# Patient Record
Sex: FEMALE | Race: BLACK OR AFRICAN AMERICAN | NOT HISPANIC OR LATINO | Employment: OTHER | URBAN - METROPOLITAN AREA
[De-identification: names, ages, dates, MRNs, and addresses within clinical notes are randomized per-mention and may not be internally consistent; named-entity substitution may affect disease eponyms.]

---

## 2017-11-08 ENCOUNTER — HOSPITAL ENCOUNTER (EMERGENCY)
Facility: HOSPITAL | Age: 77
Discharge: HOME | End: 2017-11-08
Attending: EMERGENCY MEDICINE
Payer: MEDICARE

## 2017-11-08 VITALS
DIASTOLIC BLOOD PRESSURE: 74 MMHG | BODY MASS INDEX: 30.22 KG/M2 | WEIGHT: 177 LBS | RESPIRATION RATE: 16 BRPM | OXYGEN SATURATION: 100 % | TEMPERATURE: 97 F | HEIGHT: 64 IN | HEART RATE: 63 BPM | SYSTOLIC BLOOD PRESSURE: 140 MMHG

## 2017-11-08 DIAGNOSIS — M10.9 ACUTE GOUT OF RIGHT FOOT, UNSPECIFIED CAUSE: ICD-10-CM

## 2017-11-08 DIAGNOSIS — M79.671 FOOT PAIN, RIGHT: ICD-10-CM

## 2017-11-08 DIAGNOSIS — L03.90 CELLULITIS, UNSPECIFIED CELLULITIS SITE: Primary | ICD-10-CM

## 2017-11-08 LAB
ANION GAP SERPL CALC-SCNC: 11 MMOL/L
BASOPHILS # BLD AUTO: 0.04 K/UL
BASOPHILS NFR BLD: 0.6 %
BUN SERPL-MCNC: 16 MG/DL
CALCIUM SERPL-MCNC: 9.1 MG/DL
CHLORIDE SERPL-SCNC: 104 MMOL/L
CO2 SERPL-SCNC: 23 MMOL/L
CREAT SERPL-MCNC: 0.9 MG/DL
CRP SERPL-MCNC: 59.1 MG/L
DIFFERENTIAL METHOD: ABNORMAL
EOSINOPHIL # BLD AUTO: 0.4 K/UL
EOSINOPHIL NFR BLD: 6.1 %
ERYTHROCYTE [DISTWIDTH] IN BLOOD BY AUTOMATED COUNT: 14.9 %
EST. GFR  (AFRICAN AMERICAN): >60 ML/MIN/1.73 M^2
EST. GFR  (NON AFRICAN AMERICAN): >60 ML/MIN/1.73 M^2
GLUCOSE SERPL-MCNC: 97 MG/DL
HCT VFR BLD AUTO: 31 %
HGB BLD-MCNC: 10 G/DL
LYMPHOCYTES # BLD AUTO: 0.9 K/UL
LYMPHOCYTES NFR BLD: 12.6 %
MCH RBC QN AUTO: 29.2 PG
MCHC RBC AUTO-ENTMCNC: 32.3 G/DL
MCV RBC AUTO: 90 FL
MONOCYTES # BLD AUTO: 0.8 K/UL
MONOCYTES NFR BLD: 11.7 %
NEUTROPHILS # BLD AUTO: 4.9 K/UL
NEUTROPHILS NFR BLD: 68.4 %
PLATELET # BLD AUTO: 88 K/UL
PMV BLD AUTO: 11.1 FL
POTASSIUM SERPL-SCNC: 3.7 MMOL/L
RBC # BLD AUTO: 3.43 M/UL
SODIUM SERPL-SCNC: 138 MMOL/L
URATE SERPL-MCNC: 6.9 MG/DL
WBC # BLD AUTO: 7.17 K/UL

## 2017-11-08 PROCEDURE — 84550 ASSAY OF BLOOD/URIC ACID: CPT

## 2017-11-08 PROCEDURE — 86140 C-REACTIVE PROTEIN: CPT

## 2017-11-08 PROCEDURE — 25000003 PHARM REV CODE 250: Performed by: EMERGENCY MEDICINE

## 2017-11-08 PROCEDURE — 99284 EMERGENCY DEPT VISIT MOD MDM: CPT

## 2017-11-08 PROCEDURE — 85025 COMPLETE CBC W/AUTO DIFF WBC: CPT

## 2017-11-08 PROCEDURE — 80048 BASIC METABOLIC PNL TOTAL CA: CPT

## 2017-11-08 RX ORDER — QUINAPRIL HYDROCHLORIDE AND HYDROCHLOROTHIAZIDE 20; 12.5 MG/1; MG/1
1 TABLET, FILM COATED ORAL DAILY
Status: ON HOLD | COMMUNITY
End: 2018-10-12 | Stop reason: HOSPADM

## 2017-11-08 RX ORDER — NAPROXEN 500 MG/1
500 TABLET ORAL 2 TIMES DAILY WITH MEALS
COMMUNITY
End: 2018-03-30

## 2017-11-08 RX ORDER — HYDROCODONE BITARTRATE AND ACETAMINOPHEN 5; 325 MG/1; MG/1
1 TABLET ORAL ONCE
Status: COMPLETED | OUTPATIENT
Start: 2017-11-08 | End: 2017-11-08

## 2017-11-08 RX ORDER — CLINDAMYCIN HYDROCHLORIDE 150 MG/1
300 CAPSULE ORAL 4 TIMES DAILY
Qty: 56 CAPSULE | Refills: 0 | Status: SHIPPED | OUTPATIENT
Start: 2017-11-08 | End: 2017-11-15

## 2017-11-08 RX ORDER — DEXTROSE 4 G
TABLET,CHEWABLE ORAL
COMMUNITY
Start: 2016-01-06

## 2017-11-08 RX ORDER — METFORMIN HYDROCHLORIDE 500 MG/1
500 TABLET ORAL
COMMUNITY
End: 2018-09-30

## 2017-11-08 RX ORDER — ATORVASTATIN CALCIUM 20 MG/1
20 TABLET, FILM COATED ORAL NIGHTLY
COMMUNITY

## 2017-11-08 RX ORDER — LANCETS
EACH MISCELLANEOUS
COMMUNITY
Start: 2017-08-14

## 2017-11-08 RX ORDER — HYDROCODONE BITARTRATE AND ACETAMINOPHEN 5; 325 MG/1; MG/1
1 TABLET ORAL EVERY 4 HOURS PRN
Qty: 10 TABLET | Refills: 0 | Status: SHIPPED | OUTPATIENT
Start: 2017-11-08 | End: 2018-03-30

## 2017-11-08 RX ADMIN — HYDROCODONE BITARTRATE AND ACETAMINOPHEN 1 TABLET: 5; 325 TABLET ORAL at 11:11

## 2017-11-08 NOTE — ED PROVIDER NOTES
Encounter Date: 11/8/2017       History     Chief Complaint   Patient presents with    Foot Pain     redness/swelling to left foot. denies injry.      The history is provided by the patient.   Foot Pain   This is a chronic problem. The current episode started more than 1 week ago. The problem occurs daily. The problem has been gradually worsening. Pertinent negatives include no chest pain, no abdominal pain, no headaches and no shortness of breath. The symptoms are aggravated by walking. The symptoms are relieved by rest. She has tried nothing for the symptoms. The treatment provided no relief.   Patient reports chronic swelling of medial aspect of 1st MCP bilaterally. Pt states the pain has increased to right great toe over last several days and reports increased redness and tenderness. Pt is currently on Thiazide diuretic.    Review of patient's allergies indicates:  No Known Allergies  Past Medical History:   Diagnosis Date    Diabetes mellitus     Hypercholesteremia     Hypertension      Past Surgical History:   Procedure Laterality Date    ABDOMINAL SURGERY       History reviewed. No pertinent family history.  Social History   Substance Use Topics    Smoking status: Never Smoker    Smokeless tobacco: Never Used    Alcohol use Yes      Comment: socially     Review of Systems   Respiratory: Negative for shortness of breath.    Cardiovascular: Negative for chest pain.   Gastrointestinal: Negative for abdominal pain.   Musculoskeletal:        Right foot pain   Neurological: Negative for headaches.   All other systems reviewed and are negative.      Physical Exam     Initial Vitals [11/08/17 1022]   BP Pulse Resp Temp SpO2   (!) 150/70 74 18 98.3 °F (36.8 °C) 97 %      MAP       96.67         Physical Exam    Nursing note and vitals reviewed.  Constitutional: She appears well-developed and well-nourished. No distress.   HENT:   Head: Normocephalic and atraumatic.   Mouth/Throat: Oropharynx is clear and  moist.   Eyes: Conjunctivae and EOM are normal. Pupils are equal, round, and reactive to light.   Neck: Normal range of motion. Neck supple.   Cardiovascular: Normal rate, regular rhythm and normal heart sounds.   Pulmonary/Chest: Breath sounds normal. No respiratory distress.   Abdominal: Soft. Bowel sounds are normal.   Musculoskeletal: Normal range of motion. She exhibits tenderness.        Right foot: There is tenderness and swelling. There is normal range of motion, no crepitus, no deformity and no laceration.        Left foot: There is swelling. There is normal range of motion, no tenderness, normal capillary refill, no crepitus, no deformity and no laceration.   Neurological: She is alert and oriented to person, place, and time. She has normal strength.   Skin: Skin is warm and dry.   Psychiatric: She has a normal mood and affect. Thought content normal.         ED Course   Procedures  Labs Reviewed   CBC W/ AUTO DIFFERENTIAL   BASIC METABOLIC PANEL   URIC ACID   C-REACTIVE PROTEIN     Results for orders placed or performed during the hospital encounter of 11/08/17   CBC auto differential   Result Value Ref Range    WBC 7.17 3.90 - 12.70 K/uL    RBC 3.43 (L) 4.00 - 5.40 M/uL    Hemoglobin 10.0 (L) 12.0 - 16.0 g/dL    Hematocrit 31.0 (L) 37.0 - 48.5 %    MCV 90 82 - 98 fL    MCH 29.2 27.0 - 31.0 pg    MCHC 32.3 32.0 - 36.0 g/dL    RDW 14.9 (H) 11.5 - 14.5 %    Platelets 88 (L) 150 - 350 K/uL    MPV 11.1 9.2 - 12.9 fL    Gran # 4.9 1.8 - 7.7 K/uL    Lymph # 0.9 (L) 1.0 - 4.8 K/uL    Mono # 0.8 0.3 - 1.0 K/uL    Eos # 0.4 0.0 - 0.5 K/uL    Baso # 0.04 0.00 - 0.20 K/uL    Gran% 68.4 38.0 - 73.0 %    Lymph% 12.6 (L) 18.0 - 48.0 %    Mono% 11.7 4.0 - 15.0 %    Eosinophil% 6.1 0.0 - 8.0 %    Basophil% 0.6 0.0 - 1.9 %    Differential Method Automated    Basic metabolic panel   Result Value Ref Range    Sodium 138 136 - 145 mmol/L    Potassium 3.7 3.5 - 5.1 mmol/L    Chloride 104 95 - 110 mmol/L    CO2 23 23 - 29  mmol/L    Glucose 97 70 - 110 mg/dL    BUN, Bld 16 8 - 23 mg/dL    Creatinine 0.9 0.5 - 1.4 mg/dL    Calcium 9.1 8.7 - 10.5 mg/dL    Anion Gap 11 8 - 16 mmol/L    eGFR if African American >60.0 >60 mL/min/1.73 m^2    eGFR if non African American >60.0 >60 mL/min/1.73 m^2   Uric acid   Result Value Ref Range    Uric Acid 6.9 (H) 2.4 - 5.7 mg/dL   C-reactive protein   Result Value Ref Range    CRP 59.1 (H) 0.0 - 8.2 mg/L     Imaging Results          X-Ray Foot Complete Right (Final result)  Result time 11/08/17 11:58:17    Final result by SHERRI Lopez Sr., MD (11/08/17 11:58:17)                 Impression:      1. There is mild hallux valgus deformity. There is mild prominence of the soft tissue thickness medial to the first metatarsophalangeal joint. This is characteristic of a bunion.  2. There is mild prominence of the soft tissue thickness in the dorsum of the right foot. There is moderate prominence of the soft tissue thickness lateral to the right ankle.  This is characteristic of a soft tissue contusion or cellulitis.  3. There is a small spur at the site of attachment of the plantar fascia to the calcaneus. There is a small spur at the site of attachment of the Achilles tendon to the calcaneus.   4. There is a 1 mm oval-shaped calcification projected medial to the distal aspect of the first metatarsal. This is characteristic of prior trauma.      Electronically signed by: SHERRI LOPEZ MD  Date:     11/08/17  Time:    11:58              Narrative:    3 view x-ray of the right foot    Clinical History:     Pain in right foot    Findings:     There is no fracture. There is no dislocation. There is mild hallux valgus deformity. There is a 1 mm oval-shaped calcification projected medial to the distal aspect of the first metatarsal. There is mild prominence of the soft tissue thickness medial to the first metatarsophalangeal joint. There is a small spur at the site of attachment of the plantar fascia to the  calcaneus. There is a small spur at the site of attachment of the Achilles tendon to the calcaneus. There is mild prominence of the soft tissue thickness in the dorsum of the right foot. There is moderate prominence of the soft tissue thickness lateral to the right ankle.                            12:30 PM - Counseling: Spoke with the patient and discussed todays findings, in addition to providing specific details for the plan of care and counseling regarding the diagnosis and prognosis. Questions are answered at this time. I discussed results with patient and need to follow up with PCP, regarding evaluation for Gout and presumed tx for cellulitis in Diabetic pt with findings c/w Gout on a Thiazide diuretic.                               ED Course      Clinical Impression:   The primary encounter diagnosis was Cellulitis, unspecified cellulitis site. Diagnoses of Foot pain, right and Acute gout of right foot, unspecified cause were also pertinent to this visit.    Disposition:   Disposition: Discharged  Condition: Stable                        Harpreet Mcnamara MD  11/08/17 6738

## 2018-03-30 ENCOUNTER — HOSPITAL ENCOUNTER (EMERGENCY)
Facility: HOSPITAL | Age: 78
Discharge: HOME | End: 2018-03-30
Attending: EMERGENCY MEDICINE
Payer: MEDICARE

## 2018-03-30 VITALS
SYSTOLIC BLOOD PRESSURE: 150 MMHG | BODY MASS INDEX: 28.67 KG/M2 | HEART RATE: 72 BPM | TEMPERATURE: 98 F | DIASTOLIC BLOOD PRESSURE: 67 MMHG | WEIGHT: 167 LBS | RESPIRATION RATE: 15 BRPM | OXYGEN SATURATION: 100 %

## 2018-03-30 DIAGNOSIS — R10.9 ABDOMINAL PAIN: Primary | ICD-10-CM

## 2018-03-30 DIAGNOSIS — K52.9 GASTROENTERITIS, INFECTIOUS, PRESUMED: ICD-10-CM

## 2018-03-30 LAB
ALBUMIN SERPL BCP-MCNC: 4.1 G/DL
ALP SERPL-CCNC: 80 U/L
ALT SERPL W/O P-5'-P-CCNC: 13 U/L
ANION GAP SERPL CALC-SCNC: 12 MMOL/L
AST SERPL-CCNC: 20 U/L
BASOPHILS # BLD AUTO: 0.02 K/UL
BASOPHILS NFR BLD: 0.3 %
BILIRUB SERPL-MCNC: 0.5 MG/DL
BILIRUB UR QL STRIP: NEGATIVE
BUN SERPL-MCNC: 18 MG/DL
CALCIUM SERPL-MCNC: 9.3 MG/DL
CHLORIDE SERPL-SCNC: 102 MMOL/L
CK MB SERPL-MCNC: 3.8 NG/ML
CK MB SERPL-RTO: 1.2 %
CK SERPL-CCNC: 313 U/L
CK SERPL-CCNC: 313 U/L
CLARITY UR REFRACT.AUTO: CLEAR
CO2 SERPL-SCNC: 22 MMOL/L
COLOR UR AUTO: YELLOW
CREAT SERPL-MCNC: 1 MG/DL
DIFFERENTIAL METHOD: ABNORMAL
EOSINOPHIL # BLD AUTO: 0.3 K/UL
EOSINOPHIL NFR BLD: 3.9 %
ERYTHROCYTE [DISTWIDTH] IN BLOOD BY AUTOMATED COUNT: 15.4 %
EST. GFR  (AFRICAN AMERICAN): >60 ML/MIN/1.73 M^2
EST. GFR  (NON AFRICAN AMERICAN): 54.9 ML/MIN/1.73 M^2
GLUCOSE SERPL-MCNC: 126 MG/DL
GLUCOSE UR QL STRIP: NEGATIVE
HCT VFR BLD AUTO: 30.9 %
HGB BLD-MCNC: 9.8 G/DL
HGB UR QL STRIP: NEGATIVE
KETONES UR QL STRIP: NEGATIVE
LEUKOCYTE ESTERASE UR QL STRIP: NEGATIVE
LIPASE SERPL-CCNC: 47 U/L
LYMPHOCYTES # BLD AUTO: 0.9 K/UL
LYMPHOCYTES NFR BLD: 10.8 %
MCH RBC QN AUTO: 27 PG
MCHC RBC AUTO-ENTMCNC: 31.7 G/DL
MCV RBC AUTO: 85 FL
MONOCYTES # BLD AUTO: 0.5 K/UL
MONOCYTES NFR BLD: 6.8 %
NEUTROPHILS # BLD AUTO: 6.2 K/UL
NEUTROPHILS NFR BLD: 77.8 %
NITRITE UR QL STRIP: NEGATIVE
PH UR STRIP: 6 [PH] (ref 5–8)
PLATELET # BLD AUTO: 210 K/UL
PMV BLD AUTO: 11 FL
POTASSIUM SERPL-SCNC: 3.3 MMOL/L
PROT SERPL-MCNC: 7.5 G/DL
PROT UR QL STRIP: NEGATIVE
RBC # BLD AUTO: 3.63 M/UL
SODIUM SERPL-SCNC: 136 MMOL/L
SP GR UR STRIP: 1.02 (ref 1–1.03)
TROPONIN I SERPL DL<=0.01 NG/ML-MCNC: <0.006 NG/ML
URN SPEC COLLECT METH UR: NORMAL
UROBILINOGEN UR STRIP-ACNC: NEGATIVE EU/DL
WBC # BLD AUTO: 7.93 K/UL

## 2018-03-30 PROCEDURE — 80053 COMPREHEN METABOLIC PANEL: CPT

## 2018-03-30 PROCEDURE — 99900035 HC TECH TIME PER 15 MIN (STAT)

## 2018-03-30 PROCEDURE — 99285 EMERGENCY DEPT VISIT HI MDM: CPT | Mod: 25

## 2018-03-30 PROCEDURE — 85025 COMPLETE CBC W/AUTO DIFF WBC: CPT

## 2018-03-30 PROCEDURE — 93010 ELECTROCARDIOGRAM REPORT: CPT | Mod: ,,, | Performed by: NUCLEAR MEDICINE

## 2018-03-30 PROCEDURE — 63600175 PHARM REV CODE 636 W HCPCS: Performed by: EMERGENCY MEDICINE

## 2018-03-30 PROCEDURE — 96365 THER/PROPH/DIAG IV INF INIT: CPT

## 2018-03-30 PROCEDURE — 82553 CREATINE MB FRACTION: CPT

## 2018-03-30 PROCEDURE — 83690 ASSAY OF LIPASE: CPT

## 2018-03-30 PROCEDURE — 81003 URINALYSIS AUTO W/O SCOPE: CPT

## 2018-03-30 PROCEDURE — 25000003 PHARM REV CODE 250: Performed by: EMERGENCY MEDICINE

## 2018-03-30 PROCEDURE — 93005 ELECTROCARDIOGRAM TRACING: CPT

## 2018-03-30 PROCEDURE — 96375 TX/PRO/DX INJ NEW DRUG ADDON: CPT

## 2018-03-30 PROCEDURE — 84484 ASSAY OF TROPONIN QUANT: CPT

## 2018-03-30 RX ORDER — PANTOPRAZOLE SODIUM 20 MG/1
20 TABLET, DELAYED RELEASE ORAL DAILY
Qty: 30 TABLET | Refills: 0 | Status: SHIPPED | OUTPATIENT
Start: 2018-03-30 | End: 2018-09-30

## 2018-03-30 RX ORDER — HYDROMORPHONE HYDROCHLORIDE 1 MG/ML
0.5 INJECTION, SOLUTION INTRAMUSCULAR; INTRAVENOUS; SUBCUTANEOUS
Status: COMPLETED | OUTPATIENT
Start: 2018-03-30 | End: 2018-03-30

## 2018-03-30 RX ORDER — ONDANSETRON 2 MG/ML
4 INJECTION INTRAMUSCULAR; INTRAVENOUS
Status: COMPLETED | OUTPATIENT
Start: 2018-03-30 | End: 2018-03-30

## 2018-03-30 RX ORDER — ONDANSETRON 4 MG/1
4 TABLET, ORALLY DISINTEGRATING ORAL EVERY 12 HOURS PRN
Qty: 6 TABLET | Refills: 1 | Status: SHIPPED | OUTPATIENT
Start: 2018-03-30 | End: 2018-09-30

## 2018-03-30 RX ORDER — PANTOPRAZOLE SODIUM 40 MG/1
40 TABLET, DELAYED RELEASE ORAL
Status: COMPLETED | OUTPATIENT
Start: 2018-03-30 | End: 2018-03-30

## 2018-03-30 RX ORDER — COLCHICINE 0.6 MG/1
0.6 TABLET ORAL DAILY
COMMUNITY

## 2018-03-30 RX ADMIN — HYDROMORPHONE HYDROCHLORIDE 0.5 MG: 1 INJECTION, SOLUTION INTRAMUSCULAR; INTRAVENOUS; SUBCUTANEOUS at 09:03

## 2018-03-30 RX ADMIN — ONDANSETRON 4 MG: 2 INJECTION INTRAMUSCULAR; INTRAVENOUS at 08:03

## 2018-03-30 RX ADMIN — PANTOPRAZOLE SODIUM 40 MG: 40 TABLET, DELAYED RELEASE ORAL at 10:03

## 2018-03-30 RX ADMIN — PROMETHAZINE HYDROCHLORIDE 6.25 MG: 25 INJECTION INTRAMUSCULAR; INTRAVENOUS at 08:03

## 2018-03-31 NOTE — ED NOTES
Pt is aaoX4, resp e/u, skin warm and dry, nad. Pt has been updated on test results and poc, and she has verbalized understanding. Pt denies having any further questions or concerns at this time. Pt will be discharged per md order.

## 2018-03-31 NOTE — ED PROVIDER NOTES
"Encounter Date: 3/30/2018       History     Chief Complaint   Patient presents with    Abdominal Pain     Pt states "stomach pains that started today". LBM today.     Abdominal pain, nausea - here with daughter-in-law; onset midday today; describes generalized anterior and mid abdominal discomfort with a mild increase in stool frequency but no actual diarrhea.  She has had some nausea but no vomiting.  No fever, chills, chest pain, shortness of breath, urinary symptoms, back pain, or other complaints.  No known ill contacts.  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  She does remember feeling some mild sweats earlier today.  No prior such episodes.  No other complaints.      The history is provided by the patient and a relative. No  was used.     Review of patient's allergies indicates:  No Known Allergies  Past Medical History:   Diagnosis Date    Diabetes mellitus     Gout     Hypercholesteremia     Hypertension      Past Surgical History:   Procedure Laterality Date    ABDOMINAL SURGERY       History reviewed. No pertinent family history.  Social History   Substance Use Topics    Smoking status: Never Smoker    Smokeless tobacco: Never Used    Alcohol use Yes      Comment: socially     Review of Systems   Constitutional: Negative for activity change, fatigue and fever.   HENT: Negative for congestion, ear pain, facial swelling, nosebleeds, sinus pressure and sore throat.    Eyes: Negative for pain, discharge, redness and visual disturbance.   Respiratory: Negative for cough, choking, chest tightness, shortness of breath and wheezing.    Cardiovascular: Negative for chest pain, palpitations and leg swelling.   Gastrointestinal: Positive for abdominal pain and nausea. Negative for abdominal distention and vomiting.   Endocrine: Negative for heat intolerance, polydipsia and polyuria.   Genitourinary: Negative for difficulty " urinating, dysuria, flank pain, hematuria and urgency.   Musculoskeletal: Negative for back pain, gait problem, joint swelling and myalgias.   Skin: Negative for color change and rash.   Allergic/Immunologic: Negative for environmental allergies and food allergies.   Neurological: Negative for dizziness, weakness, numbness and headaches.   Hematological: Negative for adenopathy. Does not bruise/bleed easily.   Psychiatric/Behavioral: Negative for agitation and behavioral problems. The patient is not nervous/anxious.    All other systems reviewed and are negative.      Physical Exam     Initial Vitals [03/30/18 1921]   BP Pulse Resp Temp SpO2   (!) 170/79 68 20 97.5 °F (36.4 °C) 100 %      MAP       109.33         Physical Exam    Nursing note and vitals reviewed.  Constitutional: She appears well-developed and well-nourished. She is not diaphoretic. No distress.   Slightly uncomfortable   HENT:   Head: Normocephalic and atraumatic.   Mouth/Throat: No oropharyngeal exudate.   Eyes: Conjunctivae and EOM are normal. Pupils are equal, round, and reactive to light. Right eye exhibits no discharge. Left eye exhibits no discharge. No scleral icterus.   Neck: Normal range of motion. Neck supple. No thyromegaly present. No tracheal deviation present. No JVD present.   Cardiovascular: Normal rate, regular rhythm, normal heart sounds and intact distal pulses. Exam reveals no gallop and no friction rub.    No murmur heard.  Pulmonary/Chest: Breath sounds normal. No stridor. No respiratory distress. She has no wheezes. She has no rhonchi. She has no rales. She exhibits no tenderness.   Abdominal: Soft. Bowel sounds are normal. She exhibits no distension and no mass. There is no tenderness. There is no rebound and no guarding.   Indicates diffuse anterior/ upper/ mid-abdominal region but relatively poorly localizing, no rebound/ guarding; no distension; normal BS   Musculoskeletal: Normal range of motion. She exhibits no edema or  tenderness.   Neurological: She is alert and oriented to person, place, and time. She has normal strength.   Skin: Skin is warm and dry. No rash and no abscess noted. No erythema.   Psychiatric: She has a normal mood and affect. Her behavior is normal. Judgment and thought content normal.         ED Course   Procedures  Labs Reviewed   CBC W/ AUTO DIFFERENTIAL - Abnormal; Notable for the following:        Result Value    RBC 3.63 (*)     Hemoglobin 9.8 (*)     Hematocrit 30.9 (*)     MCHC 31.7 (*)     RDW 15.4 (*)     Lymph # 0.9 (*)     Gran% 77.8 (*)     Lymph% 10.8 (*)     All other components within normal limits   COMPREHENSIVE METABOLIC PANEL - Abnormal; Notable for the following:     Potassium 3.3 (*)     CO2 22 (*)     Glucose 126 (*)     eGFR if non  54.9 (*)     All other components within normal limits   CK - Abnormal; Notable for the following:      (*)     All other components within normal limits   CK-MB - Abnormal; Notable for the following:      (*)     All other components within normal limits   LIPASE   URINALYSIS   TROPONIN I     EKG Readings: (Independently Interpreted)   Rhythm: Normal Sinus Rhythm. Heart Rate: 65. Ectopy: No Ectopy. Conduction: Normal. ST Segments: Normal ST Segments. T Waves: Normal. Clinical Impression: Normal Sinus Rhythm     Imaging Results          US Abdomen Limited (Final result)  Result time 03/30/18 21:53:47    Final result by Driss Aquino MD (03/30/18 21:53:47)                 Impression:     Negative gallbladder ultrasound.      Electronically signed by: DRISS AQUINO MD  Date:     03/30/18  Time:    21:53              Narrative:    Gallbladder ultrasound. 03/30/18 21:25:00    Clinical history: Gallstones/ abdominal pain ,  Abdominal pain.    The liver is unremarkable. The gallbladder is empty. The common bile duct is normal at 0.22 cm cm. The pancreas is normal. The right kidney is normal at 10 point cm.  The aorta, vena cava,and  portal vein as visualized appear free of significant abnormality.                             X-Ray Chest PA And Lateral (Final result)  Result time 03/30/18 20:06:12    Final result by Driss Aquino MD (03/30/18 20:06:12)                 Impression:         Negative two-view chest x-ray.      Electronically signed by: DRISS AQUINO MD  Date:     03/30/18  Time:    20:06              Narrative:    Two-view chest x-ray.03/30/18 20:01:35    Clinical indication: Unspecified abdominal pain.    Heart size is normal. The lung fields are clear. No acute pulmonary infiltrate.                             X-Ray Abdomen Flat And Erect (Final result)  Result time 03/30/18 20:05:22    Final result by Driss Aquino MD (03/30/18 20:05:22)                 Impression:     The bowel gas pattern is unremarkable.  Gallstones are present in the right upper quadrant.      Electronically signed by: DRISS AQUINO MD  Date:     03/30/18  Time:    20:05              Narrative:    Two-view abdominal x-ray, 03/30/18 19:42:40    History:    Abdominal Pain     Two views of the abdomen. The bowel gas pattern is unremarkable. No obstruction, ileus or free air.    Bony structures are intact with chronic spurring and degenerative change of the spine.  Rounded calcifications in the right upper quadrant have the appearance of gallstones measuring up to 1.5 cm.                            8:48 PM Nausea improved, abdominal pain slightly improved, asking for pain medicine.  She was not previously aware of gallstones.  Await ultrasound.  Exam is stable.    10:06 PM Discussed with radiologist.  No gallstones seen on ultrasound.  On review of abdominal plain films, the calcifications are now felt to be soft tissue benign calcifications and not gallstones.                            Clinical Impression:     1. Abdominal pain    2. Gastroenteritis, infectious, presumed          Disposition:   Disposition: Discharged  Condition:  Stable                        Kade Ortega MD  03/30/18 7843

## 2018-03-31 NOTE — DISCHARGE INSTRUCTIONS
__________________    Exam and tests are fine.    See your MD for a recheck Monday or Tuesday.    Return to the ER as needed/ if worse.    _________________

## 2018-04-16 ENCOUNTER — TELEPHONE (OUTPATIENT)
Dept: RADIOLOGY | Facility: HOSPITAL | Age: 78
End: 2018-04-16

## 2018-04-17 ENCOUNTER — HOSPITAL ENCOUNTER (OUTPATIENT)
Dept: RADIOLOGY | Facility: HOSPITAL | Age: 78
Discharge: HOME | End: 2018-04-17
Attending: INTERNAL MEDICINE
Payer: MEDICARE

## 2018-04-17 DIAGNOSIS — R10.84 GENERALIZED ABDOMINAL PAIN: ICD-10-CM

## 2018-04-17 DIAGNOSIS — D72.825 BANDEMIA: ICD-10-CM

## 2018-04-17 PROCEDURE — 74176 CT ABD & PELVIS W/O CONTRAST: CPT | Mod: TC,PO

## 2018-04-17 PROCEDURE — 25500020 PHARM REV CODE 255: Mod: PO

## 2018-04-17 PROCEDURE — 74176 CT ABD & PELVIS W/O CONTRAST: CPT | Mod: 26,,, | Performed by: RADIOLOGY

## 2018-04-17 RX ADMIN — IOHEXOL 30 ML: 350 INJECTION, SOLUTION INTRAVENOUS at 09:04

## 2018-08-10 ENCOUNTER — TELEPHONE (OUTPATIENT)
Dept: RADIOLOGY | Facility: HOSPITAL | Age: 78
End: 2018-08-10

## 2018-08-13 ENCOUNTER — HOSPITAL ENCOUNTER (OUTPATIENT)
Dept: RADIOLOGY | Facility: HOSPITAL | Age: 78
Discharge: HOME | End: 2018-08-13
Attending: PHYSICIAN ASSISTANT
Payer: MEDICARE

## 2018-08-13 DIAGNOSIS — R93.2 ABNORMAL FINDINGS ON DIAGNOSTIC IMAGING OF LIVER: ICD-10-CM

## 2018-08-13 DIAGNOSIS — D50.9 IRON DEFICIENCY ANEMIA: ICD-10-CM

## 2018-08-13 PROCEDURE — 76705 ECHO EXAM OF ABDOMEN: CPT | Mod: 26,,, | Performed by: RADIOLOGY

## 2018-08-13 PROCEDURE — 76705 ECHO EXAM OF ABDOMEN: CPT | Mod: TC,PO

## 2018-09-30 ENCOUNTER — HOSPITAL ENCOUNTER (EMERGENCY)
Facility: HOSPITAL | Age: 78
Discharge: HOME | End: 2018-09-30
Attending: EMERGENCY MEDICINE
Payer: MEDICARE

## 2018-09-30 VITALS
RESPIRATION RATE: 18 BRPM | HEART RATE: 84 BPM | TEMPERATURE: 98 F | DIASTOLIC BLOOD PRESSURE: 84 MMHG | BODY MASS INDEX: 24.22 KG/M2 | WEIGHT: 141.13 LBS | SYSTOLIC BLOOD PRESSURE: 135 MMHG | OXYGEN SATURATION: 97 %

## 2018-09-30 DIAGNOSIS — A08.4 VIRAL GASTROENTERITIS: Primary | ICD-10-CM

## 2018-09-30 LAB
ALBUMIN SERPL BCP-MCNC: 4.2 G/DL
ALP SERPL-CCNC: 92 U/L
ALT SERPL W/O P-5'-P-CCNC: 12 U/L
ANION GAP SERPL CALC-SCNC: 15 MMOL/L
AST SERPL-CCNC: 18 U/L
BASOPHILS # BLD AUTO: 0.01 K/UL
BASOPHILS NFR BLD: 0.1 %
BILIRUB SERPL-MCNC: 0.8 MG/DL
BILIRUB UR QL STRIP: ABNORMAL
BUN SERPL-MCNC: 15 MG/DL
CALCIUM SERPL-MCNC: 10.3 MG/DL
CHLORIDE SERPL-SCNC: 98 MMOL/L
CLARITY UR REFRACT.AUTO: CLEAR
CO2 SERPL-SCNC: 23 MMOL/L
COLOR UR AUTO: YELLOW
CREAT SERPL-MCNC: 1.1 MG/DL
DIFFERENTIAL METHOD: ABNORMAL
EOSINOPHIL # BLD AUTO: 0 K/UL
EOSINOPHIL NFR BLD: 0.1 %
ERYTHROCYTE [DISTWIDTH] IN BLOOD BY AUTOMATED COUNT: 17.9 %
EST. GFR  (AFRICAN AMERICAN): 56.4 ML/MIN/1.73 M^2
EST. GFR  (NON AFRICAN AMERICAN): 48.9 ML/MIN/1.73 M^2
GLUCOSE SERPL-MCNC: 158 MG/DL
GLUCOSE UR QL STRIP: NEGATIVE
HCT VFR BLD AUTO: 37.8 %
HGB BLD-MCNC: 12.7 G/DL
HGB UR QL STRIP: NEGATIVE
KETONES UR QL STRIP: NEGATIVE
LEUKOCYTE ESTERASE UR QL STRIP: NEGATIVE
LIPASE SERPL-CCNC: 32 U/L
LYMPHOCYTES # BLD AUTO: 0.5 K/UL
LYMPHOCYTES NFR BLD: 3.9 %
MCH RBC QN AUTO: 28.5 PG
MCHC RBC AUTO-ENTMCNC: 33.6 G/DL
MCV RBC AUTO: 85 FL
MONOCYTES # BLD AUTO: 0.3 K/UL
MONOCYTES NFR BLD: 1.8 %
NEUTROPHILS # BLD AUTO: 12.8 K/UL
NEUTROPHILS NFR BLD: 93.8 %
NITRITE UR QL STRIP: NEGATIVE
PH UR STRIP: 5 [PH] (ref 5–8)
PLATELET # BLD AUTO: 322 K/UL
PMV BLD AUTO: 10.5 FL
POCT GLUCOSE: 151 MG/DL (ref 70–110)
POTASSIUM SERPL-SCNC: 3.4 MMOL/L
PROT SERPL-MCNC: 8 G/DL
PROT UR QL STRIP: NEGATIVE
RBC # BLD AUTO: 4.46 M/UL
SODIUM SERPL-SCNC: 136 MMOL/L
SP GR UR STRIP: >=1.03 (ref 1–1.03)
URN SPEC COLLECT METH UR: ABNORMAL
UROBILINOGEN UR STRIP-ACNC: NEGATIVE EU/DL
WBC # BLD AUTO: 13.62 K/UL

## 2018-09-30 PROCEDURE — 63600175 PHARM REV CODE 636 W HCPCS: Performed by: EMERGENCY MEDICINE

## 2018-09-30 PROCEDURE — 81003 URINALYSIS AUTO W/O SCOPE: CPT

## 2018-09-30 PROCEDURE — 80053 COMPREHEN METABOLIC PANEL: CPT

## 2018-09-30 PROCEDURE — 99284 EMERGENCY DEPT VISIT MOD MDM: CPT | Mod: 25

## 2018-09-30 PROCEDURE — 96374 THER/PROPH/DIAG INJ IV PUSH: CPT

## 2018-09-30 PROCEDURE — 83690 ASSAY OF LIPASE: CPT

## 2018-09-30 PROCEDURE — 85025 COMPLETE CBC W/AUTO DIFF WBC: CPT

## 2018-09-30 PROCEDURE — 25000003 PHARM REV CODE 250: Performed by: EMERGENCY MEDICINE

## 2018-09-30 PROCEDURE — 82962 GLUCOSE BLOOD TEST: CPT

## 2018-09-30 RX ORDER — DIPHENOXYLATE HYDROCHLORIDE AND ATROPINE SULFATE 2.5; .025 MG/1; MG/1
1-2 TABLET ORAL 4 TIMES DAILY PRN
Qty: 8 TABLET | Refills: 1 | OUTPATIENT
Start: 2018-09-30 | End: 2018-10-12 | Stop reason: HOSPADM

## 2018-09-30 RX ORDER — FERROUS SULFATE 325(65) MG
325 TABLET, DELAYED RELEASE (ENTERIC COATED) ORAL
Status: ON HOLD | COMMUNITY
End: 2018-10-12 | Stop reason: HOSPADM

## 2018-09-30 RX ORDER — ONDANSETRON 2 MG/ML
4 INJECTION INTRAMUSCULAR; INTRAVENOUS
Status: COMPLETED | OUTPATIENT
Start: 2018-09-30 | End: 2018-09-30

## 2018-09-30 RX ORDER — OMEPRAZOLE 40 MG/1
40 CAPSULE, DELAYED RELEASE ORAL DAILY
COMMUNITY

## 2018-09-30 RX ORDER — DIPHENOXYLATE HYDROCHLORIDE AND ATROPINE SULFATE 2.5; .025 MG/1; MG/1
2 TABLET ORAL
Status: COMPLETED | OUTPATIENT
Start: 2018-09-30 | End: 2018-09-30

## 2018-09-30 RX ORDER — ONDANSETRON 4 MG/1
4 TABLET, ORALLY DISINTEGRATING ORAL EVERY 6 HOURS PRN
Qty: 6 TABLET | Refills: 1 | Status: SHIPPED | OUTPATIENT
Start: 2018-09-30 | End: 2018-10-16

## 2018-09-30 RX ORDER — PROMETHAZINE HYDROCHLORIDE 25 MG/1
25 TABLET ORAL
Status: COMPLETED | OUTPATIENT
Start: 2018-09-30 | End: 2018-09-30

## 2018-09-30 RX ORDER — DICYCLOMINE HYDROCHLORIDE 10 MG/1
10 CAPSULE ORAL 3 TIMES DAILY PRN
COMMUNITY

## 2018-09-30 RX ADMIN — ONDANSETRON 4 MG: 2 INJECTION, SOLUTION INTRAMUSCULAR; INTRAVENOUS at 02:09

## 2018-09-30 RX ADMIN — PROMETHAZINE HYDROCHLORIDE 25 MG: 25 TABLET ORAL at 03:09

## 2018-09-30 RX ADMIN — SODIUM CHLORIDE 1000 ML: 0.9 INJECTION, SOLUTION INTRAVENOUS at 02:09

## 2018-09-30 RX ADMIN — DIPHENOXYLATE HYDROCHLORIDE AND ATROPINE SULFATE 2 TABLET: 2.5; .025 TABLET ORAL at 02:09

## 2018-09-30 NOTE — ED PROVIDER NOTES
"Encounter Date: 9/30/2018       History     Chief Complaint   Patient presents with    Diarrhea     Pt states "diarrhea and throwing up".     Nausea, vomiting, and diarrhea on and off since yesterday, no known ill contacts.  Blood sugar reported under good control.  No fever or urinary symptoms.  No chest pain or dyspnea.  No sign of bleeding.  No back pain or abdominal pain. No other complaints.      The history is provided by the patient and a relative. No  was used.     Review of patient's allergies indicates:  No Known Allergies  Past Medical History:   Diagnosis Date    Diabetes mellitus     Gout     Hypercholesteremia     Hypertension      Past Surgical History:   Procedure Laterality Date    ABDOMINAL SURGERY       History reviewed. No pertinent family history.  Social History     Tobacco Use    Smoking status: Never Smoker    Smokeless tobacco: Never Used   Substance Use Topics    Alcohol use: Yes     Comment: socially    Drug use: No     Review of Systems   Constitutional: Negative for activity change, fatigue and fever.   HENT: Negative for congestion, ear pain, facial swelling, nosebleeds, sinus pressure and sore throat.    Eyes: Negative for pain, discharge, redness and visual disturbance.   Respiratory: Negative for cough, choking, chest tightness, shortness of breath and wheezing.    Cardiovascular: Negative for chest pain, palpitations and leg swelling.   Gastrointestinal: Positive for diarrhea, nausea and vomiting. Negative for abdominal distention and abdominal pain.   Endocrine: Negative for heat intolerance, polydipsia and polyuria.   Genitourinary: Negative for difficulty urinating, dysuria, flank pain, hematuria and urgency.   Musculoskeletal: Negative for back pain, gait problem, joint swelling and myalgias.   Skin: Negative for color change and rash.   Allergic/Immunologic: Negative for environmental allergies and food allergies.   Neurological: Negative for " dizziness, weakness, numbness and headaches.   Hematological: Negative for adenopathy. Does not bruise/bleed easily.   Psychiatric/Behavioral: Negative for agitation and behavioral problems. The patient is not nervous/anxious.    All other systems reviewed and are negative.      Physical Exam     Initial Vitals [09/30/18 0214]   BP Pulse Resp Temp SpO2   (!) 140/87 94 18 98.2 °F (36.8 °C) 100 %      MAP       --         Physical Exam    Nursing note and vitals reviewed.  Constitutional: She appears well-developed and well-nourished. She is not diaphoretic. No distress.   Mild distress/ gagging   HENT:   Head: Normocephalic and atraumatic.   Mouth/Throat: No oropharyngeal exudate.   Eyes: Conjunctivae and EOM are normal. Pupils are equal, round, and reactive to light. Right eye exhibits no discharge. Left eye exhibits no discharge. No scleral icterus.   Neck: Normal range of motion. Neck supple. No thyromegaly present. No tracheal deviation present. No JVD present.   Cardiovascular: Normal rate, regular rhythm, normal heart sounds and intact distal pulses. Exam reveals no gallop and no friction rub.    No murmur heard.  Pulmonary/Chest: Breath sounds normal. No stridor. No respiratory distress. She has no wheezes. She has no rhonchi. She has no rales. She exhibits no tenderness.   Abdominal: Soft. Bowel sounds are normal. She exhibits no distension and no mass. There is no tenderness. There is no rebound and no guarding.   Musculoskeletal: Normal range of motion. She exhibits no edema or tenderness.   Neurological: She is alert and oriented to person, place, and time. She has normal strength.   Skin: Skin is warm and dry. No rash and no abscess noted. No erythema.   Psychiatric: She has a normal mood and affect. Her behavior is normal. Judgment and thought content normal.         ED Course   Procedures  Labs Reviewed   CBC W/ AUTO DIFFERENTIAL - Abnormal; Notable for the following components:       Result Value     WBC 13.62 (*)     RDW 17.9 (*)     Gran # (ANC) 12.8 (*)     Lymph # 0.5 (*)     Gran% 93.8 (*)     Lymph% 3.9 (*)     Mono% 1.8 (*)     All other components within normal limits   COMPREHENSIVE METABOLIC PANEL - Abnormal; Notable for the following components:    Potassium 3.4 (*)     Glucose 158 (*)     eGFR if  56.4 (*)     eGFR if non  48.9 (*)     All other components within normal limits   LIPASE   URINALYSIS, REFLEX TO URINE CULTURE   POCT GLUCOSE MONITORING CONTINUOUS          Imaging Results          X-Ray Abdomen Flat And Erect (In process)               3:26 AM Improved; abdomen benign. Plain films show calcified gallstones, NAF. No diarrhea. Asking for a little more nausea medicine before going home.                            Clinical Impression:     1. Viral gastroenteritis          Disposition:   Condition: Stable                        Kade Ortega MD  09/30/18 0323

## 2018-10-11 ENCOUNTER — HOSPITAL ENCOUNTER (INPATIENT)
Facility: HOSPITAL | Age: 78
LOS: 1 days | Discharge: HOME | DRG: 395 | End: 2018-10-12
Attending: EMERGENCY MEDICINE | Admitting: HOSPITALIST
Payer: MEDICARE

## 2018-10-11 DIAGNOSIS — R11.2 NAUSEA & VOMITING: ICD-10-CM

## 2018-10-11 DIAGNOSIS — K43.6 VENTRAL HERNIA WITH OBSTRUCTION AND WITHOUT GANGRENE: Primary | ICD-10-CM

## 2018-10-11 LAB
ALBUMIN SERPL BCP-MCNC: 4.3 G/DL
ALP SERPL-CCNC: 75 U/L
ALT SERPL W/O P-5'-P-CCNC: 8 U/L
ANION GAP SERPL CALC-SCNC: 17 MMOL/L
AST SERPL-CCNC: 15 U/L
BASOPHILS # BLD AUTO: 0.03 K/UL
BASOPHILS NFR BLD: 0.3 %
BILIRUB SERPL-MCNC: 0.7 MG/DL
BUN SERPL-MCNC: 23 MG/DL
CALCIUM SERPL-MCNC: 10.7 MG/DL
CHLORIDE SERPL-SCNC: 101 MMOL/L
CO2 SERPL-SCNC: 21 MMOL/L
CREAT SERPL-MCNC: 1.4 MG/DL
DIFFERENTIAL METHOD: ABNORMAL
EOSINOPHIL # BLD AUTO: 0.1 K/UL
EOSINOPHIL NFR BLD: 1.4 %
ERYTHROCYTE [DISTWIDTH] IN BLOOD BY AUTOMATED COUNT: 16.5 %
EST. GFR  (AFRICAN AMERICAN): 42.1 ML/MIN/1.73 M^2
EST. GFR  (NON AFRICAN AMERICAN): 36.5 ML/MIN/1.73 M^2
GLUCOSE SERPL-MCNC: 130 MG/DL
HCT VFR BLD AUTO: 39.5 %
HGB BLD-MCNC: 13.1 G/DL
LACTATE SERPL-SCNC: 1.2 MMOL/L
LIPASE SERPL-CCNC: 60 U/L
LYMPHOCYTES # BLD AUTO: 0.9 K/UL
LYMPHOCYTES NFR BLD: 8.7 %
MCH RBC QN AUTO: 28.4 PG
MCHC RBC AUTO-ENTMCNC: 33.2 G/DL
MCV RBC AUTO: 86 FL
MONOCYTES # BLD AUTO: 0.6 K/UL
MONOCYTES NFR BLD: 6.3 %
NEUTROPHILS # BLD AUTO: 8.4 K/UL
NEUTROPHILS NFR BLD: 83.2 %
PLATELET # BLD AUTO: 284 K/UL
PMV BLD AUTO: 11.3 FL
POCT GLUCOSE: 129 MG/DL (ref 70–110)
POTASSIUM SERPL-SCNC: 3.6 MMOL/L
PROT SERPL-MCNC: 8 G/DL
RBC # BLD AUTO: 4.62 M/UL
SODIUM SERPL-SCNC: 139 MMOL/L
TROPONIN I SERPL DL<=0.01 NG/ML-MCNC: <0.006 NG/ML
WBC # BLD AUTO: 10.04 K/UL

## 2018-10-11 PROCEDURE — 85025 COMPLETE CBC W/AUTO DIFF WBC: CPT

## 2018-10-11 PROCEDURE — 11000001 HC ACUTE MED/SURG PRIVATE ROOM

## 2018-10-11 PROCEDURE — 96366 THER/PROPH/DIAG IV INF ADDON: CPT

## 2018-10-11 PROCEDURE — 93005 ELECTROCARDIOGRAM TRACING: CPT

## 2018-10-11 PROCEDURE — 96365 THER/PROPH/DIAG IV INF INIT: CPT

## 2018-10-11 PROCEDURE — 83605 ASSAY OF LACTIC ACID: CPT

## 2018-10-11 PROCEDURE — 99285 EMERGENCY DEPT VISIT HI MDM: CPT | Mod: 25

## 2018-10-11 PROCEDURE — 96361 HYDRATE IV INFUSION ADD-ON: CPT

## 2018-10-11 PROCEDURE — 83690 ASSAY OF LIPASE: CPT

## 2018-10-11 PROCEDURE — 63600175 PHARM REV CODE 636 W HCPCS: Performed by: EMERGENCY MEDICINE

## 2018-10-11 PROCEDURE — 96376 TX/PRO/DX INJ SAME DRUG ADON: CPT

## 2018-10-11 PROCEDURE — 99900035 HC TECH TIME PER 15 MIN (STAT)

## 2018-10-11 PROCEDURE — 25000003 PHARM REV CODE 250: Performed by: EMERGENCY MEDICINE

## 2018-10-11 PROCEDURE — 93010 ELECTROCARDIOGRAM REPORT: CPT | Mod: ,,, | Performed by: INTERNAL MEDICINE

## 2018-10-11 PROCEDURE — 96375 TX/PRO/DX INJ NEW DRUG ADDON: CPT

## 2018-10-11 PROCEDURE — 80053 COMPREHEN METABOLIC PANEL: CPT

## 2018-10-11 PROCEDURE — 82962 GLUCOSE BLOOD TEST: CPT | Mod: 59

## 2018-10-11 PROCEDURE — 84484 ASSAY OF TROPONIN QUANT: CPT

## 2018-10-11 PROCEDURE — 93005 ELECTROCARDIOGRAM TRACING: CPT | Mod: 59

## 2018-10-11 RX ORDER — MORPHINE SULFATE 4 MG/ML
4 INJECTION, SOLUTION INTRAMUSCULAR; INTRAVENOUS
Status: COMPLETED | OUTPATIENT
Start: 2018-10-11 | End: 2018-10-11

## 2018-10-11 RX ORDER — ONDANSETRON 2 MG/ML
4 INJECTION INTRAMUSCULAR; INTRAVENOUS
Status: COMPLETED | OUTPATIENT
Start: 2018-10-11 | End: 2018-10-11

## 2018-10-11 RX ORDER — MORPHINE SULFATE 4 MG/ML
2 INJECTION, SOLUTION INTRAMUSCULAR; INTRAVENOUS
Status: COMPLETED | OUTPATIENT
Start: 2018-10-11 | End: 2018-10-11

## 2018-10-11 RX ADMIN — SODIUM CHLORIDE 500 ML: 0.9 INJECTION, SOLUTION INTRAVENOUS at 08:10

## 2018-10-11 RX ADMIN — MORPHINE SULFATE 2 MG: 4 INJECTION, SOLUTION INTRAMUSCULAR; INTRAVENOUS at 10:10

## 2018-10-11 RX ADMIN — MORPHINE SULFATE 4 MG: 4 INJECTION, SOLUTION INTRAMUSCULAR; INTRAVENOUS at 08:10

## 2018-10-11 RX ADMIN — SODIUM CHLORIDE 500 ML: 0.9 INJECTION, SOLUTION INTRAVENOUS at 11:10

## 2018-10-11 RX ADMIN — ONDANSETRON 4 MG: 2 INJECTION INTRAMUSCULAR; INTRAVENOUS at 08:10

## 2018-10-12 VITALS
WEIGHT: 149.94 LBS | HEIGHT: 67 IN | RESPIRATION RATE: 17 BRPM | DIASTOLIC BLOOD PRESSURE: 68 MMHG | TEMPERATURE: 98 F | SYSTOLIC BLOOD PRESSURE: 106 MMHG | HEART RATE: 72 BPM | OXYGEN SATURATION: 98 % | BODY MASS INDEX: 23.53 KG/M2

## 2018-10-12 PROBLEM — A08.4 VIRAL GASTROENTERITIS: Status: ACTIVE | Noted: 2018-10-02

## 2018-10-12 PROBLEM — K63.5 COLON POLYPS: Status: ACTIVE | Noted: 2018-06-05

## 2018-10-12 PROBLEM — M10.071 ACUTE IDIOPATHIC GOUT INVOLVING TOE OF RIGHT FOOT: Status: ACTIVE | Noted: 2017-11-13

## 2018-10-12 PROBLEM — G89.29 CHRONIC PAIN OF RIGHT KNEE: Status: ACTIVE | Noted: 2018-04-11

## 2018-10-12 PROBLEM — M25.561 CHRONIC PAIN OF RIGHT KNEE: Status: ACTIVE | Noted: 2018-04-11

## 2018-10-12 LAB
ESTIMATED AVG GLUCOSE: 105 MG/DL
HBA1C MFR BLD HPLC: 5.3 %
POCT GLUCOSE: 102 MG/DL (ref 70–110)

## 2018-10-12 PROCEDURE — 99222 1ST HOSP IP/OBS MODERATE 55: CPT | Mod: ,,, | Performed by: PHYSICIAN ASSISTANT

## 2018-10-12 PROCEDURE — 36415 COLL VENOUS BLD VENIPUNCTURE: CPT

## 2018-10-12 PROCEDURE — 25000003 PHARM REV CODE 250: Performed by: NURSE PRACTITIONER

## 2018-10-12 PROCEDURE — 83036 HEMOGLOBIN GLYCOSYLATED A1C: CPT

## 2018-10-12 PROCEDURE — 63600175 PHARM REV CODE 636 W HCPCS: Performed by: HOSPITALIST

## 2018-10-12 PROCEDURE — 25000003 PHARM REV CODE 250: Performed by: EMERGENCY MEDICINE

## 2018-10-12 PROCEDURE — S5010 5% DEXTROSE AND 0.45% SALINE: HCPCS | Performed by: EMERGENCY MEDICINE

## 2018-10-12 PROCEDURE — C9113 INJ PANTOPRAZOLE SODIUM, VIA: HCPCS | Performed by: HOSPITALIST

## 2018-10-12 RX ORDER — HYDRALAZINE HYDROCHLORIDE 20 MG/ML
10 INJECTION INTRAMUSCULAR; INTRAVENOUS EVERY 6 HOURS PRN
Status: DISCONTINUED | OUTPATIENT
Start: 2018-10-12 | End: 2018-10-12 | Stop reason: HOSPADM

## 2018-10-12 RX ORDER — ONDANSETRON 2 MG/ML
4 INJECTION INTRAMUSCULAR; INTRAVENOUS EVERY 6 HOURS PRN
Status: DISCONTINUED | OUTPATIENT
Start: 2018-10-12 | End: 2018-10-12 | Stop reason: HOSPADM

## 2018-10-12 RX ORDER — SODIUM CHLORIDE 0.9 % (FLUSH) 0.9 %
5 SYRINGE (ML) INJECTION
Status: DISCONTINUED | OUTPATIENT
Start: 2018-10-12 | End: 2018-10-12 | Stop reason: HOSPADM

## 2018-10-12 RX ORDER — LISINOPRIL 10 MG/1
10 TABLET ORAL DAILY
Qty: 30 TABLET | Refills: 1 | Status: SHIPPED | OUTPATIENT
Start: 2018-10-12 | End: 2019-05-11 | Stop reason: SDUPTHER

## 2018-10-12 RX ORDER — INSULIN ASPART 100 [IU]/ML
0-5 INJECTION, SOLUTION INTRAVENOUS; SUBCUTANEOUS EVERY 6 HOURS PRN
Status: DISCONTINUED | OUTPATIENT
Start: 2018-10-12 | End: 2018-10-12 | Stop reason: HOSPADM

## 2018-10-12 RX ORDER — POLYETHYLENE GLYCOL 3350 17 G/17G
17 POWDER, FOR SOLUTION ORAL 2 TIMES DAILY
Status: DISCONTINUED | OUTPATIENT
Start: 2018-10-12 | End: 2018-10-12 | Stop reason: HOSPADM

## 2018-10-12 RX ORDER — GLUCAGON 1 MG
1 KIT INJECTION
Status: DISCONTINUED | OUTPATIENT
Start: 2018-10-12 | End: 2018-10-12 | Stop reason: HOSPADM

## 2018-10-12 RX ORDER — DEXTROSE MONOHYDRATE AND SODIUM CHLORIDE 5; .9 G/100ML; G/100ML
1000 INJECTION, SOLUTION INTRAVENOUS
Status: DISCONTINUED | OUTPATIENT
Start: 2018-10-12 | End: 2018-10-12

## 2018-10-12 RX ORDER — MORPHINE SULFATE 4 MG/ML
4 INJECTION, SOLUTION INTRAMUSCULAR; INTRAVENOUS EVERY 4 HOURS PRN
Status: DISCONTINUED | OUTPATIENT
Start: 2018-10-12 | End: 2018-10-12 | Stop reason: HOSPADM

## 2018-10-12 RX ORDER — PANTOPRAZOLE SODIUM 40 MG/10ML
40 INJECTION, POWDER, LYOPHILIZED, FOR SOLUTION INTRAVENOUS DAILY
Status: DISCONTINUED | OUTPATIENT
Start: 2018-10-12 | End: 2018-10-12 | Stop reason: HOSPADM

## 2018-10-12 RX ORDER — DEXTROSE MONOHYDRATE AND SODIUM CHLORIDE 5; .45 G/100ML; G/100ML
1000 INJECTION, SOLUTION INTRAVENOUS
Status: COMPLETED | OUTPATIENT
Start: 2018-10-12 | End: 2018-10-12

## 2018-10-12 RX ORDER — POLYETHYLENE GLYCOL 3350 17 G/17G
17 POWDER, FOR SOLUTION ORAL
Qty: 28 PACKET | Refills: 0 | Status: SHIPPED | OUTPATIENT
Start: 2018-10-12

## 2018-10-12 RX ORDER — DEXTROSE MONOHYDRATE AND SODIUM CHLORIDE 5; .9 G/100ML; G/100ML
INJECTION, SOLUTION INTRAVENOUS CONTINUOUS
Status: DISCONTINUED | OUTPATIENT
Start: 2018-10-12 | End: 2018-10-12 | Stop reason: HOSPADM

## 2018-10-12 RX ORDER — MORPHINE SULFATE 2 MG/ML
2 INJECTION, SOLUTION INTRAMUSCULAR; INTRAVENOUS EVERY 4 HOURS PRN
Status: DISCONTINUED | OUTPATIENT
Start: 2018-10-12 | End: 2018-10-12 | Stop reason: HOSPADM

## 2018-10-12 RX ADMIN — DEXTROSE AND SODIUM CHLORIDE 1000 ML: 5; .45 INJECTION, SOLUTION INTRAVENOUS at 12:10

## 2018-10-12 RX ADMIN — POLYETHYLENE GLYCOL (3350) 17 G: 17 POWDER, FOR SOLUTION ORAL at 12:10

## 2018-10-12 RX ADMIN — PANTOPRAZOLE SODIUM 40 MG: 40 INJECTION, POWDER, FOR SOLUTION INTRAVENOUS at 10:10

## 2018-10-12 RX ADMIN — DEXTROSE AND SODIUM CHLORIDE: 5; .9 INJECTION, SOLUTION INTRAVENOUS at 07:10

## 2018-10-12 NOTE — ED NOTES
"Pt c/o mid abdominal pain rated at 8/10, describes the pain as "sharp & comes & goes" requesting pain med.  ERMD made aware  "

## 2018-10-12 NOTE — CONSULTS
[facility] -   General Surgery  Consult Note    Patient Name: Mariaelena Sheffield  MRN: [id number]  Code Status: Full Code  Admission Date: 10/11/2018  Hospital Length of Stay: 1 days  Attending Physician: Kade Thompson MD  Primary Care Provider: Lui Blue MD    Patient information was obtained from patient, past medical records and ER records.     Inpatient consult to General surgery  Consult performed by: Holly Neff PA-C  Consult ordered by: Humza Xie MD        Subjective:     Principal Problem: Ventral hernia with obstruction and without gangrene    History of Present Illness: Mariaelena Sheffield is a 78 y.o. female with a pmhx of htn, DM, gout. Her pshx includes abdominal surgery about 16 year ago but pt does not clearly remember what the surgery was for. She presented to the ED last night c/o abdominal pain, nausea, and emesis. Symptoms began 1 day ago. She denies modifying factors. A CT was obtained int he ER which shows two large ventral hernias containing bowel. Hernia of lower abdominal wall shows signs of incarceration, causing bowel obstruction. The  Hernia was apparently reduced in the ED last night and pt reports relief of symptoms this morning. She is no longer having abdominal pain, nausea, emesis. She has not passed flatus yet this AM.       No current facility-administered medications on file prior to encounter.      Current Outpatient Medications on File Prior to Encounter   Medication Sig    atorvastatin (LIPITOR) 20 MG tablet Take 20 mg by mouth once daily.    blood sugar diagnostic (ACCU-CHEK SMARTVIEW TEST STRIP) Strp TEST ONE TO TWO TIMES DAILY (NEED MD APPOINTMENT)    blood-glucose meter Misc Test BID    colchicine 0.6 mg tablet Take 0.6 mg by mouth once daily.    dicyclomine (BENTYL) 10 MG capsule Take 10 mg by mouth 3 (three) times daily as needed.    ferrous sulfate 325 (65 FE) MG EC tablet Take 325 mg by mouth 3 (three) times daily with meals.     lancets (ACCU-CHEK FASTCLIX) Misc TEST BLOOD SUGAR  1  TO  2  TIMES  PER  DAY  ( NEED MD APPOINTMENT  )    omeprazole (PRILOSEC) 40 MG capsule Take 40 mg by mouth once daily.    ondansetron (ZOFRAN-ODT) 4 MG TbDL Take 1 tablet (4 mg total) by mouth every 6 (six) hours as needed.    quinapril-hydrochlorothiazide (ACCURETIC) 20-12.5 mg per tablet Take 1 tablet by mouth once daily.       Review of patient's allergies indicates:  No Known Allergies    Past Medical History:   Diagnosis Date    Diabetes mellitus     Gout     Hypercholesteremia     Hypertension      Past Surgical History:   Procedure Laterality Date    ABDOMINAL SURGERY       Family History     None        Tobacco Use    Smoking status: Never Smoker    Smokeless tobacco: Never Used   Substance and Sexual Activity    Alcohol use: Yes     Comment: socially    Drug use: No    Sexual activity: Not on file     Review of Systems   Constitutional: Negative for activity change, chills and fever.   Respiratory: Negative for shortness of breath.    Cardiovascular: Negative for chest pain.   Gastrointestinal: Positive for abdominal pain (now resolved), nausea (now resolved) and vomiting (now resolved).   Genitourinary: Negative for dysuria.   Musculoskeletal: Negative for myalgias.   Skin: Negative for wound.   Neurological: Negative for weakness.   Hematological: Does not bruise/bleed easily.   Psychiatric/Behavioral: The patient is not nervous/anxious.      Objective:     Vital Signs (Most Recent):  Temp: 98 °F (36.7 °C) (10/12/18 0725)  Pulse: 63 (10/12/18 0725)  Resp: 18 (10/12/18 0725)  BP: (!) 105/58 (10/12/18 0725)  SpO2: 100 % (10/12/18 0725) Vital Signs (24h Range):  Temp:  [97.6 °F (36.4 °C)-98 °F (36.7 °C)] 98 °F (36.7 °C)  Pulse:  [63-94] 63  Resp:  [14-24] 18  SpO2:  [97 %-100 %] 100 %  BP: (105-164)/(58-99) 105/58     Weight: 68 kg (149 lb 14.6 oz)  Body mass index is 23.48 kg/m².    Physical Exam   Constitutional: She is oriented to  person, place, and time. She appears well-developed and well-nourished.   HENT:   Head: Normocephalic and atraumatic.   Eyes: EOM are normal.   Cardiovascular: Normal rate and regular rhythm.   Pulmonary/Chest: Effort normal. No respiratory distress.   Abdominal: Soft. She exhibits no distension. There is no tenderness. A hernia (large alejandra-umbilical and lower abdominal incisional hernias. Both hernias easily reduce and are non-tender. ) is present.   Musculoskeletal: Normal range of motion.   Neurological: She is alert and oriented to person, place, and time.   Skin: Skin is warm and dry.   Psychiatric: She has a normal mood and affect. Thought content normal.   Vitals reviewed.      Significant Labs:  CBC:   Recent Labs   Lab  10/11/18   2016   WBC  10.04   RBC  4.62   HGB  13.1   HCT  39.5   PLT  284   MCV  86   MCH  28.4   MCHC  33.2     CMP:   Recent Labs   Lab  10/11/18   2016   GLU  130*   CALCIUM  10.7*   ALBUMIN  4.3   PROT  8.0   NA  139   K  3.6   CO2  21*   CL  101   BUN  23   CREATININE  1.4   ALKPHOS  75   ALT  8*   AST  15   BILITOT  0.7       Significant Diagnostics:  reviewed    Assessment/Plan:     * Ventral hernia with obstruction and without gangrene    Currently without obstructive symptoms and hernias are reducible.   Plan to repeat XR and start clear liquids if non-obstructive findings.   If urgent surgery is not required during this admission, she will need to follow up with Dr. Caldwell in General Surgery in 1-2 weeks to discuss elective hernia repair. This plan was discussed with the patient and her son who understand and agree.         Type 2 diabetes mellitus    Management per primary service        Essential hypertension    Management per primary service            VTE Risk Mitigation (From admission, onward)        Ordered     Place sequential compression device  Until discontinued      10/12/18 0632     Reason for No Pharmacological VTE Prophylaxis  Once      10/12/18 0632     IP VTE HIGH  RISK PATIENT  Once      10/12/18 0632          Thank you for your consult. I will follow-up with patient. Please contact us if you have any additional questions.    Holly Neff PA-C  General Surgery  [facility] [place]

## 2018-10-12 NOTE — SUBJECTIVE & OBJECTIVE
Past Medical History:   Diagnosis Date    Diabetes mellitus     Gout     Hypercholesteremia     Hypertension        Past Surgical History:   Procedure Laterality Date    ABDOMINAL SURGERY         Review of patient's allergies indicates:  No Known Allergies    No current facility-administered medications on file prior to encounter.      Current Outpatient Medications on File Prior to Encounter   Medication Sig    atorvastatin (LIPITOR) 20 MG tablet Take 20 mg by mouth once daily.    blood sugar diagnostic (ACCU-CHEK SMARTVIEW TEST STRIP) Strp TEST ONE TO TWO TIMES DAILY (NEED MD APPOINTMENT)    blood-glucose meter Misc Test BID    colchicine 0.6 mg tablet Take 0.6 mg by mouth once daily.    dicyclomine (BENTYL) 10 MG capsule Take 10 mg by mouth 3 (three) times daily as needed.    ferrous sulfate 325 (65 FE) MG EC tablet Take 325 mg by mouth 3 (three) times daily with meals.    lancets (ACCU-CHEK FASTCLIX) Misc TEST BLOOD SUGAR  1  TO  2  TIMES  PER  DAY  ( NEED MD APPOINTMENT  )    omeprazole (PRILOSEC) 40 MG capsule Take 40 mg by mouth once daily.    ondansetron (ZOFRAN-ODT) 4 MG TbDL Take 1 tablet (4 mg total) by mouth every 6 (six) hours as needed.    quinapril-hydrochlorothiazide (ACCURETIC) 20-12.5 mg per tablet Take 1 tablet by mouth once daily.     Family History     Reviewed and not pertinent.         Tobacco Use    Smoking status: Never Smoker    Smokeless tobacco: Never Used   Substance and Sexual Activity    Alcohol use: Yes     Comment: socially    Drug use: No    Sexual activity: Not on file     Review of Systems   Constitutional: Negative for appetite change, chills, diaphoresis, fatigue and fever.   HENT: Negative for congestion.    Respiratory: Negative for cough, shortness of breath and wheezing.    Cardiovascular: Negative for chest pain, palpitations and leg swelling.   Gastrointestinal: Positive for abdominal pain, nausea and vomiting. Negative for abdominal distention, blood  in stool, constipation and diarrhea.   Genitourinary: Negative for decreased urine volume, difficulty urinating, dysuria, flank pain, frequency, hematuria and urgency.   Musculoskeletal: Negative for arthralgias, back pain and myalgias.   Skin: Negative for pallor, rash and wound.   Neurological: Negative for dizziness, syncope, weakness, light-headedness, numbness and headaches.   Psychiatric/Behavioral: Negative for confusion. The patient is not nervous/anxious.    All other systems reviewed and are negative.    Objective:     Vital Signs (Most Recent):  Temp: 97.9 °F (36.6 °C) (10/12/18 0308)  Pulse: 74 (10/12/18 0308)  Resp: 18 (10/12/18 0308)  BP: 111/69 (10/12/18 0308)  SpO2: 97 % (10/12/18 0308) Vital Signs (24h Range):  Temp:  [97.6 °F (36.4 °C)-97.9 °F (36.6 °C)] 97.9 °F (36.6 °C)  Pulse:  [71-94] 74  Resp:  [14-24] 18  SpO2:  [97 %-100 %] 97 %  BP: (111-164)/(69-99) 111/69     Weight: 68 kg (149 lb 14.6 oz)  Body mass index is 23.48 kg/m².    Physical Exam   Constitutional: She is oriented to person, place, and time. She appears well-developed and well-nourished. No distress.   HENT:   Head: Normocephalic and atraumatic.   Eyes: Conjunctivae are normal.   PERRL; EOM intact.   Neck: Normal range of motion. Neck supple.   Cardiovascular: Normal rate, regular rhythm, S1 normal, S2 normal and intact distal pulses.  No extrasystoles are present. Exam reveals no gallop and no friction rub.   No murmur heard.  Pulses:       Radial pulses are 2+ on the right side, and 2+ on the left side.        Dorsalis pedis pulses are 2+ on the right side, and 2+ on the left side.        Posterior tibial pulses are 2+ on the right side, and 2+ on the left side.   Pulmonary/Chest: Effort normal and breath sounds normal. No accessory muscle usage. No tachypnea. No respiratory distress. She has no wheezes. She has no rhonchi. She has no rales.   Abdominal: Soft. She exhibits no distension. Bowel sounds are decreased. There is no  tenderness. There is no rigidity, no rebound, no guarding and no CVA tenderness. A hernia is present. Hernia confirmed positive in the ventral area.   Ventral wall hernia partially reduced, nontender.    Musculoskeletal: Normal range of motion. She exhibits no edema, tenderness or deformity.   Neurological: She is alert and oriented to person, place, and time. No cranial nerve deficit or sensory deficit.   Skin: Skin is warm, dry and intact. Capillary refill takes less than 2 seconds. No rash noted. She is not diaphoretic. No cyanosis or erythema.   Psychiatric: She has a normal mood and affect. Her speech is normal and behavior is normal. Cognition and memory are normal.   Nursing note and vitals reviewed.          Significant Labs:   Results for orders placed or performed during the hospital encounter of 10/11/18   CBC auto differential   Result Value Ref Range    WBC 10.04 3.90 - 12.70 K/uL    RBC 4.62 4.00 - 5.40 M/uL    Hemoglobin 13.1 12.0 - 16.0 g/dL    Hematocrit 39.5 37.0 - 48.5 %    MCV 86 82 - 98 fL    MCH 28.4 27.0 - 31.0 pg    MCHC 33.2 32.0 - 36.0 g/dL    RDW 16.5 (H) 11.5 - 14.5 %    Platelets 284 150 - 350 K/uL    MPV 11.3 9.2 - 12.9 fL    Gran # (ANC) 8.4 (H) 1.8 - 7.7 K/uL    Lymph # 0.9 (L) 1.0 - 4.8 K/uL    Mono # 0.6 0.3 - 1.0 K/uL    Eos # 0.1 0.0 - 0.5 K/uL    Baso # 0.03 0.00 - 0.20 K/uL    Gran% 83.2 (H) 38.0 - 73.0 %    Lymph% 8.7 (L) 18.0 - 48.0 %    Mono% 6.3 4.0 - 15.0 %    Eosinophil% 1.4 0.0 - 8.0 %    Basophil% 0.3 0.0 - 1.9 %    Differential Method Automated    Comprehensive metabolic panel   Result Value Ref Range    Sodium 139 136 - 145 mmol/L    Potassium 3.6 3.5 - 5.1 mmol/L    Chloride 101 95 - 110 mmol/L    CO2 21 (L) 23 - 29 mmol/L    Glucose 130 (H) 70 - 110 mg/dL    BUN, Bld 23 8 - 23 mg/dL    Creatinine 1.4 0.5 - 1.4 mg/dL    Calcium 10.7 (H) 8.7 - 10.5 mg/dL    Total Protein 8.0 6.0 - 8.4 g/dL    Albumin 4.3 3.5 - 5.2 g/dL    Total Bilirubin 0.7 0.1 - 1.0 mg/dL    Alkaline  Phosphatase 75 55 - 135 U/L    AST 15 10 - 40 U/L    ALT 8 (L) 10 - 44 U/L    Anion Gap 17 (H) 8 - 16 mmol/L    eGFR if African American 42.1 (A) >60 mL/min/1.73 m^2    eGFR if non  36.5 (A) >60 mL/min/1.73 m^2   Troponin I   Result Value Ref Range    Troponin I <0.006 0.000 - 0.026 ng/mL   Lactic acid, plasma   Result Value Ref Range    Lactate (Lactic Acid) 1.2 0.5 - 2.2 mmol/L   Lipase   Result Value Ref Range    Lipase 60 4 - 60 U/L   POCT glucose   Result Value Ref Range    POCT Glucose 129 (H) 70 - 110 mg/dL      All pertinent labs within the past 24 hours have been reviewed.    Significant Imaging:   Imaging Results          CT Abdomen Pelvis  Without Contrast (Final result)  Result time 10/11/18 21:28:25   Procedure changed from CT Abdomen Pelvis With Contrast     Final result by Kade Lozano III, MD (10/11/18 21:28:25)                 Impression:      Bowel containing periumbilical and lower ventral abdominal wall hernias.  Strangulation/incarceration of the lower ventral abdominal wall hernia with an associated distal small-bowel obstruction and surrounding free fluid in the hernia sac.  No free air or abscess identified.    All CT scans at this facility are performed  using dose modulation techniques as appropriate to performed exam including the following:  automated exposure control; adjustment of mA and/or kV according to the patients size (this includes techniques or standardized protocols for targeted exams where dose is matched to indication/reason for exam: i.e. extremities or head);  iterative reconstruction technique.      Electronically signed by: Kade Lozano MD  Date:    10/11/2018  Time:    21:28             Narrative:    EXAMINATION:  CT ABDOMEN PELVIS WITHOUT CONTRAST    CLINICAL HISTORY:  abdominal pain, nausea and vomiting;    TECHNIQUE:  Routine CT abdomen and pelvis performed without IV contrast.  Coronal and sagittal reformatted images  obtained.    COMPARISON:  Abdominal CT 04/17/2018    FINDINGS:  No acute disease identified in the lung bases.  Stable small a moderate size hiatal hernia.  No acute osseous abnormality or suspicious bone lesion identified.    There are 2 large ventral abdominal wall hernias.  There is a periumbilical hernia containing transverse colon and a dilated segment of mid to distal small bowel.  The lower ventral abdominal wall hernia contains multiple segments of dilated and narrowed distal small bowel with surrounding fluid in the hernia sac suggesting strangulation/incarceration.  There is an associated distal small-bowel obstruction with a transition point likely at the lower strangulated appearing hernia with dilation and air-fluid levels of the more proximal small bowel.  There are colonic diverticuli without evidence of acute diverticulitis.  No evidence of colonic obstruction.  There is no evidence of appendicitis. No free air or abscess identified.    The kidneys and ureters are unremarkable without evidence of urolithiasis or hydronephrosis.  The bladder is unremarkable.    The unenhanced liver, pancreas, gallbladder, bile ducts, spleen and adrenals are unremarkable. The aorta is normal in caliber.  No pathologically enlarged lymph nodes identified.  Stable enlarged uterus with multiple fibroids.                               I have reviewed all pertinent imaging results/findings within the past 24 hours.

## 2018-10-12 NOTE — HPI
Mariaelena Sheffield is a 78 y.o. female with a pmhx of htn, DM, gout. Her pshx includes abdominal surgery about 16 year ago but pt does not clearly remember what the surgery was for. She presented to the ED last night c/o abdominal pain, nausea, and emesis. Symptoms began 1 day ago. She denies modifying factors. A CT was obtained int he ER which shows two large ventral hernias containing bowel. Hernia of lower abdominal wall shows signs of incarceration, causing bowel obstruction. The  Hernia was apparently reduced in the ED last night and pt reports relief of symptoms this morning. She is no longer having abdominal pain, nausea, emesis. She has not passed flatus yet this AM.

## 2018-10-12 NOTE — ASSESSMENT & PLAN NOTE
- CT ABD/pelvis showed bowel containing periumbilical and lower ventral abdominal wall hernias. Strangulation/incarceration of the lower ventral abdominal wall hernia with an associated distal small bowel obstruction and surrounding free fluid in the hernia sac.  No free air or abscess identified.  - Case discussed with General Surgery in ED.  No strangulation suspected, ABD nontender and lactic WNL.  - Supportive care with IV hydration, bowel rest, PRN analgesics and antiemetics.  - General Surgery to eval in AM.

## 2018-10-12 NOTE — ASSESSMENT & PLAN NOTE
- Accuchecks with low dose SSI.  - IVF with dextrose while patient NPO to avoid hypoglycemia.  - Check HbA1c.

## 2018-10-12 NOTE — ASSESSMENT & PLAN NOTE
Currently without obstructive symptoms and hernias are reducible.   Plan to repeat XR and start clear liquids if non-obstructive findings.   If urgent surgery is not required during this admission, she will need to follow up with Dr. Caldwell in General Surgery in 1-2 weeks to discuss elective hernia repair. This plan was discussed with the patient and her son who understand and agree.

## 2018-10-12 NOTE — HOSPITAL COURSE
Hernia was apparently reduced in the ED last night and pt reports relief of symptoms this morning. She is no longer having abdominal pain, nausea, emesis. Tolerating full liquids. Will follow-up with General Surgery in outpatient setting. B/P med with HCTZ was changed to plain Lisinopril at reduced strength due to hypotension. Xray showed prominent amount of fecal material within the ascending and transverse portions of the colon and Miralax was ordered. Patient seen and examined and deemed stable for d/c.

## 2018-10-12 NOTE — ED PROVIDER NOTES
Encounter Date: 10/11/2018       History     Chief Complaint   Patient presents with    Nausea     Pt c/o N&V since this am. PT had similar episode 2 weeks ago. Denies diarrhea or fever with episode.     Patient is a 78-year-old female with a past medical history hypertension, hypercholesteremia, diabetes, gout on daily colchicine, prior abdominal surgery with abdominal wall hernia (surgery reportedly for tumor removal, however patient is unable to clarify any further; surgery performed many years ago per patient), who presents today with complaints of generalized abdominal pain with 1 episode of vomiting today.  Patient states she had a similar episode 1 month ago when she was diagnosed with viral gastroenteritis.  Her symptoms improved.  Symptoms returned today.  Denies diarrhea, fever/chills, chest pain, shortness of breath.          Review of patient's allergies indicates:  No Known Allergies  Past Medical History:   Diagnosis Date    Diabetes mellitus     Gout     Hypercholesteremia     Hypertension      Past Surgical History:   Procedure Laterality Date    ABDOMINAL SURGERY       History reviewed. No pertinent family history.  Social History     Tobacco Use    Smoking status: Never Smoker    Smokeless tobacco: Never Used   Substance Use Topics    Alcohol use: Yes     Comment: socially    Drug use: No     Review of Systems   Constitutional: Negative for chills, diaphoresis and fever.   HENT: Negative for congestion, rhinorrhea and sore throat.    Eyes: Negative for pain, redness and visual disturbance.   Respiratory: Negative for cough and shortness of breath.    Cardiovascular: Negative for chest pain, palpitations and leg swelling.   Gastrointestinal: Positive for abdominal pain, nausea and vomiting. Negative for abdominal distention, blood in stool, constipation and diarrhea.   Genitourinary: Negative for dysuria and hematuria.   Musculoskeletal: Negative for arthralgias and joint swelling.   Skin:  Negative for rash and wound.   Neurological: Negative for seizures, syncope and headaches.   All other systems reviewed and are negative.      Physical Exam     Initial Vitals [10/1940]   BP Pulse Resp Temp SpO2   121/78 94 18 97.6 °F (36.4 °C) 99 %      MAP       --         Physical Exam    Nursing note and vitals reviewed.  Constitutional: She appears well-developed and well-nourished. No distress.   HENT:   Head: Normocephalic and atraumatic.   Mouth/Throat: Oropharynx is clear and moist.   Eyes: Conjunctivae and EOM are normal. Pupils are equal, round, and reactive to light.   Neck: Neck supple. No tracheal deviation present.   Cardiovascular: Normal rate, regular rhythm, normal heart sounds and intact distal pulses.   Pulmonary/Chest: Breath sounds normal. No respiratory distress.   Abdominal: Soft. There is no rebound and no guarding.   2 large ventral wall hernias at old incision site; upper hernia easily reduced and nontender; lower hernia nontender; lower hernia partially reduced but not completely reduced   Musculoskeletal: Normal range of motion. She exhibits no edema or tenderness.   Neurological: She is alert and oriented to person, place, and time.   No focal deficits   Skin: Skin is warm. No rash noted. No erythema.   Psychiatric: She has a normal mood and affect. Her behavior is normal.         ED Course   Procedures  Labs Reviewed   CBC W/ AUTO DIFFERENTIAL - Abnormal; Notable for the following components:       Result Value    RDW 16.5 (*)     Gran # (ANC) 8.4 (*)     Lymph # 0.9 (*)     Gran% 83.2 (*)     Lymph% 8.7 (*)     All other components within normal limits   COMPREHENSIVE METABOLIC PANEL - Abnormal; Notable for the following components:    CO2 21 (*)     Glucose 130 (*)     Calcium 10.7 (*)     ALT 8 (*)     Anion Gap 17 (*)     eGFR if  42.1 (*)     eGFR if non  36.5 (*)     All other components within normal limits   POCT GLUCOSE - Abnormal; Notable  for the following components:    POCT Glucose 129 (*)     All other components within normal limits   TROPONIN I   LACTIC ACID, PLASMA   LIPASE   URINALYSIS, REFLEX TO URINE CULTURE     Results for orders placed or performed during the hospital encounter of 10/11/18   CBC auto differential   Result Value Ref Range    WBC 10.04 3.90 - 12.70 K/uL    RBC 4.62 4.00 - 5.40 M/uL    Hemoglobin 13.1 12.0 - 16.0 g/dL    Hematocrit 39.5 37.0 - 48.5 %    MCV 86 82 - 98 fL    MCH 28.4 27.0 - 31.0 pg    MCHC 33.2 32.0 - 36.0 g/dL    RDW 16.5 (H) 11.5 - 14.5 %    Platelets 284 150 - 350 K/uL    MPV 11.3 9.2 - 12.9 fL    Gran # (ANC) 8.4 (H) 1.8 - 7.7 K/uL    Lymph # 0.9 (L) 1.0 - 4.8 K/uL    Mono # 0.6 0.3 - 1.0 K/uL    Eos # 0.1 0.0 - 0.5 K/uL    Baso # 0.03 0.00 - 0.20 K/uL    Gran% 83.2 (H) 38.0 - 73.0 %    Lymph% 8.7 (L) 18.0 - 48.0 %    Mono% 6.3 4.0 - 15.0 %    Eosinophil% 1.4 0.0 - 8.0 %    Basophil% 0.3 0.0 - 1.9 %    Differential Method Automated    Comprehensive metabolic panel   Result Value Ref Range    Sodium 139 136 - 145 mmol/L    Potassium 3.6 3.5 - 5.1 mmol/L    Chloride 101 95 - 110 mmol/L    CO2 21 (L) 23 - 29 mmol/L    Glucose 130 (H) 70 - 110 mg/dL    BUN, Bld 23 8 - 23 mg/dL    Creatinine 1.4 0.5 - 1.4 mg/dL    Calcium 10.7 (H) 8.7 - 10.5 mg/dL    Total Protein 8.0 6.0 - 8.4 g/dL    Albumin 4.3 3.5 - 5.2 g/dL    Total Bilirubin 0.7 0.1 - 1.0 mg/dL    Alkaline Phosphatase 75 55 - 135 U/L    AST 15 10 - 40 U/L    ALT 8 (L) 10 - 44 U/L    Anion Gap 17 (H) 8 - 16 mmol/L    eGFR if African American 42.1 (A) >60 mL/min/1.73 m^2    eGFR if non  36.5 (A) >60 mL/min/1.73 m^2   Troponin I   Result Value Ref Range    Troponin I <0.006 0.000 - 0.026 ng/mL   Lactic acid, plasma   Result Value Ref Range    Lactate (Lactic Acid) 1.2 0.5 - 2.2 mmol/L   Lipase   Result Value Ref Range    Lipase 60 4 - 60 U/L   POCT glucose   Result Value Ref Range    POCT Glucose 129 (H) 70 - 110 mg/dL         Imaging Results           CT Abdomen Pelvis  Without Contrast (Final result)  Result time 10/11/18 21:28:25   Procedure changed from CT Abdomen Pelvis With Contrast     Final result by Kade Lozano III, MD (10/11/18 21:28:25)                 Impression:      Bowel containing periumbilical and lower ventral abdominal wall hernias.  Strangulation/incarceration of the lower ventral abdominal wall hernia with an associated distal small-bowel obstruction and surrounding free fluid in the hernia sac.  No free air or abscess identified.    All CT scans at this facility are performed  using dose modulation techniques as appropriate to performed exam including the following:  automated exposure control; adjustment of mA and/or kV according to the patients size (this includes techniques or standardized protocols for targeted exams where dose is matched to indication/reason for exam: i.e. extremities or head);  iterative reconstruction technique.      Electronically signed by: Kade Lozano MD  Date:    10/11/2018  Time:    21:28             Narrative:    EXAMINATION:  CT ABDOMEN PELVIS WITHOUT CONTRAST    CLINICAL HISTORY:  abdominal pain, nausea and vomiting;    TECHNIQUE:  Routine CT abdomen and pelvis performed without IV contrast.  Coronal and sagittal reformatted images obtained.    COMPARISON:  Abdominal CT 04/17/2018    FINDINGS:  No acute disease identified in the lung bases.  Stable small a moderate size hiatal hernia.  No acute osseous abnormality or suspicious bone lesion identified.    There are 2 large ventral abdominal wall hernias.  There is a periumbilical hernia containing transverse colon and a dilated segment of mid to distal small bowel.  The lower ventral abdominal wall hernia contains multiple segments of dilated and narrowed distal small bowel with surrounding fluid in the hernia sac suggesting strangulation/incarceration.  There is an associated distal small-bowel obstruction with a transition point likely at the  lower strangulated appearing hernia with dilation and air-fluid levels of the more proximal small bowel.  There are colonic diverticuli without evidence of acute diverticulitis.  No evidence of colonic obstruction.  There is no evidence of appendicitis. No free air or abscess identified.    The kidneys and ureters are unremarkable without evidence of urolithiasis or hydronephrosis.  The bladder is unremarkable.    The unenhanced liver, pancreas, gallbladder, bile ducts, spleen and adrenals are unremarkable. The aorta is normal in caliber.  No pathologically enlarged lymph nodes identified.  Stable enlarged uterus with multiple fibroids.                              EKG reviewed and interpreted by ED physician.  Normal sinus rhythm with a rate of 83, left axis deviation, normal intervals, no significant ST-T abnormalities    Medications   sodium chloride 0.9% bolus 500 mL (0 mLs Intravenous Stopped 10/11/18 2100)   ondansetron injection 4 mg (4 mg Intravenous Given 10/11/18 2015)   morphine injection 4 mg (4 mg Intravenous Given 10/11/18 2016)   morphine injection 2 mg (2 mg Intravenous Given 10/11/18 2217)   sodium chloride 0.9% bolus 500 mL (500 mLs Intravenous New Bag 10/11/18 2316)         Re-exam:  Upper ventral hernia easily reduced and nontender; lower ventral hernia partially reduced; no longer exhibiting a significant protrusion, however and testing can be palpated just beneath the skin suggesting that hernia has not been completely reduced; hernia is nontender arguing and strangulation; lactate within normal limits    Consulted Dr. Caldwell of General Surgery, who advises transfer to [facility] with admission to Medicine with General surgery as a consult    Consulted Dr. Newsome, who agrees with admission to med-surg    Vitals:    10/11/18 2016 10/11/18 2201 10/11/18 2218 10/11/18 2318   BP: (!) 164/86 (!) 150/88 (!) 144/90 126/75   BP Location:   Left arm Left arm   Patient Position:   Sitting Lying    Pulse: 80 75 75 75   Resp: 14 (!) 24 16 18   Temp:       TempSrc:       SpO2: 100% 100% 100% 100%   Weight:       Height:            Medical Decision Making:   Patient presenting with abdominal pain and nausea/vomiting; CT scan suggesting incarcerated hernia with small bowel obstruction; patient has to a ventral wall hernia; 1 hernia is nontender and easily reducible; 2nd hernia (lower hernia) was partially reduced; nontender with a normal lactate arguing and strangulation; patient appears comfortable with nontender abdomen; consulted General surgery; patient will be transferred to [facility]; patient requires transfer for general surgery evaluation andAdmission; we do not have General surgery or inpatient beds here at this facility; patient will be transferred via [facility]; patient understands diagnosis and need for transfer                      Clinical Impression:   Diagnosis:  Incarcerated ventral hernia with small bowel obstruction  Disposition:  Admission                               Humza Xie MD  10/11/18 7633

## 2018-10-12 NOTE — H&P
[facility] Hospital Medicine  History & Physical    Patient Name: Mariaelena Sheffield  MRN: [id number]  Admission Date: 10/12/2018  Attending Physician: Pola Reza, *   Primary Care Provider: Lui Blue MD         Patient information was obtained from patient, past medical records and ER records.     Subjective:     Principal Problem:Ventral hernia with obstruction and without gangrene    Chief Complaint:   Chief Complaint   Patient presents with    Nausea     Pt c/o N&V since this am. PT had similar episode 2 weeks ago. Denies diarrhea or fever with episode.        HPI: Ms. Sheffield is a 77yo female with a PMHx of HTN, HLD, DM II, and gout, who presented to the ED with c/o generalized ABD pain x 1 day.  Associated nausea and vomiting x 1 episode.  No aggravating or alleviating factors.  Denies any ABD distention, diarrhea, constipation, melena, hematochezia, rectal pain, hematemesis, back pain, dysuria, hematuria, CP, SOB, lightheadedness, dizziness, syncope, fever, or chills.  In ED, CT ABD/pelvis showed bowel containing periumbilical and lower ventral abdominal wall hernias, strangulation/incarceration of the lower ventral abdominal wall hernia with an associated distal small-bowel obstruction and surrounding free fluid in the hernia sac, no free air or abscess identified.  Case discussed with General Surgery in ED, no urgent surgery warranted at present time per Dr. Caldwell.  [facility] Medicine was called for admission.      Past Medical History:   Diagnosis Date    Diabetes mellitus     Gout     Hypercholesteremia     Hypertension        Past Surgical History:   Procedure Laterality Date    ABDOMINAL SURGERY         Review of patient's allergies indicates:  No Known Allergies    No current facility-administered medications on file prior to encounter.      Current Outpatient Medications on File Prior to Encounter   Medication Sig    atorvastatin (LIPITOR) 20 MG tablet Take 20 mg  by mouth once daily.    blood sugar diagnostic (ACCU-CHEK SMARTVIEW TEST STRIP) Strp TEST ONE TO TWO TIMES DAILY (NEED MD APPOINTMENT)    blood-glucose meter Misc Test BID    colchicine 0.6 mg tablet Take 0.6 mg by mouth once daily.    dicyclomine (BENTYL) 10 MG capsule Take 10 mg by mouth 3 (three) times daily as needed.    ferrous sulfate 325 (65 FE) MG EC tablet Take 325 mg by mouth 3 (three) times daily with meals.    lancets (ACCU-CHEK FASTCLIX) Misc TEST BLOOD SUGAR  1  TO  2  TIMES  PER  DAY  ( NEED MD APPOINTMENT  )    omeprazole (PRILOSEC) 40 MG capsule Take 40 mg by mouth once daily.    ondansetron (ZOFRAN-ODT) 4 MG TbDL Take 1 tablet (4 mg total) by mouth every 6 (six) hours as needed.    quinapril-hydrochlorothiazide (ACCURETIC) 20-12.5 mg per tablet Take 1 tablet by mouth once daily.     Family History     Reviewed and not pertinent.         Tobacco Use    Smoking status: Never Smoker    Smokeless tobacco: Never Used   Substance and Sexual Activity    Alcohol use: Yes     Comment: socially    Drug use: No    Sexual activity: Not on file     Review of Systems   Constitutional: Negative for appetite change, chills, diaphoresis, fatigue and fever.   HENT: Negative for congestion.    Respiratory: Negative for cough, shortness of breath and wheezing.    Cardiovascular: Negative for chest pain, palpitations and leg swelling.   Gastrointestinal: Positive for abdominal pain, nausea and vomiting. Negative for abdominal distention, blood in stool, constipation and diarrhea.   Genitourinary: Negative for decreased urine volume, difficulty urinating, dysuria, flank pain, frequency, hematuria and urgency.   Musculoskeletal: Negative for arthralgias, back pain and myalgias.   Skin: Negative for pallor, rash and wound.   Neurological: Negative for dizziness, syncope, weakness, light-headedness, numbness and headaches.   Psychiatric/Behavioral: Negative for confusion. The patient is not nervous/anxious.     All other systems reviewed and are negative.    Objective:     Vital Signs (Most Recent):  Temp: 97.9 °F (36.6 °C) (10/12/18 0308)  Pulse: 74 (10/12/18 0308)  Resp: 18 (10/12/18 0308)  BP: 111/69 (10/12/18 0308)  SpO2: 97 % (10/12/18 0308) Vital Signs (24h Range):  Temp:  [97.6 °F (36.4 °C)-97.9 °F (36.6 °C)] 97.9 °F (36.6 °C)  Pulse:  [71-94] 74  Resp:  [14-24] 18  SpO2:  [97 %-100 %] 97 %  BP: (111-164)/(69-99) 111/69     Weight: 68 kg (149 lb 14.6 oz)  Body mass index is 23.48 kg/m².    Physical Exam   Constitutional: She is oriented to person, place, and time. She appears well-developed and well-nourished. No distress.   HENT:   Head: Normocephalic and atraumatic.   Eyes: Conjunctivae are normal.   PERRL; EOM intact.   Neck: Normal range of motion. Neck supple.   Cardiovascular: Normal rate, regular rhythm, S1 normal, S2 normal and intact distal pulses.  No extrasystoles are present. Exam reveals no gallop and no friction rub.   No murmur heard.  Pulses:       Radial pulses are 2+ on the right side, and 2+ on the left side.        Dorsalis pedis pulses are 2+ on the right side, and 2+ on the left side.        Posterior tibial pulses are 2+ on the right side, and 2+ on the left side.   Pulmonary/Chest: Effort normal and breath sounds normal. No accessory muscle usage. No tachypnea. No respiratory distress. She has no wheezes. She has no rhonchi. She has no rales.   Abdominal: Soft. She exhibits no distension. Bowel sounds are decreased. There is no tenderness. There is no rigidity, no rebound, no guarding and no CVA tenderness. A hernia is present. Hernia confirmed positive in the ventral area.   Ventral wall hernia partially reduced, nontender.    Musculoskeletal: Normal range of motion. She exhibits no edema, tenderness or deformity.   Neurological: She is alert and oriented to person, place, and time. No cranial nerve deficit or sensory deficit.   Skin: Skin is warm, dry and intact. Capillary refill takes  less than 2 seconds. No rash noted. She is not diaphoretic. No cyanosis or erythema.   Psychiatric: She has a normal mood and affect. Her speech is normal and behavior is normal. Cognition and memory are normal.   Nursing note and vitals reviewed.          Significant Labs:   Results for orders placed or performed during the hospital encounter of 10/11/18   CBC auto differential   Result Value Ref Range    WBC 10.04 3.90 - 12.70 K/uL    RBC 4.62 4.00 - 5.40 M/uL    Hemoglobin 13.1 12.0 - 16.0 g/dL    Hematocrit 39.5 37.0 - 48.5 %    MCV 86 82 - 98 fL    MCH 28.4 27.0 - 31.0 pg    MCHC 33.2 32.0 - 36.0 g/dL    RDW 16.5 (H) 11.5 - 14.5 %    Platelets 284 150 - 350 K/uL    MPV 11.3 9.2 - 12.9 fL    Gran # (ANC) 8.4 (H) 1.8 - 7.7 K/uL    Lymph # 0.9 (L) 1.0 - 4.8 K/uL    Mono # 0.6 0.3 - 1.0 K/uL    Eos # 0.1 0.0 - 0.5 K/uL    Baso # 0.03 0.00 - 0.20 K/uL    Gran% 83.2 (H) 38.0 - 73.0 %    Lymph% 8.7 (L) 18.0 - 48.0 %    Mono% 6.3 4.0 - 15.0 %    Eosinophil% 1.4 0.0 - 8.0 %    Basophil% 0.3 0.0 - 1.9 %    Differential Method Automated    Comprehensive metabolic panel   Result Value Ref Range    Sodium 139 136 - 145 mmol/L    Potassium 3.6 3.5 - 5.1 mmol/L    Chloride 101 95 - 110 mmol/L    CO2 21 (L) 23 - 29 mmol/L    Glucose 130 (H) 70 - 110 mg/dL    BUN, Bld 23 8 - 23 mg/dL    Creatinine 1.4 0.5 - 1.4 mg/dL    Calcium 10.7 (H) 8.7 - 10.5 mg/dL    Total Protein 8.0 6.0 - 8.4 g/dL    Albumin 4.3 3.5 - 5.2 g/dL    Total Bilirubin 0.7 0.1 - 1.0 mg/dL    Alkaline Phosphatase 75 55 - 135 U/L    AST 15 10 - 40 U/L    ALT 8 (L) 10 - 44 U/L    Anion Gap 17 (H) 8 - 16 mmol/L    eGFR if African American 42.1 (A) >60 mL/min/1.73 m^2    eGFR if non  36.5 (A) >60 mL/min/1.73 m^2   Troponin I   Result Value Ref Range    Troponin I <0.006 0.000 - 0.026 ng/mL   Lactic acid, plasma   Result Value Ref Range    Lactate (Lactic Acid) 1.2 0.5 - 2.2 mmol/L   Lipase   Result Value Ref Range    Lipase 60 4 - 60 U/L   POCT  glucose   Result Value Ref Range    POCT Glucose 129 (H) 70 - 110 mg/dL      All pertinent labs within the past 24 hours have been reviewed.    Significant Imaging:   Imaging Results          CT Abdomen Pelvis  Without Contrast (Final result)  Result time 10/11/18 21:28:25   Procedure changed from CT Abdomen Pelvis With Contrast     Final result by Kade Lozano III, MD (10/11/18 21:28:25)                 Impression:      Bowel containing periumbilical and lower ventral abdominal wall hernias.  Strangulation/incarceration of the lower ventral abdominal wall hernia with an associated distal small-bowel obstruction and surrounding free fluid in the hernia sac.  No free air or abscess identified.    All CT scans at this facility are performed  using dose modulation techniques as appropriate to performed exam including the following:  automated exposure control; adjustment of mA and/or kV according to the patients size (this includes techniques or standardized protocols for targeted exams where dose is matched to indication/reason for exam: i.e. extremities or head);  iterative reconstruction technique.      Electronically signed by: Kade Lozano MD  Date:    10/11/2018  Time:    21:28             Narrative:    EXAMINATION:  CT ABDOMEN PELVIS WITHOUT CONTRAST    CLINICAL HISTORY:  abdominal pain, nausea and vomiting;    TECHNIQUE:  Routine CT abdomen and pelvis performed without IV contrast.  Coronal and sagittal reformatted images obtained.    COMPARISON:  Abdominal CT 04/17/2018    FINDINGS:  No acute disease identified in the lung bases.  Stable small a moderate size hiatal hernia.  No acute osseous abnormality or suspicious bone lesion identified.    There are 2 large ventral abdominal wall hernias.  There is a periumbilical hernia containing transverse colon and a dilated segment of mid to distal small bowel.  The lower ventral abdominal wall hernia contains multiple segments of dilated and narrowed distal  small bowel with surrounding fluid in the hernia sac suggesting strangulation/incarceration.  There is an associated distal small-bowel obstruction with a transition point likely at the lower strangulated appearing hernia with dilation and air-fluid levels of the more proximal small bowel.  There are colonic diverticuli without evidence of acute diverticulitis.  No evidence of colonic obstruction.  There is no evidence of appendicitis. No free air or abscess identified.    The kidneys and ureters are unremarkable without evidence of urolithiasis or hydronephrosis.  The bladder is unremarkable.    The unenhanced liver, pancreas, gallbladder, bile ducts, spleen and adrenals are unremarkable. The aorta is normal in caliber.  No pathologically enlarged lymph nodes identified.  Stable enlarged uterus with multiple fibroids.                               I have reviewed all pertinent imaging results/findings within the past 24 hours.             Assessment/Plan:     * Ventral hernia with obstruction and without gangrene    - CT ABD/pelvis showed bowel containing periumbilical and lower ventral abdominal wall hernias. Strangulation/incarceration of the lower ventral abdominal wall hernia with an associated distal small bowel obstruction and surrounding free fluid in the hernia sac.  No free air or abscess identified.  - Case discussed with General Surgery in ED.  No strangulation suspected, ABD nontender and lactic WNL.  - Supportive care with IV hydration, bowel rest, PRN analgesics and antiemetics.  - General Surgery to eval in AM.         Type 2 diabetes mellitus    - Accuchecks with low dose SSI.  - IVF with dextrose while patient NPO to avoid hypoglycemia.  - Check HbA1c.        Essential hypertension    - BP stable.  - IV hydralazine PRN.  Resume home antihypertensives once tolerating PO.          VTE Risk Mitigation (From admission, onward)        Ordered     Place sequential compression device  Until discontinued       10/12/18 0632     Reason for No Pharmacological VTE Prophylaxis  Once      10/12/18 0632     IP VTE HIGH RISK PATIENT  Once      10/12/18 0632             Marie Martínez NP  Department of Hospital Medicine   [facility]

## 2018-10-12 NOTE — PLAN OF CARE
Problem: Patient Care Overview  Goal: Plan of Care Review  Outcome: Ongoing (interventions implemented as appropriate)  Fall precautions maintained. Pt free from falls/injuries. Pt repositions and ambulates independently. Pt has no c/o pain. Diet advanced as tolerated. Tolerating full liquids at this time. Will advance for dinner. Glycemic control provided with ACHS glucose monitoring. Fluid promotion with PO fluids and IV fluids. Patient turns every 2 hours. POC and meds reviewed. Patient verbalized understanding. Side rails up x2. Bed Low and locked. Personal items and call light within reach. No signs/symptoms of acute distress. Chart check done. Will monitor

## 2018-10-12 NOTE — HPI
Mariaelena Sheffield is a 77 yo female with a PMHx of HTN, HLD, DM II, gout, who presented to the ED with c/o generalized ABD pain x 1 day.  Associated s/s nausea and vomiting x 1 episode. No aggravating or alleviating factors. Denies any ABD distention, diarrhea, constipation, melena, hematochezia, rectal pain, hematemesis, back pain, dysuria, hematuria, CP, SOB, lightheadedness, dizziness, syncope, fever, or chills. In ED, CT ABD/pelvis showed bowel containing periumbilical and lower ventral abdominal wall hernias, strangulation/incarceration of the lower ventral abdominal wall hernia with an associated distal small-bowel obstruction and surrounding free fluid in the hernia sac, no free air or abscess identified.  Case discussed with General Surgery in ED, no urgent surgery warranted at present time per Dr. Caldwell.  [facility] Medicine was called for admission for SBO.

## 2018-10-12 NOTE — PLAN OF CARE
Met with patient . Patient denies any post hospital needs or services at this time.  Patient lives with her son and is independent with all her adls and iadls.  Discharge Planning Begins on Admission pamphlet, Advance Directive information given to patient along with the contact information given.Instructed patient or family to call with any questions or concerns.    Follow-up Information     Kevin Caldwell MD In 1 week.    Specialties:  General Surgery, Bariatrics  Why:  large ventral hernias  Contact information:  [address]  [place] [id number] [phone]                 No Pharmacies Listed  Lui Blue MD  Payor: [employer] MANAGED MEDICARE / Plan: [employer] MEDICARE HMO / Product Type: Capitation /           10/12/18 1421   Discharge Assessment   Assessment Type Discharge Planning Assessment   Confirmed/corrected address and phone number on facesheet? Yes   Assessment information obtained from? Patient;Medical Record   Expected Length of Stay (days) (dc later today)   Communicated expected length of stay with patient/caregiver yes   Prior to hospitilization cognitive status: Alert/Oriented   Prior to hospitalization functional status: Independent   Current cognitive status: Alert/Oriented   Current Functional Status: Independent   Lives With child(vipul), adult   Able to Return to Prior Arrangements yes   Is patient able to care for self after discharge? Yes   Who are your caregiver(s) and their phone number(s)? Bj Sheffield ( son ) [phone]   Patient's perception of discharge disposition home or selfcare   Readmission Within The Last 30 Days no previous admission in last 30 days   Patient currently being followed by outpatient case management? No   Patient currently receives any other outside agency services? No   Equipment Currently Used at Home none   Do you have any problems affording any of your prescribed medications? No   Is the patient taking medications as prescribed? yes   Does the patient have  transportation home? Yes   Transportation Available car;family or friend will provide   Does the patient receive services at the [facility]? No   Discharge Plan A Home;Home with family   Discharge Plan B Home   Patient/Family In Agreement With Plan yes

## 2018-10-12 NOTE — PLAN OF CARE
Problem: Patient Care Overview  Goal: Plan of Care Review  Outcome: Ongoing (interventions implemented as appropriate)  Received pt. No acute distress noted. Safety measures are in place. Call light within reach. Family at bedside. No complaints of pain. Iv patent. Will continue to monitor.

## 2018-10-12 NOTE — PLAN OF CARE
Discharge instructions reviewed with patient and son.   Medication delivered to patient via bedside pharmacy  Follow up appointments scheduled and discussed.  Patient tolerated regular diet  Questions answered. No further needs.

## 2018-10-12 NOTE — DISCHARGE SUMMARY
[facility] Hospital Medicine  Discharge Summary    Patient Name: Mariaelena Sheffield  MRN: [id number]  Admission Date: 10/11/2018  Hospital Length of Stay: 1 days  Discharge Date and Time:  10/12/2018 1:12 PM  Attending Physician: Kade Thompson MD   Discharging Provider: Sherley Sol NP  Primary Care Provider: Lui Blue MD    HPI:   Mariaelena Sheffield is a 79 yo female with a PMHx of HTN, HLD, DM II, gout, who presented to the ED with c/o generalized ABD pain x 1 day.  Associated s/s nausea and vomiting x 1 episode. No aggravating or alleviating factors. Denies any ABD distention, diarrhea, constipation, melena, hematochezia, rectal pain, hematemesis, back pain, dysuria, hematuria, CP, SOB, lightheadedness, dizziness, syncope, fever, or chills. In ED, CT ABD/pelvis showed bowel containing periumbilical and lower ventral abdominal wall hernias, strangulation/incarceration of the lower ventral abdominal wall hernia with an associated distal small-bowel obstruction and surrounding free fluid in the hernia sac, no free air or abscess identified.  Case discussed with General Surgery in ED, no urgent surgery warranted at present time per Dr. Caldwell.  [facility] Medicine was called for admission for SBO.    * No surgery found *      Hospital Course:   Hernia was apparently reduced in the ED last night and pt reports relief of symptoms this morning. She is no longer having abdominal pain, nausea, emesis. Tolerating full liquids. Will follow-up with General Surgery in outpatient setting. B/P med with HCTZ was changed to plain Lisinopril at reduced strength due to hypotension. Xray showed prominent amount of fecal material within the ascending and transverse portions of the colon and Miralax was ordered. Patient seen and examined and deemed stable for d/c.    Consults:   Consults (From admission, onward)        Status Ordering Provider     Inpatient consult to General surgery  Once     Provider:   Kevin Caldwell MD    Completed [facility]          No new Assessment & Plan notes have been filed under this hospital service since the last note was generated.  Service: Hospital Medicine    Final Active Diagnoses:    Diagnosis Date Noted POA    PRINCIPAL PROBLEM:  Ventral hernia with obstruction and without gangrene [K43.6] 10/11/2018 Yes    Essential hypertension [I10] 04/26/2016 Yes     Chronic    Type 2 diabetes mellitus [E11.9] 02/19/2013 Yes     Chronic      Problems Resolved During this Admission:       Discharged Condition: stable    Disposition: Home or Self Care    Follow Up:  Follow-up Information     Kevin Caldwell MD In 1 week.    Specialties:  General Surgery, Bariatrics  Why:  large ventral hernias  Contact information:  [address]  [place] [address] [phone]                 Patient Instructions:      Diet Adult Regular   Scheduling Instructions: No fried Foods, No large meals.     Activity as tolerated       Significant Diagnostic Studies: Labs:   CMP   Recent Labs   Lab  10/11/18   2016   NA  139   K  3.6   CL  101   CO2  21*   GLU  130*   BUN  23   CREATININE  1.4   CALCIUM  10.7*   PROT  8.0   ALBUMIN  4.3   BILITOT  0.7   ALKPHOS  75   AST  15   ALT  8*   ANIONGAP  17*   ESTGFRAFRICA  42.1*   EGFRNONAA  36.5*   , CBC   Recent Labs   Lab  10/11/18   2016   WBC  10.04   HGB  13.1   HCT  39.5   PLT  284    and INR No results found for: INR, PROTIME  Imaging Results          CT Abdomen Pelvis  Without Contrast (Final result)  Result time 10/11/18 21:28:25   Procedure changed from CT Abdomen Pelvis With Contrast     Final result by Kade Lozano III, MD (10/11/18 21:28:25)                 Impression:      Bowel containing periumbilical and lower ventral abdominal wall hernias.  Strangulation/incarceration of the lower ventral abdominal wall hernia with an associated distal small-bowel obstruction and surrounding free fluid in the hernia sac.  No free air or abscess identified.    All  CT scans at this facility are performed  using dose modulation techniques as appropriate to performed exam including the following:  automated exposure control; adjustment of mA and/or kV according to the patients size (this includes techniques or standardized protocols for targeted exams where dose is matched to indication/reason for exam: i.e. extremities or head);  iterative reconstruction technique.      Electronically signed by: Kade Lozano MD  Date:    10/11/2018  Time:    21:28             Narrative:    EXAMINATION:  CT ABDOMEN PELVIS WITHOUT CONTRAST    CLINICAL HISTORY:  abdominal pain, nausea and vomiting;    TECHNIQUE:  Routine CT abdomen and pelvis performed without IV contrast.  Coronal and sagittal reformatted images obtained.    COMPARISON:  Abdominal CT 04/17/2018    FINDINGS:  No acute disease identified in the lung bases.  Stable small a moderate size hiatal hernia.  No acute osseous abnormality or suspicious bone lesion identified.    There are 2 large ventral abdominal wall hernias.  There is a periumbilical hernia containing transverse colon and a dilated segment of mid to distal small bowel.  The lower ventral abdominal wall hernia contains multiple segments of dilated and narrowed distal small bowel with surrounding fluid in the hernia sac suggesting strangulation/incarceration.  There is an associated distal small-bowel obstruction with a transition point likely at the lower strangulated appearing hernia with dilation and air-fluid levels of the more proximal small bowel.  There are colonic diverticuli without evidence of acute diverticulitis.  No evidence of colonic obstruction.  There is no evidence of appendicitis. No free air or abscess identified.    The kidneys and ureters are unremarkable without evidence of urolithiasis or hydronephrosis.  The bladder is unremarkable.    The unenhanced liver, pancreas, gallbladder, bile ducts, spleen and adrenals are unremarkable. The aorta is  normal in caliber.  No pathologically enlarged lymph nodes identified.  Stable enlarged uterus with multiple fibroids.                                Pending Diagnostic Studies:     None         Medications:  Reconciled Home Medications:      Medication List      START taking these medications    lisinopril 10 MG tablet  Take 1 tablet (10 mg total) by mouth once daily.     polyethylene glycol 17 gram Pwpk  Commonly known as:  GLYCOLAX  Take 17 g by mouth daily        CONTINUE taking these medications    ACCU-CHEK FASTCLIX LANCING DEV Misc  Generic drug:  lancets  TEST BLOOD SUGAR  1  TO  2  TIMES  PER  DAY  ( NEED MD APPOINTMENT  )     ACCU-CHEK SMARTVIEW TEST STRIP Strp  Generic drug:  blood sugar diagnostic  TEST ONE TO TWO TIMES DAILY (NEED MD APPOINTMENT)     atorvastatin 20 MG tablet  Commonly known as:  LIPITOR  Take 20 mg by mouth once daily.     blood-glucose meter Misc  Test BID     colchicine 0.6 mg tablet  Commonly known as:  COLCRYS  Take 0.6 mg by mouth once daily.     dicyclomine 10 MG capsule  Commonly known as:  BENTYL  Take 10 mg by mouth 3 (three) times daily as needed.     omeprazole 40 MG capsule  Commonly known as:  PRILOSEC  Take 40 mg by mouth once daily.     ondansetron 4 MG Tbdl  Commonly known as:  ZOFRAN-ODT  Take 1 tablet (4 mg total) by mouth every 6 (six) hours as needed.        STOP taking these medications    diphenoxylate-atropine 2.5-0.025 mg 2.5-0.025 mg per tablet  Commonly known as:  LOMOTIL     ferrous sulfate 325 (65 FE) MG EC tablet     quinapril-hydrochlorothiazide 20-12.5 mg per tablet  Commonly known as:  ACCURETIC            Indwelling Lines/Drains at time of discharge:   Lines/Drains/Airways          None      Time spent on the discharge of patient: 45 minutes  Patient was seen and examined on the date of discharge and determined to be suitable for discharge.    Sherley Sol NP  Department of Hospital Medicine  [facility]

## 2018-10-12 NOTE — SUBJECTIVE & OBJECTIVE
No current facility-administered medications on file prior to encounter.      Current Outpatient Medications on File Prior to Encounter   Medication Sig    atorvastatin (LIPITOR) 20 MG tablet Take 20 mg by mouth once daily.    blood sugar diagnostic (ACCU-CHEK SMARTVIEW TEST STRIP) Strp TEST ONE TO TWO TIMES DAILY (NEED MD APPOINTMENT)    blood-glucose meter Misc Test BID    colchicine 0.6 mg tablet Take 0.6 mg by mouth once daily.    dicyclomine (BENTYL) 10 MG capsule Take 10 mg by mouth 3 (three) times daily as needed.    ferrous sulfate 325 (65 FE) MG EC tablet Take 325 mg by mouth 3 (three) times daily with meals.    lancets (ACCU-CHEK FASTCLIX) Misc TEST BLOOD SUGAR  1  TO  2  TIMES  PER  DAY  ( NEED MD APPOINTMENT  )    omeprazole (PRILOSEC) 40 MG capsule Take 40 mg by mouth once daily.    ondansetron (ZOFRAN-ODT) 4 MG TbDL Take 1 tablet (4 mg total) by mouth every 6 (six) hours as needed.    quinapril-hydrochlorothiazide (ACCURETIC) 20-12.5 mg per tablet Take 1 tablet by mouth once daily.       Review of patient's allergies indicates:  No Known Allergies    Past Medical History:   Diagnosis Date    Diabetes mellitus     Gout     Hypercholesteremia     Hypertension      Past Surgical History:   Procedure Laterality Date    ABDOMINAL SURGERY       Family History     None        Tobacco Use    Smoking status: Never Smoker    Smokeless tobacco: Never Used   Substance and Sexual Activity    Alcohol use: Yes     Comment: socially    Drug use: No    Sexual activity: Not on file     Review of Systems   Constitutional: Negative for activity change, chills and fever.   Respiratory: Negative for shortness of breath.    Cardiovascular: Negative for chest pain.   Gastrointestinal: Positive for abdominal pain (now resolved), nausea (now resolved) and vomiting (now resolved).   Genitourinary: Negative for dysuria.   Musculoskeletal: Negative for myalgias.   Skin: Negative for wound.   Neurological:  Negative for weakness.   Hematological: Does not bruise/bleed easily.   Psychiatric/Behavioral: The patient is not nervous/anxious.      Objective:     Vital Signs (Most Recent):  Temp: 98 °F (36.7 °C) (10/12/18 0725)  Pulse: 63 (10/12/18 0725)  Resp: 18 (10/12/18 0725)  BP: (!) 105/58 (10/12/18 0725)  SpO2: 100 % (10/12/18 0725) Vital Signs (24h Range):  Temp:  [97.6 °F (36.4 °C)-98 °F (36.7 °C)] 98 °F (36.7 °C)  Pulse:  [63-94] 63  Resp:  [14-24] 18  SpO2:  [97 %-100 %] 100 %  BP: (105-164)/(58-99) 105/58     Weight: 68 kg (149 lb 14.6 oz)  Body mass index is 23.48 kg/m².    Physical Exam   Constitutional: She is oriented to person, place, and time. She appears well-developed and well-nourished.   HENT:   Head: Normocephalic and atraumatic.   Eyes: EOM are normal.   Cardiovascular: Normal rate and regular rhythm.   Pulmonary/Chest: Effort normal. No respiratory distress.   Abdominal: Soft. She exhibits no distension. There is no tenderness. A hernia (large alejandra-umbilical and lower abdominal incisional hernias. Both hernias easily reduce and are non-tender. ) is present.   Musculoskeletal: Normal range of motion.   Neurological: She is alert and oriented to person, place, and time.   Skin: Skin is warm and dry.   Psychiatric: She has a normal mood and affect. Thought content normal.   Vitals reviewed.      Significant Labs:  CBC:   Recent Labs   Lab  10/11/18   2016   WBC  10.04   RBC  4.62   HGB  13.1   HCT  39.5   PLT  284   MCV  86   MCH  28.4   MCHC  33.2     CMP:   Recent Labs   Lab  10/11/18   2016   GLU  130*   CALCIUM  10.7*   ALBUMIN  4.3   PROT  8.0   NA  139   K  3.6   CO2  21*   CL  101   BUN  23   CREATININE  1.4   ALKPHOS  75   ALT  8*   AST  15   BILITOT  0.7       Significant Diagnostics:  reviewed

## 2018-10-12 NOTE — ED NOTES
Dr Caldwell returned call &  requests that pt be admitted to Hospital Medicine.  Hospital Medicine paged

## 2018-10-15 ENCOUNTER — TELEPHONE (OUTPATIENT)
Dept: SURGERY | Facility: CLINIC | Age: 78
End: 2018-10-15

## 2018-10-15 ENCOUNTER — PATIENT OUTREACH (OUTPATIENT)
Dept: ADMINISTRATIVE | Facility: CLINIC | Age: 78
End: 2018-10-15

## 2018-10-15 NOTE — PLAN OF CARE
10/15/18 0811   Final Note   Assessment Type Final Discharge Note   Discharge Disposition Home   Right Care Referral Info   Post Acute Recommendation No Care

## 2018-10-15 NOTE — TELEPHONE ENCOUNTER
S/W pt via phone informed pt Zofran is not needed, advised pt to go to ED for N/V and to keep upcoming appt to schedule an elective sx to repair hernia. Patient voiced an understanding.   10/16/18 9:21AM s/w pt via phone informed her Rx for Zofran was sent to pharmacy per oHlly Nash. Patient voiced an understanding

## 2018-10-15 NOTE — PATIENT INSTRUCTIONS
Ondansetron tablets  What is this medicine?  ONDANSETRON (on DAN se kelsey) is used to treat nausea and vomiting caused by chemotherapy. It is also used to prevent or treat nausea and vomiting after surgery.  How should I use this medicine?  Take this medicine by mouth with a glass of water. Follow the directions on your prescription label. Take your doses at regular intervals. Do not take your medicine more often than directed.  Talk to your pediatrician regarding the use of this medicine in children. Special care may be needed.  What side effects may I notice from receiving this medicine?  Side effects that you should report to your doctor or health care professional as soon as possible:  · allergic reactions like skin rash, itching or hives, swelling of the face, lips or tongue  · breathing problems  · confusion  · dizziness  · fast or irregular heartbeat  · feeling faint or lightheaded, falls  · fever and chills  · loss of balance or coordination  · seizures  · sweating  · swelling of the hands or feet  · tightness in the chest  · tremors  · unusually weak or tired  Side effects that usually do not require medical attention (report to your doctor or health care professional if they continue or are bothersome):  · constipation or diarrhea  · headache  What may interact with this medicine?  Do not take this medicine with any of the following medications:  · apomorphine  · certain medicines for fungal infections like fluconazole, itraconazole, ketoconazole, posaconazole, voriconazole  · cisapride  · dofetilide  · dronedarone  · pimozide  · thioridazine  · ziprasidone  This medicine may also interact with the following medications:  · carbamazepine  · certain medicines for depression, anxiety, or psychotic disturbances  · fentanyl  · linezolid  · MAOIs like Carbex, Eldepryl, Marplan, Nardil, and Parnate  · methylene blue (injected into a vein)  · other medicines that prolong the QT interval (cause an abnormal heart  rhythm)  · phenytoin  · rifampicin  · tramadol  What if I miss a dose?  If you miss a dose, take it as soon as you can. If it is almost time for your next dose, take only that dose. Do not take double or extra doses.  Where should I keep my medicine?  Keep out of the reach of children.  Store between 2 and 30 degrees C (36 and 86 degrees F). Throw away any unused medicine after the expiration date.  What should I tell my health care provider before I take this medicine?  They need to know if you have any of these conditions:  · heart disease  · history of irregular heartbeat  · liver disease  · low levels of magnesium or potassium in the blood  · an unusual or allergic reaction to ondansetron, granisetron, other medicines, foods, dyes, or preservatives  · pregnant or trying to get pregnant  · breast-feeding  What should I watch for while using this medicine?  Check with your doctor or health care professional right away if you have any sign of an allergic reaction.  NOTE:This sheet is a summary. It may not cover all possible information. If you have questions about this medicine, talk to your doctor, pharmacist, or health care provider. Copyright© 2017 [employer]

## 2018-10-16 ENCOUNTER — TELEPHONE (OUTPATIENT)
Dept: SURGERY | Facility: HOSPITAL | Age: 78
End: 2018-10-16

## 2018-10-16 RX ORDER — ONDANSETRON 4 MG/1
4 TABLET, ORALLY DISINTEGRATING ORAL EVERY 6 HOURS PRN
Qty: 15 TABLET | Refills: 1 | Status: SHIPPED | OUTPATIENT
Start: 2018-10-16 | End: 2018-10-19 | Stop reason: SDUPTHER

## 2018-10-19 ENCOUNTER — OFFICE VISIT (OUTPATIENT)
Dept: SURGERY | Facility: CLINIC | Age: 78
End: 2018-10-19
Payer: MEDICARE

## 2018-10-19 VITALS
SYSTOLIC BLOOD PRESSURE: 110 MMHG | DIASTOLIC BLOOD PRESSURE: 69 MMHG | HEART RATE: 64 BPM | TEMPERATURE: 98 F | WEIGHT: 149 LBS | BODY MASS INDEX: 23.34 KG/M2

## 2018-10-19 DIAGNOSIS — K43.6 VENTRAL HERNIA WITH OBSTRUCTION AND WITHOUT GANGRENE: ICD-10-CM

## 2018-10-19 DIAGNOSIS — K43.0 INCISIONAL HERNIA WITH BOWEL OBSTRUCTION: Primary | ICD-10-CM

## 2018-10-19 PROCEDURE — 99999 PR PBB SHADOW E&M-EST. PATIENT-LVL III: CPT | Mod: PBBFAC,,, | Performed by: SURGERY

## 2018-10-19 PROCEDURE — 99213 OFFICE O/P EST LOW 20 MIN: CPT | Mod: PBBFAC,PO | Performed by: SURGERY

## 2018-10-19 PROCEDURE — 99214 OFFICE O/P EST MOD 30 MIN: CPT | Mod: S$PBB,,, | Performed by: SURGERY

## 2018-10-19 RX ORDER — SODIUM CHLORIDE 9 MG/ML
INJECTION, SOLUTION INTRAVENOUS CONTINUOUS
Status: CANCELLED | OUTPATIENT
Start: 2018-10-19

## 2018-10-19 RX ORDER — ONDANSETRON 4 MG/1
4 TABLET, ORALLY DISINTEGRATING ORAL EVERY 6 HOURS PRN
Qty: 15 TABLET | Refills: 1 | Status: SHIPPED | OUTPATIENT
Start: 2018-10-19 | End: 2018-10-24

## 2018-10-19 RX ORDER — LIDOCAINE HYDROCHLORIDE 10 MG/ML
1 INJECTION, SOLUTION EPIDURAL; INFILTRATION; INTRACAUDAL; PERINEURAL ONCE
Status: ACTIVE | OUTPATIENT
Start: 2018-10-19

## 2018-10-19 NOTE — H&P (VIEW-ONLY)
History & Physical    SUBJECTIVE:     History of Present Illness:  Patient is a 78 y.o. female presents with known incisional hernia. She did undergo export laparotomy with a midline incision 17 years ago at the local hospital for removal of a tumor in her abdomen.  The precise location of the tumor resection is unknown at this time and there are no medical records available.  Since then, she began to notice slowly growing bulge on her midline which is consistent with incisional hernia. She has neglected and reached the point that she got frequent bouts of abdominal pain. She was recently admitted to the hospital with acute obstruction and which resolved spontaneously.  The CT scan confirmed to the incisional hernia located in the midline.    Chief Complaint   Patient presents with    Consult    Hernia       Review of patient's allergies indicates:  No Known Allergies    Current Outpatient Medications   Medication Sig Dispense Refill    atorvastatin (LIPITOR) 20 MG tablet Take 20 mg by mouth once daily.      blood sugar diagnostic (ACCU-CHEK SMARTVIEW TEST STRIP) Strp TEST ONE TO TWO TIMES DAILY (NEED MD APPOINTMENT)      blood-glucose meter Misc Test BID      colchicine 0.6 mg tablet Take 0.6 mg by mouth once daily.      dicyclomine (BENTYL) 10 MG capsule Take 10 mg by mouth 3 (three) times daily as needed.      lancets (ACCU-CHEK FASTCLIX) Misc TEST BLOOD SUGAR  1  TO  2  TIMES  PER  DAY  ( NEED MD APPOINTMENT  )      lisinopril 10 MG tablet Take 1 tablet (10 mg total) by mouth once daily. 30 tablet 1    omeprazole (PRILOSEC) 40 MG capsule Take 40 mg by mouth once daily.      ondansetron (ZOFRAN-ODT) 4 MG TbDL Take 1 tablet (4 mg total) by mouth every 6 (six) hours as needed. 15 tablet 1    polyethylene glycol (GLYCOLAX) 17 gram PwPk Take 17 g by mouth daily 28 packet 0     Current Facility-Administered Medications   Medication Dose Route Frequency Provider Last Rate Last Dose    lidocaine (PF) 10  mg/ml (1%) injection 10 mg  1 mL Intradermal Once Kevin Caldwell MD           Past Medical History:   Diagnosis Date    Diabetes mellitus     Gout     Hypercholesteremia     Hypertension      Past Surgical History:   Procedure Laterality Date    ABDOMINAL SURGERY       History reviewed. No pertinent family history.  Social History     Tobacco Use    Smoking status: Never Smoker    Smokeless tobacco: Never Used   Substance Use Topics    Alcohol use: Yes     Comment: socially    Drug use: No        Review of Systems:  Review of Systems   Constitutional: Positive for activity change and appetite change. Negative for chills and fever.   HENT: Negative for sore throat and trouble swallowing.    Eyes: Negative.    Respiratory: Negative for cough and shortness of breath.    Cardiovascular: Negative.    Gastrointestinal: Positive for abdominal pain. Negative for abdominal distention, nausea and vomiting.   Endocrine: Negative.    Genitourinary: Negative.    Musculoskeletal: Negative.    Skin: Negative.    Allergic/Immunologic: Negative.    Neurological: Negative.    Hematological: Does not bruise/bleed easily.   Psychiatric/Behavioral: Negative.        OBJECTIVE:     Vital Signs (Most Recent)  Temp: 97.9 °F (36.6 °C) (10/19/18 1115)  Pulse: 64 (10/19/18 1115)  BP: 110/69 (10/19/18 1115)     67.6 kg (149 lb)     Physical Exam:  Physical Exam   Constitutional: She is oriented to person, place, and time.   HENT:   Head: Normocephalic.   Right Ear: External ear normal.   Left Ear: External ear normal.   Nose: Nose normal.   Eyes: Pupils are equal, round, and reactive to light. No scleral icterus.   Neck: Normal range of motion. Neck supple. No thyromegaly present.   Cardiovascular: Normal rate, regular rhythm and normal heart sounds.   No murmur heard.  Pulmonary/Chest: Effort normal and breath sounds normal.   Abdominal: Soft. Bowel sounds are normal. There is no tenderness. There is no guarding. A hernia is present.    Musculoskeletal: Normal range of motion.   Lymphadenopathy:     She has no cervical adenopathy.   Neurological: She is alert and oriented to person, place, and time.   Skin: Skin is warm and dry.       Laboratory  Lab Results   Component Value Date    WBC 10.04 10/11/2018    HGB 13.1 10/11/2018    HCT 39.5 10/11/2018     10/11/2018    ALT 8 (L) 10/11/2018    AST 15 10/11/2018     10/11/2018    K 3.6 10/11/2018     10/11/2018    CREATININE 1.4 10/11/2018    BUN 23 10/11/2018    CO2 21 (L) 10/11/2018    HGBA1C 5.3 10/12/2018       No results found for this or any previous visit.      Diagnostic Results:  Labs: Reviewed  ECG: Reviewed  X-Ray: Reviewed  CT: Reviewed    None    ASSESSMENT/PLAN:     Incisional hernia, abdomen.    PLAN:Plan     The recommendation is proceed with repair of the incisional hernia robotically.  The risk and the benefit of the procedure were fully addressed with the patient. The surgery scheduled for October 25, 2018 at 7:00 a.m..    Kevin Caldwell

## 2018-10-23 ENCOUNTER — ANESTHESIA EVENT (OUTPATIENT)
Dept: SURGERY | Facility: HOSPITAL | Age: 78
DRG: 337 | End: 2018-10-23
Payer: MEDICARE

## 2018-10-23 NOTE — ANESTHESIA PREPROCEDURE EVALUATION
10/23/2018  Mariaelena Sheffield is a 78 y.o., female.    Anesthesia Evaluation    I have reviewed the Patient Summary Reports.    I have reviewed the Nursing Notes.   I have reviewed the Medications.     Review of Systems  Anesthesia Hx:  No problems with previous Anesthesia  Denies Family Hx of Anesthesia complications.   Denies Personal Hx of Anesthesia complications.   Social:  Non-Smoker, Social Alcohol Use    Hematology/Oncology:  Hematology Normal      Oncology Comments: Breast ca    EENT/Dental:EENT/Dental Normal   Cardiovascular:   Hypertension Denies MI.   Denies CABG/stent.      hyperlipidemia ECG has been reviewed. ekg 10/2018:  Normal sinus rhythm 83  Normal ECG  When compared with ECG of 30-MAR-2018 19:44,  No significant change was found   Pulmonary:   Denies COPD.  Denies Asthma.  Denies Sleep Apnea.    Renal/:  Renal/ Normal     Hepatic/GI:   GERD Denies Liver Disease. Denies Hepatitis. Incisional hernia   Musculoskeletal:   Gout   Neurological:   Denies CVA. Denies Seizures.    Endocrine:   Diabetes, well controlled, type 2 Denies Hypothyroidism. Denies Hyperthyroidism.        Physical Exam  General:  Well nourished    Airway/Jaw/Neck:  Airway Findings: Mouth Opening: Normal Tongue: Normal  General Airway Assessment: Adult  Mallampati: II      Dental:  Dental Findings:Uppers out   Chest/Lungs:  Chest/Lungs Findings: Clear to auscultation, Normal Respiratory Rate     Heart/Vascular:  Heart Findings: Rate: Normal  Rhythm: Regular Rhythm  Sounds: Normal             Anesthesia Plan  Type of Anesthesia, risks & benefits discussed:  Anesthesia Type:  general  Patient's Preference:   Intra-op Monitoring Plan: standard ASA monitors  Intra-op Monitoring Plan Comments:   Post Op Pain Control Plan: multimodal analgesia  Post Op Pain Control Plan Comments:   Induction:   IV  Beta Blocker:  Patient is  not currently on a Beta-Blocker (No further documentation required).       Informed Consent: Patient understands risks and agrees with Anesthesia plan.  Questions answered. Anesthesia consent signed with patient.  ASA Score: 3     Day of Surgery Review of History & Physical: I have interviewed and examined the patient. I have reviewed the patient's H&P dated: 10/19/18. There are no significant changes.  H&P update referred to the surgeon.         Ready For Surgery From Anesthesia Perspective.

## 2018-10-24 ENCOUNTER — HOSPITAL ENCOUNTER (INPATIENT)
Facility: HOSPITAL | Age: 78
LOS: 5 days | Discharge: HOME HEALTH | DRG: 337 | End: 2018-10-29
Attending: EMERGENCY MEDICINE | Admitting: SURGERY
Payer: MEDICARE

## 2018-10-24 DIAGNOSIS — K43.6 VENTRAL HERNIA WITH BOWEL OBSTRUCTION: Primary | ICD-10-CM

## 2018-10-24 DIAGNOSIS — E11.9 TYPE 2 DIABETES MELLITUS WITHOUT COMPLICATION, WITHOUT LONG-TERM CURRENT USE OF INSULIN: ICD-10-CM

## 2018-10-24 DIAGNOSIS — K43.0 INCISIONAL HERNIA WITH BOWEL OBSTRUCTION: ICD-10-CM

## 2018-10-24 DIAGNOSIS — Z01.89 ENCOUNTER FOR IMAGING STUDY TO CONFIRM NASOGASTRIC TUBE PLACEMENT: ICD-10-CM

## 2018-10-24 PROCEDURE — 21400001 HC TELEMETRY ROOM

## 2018-10-24 PROCEDURE — 99285 EMERGENCY DEPT VISIT HI MDM: CPT | Mod: 25

## 2018-10-24 RX ORDER — FERROUS SULFATE 325(65) MG
325 TABLET, DELAYED RELEASE (ENTERIC COATED) ORAL DAILY
COMMUNITY

## 2018-10-24 RX ORDER — QUINAPRIL HYDROCHLORIDE AND HYDROCHLOROTHIAZIDE 20; 12.5 MG/1; MG/1
2 TABLET, FILM COATED ORAL DAILY
Status: ON HOLD | COMMUNITY
Start: 2018-10-05 | End: 2018-10-25

## 2018-10-24 RX ORDER — METFORMIN HYDROCHLORIDE 500 MG/1
1 TABLET ORAL
COMMUNITY
Start: 2018-09-21

## 2018-10-25 ENCOUNTER — ANESTHESIA (OUTPATIENT)
Dept: SURGERY | Facility: HOSPITAL | Age: 78
DRG: 337 | End: 2018-10-25
Payer: MEDICARE

## 2018-10-25 PROBLEM — K43.6 VENTRAL HERNIA WITH BOWEL OBSTRUCTION: Status: ACTIVE | Noted: 2018-10-25

## 2018-10-25 PROBLEM — E87.6 HYPOKALEMIA: Status: ACTIVE | Noted: 2018-10-25

## 2018-10-25 LAB
ABO + RH BLD: NORMAL
ALBUMIN SERPL BCP-MCNC: 4 G/DL
ALP SERPL-CCNC: 69 U/L
ALT SERPL W/O P-5'-P-CCNC: <5 U/L
ANION GAP SERPL CALC-SCNC: 13 MMOL/L
AST SERPL-CCNC: 11 U/L
BASOPHILS # BLD AUTO: 0.02 K/UL
BASOPHILS NFR BLD: 0.2 %
BILIRUB SERPL-MCNC: 0.5 MG/DL
BLD GP AB SCN CELLS X3 SERPL QL: NORMAL
BUN SERPL-MCNC: 10 MG/DL
CALCIUM SERPL-MCNC: 10.3 MG/DL
CHLORIDE SERPL-SCNC: 106 MMOL/L
CO2 SERPL-SCNC: 21 MMOL/L
CREAT SERPL-MCNC: 0.9 MG/DL
DIFFERENTIAL METHOD: ABNORMAL
EOSINOPHIL # BLD AUTO: 0.1 K/UL
EOSINOPHIL NFR BLD: 1.5 %
ERYTHROCYTE [DISTWIDTH] IN BLOOD BY AUTOMATED COUNT: 15.9 %
EST. GFR  (AFRICAN AMERICAN): >60 ML/MIN/1.73 M^2
EST. GFR  (NON AFRICAN AMERICAN): >60 ML/MIN/1.73 M^2
GLUCOSE SERPL-MCNC: 141 MG/DL
HCT VFR BLD AUTO: 37.1 %
HGB BLD-MCNC: 12.5 G/DL
LACTATE SERPL-SCNC: 1.4 MMOL/L
LYMPHOCYTES # BLD AUTO: 1.4 K/UL
LYMPHOCYTES NFR BLD: 15.3 %
MCH RBC QN AUTO: 28.6 PG
MCHC RBC AUTO-ENTMCNC: 33.7 G/DL
MCV RBC AUTO: 85 FL
MONOCYTES # BLD AUTO: 0.6 K/UL
MONOCYTES NFR BLD: 6.3 %
NEUTROPHILS # BLD AUTO: 7.1 K/UL
NEUTROPHILS NFR BLD: 76.4 %
PLATELET # BLD AUTO: 266 K/UL
PMV BLD AUTO: 10.9 FL
POCT GLUCOSE: 120 MG/DL (ref 70–110)
POCT GLUCOSE: 128 MG/DL (ref 70–110)
POCT GLUCOSE: 143 MG/DL (ref 70–110)
POCT GLUCOSE: 159 MG/DL (ref 70–110)
POTASSIUM SERPL-SCNC: 3.4 MMOL/L
PROT SERPL-MCNC: 7.1 G/DL
RBC # BLD AUTO: 4.37 M/UL
SODIUM SERPL-SCNC: 140 MMOL/L
WBC # BLD AUTO: 9.27 K/UL

## 2018-10-25 PROCEDURE — 37000009 HC ANESTHESIA EA ADD 15 MINS: Performed by: SURGERY

## 2018-10-25 PROCEDURE — 25000003 PHARM REV CODE 250: Performed by: NURSE ANESTHETIST, CERTIFIED REGISTERED

## 2018-10-25 PROCEDURE — 63600175 PHARM REV CODE 636 W HCPCS: Performed by: SURGERY

## 2018-10-25 PROCEDURE — C9290 INJ, BUPIVACAINE LIPOSOME: HCPCS | Performed by: SURGERY

## 2018-10-25 PROCEDURE — 49561 PR REPAIR INCISIONAL HERNIA,STRANG: CPT | Mod: 22,,, | Performed by: SURGERY

## 2018-10-25 PROCEDURE — 63600175 PHARM REV CODE 636 W HCPCS: Performed by: NURSE ANESTHETIST, CERTIFIED REGISTERED

## 2018-10-25 PROCEDURE — 27201423 OPTIME MED/SURG SUP & DEVICES STERILE SUPPLY: Performed by: SURGERY

## 2018-10-25 PROCEDURE — 88302 TISSUE EXAM BY PATHOLOGIST: CPT | Mod: 26,,, | Performed by: PATHOLOGY

## 2018-10-25 PROCEDURE — 25000003 PHARM REV CODE 250: Performed by: SURGERY

## 2018-10-25 PROCEDURE — 49561 PR REPAIR INCISIONAL HERNIA,STRANG: CPT | Mod: AS,,, | Performed by: PHYSICIAN ASSISTANT

## 2018-10-25 PROCEDURE — 63600175 PHARM REV CODE 636 W HCPCS: Performed by: EMERGENCY MEDICINE

## 2018-10-25 PROCEDURE — 25000003 PHARM REV CODE 250: Performed by: EMERGENCY MEDICINE

## 2018-10-25 PROCEDURE — 94799 UNLISTED PULMONARY SVC/PX: CPT

## 2018-10-25 PROCEDURE — 25000003 PHARM REV CODE 250: Performed by: NURSE PRACTITIONER

## 2018-10-25 PROCEDURE — 86901 BLOOD TYPING SEROLOGIC RH(D): CPT

## 2018-10-25 PROCEDURE — 0WQF0ZZ REPAIR ABDOMINAL WALL, OPEN APPROACH: ICD-10-PCS | Performed by: SURGERY

## 2018-10-25 PROCEDURE — 63600175 PHARM REV CODE 636 W HCPCS: Performed by: NURSE PRACTITIONER

## 2018-10-25 PROCEDURE — 21400001 HC TELEMETRY ROOM

## 2018-10-25 PROCEDURE — 37000008 HC ANESTHESIA 1ST 15 MINUTES: Performed by: SURGERY

## 2018-10-25 PROCEDURE — 88302 TISSUE EXAM BY PATHOLOGIST: CPT | Performed by: PATHOLOGY

## 2018-10-25 PROCEDURE — 85025 COMPLETE CBC W/AUTO DIFF WBC: CPT

## 2018-10-25 PROCEDURE — 80053 COMPREHEN METABOLIC PANEL: CPT

## 2018-10-25 PROCEDURE — 99900037 HC PT THERAPY SCREENING (STAT)

## 2018-10-25 PROCEDURE — 36000706: Performed by: SURGERY

## 2018-10-25 PROCEDURE — 96376 TX/PRO/DX INJ SAME DRUG ADON: CPT

## 2018-10-25 PROCEDURE — 96375 TX/PRO/DX INJ NEW DRUG ADDON: CPT

## 2018-10-25 PROCEDURE — S0020 INJECTION, BUPIVICAINE HYDRO: HCPCS | Performed by: SURGERY

## 2018-10-25 PROCEDURE — 94761 N-INVAS EAR/PLS OXIMETRY MLT: CPT

## 2018-10-25 PROCEDURE — 36000707: Performed by: SURGERY

## 2018-10-25 PROCEDURE — 0DN80ZZ RELEASE SMALL INTESTINE, OPEN APPROACH: ICD-10-PCS | Performed by: SURGERY

## 2018-10-25 PROCEDURE — 96374 THER/PROPH/DIAG INJ IV PUSH: CPT

## 2018-10-25 PROCEDURE — 71000033 HC RECOVERY, INTIAL HOUR: Performed by: SURGERY

## 2018-10-25 PROCEDURE — 83605 ASSAY OF LACTIC ACID: CPT

## 2018-10-25 PROCEDURE — 63600175 PHARM REV CODE 636 W HCPCS: Performed by: ANESTHESIOLOGY

## 2018-10-25 RX ORDER — ESMOLOL HYDROCHLORIDE 10 MG/ML
INJECTION INTRAVENOUS
Status: DISCONTINUED | OUTPATIENT
Start: 2018-10-25 | End: 2018-10-25

## 2018-10-25 RX ORDER — MORPHINE SULFATE 2 MG/ML
2 INJECTION, SOLUTION INTRAMUSCULAR; INTRAVENOUS
Status: DISCONTINUED | OUTPATIENT
Start: 2018-10-25 | End: 2018-10-25

## 2018-10-25 RX ORDER — HYDROMORPHONE HYDROCHLORIDE 2 MG/ML
0.2 INJECTION, SOLUTION INTRAMUSCULAR; INTRAVENOUS; SUBCUTANEOUS EVERY 5 MIN PRN
Status: DISCONTINUED | OUTPATIENT
Start: 2018-10-25 | End: 2018-10-25 | Stop reason: HOSPADM

## 2018-10-25 RX ORDER — LISINOPRIL 10 MG/1
10 TABLET ORAL NIGHTLY
Status: DISCONTINUED | OUTPATIENT
Start: 2018-10-25 | End: 2018-10-29 | Stop reason: HOSPADM

## 2018-10-25 RX ORDER — LIDOCAINE HYDROCHLORIDE 10 MG/ML
1 INJECTION, SOLUTION EPIDURAL; INFILTRATION; INTRACAUDAL; PERINEURAL ONCE
Status: DISCONTINUED | OUTPATIENT
Start: 2018-10-25 | End: 2018-10-25 | Stop reason: HOSPADM

## 2018-10-25 RX ORDER — CLONIDINE HYDROCHLORIDE 0.1 MG/1
0.1 TABLET ORAL EVERY 6 HOURS PRN
Status: DISCONTINUED | OUTPATIENT
Start: 2018-10-25 | End: 2018-10-29 | Stop reason: HOSPADM

## 2018-10-25 RX ORDER — AMOXICILLIN 250 MG
1 CAPSULE ORAL 2 TIMES DAILY
Status: DISCONTINUED | OUTPATIENT
Start: 2018-10-25 | End: 2018-10-29 | Stop reason: HOSPADM

## 2018-10-25 RX ORDER — ONDANSETRON 2 MG/ML
4 INJECTION INTRAMUSCULAR; INTRAVENOUS DAILY PRN
Status: DISCONTINUED | OUTPATIENT
Start: 2018-10-25 | End: 2018-10-25 | Stop reason: HOSPADM

## 2018-10-25 RX ORDER — RAMELTEON 8 MG/1
8 TABLET ORAL NIGHTLY PRN
Status: DISCONTINUED | OUTPATIENT
Start: 2018-10-25 | End: 2018-10-29 | Stop reason: HOSPADM

## 2018-10-25 RX ORDER — RAMELTEON 8 MG/1
8 TABLET ORAL NIGHTLY PRN
Status: DISCONTINUED | OUTPATIENT
Start: 2018-10-25 | End: 2018-10-25

## 2018-10-25 RX ORDER — HYDROMORPHONE HYDROCHLORIDE 1 MG/ML
0.5 INJECTION, SOLUTION INTRAMUSCULAR; INTRAVENOUS; SUBCUTANEOUS
Status: DISCONTINUED | OUTPATIENT
Start: 2018-10-25 | End: 2018-10-29 | Stop reason: HOSPADM

## 2018-10-25 RX ORDER — BISACODYL 10 MG
10 SUPPOSITORY, RECTAL RECTAL DAILY PRN
Status: DISCONTINUED | OUTPATIENT
Start: 2018-10-25 | End: 2018-10-29 | Stop reason: HOSPADM

## 2018-10-25 RX ORDER — GLUCAGON 1 MG
1 KIT INJECTION
Status: DISCONTINUED | OUTPATIENT
Start: 2018-10-25 | End: 2018-10-29 | Stop reason: HOSPADM

## 2018-10-25 RX ORDER — PANTOPRAZOLE SODIUM 40 MG/1
40 TABLET, DELAYED RELEASE ORAL DAILY
Status: DISCONTINUED | OUTPATIENT
Start: 2018-10-25 | End: 2018-10-29 | Stop reason: HOSPADM

## 2018-10-25 RX ORDER — ONDANSETRON 2 MG/ML
INJECTION INTRAMUSCULAR; INTRAVENOUS
Status: DISCONTINUED | OUTPATIENT
Start: 2018-10-25 | End: 2018-10-25

## 2018-10-25 RX ORDER — ACETAMINOPHEN 325 MG/1
650 TABLET ORAL EVERY 8 HOURS PRN
Status: DISCONTINUED | OUTPATIENT
Start: 2018-10-25 | End: 2018-10-25

## 2018-10-25 RX ORDER — CHLORHEXIDINE GLUCONATE ORAL RINSE 1.2 MG/ML
10 SOLUTION DENTAL 2 TIMES DAILY
Status: COMPLETED | OUTPATIENT
Start: 2018-10-25 | End: 2018-10-29

## 2018-10-25 RX ORDER — SODIUM CHLORIDE 0.9 % (FLUSH) 0.9 %
3 SYRINGE (ML) INJECTION
Status: DISCONTINUED | OUTPATIENT
Start: 2018-10-25 | End: 2018-10-25

## 2018-10-25 RX ORDER — HYDROCODONE BITARTRATE AND ACETAMINOPHEN 5; 325 MG/1; MG/1
1 TABLET ORAL EVERY 4 HOURS PRN
Status: DISCONTINUED | OUTPATIENT
Start: 2018-10-25 | End: 2018-10-29 | Stop reason: HOSPADM

## 2018-10-25 RX ORDER — BUPIVACAINE HYDROCHLORIDE 5 MG/ML
INJECTION, SOLUTION EPIDURAL; INTRACAUDAL
Status: DISCONTINUED | OUTPATIENT
Start: 2018-10-25 | End: 2018-10-25 | Stop reason: HOSPADM

## 2018-10-25 RX ORDER — SODIUM CHLORIDE, SODIUM LACTATE, POTASSIUM CHLORIDE, CALCIUM CHLORIDE 600; 310; 30; 20 MG/100ML; MG/100ML; MG/100ML; MG/100ML
INJECTION, SOLUTION INTRAVENOUS CONTINUOUS
Status: DISCONTINUED | OUTPATIENT
Start: 2018-10-25 | End: 2018-10-27

## 2018-10-25 RX ORDER — GLYCOPYRROLATE 0.2 MG/ML
INJECTION INTRAMUSCULAR; INTRAVENOUS
Status: DISCONTINUED | OUTPATIENT
Start: 2018-10-25 | End: 2018-10-25

## 2018-10-25 RX ORDER — MORPHINE SULFATE 4 MG/ML
4 INJECTION, SOLUTION INTRAMUSCULAR; INTRAVENOUS
Status: DISCONTINUED | OUTPATIENT
Start: 2018-10-25 | End: 2018-10-25

## 2018-10-25 RX ORDER — FENTANYL CITRATE 50 UG/ML
25 INJECTION, SOLUTION INTRAMUSCULAR; INTRAVENOUS EVERY 5 MIN PRN
Status: DISCONTINUED | OUTPATIENT
Start: 2018-10-25 | End: 2018-10-25 | Stop reason: HOSPADM

## 2018-10-25 RX ORDER — ACETAMINOPHEN 325 MG/1
650 TABLET ORAL EVERY 6 HOURS PRN
Status: DISCONTINUED | OUTPATIENT
Start: 2018-10-25 | End: 2018-10-29 | Stop reason: HOSPADM

## 2018-10-25 RX ORDER — MORPHINE SULFATE 4 MG/ML
2 INJECTION, SOLUTION INTRAMUSCULAR; INTRAVENOUS
Status: COMPLETED | OUTPATIENT
Start: 2018-10-25 | End: 2018-10-25

## 2018-10-25 RX ORDER — DEXTROSE MONOHYDRATE AND SODIUM CHLORIDE 5; .9 G/100ML; G/100ML
1000 INJECTION, SOLUTION INTRAVENOUS
Status: COMPLETED | OUTPATIENT
Start: 2018-10-25 | End: 2018-10-25

## 2018-10-25 RX ORDER — SODIUM CHLORIDE, SODIUM LACTATE, POTASSIUM CHLORIDE, CALCIUM CHLORIDE 600; 310; 30; 20 MG/100ML; MG/100ML; MG/100ML; MG/100ML
INJECTION, SOLUTION INTRAVENOUS CONTINUOUS PRN
Status: DISCONTINUED | OUTPATIENT
Start: 2018-10-25 | End: 2018-10-25

## 2018-10-25 RX ORDER — CEFAZOLIN SODIUM 2 G/50ML
2 SOLUTION INTRAVENOUS
Status: COMPLETED | OUTPATIENT
Start: 2018-10-25 | End: 2018-10-25

## 2018-10-25 RX ORDER — PROPOFOL 10 MG/ML
VIAL (ML) INTRAVENOUS
Status: DISCONTINUED | OUTPATIENT
Start: 2018-10-25 | End: 2018-10-25

## 2018-10-25 RX ORDER — ACETAMINOPHEN 10 MG/ML
1000 INJECTION, SOLUTION INTRAVENOUS ONCE
Status: DISCONTINUED | OUTPATIENT
Start: 2018-10-25 | End: 2018-10-25

## 2018-10-25 RX ORDER — SODIUM CHLORIDE, SODIUM LACTATE, POTASSIUM CHLORIDE, CALCIUM CHLORIDE 600; 310; 30; 20 MG/100ML; MG/100ML; MG/100ML; MG/100ML
INJECTION, SOLUTION INTRAVENOUS CONTINUOUS
Status: DISCONTINUED | OUTPATIENT
Start: 2018-10-25 | End: 2018-10-25

## 2018-10-25 RX ORDER — ONDANSETRON 2 MG/ML
4 INJECTION INTRAMUSCULAR; INTRAVENOUS
Status: COMPLETED | OUTPATIENT
Start: 2018-10-25 | End: 2018-10-25

## 2018-10-25 RX ORDER — CEFAZOLIN SODIUM 2 G/50ML
2 SOLUTION INTRAVENOUS
Status: COMPLETED | OUTPATIENT
Start: 2018-10-25 | End: 2018-10-26

## 2018-10-25 RX ORDER — ONDANSETRON 8 MG/1
8 TABLET, ORALLY DISINTEGRATING ORAL EVERY 8 HOURS PRN
Status: DISCONTINUED | OUTPATIENT
Start: 2018-10-25 | End: 2018-10-29 | Stop reason: HOSPADM

## 2018-10-25 RX ORDER — FENTANYL CITRATE 50 UG/ML
INJECTION, SOLUTION INTRAMUSCULAR; INTRAVENOUS
Status: DISCONTINUED | OUTPATIENT
Start: 2018-10-25 | End: 2018-10-25

## 2018-10-25 RX ORDER — LACTULOSE 10 G/15ML
20 SOLUTION ORAL EVERY 6 HOURS PRN
Status: DISCONTINUED | OUTPATIENT
Start: 2018-10-25 | End: 2018-10-29 | Stop reason: HOSPADM

## 2018-10-25 RX ORDER — POTASSIUM CHLORIDE 7.45 MG/ML
10 INJECTION INTRAVENOUS
Status: COMPLETED | OUTPATIENT
Start: 2018-10-25 | End: 2018-10-25

## 2018-10-25 RX ORDER — SODIUM CHLORIDE 9 MG/ML
INJECTION, SOLUTION INTRAVENOUS CONTINUOUS
Status: DISCONTINUED | OUTPATIENT
Start: 2018-10-25 | End: 2018-10-25

## 2018-10-25 RX ORDER — INSULIN ASPART 100 [IU]/ML
0-5 INJECTION, SOLUTION INTRAVENOUS; SUBCUTANEOUS EVERY 6 HOURS PRN
Status: DISCONTINUED | OUTPATIENT
Start: 2018-10-25 | End: 2018-10-29 | Stop reason: HOSPADM

## 2018-10-25 RX ORDER — ONDANSETRON 8 MG/1
8 TABLET, ORALLY DISINTEGRATING ORAL EVERY 8 HOURS PRN
Status: DISCONTINUED | OUTPATIENT
Start: 2018-10-25 | End: 2018-10-25

## 2018-10-25 RX ORDER — SUCCINYLCHOLINE CHLORIDE 20 MG/ML
INJECTION INTRAMUSCULAR; INTRAVENOUS
Status: DISCONTINUED | OUTPATIENT
Start: 2018-10-25 | End: 2018-10-25

## 2018-10-25 RX ORDER — DEXAMETHASONE SODIUM PHOSPHATE 4 MG/ML
INJECTION, SOLUTION INTRA-ARTICULAR; INTRALESIONAL; INTRAMUSCULAR; INTRAVENOUS; SOFT TISSUE
Status: DISCONTINUED | OUTPATIENT
Start: 2018-10-25 | End: 2018-10-25

## 2018-10-25 RX ORDER — ROCURONIUM BROMIDE 10 MG/ML
INJECTION, SOLUTION INTRAVENOUS
Status: DISCONTINUED | OUTPATIENT
Start: 2018-10-25 | End: 2018-10-25

## 2018-10-25 RX ORDER — PHENYLEPHRINE HYDROCHLORIDE 10 MG/ML
INJECTION INTRAVENOUS
Status: DISCONTINUED | OUTPATIENT
Start: 2018-10-25 | End: 2018-10-25

## 2018-10-25 RX ORDER — DIPHENHYDRAMINE HYDROCHLORIDE 50 MG/ML
25 INJECTION INTRAMUSCULAR; INTRAVENOUS EVERY 4 HOURS PRN
Status: DISCONTINUED | OUTPATIENT
Start: 2018-10-25 | End: 2018-10-29 | Stop reason: HOSPADM

## 2018-10-25 RX ORDER — SODIUM CHLORIDE 9 MG/ML
INJECTION, SOLUTION INTRAVENOUS CONTINUOUS PRN
Status: DISCONTINUED | OUTPATIENT
Start: 2018-10-25 | End: 2018-10-25

## 2018-10-25 RX ORDER — LIDOCAINE HCL/PF 100 MG/5ML
SYRINGE (ML) INTRAVENOUS
Status: DISCONTINUED | OUTPATIENT
Start: 2018-10-25 | End: 2018-10-25

## 2018-10-25 RX ORDER — ATORVASTATIN CALCIUM 10 MG/1
20 TABLET, FILM COATED ORAL NIGHTLY
Status: DISCONTINUED | OUTPATIENT
Start: 2018-10-25 | End: 2018-10-29 | Stop reason: HOSPADM

## 2018-10-25 RX ORDER — DIPHENHYDRAMINE HYDROCHLORIDE 50 MG/ML
25 INJECTION INTRAMUSCULAR; INTRAVENOUS EVERY 4 HOURS PRN
Status: DISCONTINUED | OUTPATIENT
Start: 2018-10-25 | End: 2018-10-25

## 2018-10-25 RX ORDER — NEOSTIGMINE METHYLSULFATE 1 MG/ML
INJECTION, SOLUTION INTRAVENOUS
Status: DISCONTINUED | OUTPATIENT
Start: 2018-10-25 | End: 2018-10-25

## 2018-10-25 RX ADMIN — ESMOLOL HYDROCHLORIDE 30 MG: 10 INJECTION, SOLUTION INTRAVENOUS at 08:10

## 2018-10-25 RX ADMIN — NEOSTIGMINE METHYLSULFATE 5 MG: 1 INJECTION INTRAVENOUS at 09:10

## 2018-10-25 RX ADMIN — ROBINUL 0.8 MG: 0.2 INJECTION INTRAMUSCULAR; INTRAVENOUS at 09:10

## 2018-10-25 RX ADMIN — SODIUM CHLORIDE, SODIUM LACTATE, POTASSIUM CHLORIDE, AND CALCIUM CHLORIDE: .6; .31; .03; .02 INJECTION, SOLUTION INTRAVENOUS at 09:10

## 2018-10-25 RX ADMIN — LISINOPRIL 10 MG: 10 TABLET ORAL at 09:10

## 2018-10-25 RX ADMIN — RAMELTEON 8 MG: 8 TABLET, FILM COATED ORAL at 09:10

## 2018-10-25 RX ADMIN — HYDROMORPHONE HYDROCHLORIDE 0.2 MG: 2 INJECTION INTRAMUSCULAR; INTRAVENOUS; SUBCUTANEOUS at 10:10

## 2018-10-25 RX ADMIN — BUPIVACAINE 266 MG: 13.3 INJECTION, SUSPENSION, LIPOSOMAL INFILTRATION at 08:10

## 2018-10-25 RX ADMIN — MORPHINE SULFATE 2 MG: 4 INJECTION INTRAVENOUS at 02:10

## 2018-10-25 RX ADMIN — ONDANSETRON 4 MG: 2 INJECTION INTRAMUSCULAR; INTRAVENOUS at 12:10

## 2018-10-25 RX ADMIN — CHLORHEXIDINE GLUCONATE 10 ML: 1.2 RINSE ORAL at 09:10

## 2018-10-25 RX ADMIN — FENTANYL CITRATE 50 MCG: 50 INJECTION, SOLUTION INTRAMUSCULAR; INTRAVENOUS at 07:10

## 2018-10-25 RX ADMIN — DEXAMETHASONE SODIUM PHOSPHATE 8 MG: 4 INJECTION, SOLUTION INTRA-ARTICULAR; INTRALESIONAL; INTRAMUSCULAR; INTRAVENOUS; SOFT TISSUE at 09:10

## 2018-10-25 RX ADMIN — PHENYLEPHRINE HYDROCHLORIDE 200 MCG: 10 INJECTION INTRAVENOUS at 08:10

## 2018-10-25 RX ADMIN — SODIUM CHLORIDE: 900 INJECTION, SOLUTION INTRAVENOUS at 08:10

## 2018-10-25 RX ADMIN — ATORVASTATIN CALCIUM 20 MG: 10 TABLET, FILM COATED ORAL at 09:10

## 2018-10-25 RX ADMIN — CEFAZOLIN SODIUM 2 G: 2 SOLUTION INTRAVENOUS at 08:10

## 2018-10-25 RX ADMIN — SODIUM CHLORIDE, SODIUM LACTATE, POTASSIUM CHLORIDE, AND CALCIUM CHLORIDE: .6; .31; .03; .02 INJECTION, SOLUTION INTRAVENOUS at 07:10

## 2018-10-25 RX ADMIN — ESMOLOL HYDROCHLORIDE 20 MG: 10 INJECTION, SOLUTION INTRAVENOUS at 08:10

## 2018-10-25 RX ADMIN — DEXTROSE AND SODIUM CHLORIDE 1000 ML: 5; .9 INJECTION, SOLUTION INTRAVENOUS at 01:10

## 2018-10-25 RX ADMIN — LIDOCAINE HYDROCHLORIDE 100 MG: 20 INJECTION, SOLUTION INTRAVENOUS at 07:10

## 2018-10-25 RX ADMIN — PANTOPRAZOLE SODIUM 40 MG: 40 TABLET, DELAYED RELEASE ORAL at 02:10

## 2018-10-25 RX ADMIN — SODIUM CHLORIDE, SODIUM LACTATE, POTASSIUM CHLORIDE, AND CALCIUM CHLORIDE: .6; .31; .03; .02 INJECTION, SOLUTION INTRAVENOUS at 11:10

## 2018-10-25 RX ADMIN — ROCURONIUM BROMIDE 5 MG: 10 INJECTION, SOLUTION INTRAVENOUS at 07:10

## 2018-10-25 RX ADMIN — PROPOFOL 100 MG: 10 INJECTION, EMULSION INTRAVENOUS at 07:10

## 2018-10-25 RX ADMIN — FENTANYL CITRATE 50 MCG: 50 INJECTION, SOLUTION INTRAMUSCULAR; INTRAVENOUS at 09:10

## 2018-10-25 RX ADMIN — ONDANSETRON 8 MG: 8 TABLET, ORALLY DISINTEGRATING ORAL at 03:10

## 2018-10-25 RX ADMIN — SUCCINYLCHOLINE CHLORIDE 140 MG: 20 INJECTION, SOLUTION INTRAMUSCULAR; INTRAVENOUS at 07:10

## 2018-10-25 RX ADMIN — ONDANSETRON 4 MG: 2 INJECTION, SOLUTION INTRAMUSCULAR; INTRAVENOUS at 09:10

## 2018-10-25 RX ADMIN — POTASSIUM CHLORIDE 10 MEQ: 7.46 INJECTION, SOLUTION INTRAVENOUS at 02:10

## 2018-10-25 RX ADMIN — MORPHINE SULFATE 2 MG: 4 INJECTION INTRAVENOUS at 12:10

## 2018-10-25 RX ADMIN — ROCURONIUM BROMIDE 35 MG: 10 INJECTION, SOLUTION INTRAVENOUS at 08:10

## 2018-10-25 RX ADMIN — CHLORHEXIDINE GLUCONATE 10 ML: 1.2 RINSE ORAL at 11:10

## 2018-10-25 RX ADMIN — MORPHINE SULFATE 4 MG: 4 INJECTION INTRAVENOUS at 05:10

## 2018-10-25 RX ADMIN — FENTANYL CITRATE 50 MCG: 50 INJECTION, SOLUTION INTRAMUSCULAR; INTRAVENOUS at 08:10

## 2018-10-25 RX ADMIN — POTASSIUM CHLORIDE 10 MEQ: 7.46 INJECTION, SOLUTION INTRAVENOUS at 03:10

## 2018-10-25 RX ADMIN — CEFAZOLIN SODIUM 2 G: 2 SOLUTION INTRAVENOUS at 03:10

## 2018-10-25 NOTE — ED NOTES
Dr. Ramirez speaking w/ Dr. Burns ([facility]) regarding patient consult at this time.

## 2018-10-25 NOTE — PT/OT/SLP PROGRESS
Physical Therapy      Patient Name:  Mariaelena Sheffield   MRN:  [id number]    BUD CRUZ INITIATED THIS AM VIA CHART REVIEW, PT JUST OUT OF SX. THIS AM SO WILL ATTEMPTED COMPLETION OF BUD CRUZ NEXT VISIT    Jesenia Mcclendon, PT   10/25/2018  [id number]

## 2018-10-25 NOTE — INTERVAL H&P NOTE
The patient has been examined and the H&P has been reviewed:    I concur with the findings and no changes have occurred since H&P was written.    Anesthesia/Surgery risks, benefits and alternative options discussed and understood by patient/family.          Active Hospital Problems    Diagnosis  POA    Ventral hernia with bowel obstruction [K43.6]  Yes      Resolved Hospital Problems   No resolved problems to display.

## 2018-10-25 NOTE — PLAN OF CARE
Pt in bed AAOx4.   Plan of Care reviewed, Verbalized understanding.  Patient remained free from falls, fall precautions in place.   Pt is NSR on monitor. VSS.   PIV CDI with D5 NS running at 125ml.hr.   Pt remains NPO with NG tube to low continuous suction per Dr. Burns via secure chat.   Call bell and personal belongings within reach.   Hourly rounding complete. Reminded to call for assistance.   No complaints at this time.   Will continue to monitor.

## 2018-10-25 NOTE — TRANSFER OF CARE
"Anesthesia Transfer of Care Note    Patient: Mariaelena Sheffield    Procedure(s) Performed: Procedure(s) (LRB):  LAPAROTOMY, EXPLORATORY  REPAIR, HERNIA, INCISIONAL, INCARCERATED, WITHOUT HISTORY OF PRIOR REPAIR  LYSIS, ADHESIONS (N/A)    Patient location: PACU    Anesthesia Type: general    Transport from OR: Transported from OR on room air with adequate spontaneous ventilation    Post pain: adequate analgesia    Post assessment: no apparent anesthetic complications and tolerated procedure well    Post vital signs: stable    Level of consciousness: awake    Nausea/Vomiting: no nausea/vomiting    Complications: none    Transfer of care protocol was followed      Last vitals:   Visit Vitals  /68 (BP Location: Left arm, Patient Position: Lying)   Pulse 90   Temp 36.4 °C (97.6 °F) (Oral)   Resp 16   Ht 5' 4" (1.626 m)   Wt 64.7 kg (142 lb 10.2 oz)   LMP  (LMP Unknown)   SpO2 96%   Breastfeeding? No   BMI 24.48 kg/m²     "

## 2018-10-25 NOTE — HPI
Mariaelena Sheffield is a 79 yo female with PMHx of HTN, HPL and DM II who has undergone an Exp Lap for an incarcerated incisional hernia performed by Dr. Caldwell. She had a history of multiple bowel obstructions 2/2 hernia incarceration. Presently, the pt is alert and eating ice chips. She denies nausea, chest pain, SOB and abdominal pain. Currently, her B/P is controlled, chemistries note hypokalemia, CBC is stable and blood glucose = 143. Hospital Medicine was consulted to assist with medical management.

## 2018-10-25 NOTE — SUBJECTIVE & OBJECTIVE
Past Medical History:   Diagnosis Date    Cancer     breast cancer R     Diabetes mellitus     GERD (gastroesophageal reflux disease)     Gout     Hypercholesteremia     Hypertension        Past Surgical History:   Procedure Laterality Date    ABDOMINAL SURGERY      BREAST SURGERY Right     Breast biopsy    CATARACT EXTRACTION, BILATERAL         Review of patient's allergies indicates:  No Known Allergies    No current facility-administered medications on file prior to encounter.      Current Outpatient Medications on File Prior to Encounter   Medication Sig    atorvastatin (LIPITOR) 20 MG tablet Take 20 mg by mouth every evening.     blood sugar diagnostic (ACCU-CHEK SMARTVIEW TEST STRIP) Strp TEST ONE TO TWO TIMES DAILY (NEED MD APPOINTMENT)    blood-glucose meter Misc Test BID    colchicine 0.6 mg tablet Take 0.6 mg by mouth once daily.    dicyclomine (BENTYL) 10 MG capsule Take 10 mg by mouth 3 (three) times daily as needed.    lancets (ACCU-CHEK FASTCLIX) Misc TEST BLOOD SUGAR  1  TO  2  TIMES  PER  DAY  ( NEED MD APPOINTMENT  )    lisinopril 10 MG tablet Take 1 tablet (10 mg total) by mouth once daily. (Patient taking differently: Take 10 mg by mouth every evening. )    metFORMIN (GLUCOPHAGE) 500 MG tablet Take 1 tablet by mouth daily with dinner or evening meal.    omeprazole (PRILOSEC) 40 MG capsule Take 40 mg by mouth once daily.     polyethylene glycol (GLYCOLAX) 17 gram PwPk Take 17 g by mouth daily (Patient taking differently: Take 17 g by mouth once daily. )    [DISCONTINUED] quinapril-hydrochlorothiazide (ACCURETIC) 20-12.5 mg per tablet Take 2 tablets by mouth once daily.    ferrous sulfate 325 (65 FE) MG EC tablet Take 325 mg by mouth once daily.     Family History     None        Tobacco Use    Smoking status: Never Smoker    Smokeless tobacco: Never Used   Substance and Sexual Activity    Alcohol use: Yes     Comment: socially  No alcohol prior to sx    Drug use: No     Sexual activity: Not on file     Review of Systems   Constitutional: Negative for activity change, appetite change, chills and fever.   HENT: Negative.    Eyes: Negative for pain and redness.   Respiratory: Negative for cough and shortness of breath.    Cardiovascular: Negative for chest pain.   Gastrointestinal: Positive for abdominal pain. Negative for nausea and vomiting.   Genitourinary: Negative for difficulty urinating.   Skin: Positive for wound.        Surgical incision   Neurological: Negative for dizziness, weakness and light-headedness.   Psychiatric/Behavioral: Negative for confusion. The patient is not nervous/anxious.      Objective:     Vital Signs (Most Recent):  Temp: 97.2 °F (36.2 °C) (10/25/18 1151)  Pulse: 69 (10/25/18 1151)  Resp: 18 (10/25/18 1151)  BP: 129/75 (10/25/18 1151)  SpO2: 100 % (10/25/18 1151) Vital Signs (24h Range):  Temp:  [96.9 °F (36.1 °C)-98.7 °F (37.1 °C)] 97.2 °F (36.2 °C)  Pulse:  [] 69  Resp:  [13-34] 18  SpO2:  [96 %-100 %] 100 %  BP: ()/(63-99) 129/75     Weight: 64.7 kg (142 lb 10.2 oz)  Body mass index is 24.48 kg/m².    Physical Exam   Constitutional: She is oriented to person, place, and time. She appears well-developed and well-nourished.   HENT:   Head: Normocephalic and atraumatic.   Eyes: Conjunctivae are normal. Pupils are equal, round, and reactive to light. No scleral icterus.   Neck: Normal range of motion. Neck supple.   Cardiovascular: Normal rate, regular rhythm and normal heart sounds. Exam reveals no gallop and no friction rub.   No murmur heard.  Pulmonary/Chest: Effort normal and breath sounds normal.   Abdominal:   Dry and intact abdominal dressing. Wearing an abdominal binder   Genitourinary:   Genitourinary Comments: Indwelling fleming cath   Musculoskeletal: Normal range of motion. She exhibits no edema or tenderness.   Neurological: She is alert and oriented to person, place, and time.   Skin: Skin is warm and dry.   Abdominal surgical  incision   Psychiatric: She has a normal mood and affect. Her behavior is normal.   Nursing note and vitals reviewed.      Significant Labs:   CBC:   Recent Labs   Lab 10/25/18  0010   WBC 9.27   HGB 12.5   HCT 37.1        CMP:   Recent Labs   Lab 10/25/18  0010      K 3.4*      CO2 21*   *   BUN 10   CREATININE 0.9   CALCIUM 10.3   PROT 7.1   ALBUMIN 4.0   BILITOT 0.5   ALKPHOS 69   AST 11   ALT <5*   ANIONGAP 13   EGFRNONAA >60.0     All pertinent labs within the past 24 hours have been reviewed.    Significant Imaging: I have reviewed all pertinent imaging results/findings within the past 24 hours.

## 2018-10-25 NOTE — ED NOTES
Rates pain at 5/10, continues to deny nausea.  Minimal drainage noted to NG tube, lots of air from NG tube

## 2018-10-25 NOTE — ED NOTES
C/O abdominal pain, no additional drainage from NG tube, still decompressing only air.  ERMD aware of pain c/o & NG output

## 2018-10-25 NOTE — CONSULTS
"[facility] Hospital Medicine  Consult Note    Patient Name: Mariaelena Sheffield  MRN: [id number]  Admission Date: 10/24/2018  Hospital Length of Stay: 0 days  Attending Physician: Kevin Caldwell MD   Primary Care Provider: Lui Blue MD           Patient information was obtained from patient, past medical records and ER records.     Consults  Subjective:     Principal Problem: Incisional hernia with bowel obstruction    Chief Complaint:   Chief Complaint   Patient presents with    Vomiting     Pt reports she is scheduled for hernia repair surgery tomorrow but started throwing up just PTA. 1 episode of emesis. States "it was a lot of throw up." Denies diarrhea. Last BM 10/23.        HPI: Mariaelena Sheffield is a 79 yo female with PMHx of HTN, HPL and DM II who has undergone an Exp Lap for an incarcerated incisional hernia performed by Dr. Caldwell. Presently, the pt is alert and eating ice chips. She denies nausea, chest pain, SOB and abdominal pain. Currently, her B/P is controlled, chemistries note hypokalemia, CBC is stable and blood glucose = 143. Hospital Medicine was consulted to assist with medical management.     Past Medical History:   Diagnosis Date    Cancer     breast cancer R     Diabetes mellitus     GERD (gastroesophageal reflux disease)     Gout     Hypercholesteremia     Hypertension        Past Surgical History:   Procedure Laterality Date    ABDOMINAL SURGERY      BREAST SURGERY Right     Breast biopsy    CATARACT EXTRACTION, BILATERAL         Review of patient's allergies indicates:  No Known Allergies    No current facility-administered medications on file prior to encounter.      Current Outpatient Medications on File Prior to Encounter   Medication Sig    atorvastatin (LIPITOR) 20 MG tablet Take 20 mg by mouth every evening.     blood sugar diagnostic (ACCU-CHEK SMARTVIEW TEST STRIP) Strp TEST ONE TO TWO TIMES DAILY (NEED MD APPOINTMENT)    blood-glucose meter Misc Test " BID    colchicine 0.6 mg tablet Take 0.6 mg by mouth once daily.    dicyclomine (BENTYL) 10 MG capsule Take 10 mg by mouth 3 (three) times daily as needed.    lancets (ACCU-CHEK FASTCLIX) Misc TEST BLOOD SUGAR  1  TO  2  TIMES  PER  DAY  ( NEED MD APPOINTMENT  )    lisinopril 10 MG tablet Take 1 tablet (10 mg total) by mouth once daily. (Patient taking differently: Take 10 mg by mouth every evening. )    metFORMIN (GLUCOPHAGE) 500 MG tablet Take 1 tablet by mouth daily with dinner or evening meal.    omeprazole (PRILOSEC) 40 MG capsule Take 40 mg by mouth once daily.     polyethylene glycol (GLYCOLAX) 17 gram PwPk Take 17 g by mouth daily (Patient taking differently: Take 17 g by mouth once daily. )    [DISCONTINUED] quinapril-hydrochlorothiazide (ACCURETIC) 20-12.5 mg per tablet Take 2 tablets by mouth once daily.    ferrous sulfate 325 (65 FE) MG EC tablet Take 325 mg by mouth once daily.     Family History     None        Tobacco Use    Smoking status: Never Smoker    Smokeless tobacco: Never Used   Substance and Sexual Activity    Alcohol use: Yes     Comment: socially  No alcohol prior to sx    Drug use: No    Sexual activity: Not on file     Review of Systems   Constitutional: Negative for activity change, appetite change, chills and fever.   HENT: Negative.    Eyes: Negative for pain and redness.   Respiratory: Negative for cough and shortness of breath.    Cardiovascular: Negative for chest pain.   Gastrointestinal: Positive for abdominal pain. Negative for nausea and vomiting.   Genitourinary: Negative for difficulty urinating.   Skin: Positive for wound.        Surgical incision   Neurological: Negative for dizziness, weakness and light-headedness.   Psychiatric/Behavioral: Negative for confusion. The patient is not nervous/anxious.      Objective:     Vital Signs (Most Recent):  Temp: 97.2 °F (36.2 °C) (10/25/18 1151)  Pulse: 69 (10/25/18 1151)  Resp: 18 (10/25/18 1151)  BP: 129/75 (10/25/18  1151)  SpO2: 100 % (10/25/18 1151) Vital Signs (24h Range):  Temp:  [96.9 °F (36.1 °C)-98.7 °F (37.1 °C)] 97.2 °F (36.2 °C)  Pulse:  [] 69  Resp:  [13-34] 18  SpO2:  [96 %-100 %] 100 %  BP: ()/(63-99) 129/75     Weight: 64.7 kg (142 lb 10.2 oz)  Body mass index is 24.48 kg/m².    Physical Exam   Constitutional: She is oriented to person, place, and time. She appears well-developed and well-nourished.   HENT:   Head: Normocephalic and atraumatic.   Eyes: Conjunctivae are normal. Pupils are equal, round, and reactive to light. No scleral icterus.   Neck: Normal range of motion. Neck supple.   Cardiovascular: Normal rate, regular rhythm and normal heart sounds. Exam reveals no gallop and no friction rub.   No murmur heard.  Pulmonary/Chest: Effort normal and breath sounds normal.   Abdominal:   Dry and intact abdominal dressing. Wearing an abdominal binder   Genitourinary:   Genitourinary Comments: Indwelling fleming cath   Musculoskeletal: Normal range of motion. She exhibits no edema or tenderness.   Neurological: She is alert and oriented to person, place, and time.   Skin: Skin is warm and dry.   Abdominal surgical incision   Psychiatric: She has a normal mood and affect. Her behavior is normal.   Nursing note and vitals reviewed.      Significant Labs:   CBC:   Recent Labs   Lab 10/25/18  0010   WBC 9.27   HGB 12.5   HCT 37.1        CMP:   Recent Labs   Lab 10/25/18  0010      K 3.4*      CO2 21*   *   BUN 10   CREATININE 0.9   CALCIUM 10.3   PROT 7.1   ALBUMIN 4.0   BILITOT 0.5   ALKPHOS 69   AST 11   ALT <5*   ANIONGAP 13   EGFRNONAA >60.0     All pertinent labs within the past 24 hours have been reviewed.    Significant Imaging: I have reviewed all pertinent imaging results/findings within the past 24 hours.    Assessment/Plan:     * Incisional hernia with bowel obstruction    S/P Exp Lap performed by Dr. Caldwell - continue post op care by primary team       Hypokalemia     Replete and monitor       Type 2 diabetes mellitus    Hold metformin  accuchecks  Low Sliding scale insulin       Essential hypertension    Currently controlled  Resume lisinopril       Hyperlipidemia    Continue atorvastatin         VTE Risk Mitigation (From admission, onward)    None              Thank you for your consult. I will follow-up with patient. Please contact us if you have any additional questions.    Michelle Yang NP  Department of Hospital Medicine   [facility]

## 2018-10-25 NOTE — OP NOTE
Operative Note       SURGERY DATE:  10/25/2018    PRE-OP DIAGNOSIS:  Incisional hernia with bowel obstruction [K43.0]    POST-OP DIAGNOSIS:  Incisional hernia with bowel obstruction [K43.0]   Active Hospital Problems    Diagnosis  POA    Ventral hernia with bowel obstruction [K43.6]  Yes    Incisional hernia with bowel obstruction [K43.0]  Yes      Resolved Hospital Problems   No resolved problems to display.       Procedure(s) (LRB):  LAPAROTOMY, EXPLORATORY  REPAIR, HERNIA, INCISIONAL, INCARCERATED, WITHOUT HISTORY OF PRIOR REPAIR  LYSIS, ADHESIONS (N/A)    Surgeon(s) and Role:     * Kevin Caldwell MD - Primary    ASSISTANTS:  Holly SWAN  ANESTHESIA: General    FINDINGS:  Upon entering the peritoneal cavity, dense adhesion of small intestine along the anterior peritoneal wall was noted and required extensive amount of time to free those up. There were multiple fascial defects along the midline incision from previous surgeries:  5 in total.  The largest 1 was measured about 3 cm in diameter and was located on the upper abdomen the smallest 1 was about 1 cm in diameter located at for other places along the incision with the lowest 1 just about the pubic bone.  The incarcerated and strangulated segment of the bowel was noted to be viable.    ESTIMATED BLOOD LOSS: 50 mL              COMPLICATIONS:  None    SPECIMEN:  Hernia sac    Implants: None    INDICATION:  Acute strangulated and incarcerated incisional hernia.    DESCRIPTION OF PROCEDURE:    The patient was taken to the operating room, and underwent general anesthesia with orotracheal intubation per anesthesia service.  The abdomen was prepped and draped in the usual sterile manner.  Long midline incision freed from previous surgery was used to gain access into the peritoneal cavity.  Upon entering the peritoneal cavity, the above findings were noted.  After lysis of adhesion of small intestine plastered along the anterior abdominal wall which took about  none 45 min, attention was turned to the assessment of the hernia sac and excision of the hernia sacs along the is midline incision.  After complete excision of the all the hernia sac, then fascia was debrided laterally until the healthy margin was found. After hemostasis was confirmed to be good, and lap and instrument counts were correct, the abdomen was closed in the usual manner with blunt-tipped 1.  Looped PDS.  Prior to the closure, total of 100 mL of a mixture of Exparel, 0.5% Marcaine and saline were injected along the incision. Subcutaneous level was approximated using interrupted 3 0 Vicryl sutures then the skin was stapled.  The patient tolerated her procedure without any difficulty.  Patient was later extubated and taken to the recovery room for further recovery.           CONDITION: Good    DISPOSITION: PACU - hemodynamically stable.     Kevin Caldwell

## 2018-10-25 NOTE — PLAN OF CARE
Pt states ambulatory without aids. Ambulated in room to stretcher. Report given to alan conley rn by floor nurse. Report received from alan conley rn. Pt escorted to preop via stretcher and accompanied by rn and family with orderly on telemetry monitor. Monitor shows nsr with inverted t wave. Monitor three lead only.

## 2018-10-25 NOTE — ED PROVIDER NOTES
"Encounter Date: 10/24/2018       History     Chief Complaint   Patient presents with    Vomiting     Pt reports she is scheduled for hernia repair surgery tomorrow but started throwing up just PTA. 1 episode of emesis. States "it was a lot of throw up." Denies diarrhea. Last BM 10/23.     Patient currently presents with complaint of abdominal pain.  Onset of this event was first noted this evening.  This is localized to the lower central abdomen.  This discomfort is described as aching in nature.  There are associated changes in bowel habits with no flatus or BM noted since early AM.  There has been 1 episode of voluminous emesis.  There are not associated urinary complaints.  This is a recurring problem.  Patient denies fever.  NOTABLY the patient was admitted in early October for SBO related to a chronic ventral incisional hernia but was discharged with plans for outpatient repair.  Patient notes she has had ongoing nausea and intermittent pain since that time.            Review of patient's allergies indicates:  No Known Allergies  Past Medical History:   Diagnosis Date    Cancer     breast cancer R     Diabetes mellitus     GERD (gastroesophageal reflux disease)     Gout     Hypercholesteremia     Hypertension      Past Surgical History:   Procedure Laterality Date    ABDOMINAL SURGERY      BREAST SURGERY Right     Breast biopsy    CATARACT EXTRACTION, BILATERAL       History reviewed. No pertinent family history.  Social History     Tobacco Use    Smoking status: Never Smoker    Smokeless tobacco: Never Used   Substance Use Topics    Alcohol use: Yes     Comment: socially  No alcohol prior to sx    Drug use: No     Review of Systems   Constitutional: Negative for chills and fever.   HENT: Negative for congestion and rhinorrhea.    Respiratory: Negative for cough, chest tightness, shortness of breath and wheezing.    Cardiovascular: Negative for chest pain, palpitations and leg swelling. "   Gastrointestinal: Positive for abdominal distention, abdominal pain, nausea and vomiting. Negative for constipation and diarrhea.   Genitourinary: Negative for dysuria, frequency, urgency, vaginal bleeding and vaginal discharge.   Skin: Negative for color change and rash.   Allergic/Immunologic: Negative for immunocompromised state.   Neurological: Negative for dizziness, weakness and numbness.   Hematological: Negative for adenopathy. Does not bruise/bleed easily.   All other systems reviewed and are negative.      Physical Exam     Initial Vitals [10/24/18 2348]   BP Pulse Resp Temp SpO2   95/63 97 20 98.5 °F (36.9 °C) 99 %      MAP       --         Physical Exam    Nursing note and vitals reviewed.  Constitutional: She appears well-developed and well-nourished. She is not diaphoretic. No distress.   HENT:   Head: Normocephalic and atraumatic.   Right Ear: External ear normal.   Left Ear: External ear normal.   Nose: Nose normal.   Mouth/Throat: Oropharynx is clear and moist.   Eyes: Conjunctivae and EOM are normal. Pupils are equal, round, and reactive to light. No scleral icterus.   Neck: Neck supple. No tracheal deviation present. No JVD present.   Cardiovascular: Normal rate, regular rhythm, normal heart sounds and intact distal pulses. Exam reveals no gallop and no friction rub.    No murmur heard.  Pulmonary/Chest: Breath sounds normal. No respiratory distress. She has no wheezes. She has no rhonchi. She has no rales.   Abdominal: Soft. Bowel sounds are normal. She exhibits distension and mass. There is tenderness. There is guarding. There is no rebound.   There are 2 distinct masses consistent with a multifaceted midline ventral incisional hernia.  The upper bulge was easily reducible on palpation.  The lower bulges more firm and does not appear to be reducible at this time.  This is the site of exquisite tenderness.   Musculoskeletal: Normal range of motion. She exhibits no edema.   Neurological: She is  alert and oriented to person, place, and time. She has normal strength. No cranial nerve deficit or sensory deficit.   Skin: Skin is warm and dry. No rash noted.   Psychiatric: She has a normal mood and affect. Her behavior is normal.       ED Course   Procedures  Labs Reviewed   CBC W/ AUTO DIFFERENTIAL   COMPREHENSIVE METABOLIC PANEL   URINALYSIS   LACTIC ACID, PLASMA     EKG Readings: (Independently Interpreted)   Initial Reading: No STEMI. Rhythm: Normal Sinus Rhythm. Heart Rate: 91. Ectopy: No Ectopy. Axis: Left Axis Deviation.       Imaging Results    None          Medical Decision Making:   ED Management:  All historical, clinical, radiographic, and laboratory findings were reviewed with the patient/family in detail along with the indications for transport to the facility in [place] in order to receive surgical evaluation and likely VHR with correction of the SBO.  All remaining questions and concerns were addressed at this time and the patient/family communicates understanding and agrees to proceed accordingly.  Similarly, all pertinent details of the encounter were discussed with Dr. Burns of general surgery who agrees to receive the patient at [facility] for further care as outlined above noting he will evaluate the patient after arrival.  The patient will be transferred by [facility] ambulance services secondary to a need for ongoing cardiac monitoring and IVF en route.  Martín Ramirez MD  12:15 AM                          Clinical Impression:   The primary encounter diagnosis was Ventral hernia with bowel obstruction. A diagnosis of Type 2 diabetes mellitus without complication, without long-term current use of insulin was also pertinent to this visit.                             Martín Ramirez MD  10/25/18 0112

## 2018-10-25 NOTE — PLAN OF CARE
Problem: Patient Care Overview  Goal: Plan of Care Review  Outcome: Ongoing (interventions implemented as appropriate)  Patient had uneventful shift. Patient awake, alert and oriented x 4. VS stable. Patient denies pain or SOB. Patient on telemetry, NSR on the monitor. Patient on bedrest, SCDs in place. Fall precautions in place. Bed alarm active and audible. Patient free from fall/injury. Plan of care reviewed with patient and family. Patient has no questions at this time. Will continue to monitor.

## 2018-10-25 NOTE — ED NOTES
Pt accepted to [facility] by Dr. Burns. Reports pt to go to OR upon arrival to [facility].

## 2018-10-26 LAB
ANION GAP SERPL CALC-SCNC: 7 MMOL/L
BUN SERPL-MCNC: 11 MG/DL
CALCIUM SERPL-MCNC: 9.1 MG/DL
CHLORIDE SERPL-SCNC: 106 MMOL/L
CO2 SERPL-SCNC: 26 MMOL/L
CREAT SERPL-MCNC: 0.9 MG/DL
ERYTHROCYTE [DISTWIDTH] IN BLOOD BY AUTOMATED COUNT: 15.8 %
EST. GFR  (AFRICAN AMERICAN): >60 ML/MIN/1.73 M^2
EST. GFR  (NON AFRICAN AMERICAN): >60 ML/MIN/1.73 M^2
GLUCOSE SERPL-MCNC: 108 MG/DL
HCT VFR BLD AUTO: 35.5 %
HGB BLD-MCNC: 11.5 G/DL
MCH RBC QN AUTO: 28.5 PG
MCHC RBC AUTO-ENTMCNC: 32.4 G/DL
MCV RBC AUTO: 88 FL
PLATELET # BLD AUTO: 245 K/UL
PMV BLD AUTO: 10.5 FL
POCT GLUCOSE: 85 MG/DL (ref 70–110)
POCT GLUCOSE: 97 MG/DL (ref 70–110)
POCT GLUCOSE: 99 MG/DL (ref 70–110)
POCT GLUCOSE: 99 MG/DL (ref 70–110)
POTASSIUM SERPL-SCNC: 5 MMOL/L
RBC # BLD AUTO: 4.03 M/UL
SODIUM SERPL-SCNC: 139 MMOL/L
WBC # BLD AUTO: 19.03 K/UL

## 2018-10-26 PROCEDURE — 85027 COMPLETE CBC AUTOMATED: CPT

## 2018-10-26 PROCEDURE — 97116 GAIT TRAINING THERAPY: CPT

## 2018-10-26 PROCEDURE — 36415 COLL VENOUS BLD VENIPUNCTURE: CPT

## 2018-10-26 PROCEDURE — 25000003 PHARM REV CODE 250: Performed by: SURGERY

## 2018-10-26 PROCEDURE — 25000003 PHARM REV CODE 250: Performed by: NURSE PRACTITIONER

## 2018-10-26 PROCEDURE — 94761 N-INVAS EAR/PLS OXIMETRY MLT: CPT

## 2018-10-26 PROCEDURE — 94799 UNLISTED PULMONARY SVC/PX: CPT

## 2018-10-26 PROCEDURE — G8978 MOBILITY CURRENT STATUS: HCPCS | Mod: CK

## 2018-10-26 PROCEDURE — 21400001 HC TELEMETRY ROOM

## 2018-10-26 PROCEDURE — 94760 N-INVAS EAR/PLS OXIMETRY 1: CPT

## 2018-10-26 PROCEDURE — 63600175 PHARM REV CODE 636 W HCPCS: Performed by: SURGERY

## 2018-10-26 PROCEDURE — G8979 MOBILITY GOAL STATUS: HCPCS | Mod: CI

## 2018-10-26 PROCEDURE — 80048 BASIC METABOLIC PNL TOTAL CA: CPT

## 2018-10-26 PROCEDURE — 97162 PT EVAL MOD COMPLEX 30 MIN: CPT

## 2018-10-26 RX ADMIN — CHLORHEXIDINE GLUCONATE 10 ML: 1.2 RINSE ORAL at 08:10

## 2018-10-26 RX ADMIN — CHLORHEXIDINE GLUCONATE 10 ML: 1.2 RINSE ORAL at 09:10

## 2018-10-26 RX ADMIN — HYDROCODONE BITARTRATE AND ACETAMINOPHEN 1 TABLET: 5; 325 TABLET ORAL at 02:10

## 2018-10-26 RX ADMIN — PANTOPRAZOLE SODIUM 40 MG: 40 TABLET, DELAYED RELEASE ORAL at 09:10

## 2018-10-26 RX ADMIN — HYDROMORPHONE HYDROCHLORIDE 0.5 MG: 1 INJECTION, SOLUTION INTRAMUSCULAR; INTRAVENOUS; SUBCUTANEOUS at 09:10

## 2018-10-26 RX ADMIN — HYDROCODONE BITARTRATE AND ACETAMINOPHEN 1 TABLET: 5; 325 TABLET ORAL at 06:10

## 2018-10-26 RX ADMIN — LISINOPRIL 10 MG: 10 TABLET ORAL at 08:10

## 2018-10-26 RX ADMIN — RAMELTEON 8 MG: 8 TABLET, FILM COATED ORAL at 08:10

## 2018-10-26 RX ADMIN — HYDROMORPHONE HYDROCHLORIDE 0.5 MG: 1 INJECTION, SOLUTION INTRAMUSCULAR; INTRAVENOUS; SUBCUTANEOUS at 05:10

## 2018-10-26 RX ADMIN — STANDARDIZED SENNA CONCENTRATE AND DOCUSATE SODIUM 1 TABLET: 8.6; 5 TABLET, FILM COATED ORAL at 08:10

## 2018-10-26 RX ADMIN — CEFAZOLIN SODIUM 2 G: 2 SOLUTION INTRAVENOUS at 12:10

## 2018-10-26 RX ADMIN — HYDROMORPHONE HYDROCHLORIDE 0.5 MG: 1 INJECTION, SOLUTION INTRAMUSCULAR; INTRAVENOUS; SUBCUTANEOUS at 10:10

## 2018-10-26 RX ADMIN — ATORVASTATIN CALCIUM 20 MG: 10 TABLET, FILM COATED ORAL at 08:10

## 2018-10-26 RX ADMIN — STANDARDIZED SENNA CONCENTRATE AND DOCUSATE SODIUM 1 TABLET: 8.6; 5 TABLET, FILM COATED ORAL at 09:10

## 2018-10-26 RX ADMIN — SODIUM CHLORIDE, SODIUM LACTATE, POTASSIUM CHLORIDE, AND CALCIUM CHLORIDE: .6; .31; .03; .02 INJECTION, SOLUTION INTRAVENOUS at 12:10

## 2018-10-26 NOTE — PT/OT/SLP EVAL
Physical Therapy Evaluation    Patient Name:  Mariaelena Sheffield   MRN:  [id number]    Recommendations:     Discharge Recommendations:  home health PT   Discharge Equipment Recommendations: bath bench, walker, rolling(TO BE ASSESSED FURTHER WITH PROGRESS)   Barriers to discharge: None    Assessment:     Mariaelena Sheffield is a 78 y.o. female admitted with a medical diagnosis of Incisional hernia with bowel obstruction.  She presents with the following impairments/functional limitations:  weakness, impaired endurance, impaired functional mobilty, gait instability, impaired balance, decreased coordination, decreased safety awareness, pain.    Rehab Prognosis:  GOOD; patient would benefit from acute skilled PT services to address these deficits and reach maximum level of function.      Recent Surgery: Procedure(s) (LRB):  LAPAROTOMY, EXPLORATORY (N/A)  REPAIR, HERNIA, INCISIONAL, INCARCERATED, WITHOUT HISTORY OF PRIOR REPAIR (N/A)  LYSIS, ADHESIONS (N/A) 1 Day Post-Op    Plan:     During this hospitalization, patient to be seen 5 x/week to address the above listed problems via gait training, therapeutic activities, therapeutic exercises  · Plan of Care Expires:  11/02/18   Plan of Care Reviewed with: patient    Subjective     Communicated with NURSE RAVI prior to session.  Patient found SUPINE upon PT entry to room, agreeable to evaluation.      Chief Complaint: PAIN  Patient comments/goals: RETURN HOME  Pain/Comfort:  · Pain Rating 1: 6/10  · Location - Orientation 1: lower  · Location 1: abdomen    Patients cultural, spiritual, [employer]ism conflicts given the current situation:     Living Environment:  PT'S SON LIVES WITH HER, 1 STORY HOUSE 3 STEPS 1 SIDE RAIL, AMB INDEP COMMUNITY DISTANCES, STILL DRIVES, INDEP WITH ADL'S  Prior to admission, patients level of function was INDEP.  Patient has the following equipment: none.  DME owned (not currently used): none.  Upon discharge, patient will have assistance from  [facility].    Objective:     Patient found with: telemetry, peripheral IV     General Precautions: Standard, fall   Orthopedic Precautions:N/A   Braces: (ABDOMINAL BINDER)     Exams:  · Cognitive Exam:  Patient is oriented to Person, Place, Time and Situation  · Postural Exam:  Patient presented with the following abnormalities:    · -       Rounded shoulders  · Sensation:    · -       Intact  · RLE ROM: WFL  · RLE Strength: WFL  · LLE ROM: WFL  · LLE Strength: WFL    Functional Mobility:  · Bed Mobility:     · Rolling Left:  minimum assistance  · Scooting: minimum assistance  · Supine to Sit: minimum assistance  · Transfers:     · Sit to Stand:  minimum assistance with no AD  · Bed to Chair: minimum assistance with  no AD  using  Stand Pivot  · Gait: PT ' NO AD, HHA/CHANCE, NO GROSS LOB OR SOB ON ROOM AIR  · Balance: FAIR    AM-PAC 6 CLICK MOBILITY  Total Score:16     Therapeutic Activities and Exercises:  PT EDUCATED IN ROLE OF P.T. AND POC, PT EDUCATED IN LOG ROLLING FOR BED MOBILITY, PT ENCOURAGED TO INCREASE TIME OOB IN CHAIR, PT EDUCATED IN BLE THEREX TO PERFORM WHILE SEATED IN CHAIR    Patient left up in chair with all lines intact, call button in reach and NURSE notified.    GOALS:   Multidisciplinary Problems     Physical Therapy Goals        Problem: Physical Therapy Goal    Goal Priority Disciplines Outcome Goal Variances Interventions   Physical Therapy Goal     PT, PT/OT      Description:  LTG'S TO BE MET IN 7 DAYS (11-2-18)  1. PT WILL REQUIRE SPV FOR BED MOBILITY  2. PT WILL REQUIRE SPV FOR TF'S  3. PT WILL ' WITH SPV  4. PT WILL DEMO G DYNAMIC BALANCE DURING GAIT                    History:     Past Medical History:   Diagnosis Date    Cancer     breast cancer R     Diabetes mellitus     GERD (gastroesophageal reflux disease)     Gout     Hypercholesteremia     Hypertension        Past Surgical History:   Procedure Laterality Date    ABDOMINAL SURGERY      BREAST SURGERY Right      Breast biopsy    CATARACT EXTRACTION, BILATERAL      LAPAROTOMY, EXPLORATORY N/A 10/25/2018    Performed by Kevin Caldwell MD at [facility]    LYSIS OF ADHESIONS N/A 10/25/2018    Procedure: LYSIS, ADHESIONS;  Surgeon: Kevin Caldwell MD;  Location: [facility];  Service: General;  Laterality: N/A;    LYSIS, ADHESIONS N/A 10/25/2018    Performed by Kevin Caldwell MD at [facility]    REPAIR OF INCARCERATED INCISIONAL HERNIA WITHOUT HISTORY OF PRIOR REPAIR N/A 10/25/2018    Procedure: REPAIR, HERNIA, INCISIONAL, INCARCERATED, WITHOUT HISTORY OF PRIOR REPAIR;  Surgeon: Kevin Caldwell MD;  Location: [facility];  Service: General;  Laterality: N/A;    REPAIR, HERNIA, INCISIONAL, INCARCERATED, WITHOUT HISTORY OF PRIOR REPAIR N/A 10/25/2018    Performed by eKvin Caldwell MD at [facility]       Clinical Decision Making:     History  Co-morbidities and personal factors that may impact the plan of care Examination  Body Structures and Functions, activity limitations and participation restrictions that may impact the plan of care Clinical Presentation   Decision Making/ Complexity Score   Co-morbidities:   [] Time since onset of injury / illness / exacerbation  [] Status of current condition  []Patient's cognitive status and safety concerns    [] Multiple Medical Problems (see med hx)  Personal Factors:   [] Patient's age  [] Prior Level of function   [] Patient's home situation (environment and family support)  [] Patient's level of motivation  [] Expected progression of patient      HISTORY:(criteria)    [] 56519 - no personal factors/history    [] 05721 - has 1-2 personal factor/comorbidity     [] 52839 - has >3 personal factor/comorbidity     Body Regions:  [] Objective examination findings  [] Head     []  Neck  [] Trunk   [] Upper Extremity  [] Lower Extremity    Body Systems:  [] For communication ability, affect, cognition, language, and learning style: the assessment of the ability to make needs known, consciousness, orientation (person,  place, and time), expected emotional /behavioral responses, and learning preferences (eg, learning barriers, education  needs)  [] For the neuromuscular system: a general assessment of gross coordinated movement (eg, balance, gait, locomotion, transfers, and transitions) and motor function  (motor control and motor learning)  [] For the musculoskeletal system: the assessment of gross symmetry, gross range of motion, gross strength, height, and weight  [] For the integumentary system: the assessment of pliability(texture), presence of scar formation, skin color, and skin integrity  [] For cardiovascular/pulmonary system: the assessment of heart rate, respiratory rate, blood pressure, and edema     Activity limitations:    [] Patient's cognitive status and saf ety concerns          [] Status of current condition      [] Weight bearing restriction  [] Cardiopulmunary Restriction    Participation Restrictions:   [] Goals and goal agreement with the patient     [] Rehab potential (prognosis) and probable outcome      Examination of Body System: (criteria)    [] 85483 - addressing 1-2 elements    [] 13438 - addressing a total of 3 or more elements     [] 83028 -  Addressing a total of 4 or more elements         Clinical Presentation: (criteria)  Choose one     On examination of body system using standardized tests and measures patient presents with (CHOOSE ONE) elements from any of the following: body structures and functions, activity limitations, and/or participation restrictions.  Leading to a clinical presentation that is considered (CHOOSE ONE)                              Clinical Decision Making  (Eval Complexity):  Choose One     Time Tracking:     PT Received On: 10/26/18  PT Start Time: 0830     PT Stop Time: 0855  PT Total Time (min): 25 min     Billable Minutes: Evaluation 15 and Gait Training 10     Jesenia Mcclendon, PT  10/26/2018

## 2018-10-26 NOTE — SUBJECTIVE & OBJECTIVE
Interval History: POD 1#     Review of Systems   Constitutional: Negative for activity change, appetite change, chills and fever.   HENT: Negative.    Eyes: Negative for pain and redness.   Respiratory: Negative for cough and shortness of breath.    Cardiovascular: Negative for chest pain.   Gastrointestinal: Positive for abdominal pain (tenderness). Negative for nausea and vomiting.   Genitourinary: Negative for difficulty urinating.   Skin: Positive for wound.        Surgical incision   Neurological: Negative for dizziness, weakness and light-headedness.   Psychiatric/Behavioral: Negative for confusion. The patient is not nervous/anxious.      Objective:     Vital Signs (Most Recent):  Temp: 98.1 °F (36.7 °C) (10/26/18 1208)  Pulse: 87 (10/26/18 1208)  Resp: 18 (10/26/18 1208)  BP: 135/71 (10/26/18 1208)  SpO2: 97 % (10/26/18 1208) Vital Signs (24h Range):  Temp:  [97.5 °F (36.4 °C)-99.1 °F (37.3 °C)] 98.1 °F (36.7 °C)  Pulse:  [70-90] 87  Resp:  [18] 18  SpO2:  [95 %-100 %] 97 %  BP: (113-135)/(67-71) 135/71     Weight: 64.7 kg (142 lb 10.2 oz)  Body mass index is 24.48 kg/m².    Intake/Output Summary (Last 24 hours) at 10/26/2018 1332  Last data filed at 10/26/2018 0500  Gross per 24 hour   Intake 1781.67 ml   Output 560 ml   Net 1221.67 ml      Physical Exam   Constitutional: She is oriented to person, place, and time. She appears well-developed and well-nourished. No distress.   HENT:   Head: Normocephalic and atraumatic.   Nose: Nose normal.   Eyes: Conjunctivae and EOM are normal. Pupils are equal, round, and reactive to light. Right eye exhibits no discharge. Left eye exhibits no discharge.   Neck: Normal range of motion. Neck supple. No JVD present. No tracheal deviation present. No thyromegaly present.   Cardiovascular: Normal rate, regular rhythm and normal heart sounds. Exam reveals no gallop and no friction rub.   No murmur heard.  Pulmonary/Chest: Effort normal and breath sounds normal. No stridor. No  respiratory distress. She has no wheezes. She has no rales. She exhibits no tenderness.   Abdominal: Soft. Bowel sounds are normal. She exhibits no distension and no mass. There is tenderness (appropriate incisional ttp). There is no guarding.   Wound dressings c/d/i   Musculoskeletal: Normal range of motion. She exhibits no edema, tenderness or deformity.   Lymphadenopathy:     She has no cervical adenopathy.   Neurological: She is alert and oriented to person, place, and time. She has normal reflexes. No cranial nerve deficit. Coordination normal.   Skin: Skin is warm and dry. No rash noted. She is not diaphoretic. No erythema. No pallor.   Psychiatric: She has a normal mood and affect. Her behavior is normal. Judgment and thought content normal.   Nursing note and vitals reviewed.    Significant Labs:   CBC:   Recent Labs   Lab 10/25/18  0010 10/26/18  0343   WBC 9.27 19.03*   HGB 12.5 11.5*   HCT 37.1 35.5*    245     CMP:   Recent Labs   Lab 10/25/18  0010 10/26/18  0343    139   K 3.4* 5.0    106   CO2 21* 26   * 108   BUN 10 11   CREATININE 0.9 0.9   CALCIUM 10.3 9.1   PROT 7.1  --    ALBUMIN 4.0  --    BILITOT 0.5  --    ALKPHOS 69  --    AST 11  --    ALT <5*  --    ANIONGAP 13 7*   EGFRNONAA >60.0 >60       Significant Imaging: I have reviewed all pertinent imaging results/findings within the past 24 hours.

## 2018-10-26 NOTE — PLAN OF CARE
Met with patient to complete d/c planning assessment. Patient lives with her son, Bj, who helps her when needed. Patient is independent with ADLs and does not anticipate any discharge needs at this time.   Report given to Holly Isaacs.     10/26/18 1137   Discharge Assessment   Assessment Type Discharge Planning Assessment   Confirmed/corrected address and phone number on facesheet? Yes   Assessment information obtained from? Patient   Prior to hospitilization cognitive status: Alert/Oriented   Prior to hospitalization functional status: Independent   Current cognitive status: Alert/Oriented   Current Functional Status: Independent   Lives With child(vipul), adult   Is patient able to care for self after discharge? Yes   Who are your caregiver(s) and their phone number(s)? Bj, son [phone]   Equipment Currently Used at Home none   Do you have any problems affording any of your prescribed medications? No   Is the patient taking medications as prescribed? yes   Does the patient have transportation home? Yes   Transportation Available car;family or friend will provide   Discharge Plan A Home with family   Patient/Family In Agreement With Plan yes

## 2018-10-26 NOTE — HOSPITAL COURSE
She underwent exlap with primary repair of ventral hernia, lysis of adhesions  On POD1 she was ambulating with PT, pain was controlled, she denies nasuea or emesis.    10/27/18 - Doing well. No flatus or BM. Tolerating clears. Ambulated with PT yesterday.   10/28/2018: pain well controlled. No flatus or bm. Ambulates with assistance.

## 2018-10-26 NOTE — PLAN OF CARE
Problem: Patient Care Overview  Goal: Plan of Care Review  Outcome: Ongoing (interventions implemented as appropriate)  The patient has been sinus rhythm on the monitor. LR infusing at 100 mL/hr. Midline incision dressing clean, dry, intact and abdominal binder in place. No complaint of nausea, vom or pain. Pt has had an uneventful night and is resting quietly, will continue to monitor.

## 2018-10-26 NOTE — PROGRESS NOTES
[facility] Hospital Medicine  Progress Note    Patient Name: Mariaelena Sheffield  MRN: [id number]  Patient Class: IP- Inpatient   Admission Date: 10/24/2018  Length of Stay: 1 days  Attending Physician: Kevin Caldwell MD  Primary Care Provider: Lui Blue MD      Subjective:     Principal Problem:Incisional hernia with bowel obstruction    HPI:  Mariaelena Sheffield is a 77 yo female with PMHx of HTN, HPL and DM II who has undergone an Exp Lap for an incarcerated incisional hernia performed by Dr. Caldwell. She had a history of multiple bowel obstructions 2/2 hernia incarceration. Presently, the pt is alert and eating ice chips. She denies nausea, chest pain, SOB and abdominal pain. Currently, her B/P is controlled, chemistries note hypokalemia, CBC is stable and blood glucose = 143. Hospital Medicine was consulted to assist with medical management.     Hospital Course:  POD #1 S/p exlap with primary repair of hernia, closure of fascia. General Surgery placed patient on clear liquids. PT consult. WBC 19 K. Vital signs stable. 10/26/18 patient reports no flatus, no BM yet.     Interval History: POD 1#     Review of Systems   Constitutional: Negative for activity change, appetite change, chills and fever.   HENT: Negative.    Eyes: Negative for pain and redness.   Respiratory: Negative for cough and shortness of breath.    Cardiovascular: Negative for chest pain.   Gastrointestinal: Positive for abdominal pain (tenderness). Negative for nausea and vomiting.   Genitourinary: Negative for difficulty urinating.   Skin: Positive for wound.        Surgical incision   Neurological: Negative for dizziness, weakness and light-headedness.   Psychiatric/Behavioral: Negative for confusion. The patient is not nervous/anxious.      Objective:     Vital Signs (Most Recent):  Temp: 98.1 °F (36.7 °C) (10/26/18 1208)  Pulse: 87 (10/26/18 1208)  Resp: 18 (10/26/18 1208)  BP: 135/71 (10/26/18 1208)  SpO2: 97 % (10/26/18 1208)  Vital Signs (24h Range):  Temp:  [97.5 °F (36.4 °C)-99.1 °F (37.3 °C)] 98.1 °F (36.7 °C)  Pulse:  [70-90] 87  Resp:  [18] 18  SpO2:  [95 %-100 %] 97 %  BP: (113-135)/(67-71) 135/71     Weight: 64.7 kg (142 lb 10.2 oz)  Body mass index is 24.48 kg/m².    Intake/Output Summary (Last 24 hours) at 10/26/2018 1332  Last data filed at 10/26/2018 0500  Gross per 24 hour   Intake 1781.67 ml   Output 560 ml   Net 1221.67 ml      Physical Exam   Constitutional: She is oriented to person, place, and time. She appears well-developed and well-nourished. No distress.   HENT:   Head: Normocephalic and atraumatic.   Nose: Nose normal.   Eyes: Conjunctivae and EOM are normal. Pupils are equal, round, and reactive to light. Right eye exhibits no discharge. Left eye exhibits no discharge.   Neck: Normal range of motion. Neck supple. No JVD present. No tracheal deviation present. No thyromegaly present.   Cardiovascular: Normal rate, regular rhythm and normal heart sounds. Exam reveals no gallop and no friction rub.   No murmur heard.  Pulmonary/Chest: Effort normal and breath sounds normal. No stridor. No respiratory distress. She has no wheezes. She has no rales. She exhibits no tenderness.   Abdominal: Soft. Bowel sounds are normal. She exhibits no distension and no mass. There is tenderness (appropriate incisional ttp). There is no guarding.   Wound dressings c/d/i   Musculoskeletal: Normal range of motion. She exhibits no edema, tenderness or deformity.   Lymphadenopathy:     She has no cervical adenopathy.   Neurological: She is alert and oriented to person, place, and time. She has normal reflexes. No cranial nerve deficit. Coordination normal.   Skin: Skin is warm and dry. No rash noted. She is not diaphoretic. No erythema. No pallor.   Psychiatric: She has a normal mood and affect. Her behavior is normal. Judgment and thought content normal.   Nursing note and vitals reviewed.    Significant Labs:   CBC:   Recent Labs   Lab  10/25/18  0010 10/26/18  0343   WBC 9.27 19.03*   HGB 12.5 11.5*   HCT 37.1 35.5*    245     CMP:   Recent Labs   Lab 10/25/18  0010 10/26/18  0343    139   K 3.4* 5.0    106   CO2 21* 26   * 108   BUN 10 11   CREATININE 0.9 0.9   CALCIUM 10.3 9.1   PROT 7.1  --    ALBUMIN 4.0  --    BILITOT 0.5  --    ALKPHOS 69  --    AST 11  --    ALT <5*  --    ANIONGAP 13 7*   EGFRNONAA >60.0 >60       Significant Imaging: I have reviewed all pertinent imaging results/findings within the past 24 hours.    Assessment/Plan:      * Incisional hernia with bowel obstruction    POD #1 S/P Exp Lap performed by Dr. Caldwell - continue post op care by primary team       Hypokalemia    resolved and monitor       Ventral hernia with bowel obstruction    Surgery As above       Type 2 diabetes mellitus    Hold metformin  accuchecks  Low Sliding scale insulin       Essential hypertension    Currently controlled  Resume lisinopril       Hyperlipidemia    Continue atorvastatin         VTE Risk Mitigation (From admission, onward)        Ordered     IP VTE HIGH RISK PATIENT  Once      10/26/18 1327     Place sequential compression device  Until discontinued      10/26/18 1327              Sherley Sol NP  Department of Hospital Medicine   [facility]

## 2018-10-26 NOTE — PROGRESS NOTES
[facility] -   General Surgery  Progress Note    Subjective:     History of Present Illness:  Mariaelena Sheffield is a 78 y.o. female with large ventral incisional hernias who has presented multiple times with bowel obstruction 2/2 hernia incarceration. She presented for hernia repair.     Post-Op Info:  Procedure(s) (LRB):  LAPAROTOMY, EXPLORATORY  REPAIR, HERNIA, INCISIONAL, INCARCERATED, WITHOUT HISTORY OF PRIOR REPAIR  LYSIS, ADHESIONS (N/A)   1 Day Post-Op     Interval History: no nausea, pain controlled.     Medications:  Continuous Infusions:   lactated ringers 100 mL/hr at 10/26/18 0013     Scheduled Meds:   atorvastatin  20 mg Oral QHS    chlorhexidine  10 mL Mouth/Throat BID    lisinopril  10 mg Oral QHS    nozaseptin   Each Nare BID    pantoprazole  40 mg Oral Daily    senna-docusate 8.6-50 mg  1 tablet Oral BID     PRN Meds:acetaminophen, bisacodyl, cloNIDine, dextrose 50%, diphenhydrAMINE, glucagon (human recombinant), HYDROcodone-acetaminophen, HYDROmorphone, insulin aspart U-100, lactulose, ondansetron, promethazine (PHENERGAN) IVPB, ramelteon     Review of patient's allergies indicates:  No Known Allergies  Objective:     Vital Signs (Most Recent):  Temp: 98.2 °F (36.8 °C) (10/26/18 0750)  Pulse: 80 (10/26/18 0758)  Resp: 18 (10/26/18 0758)  BP: 122/70 (10/26/18 0750)  SpO2: 95 % (10/26/18 0758) Vital Signs (24h Range):  Temp:  [97.2 °F (36.2 °C)-99.1 °F (37.3 °C)] 98.2 °F (36.8 °C)  Pulse:  [] 80  Resp:  [15-19] 18  SpO2:  [95 %-100 %] 95 %  BP: (113-154)/(67-96) 122/70     Weight: 64.7 kg (142 lb 10.2 oz)  Body mass index is 24.48 kg/m².    Intake/Output - Last 3 Shifts       10/24 0700 - 10/25 0659 10/25 0700 - 10/26 0659 10/26 0700 - 10/27 0659    I.V. (mL/kg)  3781.7 (58.4)     Total Intake(mL/kg)  3781.7 (58.4)     Urine (mL/kg/hr)  660 (0.4)     Blood  50     Total Output  710     Net  +3071.7                  Physical Exam   Constitutional: She is oriented to person,  place, and time. She appears well-developed and well-nourished.   HENT:   Head: Normocephalic and atraumatic.   Eyes: EOM are normal.   Cardiovascular: Normal rate and regular rhythm.   Pulmonary/Chest: Effort normal. No respiratory distress.   Abdominal: Soft. There is tenderness (appropriate incisional ttp).   Wound dressings c/d/i   Musculoskeletal: Normal range of motion.   Neurological: She is alert and oriented to person, place, and time.   Skin: Skin is warm and dry.   Psychiatric: She has a normal mood and affect. Thought content normal.   Vitals reviewed.      Significant Labs:  CBC:   Recent Labs   Lab 10/26/18  0343   WBC 19.03*   RBC 4.03   HGB 11.5*   HCT 35.5*      MCV 88   MCH 28.5   MCHC 32.4     CMP:   Recent Labs   Lab 10/25/18  0010 10/26/18  0343   * 108   CALCIUM 10.3 9.1   ALBUMIN 4.0  --    PROT 7.1  --     139   K 3.4* 5.0   CO2 21* 26    106   BUN 10 11   CREATININE 0.9 0.9   ALKPHOS 69  --    ALT <5*  --    AST 11  --    BILITOT 0.5  --        Significant Diagnostics:  I have reviewed all pertinent imaging results/findings within the past 24 hours.    Assessment/Plan:     * Incisional hernia with bowel obstruction    S/p exlap with primary repair of hernia, closure of fascia.   pod1 she denies nausea. Pain controlled  Transfer to [facility]  Clear liquids  Ambulate, PT consult  IS       Hypokalemia    Monitor, replete as needed     Type 2 diabetes mellitus    Accuchecks, sliding scale     Essential hypertension    Home meds     Hyperlipidemia    Home meds         Holly Neff PA-C  General Surgery  [facility]

## 2018-10-26 NOTE — SUBJECTIVE & OBJECTIVE
Interval History: no nausea, pain controlled.     Medications:  Continuous Infusions:   lactated ringers 100 mL/hr at 10/26/18 0013     Scheduled Meds:   atorvastatin  20 mg Oral QHS    chlorhexidine  10 mL Mouth/Throat BID    lisinopril  10 mg Oral QHS    nozaseptin   Each Nare BID    pantoprazole  40 mg Oral Daily    senna-docusate 8.6-50 mg  1 tablet Oral BID     PRN Meds:acetaminophen, bisacodyl, cloNIDine, dextrose 50%, diphenhydrAMINE, glucagon (human recombinant), HYDROcodone-acetaminophen, HYDROmorphone, insulin aspart U-100, lactulose, ondansetron, promethazine (PHENERGAN) IVPB, ramelteon     Review of patient's allergies indicates:  No Known Allergies  Objective:     Vital Signs (Most Recent):  Temp: 98.2 °F (36.8 °C) (10/26/18 0750)  Pulse: 80 (10/26/18 0758)  Resp: 18 (10/26/18 0758)  BP: 122/70 (10/26/18 0750)  SpO2: 95 % (10/26/18 0758) Vital Signs (24h Range):  Temp:  [97.2 °F (36.2 °C)-99.1 °F (37.3 °C)] 98.2 °F (36.8 °C)  Pulse:  [] 80  Resp:  [15-19] 18  SpO2:  [95 %-100 %] 95 %  BP: (113-154)/(67-96) 122/70     Weight: 64.7 kg (142 lb 10.2 oz)  Body mass index is 24.48 kg/m².    Intake/Output - Last 3 Shifts       10/24 0700 - 10/25 0659 10/25 0700 - 10/26 0659 10/26 0700 - 10/27 0659    I.V. (mL/kg)  3781.7 (58.4)     Total Intake(mL/kg)  3781.7 (58.4)     Urine (mL/kg/hr)  660 (0.4)     Blood  50     Total Output  710     Net  +3071.7                  Physical Exam   Constitutional: She is oriented to person, place, and time. She appears well-developed and well-nourished.   HENT:   Head: Normocephalic and atraumatic.   Eyes: EOM are normal.   Cardiovascular: Normal rate and regular rhythm.   Pulmonary/Chest: Effort normal. No respiratory distress.   Abdominal: Soft. There is tenderness (appropriate incisional ttp).   Wound dressings c/d/i   Musculoskeletal: Normal range of motion.   Neurological: She is alert and oriented to person, place, and time.   Skin: Skin is warm and dry.    Psychiatric: She has a normal mood and affect. Thought content normal.   Vitals reviewed.      Significant Labs:  CBC:   Recent Labs   Lab 10/26/18  0343   WBC 19.03*   RBC 4.03   HGB 11.5*   HCT 35.5*      MCV 88   MCH 28.5   MCHC 32.4     CMP:   Recent Labs   Lab 10/25/18  0010 10/26/18  0343   * 108   CALCIUM 10.3 9.1   ALBUMIN 4.0  --    PROT 7.1  --     139   K 3.4* 5.0   CO2 21* 26    106   BUN 10 11   CREATININE 0.9 0.9   ALKPHOS 69  --    ALT <5*  --    AST 11  --    BILITOT 0.5  --        Significant Diagnostics:  I have reviewed all pertinent imaging results/findings within the past 24 hours.

## 2018-10-26 NOTE — ASSESSMENT & PLAN NOTE
S/p exlap with primary repair of hernia, closure of fascia.   pod1 she denies nausea. Pain controlled  Transfer to [facility]  Clear liquids  Ambulate, PT consult  IS

## 2018-10-26 NOTE — HPI
Mariaelena Sheffield is a 78 y.o. female with large ventral incisional hernias who has presented multiple times with bowel obstruction 2/2 hernia incarceration. She presented for hernia repair.

## 2018-10-27 LAB
ALBUMIN SERPL BCP-MCNC: 3.2 G/DL
ALP SERPL-CCNC: 66 U/L
ALT SERPL W/O P-5'-P-CCNC: 5 U/L
ANION GAP SERPL CALC-SCNC: 10 MMOL/L
AST SERPL-CCNC: 16 U/L
BASOPHILS # BLD AUTO: 0.02 K/UL
BASOPHILS NFR BLD: 0.2 %
BILIRUB SERPL-MCNC: 0.5 MG/DL
BUN SERPL-MCNC: 9 MG/DL
CALCIUM SERPL-MCNC: 9.7 MG/DL
CHLORIDE SERPL-SCNC: 106 MMOL/L
CO2 SERPL-SCNC: 25 MMOL/L
CREAT SERPL-MCNC: 0.8 MG/DL
DIFFERENTIAL METHOD: ABNORMAL
EOSINOPHIL # BLD AUTO: 0.1 K/UL
EOSINOPHIL NFR BLD: 0.9 %
ERYTHROCYTE [DISTWIDTH] IN BLOOD BY AUTOMATED COUNT: 16 %
EST. GFR  (AFRICAN AMERICAN): >60 ML/MIN/1.73 M^2
EST. GFR  (NON AFRICAN AMERICAN): >60 ML/MIN/1.73 M^2
GLUCOSE SERPL-MCNC: 106 MG/DL
HCT VFR BLD AUTO: 34.4 %
HGB BLD-MCNC: 11.2 G/DL
LYMPHOCYTES # BLD AUTO: 0.9 K/UL
LYMPHOCYTES NFR BLD: 9 %
MCH RBC QN AUTO: 28.7 PG
MCHC RBC AUTO-ENTMCNC: 32.6 G/DL
MCV RBC AUTO: 88 FL
MONOCYTES # BLD AUTO: 0.7 K/UL
MONOCYTES NFR BLD: 6.7 %
NEUTROPHILS # BLD AUTO: 8.1 K/UL
NEUTROPHILS NFR BLD: 83.2 %
PLATELET # BLD AUTO: 236 K/UL
PMV BLD AUTO: 10.6 FL
POCT GLUCOSE: 107 MG/DL (ref 70–110)
POCT GLUCOSE: 91 MG/DL (ref 70–110)
POCT GLUCOSE: 93 MG/DL (ref 70–110)
POCT GLUCOSE: 97 MG/DL (ref 70–110)
POTASSIUM SERPL-SCNC: 4.7 MMOL/L
PROT SERPL-MCNC: 6 G/DL
RBC # BLD AUTO: 3.9 M/UL
SODIUM SERPL-SCNC: 141 MMOL/L
WBC # BLD AUTO: 9.73 K/UL

## 2018-10-27 PROCEDURE — 25000003 PHARM REV CODE 250: Performed by: SURGERY

## 2018-10-27 PROCEDURE — 94761 N-INVAS EAR/PLS OXIMETRY MLT: CPT

## 2018-10-27 PROCEDURE — 80053 COMPREHEN METABOLIC PANEL: CPT

## 2018-10-27 PROCEDURE — 36415 COLL VENOUS BLD VENIPUNCTURE: CPT

## 2018-10-27 PROCEDURE — 25000003 PHARM REV CODE 250: Performed by: NURSE PRACTITIONER

## 2018-10-27 PROCEDURE — 85025 COMPLETE CBC W/AUTO DIFF WBC: CPT

## 2018-10-27 PROCEDURE — 21400001 HC TELEMETRY ROOM

## 2018-10-27 PROCEDURE — 94799 UNLISTED PULMONARY SVC/PX: CPT

## 2018-10-27 RX ADMIN — HYDROCODONE BITARTRATE AND ACETAMINOPHEN 1 TABLET: 5; 325 TABLET ORAL at 06:10

## 2018-10-27 RX ADMIN — ATORVASTATIN CALCIUM 20 MG: 10 TABLET, FILM COATED ORAL at 09:10

## 2018-10-27 RX ADMIN — CHLORHEXIDINE GLUCONATE 10 ML: 1.2 RINSE ORAL at 09:10

## 2018-10-27 RX ADMIN — HYDROCODONE BITARTRATE AND ACETAMINOPHEN 1 TABLET: 5; 325 TABLET ORAL at 02:10

## 2018-10-27 RX ADMIN — CHLORHEXIDINE GLUCONATE 10 ML: 1.2 RINSE ORAL at 08:10

## 2018-10-27 RX ADMIN — LISINOPRIL 10 MG: 10 TABLET ORAL at 09:10

## 2018-10-27 RX ADMIN — STANDARDIZED SENNA CONCENTRATE AND DOCUSATE SODIUM 1 TABLET: 8.6; 5 TABLET, FILM COATED ORAL at 09:10

## 2018-10-27 RX ADMIN — PANTOPRAZOLE SODIUM 40 MG: 40 TABLET, DELAYED RELEASE ORAL at 08:10

## 2018-10-27 RX ADMIN — STANDARDIZED SENNA CONCENTRATE AND DOCUSATE SODIUM 1 TABLET: 8.6; 5 TABLET, FILM COATED ORAL at 08:10

## 2018-10-27 NOTE — PLAN OF CARE
Problem: Patient Care Overview  Goal: Plan of Care Review  Outcome: Ongoing (interventions implemented as appropriate)  The patient has been sinus rhythm on the monitor. LR infusing at 100 mL/hr. Blood glucose monitored. Pain managed with prn medication. Abd dressing clean, dry, intact. Pt has had an uneventful night and is resting quietly, will continue to monitor.

## 2018-10-27 NOTE — SUBJECTIVE & OBJECTIVE
Interval History: Doing well. No bowel function yet.     Medications:  Continuous Infusions:   lactated ringers 100 mL/hr at 10/26/18 1800     Scheduled Meds:   atorvastatin  20 mg Oral QHS    chlorhexidine  10 mL Mouth/Throat BID    lisinopril  10 mg Oral QHS    nozaseptin   Each Nare BID    pantoprazole  40 mg Oral Daily    senna-docusate 8.6-50 mg  1 tablet Oral BID     PRN Meds:acetaminophen, bisacodyl, cloNIDine, dextrose 50%, diphenhydrAMINE, glucagon (human recombinant), HYDROcodone-acetaminophen, HYDROmorphone, insulin aspart U-100, lactulose, ondansetron, promethazine (PHENERGAN) IVPB, ramelteon     Review of patient's allergies indicates:  No Known Allergies  Objective:     Vital Signs (Most Recent):  Temp: 97.8 °F (36.6 °C) (10/27/18 0741)  Pulse: 82 (10/27/18 0741)  Resp: 17 (10/27/18 0741)  BP: 124/78 (10/27/18 0741)  SpO2: 95 % (10/27/18 0800) Vital Signs (24h Range):  Temp:  [97.8 °F (36.6 °C)-98.8 °F (37.1 °C)] 97.8 °F (36.6 °C)  Pulse:  [] 82  Resp:  [17-20] 17  SpO2:  [94 %-97 %] 95 %  BP: ()/(56-78) 124/78     Weight: 64.7 kg (142 lb 10.2 oz)  Body mass index is 24.48 kg/m².    Intake/Output - Last 3 Shifts       10/25 0700 - 10/26 0659 10/26 0700 - 10/27 0659 10/27 0700 - 10/28 0659    I.V. (mL/kg) 3781.7 (58.4) 2500 (38.6)     Total Intake(mL/kg) 3781.7 (58.4) 2500 (38.6)     Urine (mL/kg/hr) 660 (0.4) 1600 (1)     Blood 50      Total Output 710 1600     Net +3071.7 +900                  Physical Exam   Constitutional: She is oriented to person, place, and time. She appears well-developed and well-nourished. No distress.   HENT:   Head: Normocephalic and atraumatic.   Eyes: EOM are normal.   Neck: Neck supple.   Cardiovascular: Normal rate and regular rhythm.   Pulmonary/Chest: Effort normal.   Abdominal: Soft. She exhibits no distension. There is no tenderness (appropriate at incision). There is no rebound and no guarding.   Incision c/d/i, in binder   Musculoskeletal: Normal  range of motion.   Neurological: She is alert and oriented to person, place, and time.   Skin: Skin is warm.   Vitals reviewed.      Significant Labs:  no new labs    Significant Diagnostics:  none

## 2018-10-27 NOTE — PLAN OF CARE
Problem: Patient Care Overview  Goal: Plan of Care Review  Outcome: Ongoing (interventions implemented as appropriate)  POC discussed w/patient, verbalized understanding. NSR on monitor. VSS. Voids per BRP.  Pt c/o pain relieved with Norco. Blood glucose monitored this shift. Patient turns independently in bed. Fall precautions in place, bed alarm on. Patient denies needs at this time.

## 2018-10-27 NOTE — PROGRESS NOTES
[facility] -   General Surgery  Progress Note    Subjective:     History of Present Illness:  Mariaelena Sheffield is a 78 y.o. female with large ventral incisional hernias who has presented multiple times with bowel obstruction 2/2 hernia incarceration. She presented for hernia repair.     Post-Op Info:  Procedure(s) (LRB):  LAPAROTOMY, EXPLORATORY (N/A)  REPAIR, HERNIA, INCISIONAL, INCARCERATED, WITHOUT HISTORY OF PRIOR REPAIR (N/A)  LYSIS, ADHESIONS (N/A)   2 Days Post-Op     Interval History: Doing well. No bowel function yet.     Medications:  Continuous Infusions:   lactated ringers 100 mL/hr at 10/26/18 1800     Scheduled Meds:   atorvastatin  20 mg Oral QHS    chlorhexidine  10 mL Mouth/Throat BID    lisinopril  10 mg Oral QHS    nozaseptin   Each Nare BID    pantoprazole  40 mg Oral Daily    senna-docusate 8.6-50 mg  1 tablet Oral BID     PRN Meds:acetaminophen, bisacodyl, cloNIDine, dextrose 50%, diphenhydrAMINE, glucagon (human recombinant), HYDROcodone-acetaminophen, HYDROmorphone, insulin aspart U-100, lactulose, ondansetron, promethazine (PHENERGAN) IVPB, ramelteon     Review of patient's allergies indicates:  No Known Allergies  Objective:     Vital Signs (Most Recent):  Temp: 97.8 °F (36.6 °C) (10/27/18 0741)  Pulse: 82 (10/27/18 0741)  Resp: 17 (10/27/18 0741)  BP: 124/78 (10/27/18 0741)  SpO2: 95 % (10/27/18 0800) Vital Signs (24h Range):  Temp:  [97.8 °F (36.6 °C)-98.8 °F (37.1 °C)] 97.8 °F (36.6 °C)  Pulse:  [] 82  Resp:  [17-20] 17  SpO2:  [94 %-97 %] 95 %  BP: ()/(56-78) 124/78     Weight: 64.7 kg (142 lb 10.2 oz)  Body mass index is 24.48 kg/m².    Intake/Output - Last 3 Shifts       10/25 0700 - 10/26 0659 10/26 0700 - 10/27 0659 10/27 0700 - 10/28 0659    I.V. (mL/kg) 3781.7 (58.4) 2500 (38.6)     Total Intake(mL/kg) 3781.7 (58.4) 2500 (38.6)     Urine (mL/kg/hr) 660 (0.4) 1600 (1)     Blood 50      Total Output 710 1600     Net +3071.7 +900                   Physical Exam   Constitutional: She is oriented to person, place, and time. She appears well-developed and well-nourished. No distress.   HENT:   Head: Normocephalic and atraumatic.   Eyes: EOM are normal.   Neck: Neck supple.   Cardiovascular: Normal rate and regular rhythm.   Pulmonary/Chest: Effort normal.   Abdominal: Soft. She exhibits no distension. There is no tenderness (appropriate at incision). There is no rebound and no guarding.   Incision c/d/i, in binder   Musculoskeletal: Normal range of motion.   Neurological: She is alert and oriented to person, place, and time.   Skin: Skin is warm.   Vitals reviewed.      Significant Labs:  no new labs    Significant Diagnostics:  none    Assessment/Plan:     * Incisional hernia with bowel obstruction    POD#2 s/p incisional hernia repair  Full liquid diet today  ISS  PT/OT to mobilize  Awaiting bowel function       Hypokalemia    Monitor, replete as needed     Type 2 diabetes mellitus    Accuchecks, sliding scale     Essential hypertension    Home meds     Hyperlipidemia    Home meds         Minna Urena MD  General Surgery  [facility]

## 2018-10-27 NOTE — ASSESSMENT & PLAN NOTE
Hold metformin  accuchecks  Low Sliding scale insulin  10/27  --HA1C on 10/12/18 5.3  --accuchecks and SSI

## 2018-10-27 NOTE — ASSESSMENT & PLAN NOTE
POD#2 s/p incisional hernia repair  Full liquid diet today  ISS  PT/OT to mobilize  Awaiting bowel function

## 2018-10-27 NOTE — SUBJECTIVE & OBJECTIVE
Interval History: POD #2. Diet advanced to full liquid. No flatus or BM. Pain controlled with PRN analgesia.     Review of Systems   Constitutional: Negative for activity change, appetite change, chills and fever.   HENT: Negative for congestion, postnasal drip, rhinorrhea and sore throat.    Eyes: Negative for pain and redness.   Respiratory: Negative for cough and shortness of breath.    Cardiovascular: Negative for chest pain.   Gastrointestinal: Positive for abdominal pain (tenderness). Negative for nausea and vomiting.   Endocrine: Negative for cold intolerance and heat intolerance.   Genitourinary: Negative for difficulty urinating, dysuria and hematuria.   Musculoskeletal: Negative for arthralgias and myalgias.   Skin: Positive for wound. Negative for color change.        Surgical incision   Allergic/Immunologic: Negative.    Neurological: Negative for dizziness, weakness and light-headedness.   Hematological: Negative.    Psychiatric/Behavioral: Negative for agitation, behavioral problems and confusion. The patient is not nervous/anxious.      Objective:     Vital Signs (Most Recent):  Temp: 98.9 °F (37.2 °C) (10/27/18 1041)  Pulse: 80 (10/27/18 1100)  Resp: 18 (10/27/18 1041)  BP: 135/70 (10/27/18 1041)  SpO2: 97 % (10/27/18 1041) Vital Signs (24h Range):  Temp:  [97.8 °F (36.6 °C)-98.9 °F (37.2 °C)] 98.9 °F (37.2 °C)  Pulse:  [] 80  Resp:  [17-20] 18  SpO2:  [94 %-97 %] 97 %  BP: ()/(56-78) 135/70     Weight: 64.7 kg (142 lb 10.2 oz)  Body mass index is 24.48 kg/m².    Intake/Output Summary (Last 24 hours) at 10/27/2018 1222  Last data filed at 10/27/2018 0600  Gross per 24 hour   Intake 2500 ml   Output 1400 ml   Net 1100 ml      Physical Exam   Constitutional: She is oriented to person, place, and time. She appears well-developed and well-nourished. No distress.   HENT:   Head: Normocephalic and atraumatic.   Nose: Nose normal.   Eyes: Conjunctivae and EOM are normal. Pupils are equal, round,  and reactive to light. Right eye exhibits no discharge. Left eye exhibits no discharge.   Neck: Normal range of motion. Neck supple. No JVD present. No tracheal deviation present. No thyromegaly present.   Cardiovascular: Normal rate, regular rhythm and normal heart sounds. Exam reveals no gallop and no friction rub.   No murmur heard.  Pulmonary/Chest: Effort normal and breath sounds normal. No stridor. No respiratory distress. She has no wheezes. She has no rales. She exhibits no tenderness.   Abdominal: Soft. Bowel sounds are normal. She exhibits no distension and no mass. There is tenderness (appropriate incisional ttp). There is no guarding.   Wound dressings c/d/i   Genitourinary:   Genitourinary Comments: deferred   Musculoskeletal: Normal range of motion. She exhibits no edema, tenderness or deformity.   Lymphadenopathy:     She has no cervical adenopathy.   Neurological: She is alert and oriented to person, place, and time. She has normal reflexes. No cranial nerve deficit. Coordination normal.   Skin: Skin is warm and dry. Capillary refill takes less than 2 seconds. No rash noted. She is not diaphoretic. No erythema. No pallor.   Psychiatric: She has a normal mood and affect. Her behavior is normal. Judgment and thought content normal.   Nursing note and vitals reviewed.      Significant Labs:   Recent Lab Results       10/27/18  1136   10/27/18  0633   10/26/18  2030   10/26/18  1806        POCT Glucose 97 93 97 99         All pertinent labs within the past 24 hours have been reviewed.    Significant Imaging: I have reviewed all pertinent imaging results/findings within the past 24 hours.

## 2018-10-27 NOTE — PROGRESS NOTES
[facility] Hospital Medicine  Progress Note    Patient Name: Mariaelena Sheffield  MRN: [id number]  Patient Class: IP- Inpatient   Admission Date: 10/24/2018  Length of Stay: 2 days  Attending Physician: Kevin Caldwell MD  Primary Care Provider: Lui Blue MD        Subjective:     Principal Problem:Incisional hernia with bowel obstruction    HPI:  Mariaelena Sheffield is a 79 yo female with PMHx of HTN, HPL and DM II who has undergone an Exp Lap for an incarcerated incisional hernia performed by Dr. Caldwell. She had a history of multiple bowel obstructions 2/2 hernia incarceration. Presently, the pt is alert and eating ice chips. She denies nausea, chest pain, SOB and abdominal pain. Currently, her B/P is controlled, chemistries note hypokalemia, CBC is stable and blood glucose = 143. Hospital Medicine was consulted to assist with medical management.     Hospital Course:  Patient was admitted to telemetry for SBO under the care of General Surgery. Hospital Medicine was consulted for medical management. POD #1 S/p exlap with primary repair of hernia, closure of fascia. Pt on clear liquids. No flatus, no BM. Physical Therapy to eval and tx.      Interval History: POD #2. Diet advanced to full liquid. No flatus or BM. Pain controlled with PRN analgesia.     Review of Systems   Constitutional: Negative for activity change, appetite change, chills and fever.   HENT: Negative for congestion, postnasal drip, rhinorrhea and sore throat.    Eyes: Negative for pain and redness.   Respiratory: Negative for cough and shortness of breath.    Cardiovascular: Negative for chest pain.   Gastrointestinal: Positive for abdominal pain (tenderness). Negative for nausea and vomiting.   Endocrine: Negative for cold intolerance and heat intolerance.   Genitourinary: Negative for difficulty urinating, dysuria and hematuria.   Musculoskeletal: Negative for arthralgias and myalgias.   Skin: Positive for wound. Negative for color  change.        Surgical incision   Allergic/Immunologic: Negative.    Neurological: Negative for dizziness, weakness and light-headedness.   Hematological: Negative.    Psychiatric/Behavioral: Negative for agitation, behavioral problems and confusion. The patient is not nervous/anxious.      Objective:     Vital Signs (Most Recent):  Temp: 98.9 °F (37.2 °C) (10/27/18 1041)  Pulse: 80 (10/27/18 1100)  Resp: 18 (10/27/18 1041)  BP: 135/70 (10/27/18 1041)  SpO2: 97 % (10/27/18 1041) Vital Signs (24h Range):  Temp:  [97.8 °F (36.6 °C)-98.9 °F (37.2 °C)] 98.9 °F (37.2 °C)  Pulse:  [] 80  Resp:  [17-20] 18  SpO2:  [94 %-97 %] 97 %  BP: ()/(56-78) 135/70     Weight: 64.7 kg (142 lb 10.2 oz)  Body mass index is 24.48 kg/m².    Intake/Output Summary (Last 24 hours) at 10/27/2018 1222  Last data filed at 10/27/2018 0600  Gross per 24 hour   Intake 2500 ml   Output 1400 ml   Net 1100 ml      Physical Exam   Constitutional: She is oriented to person, place, and time. She appears well-developed and well-nourished. No distress.   HENT:   Head: Normocephalic and atraumatic.   Nose: Nose normal.   Eyes: Conjunctivae and EOM are normal. Pupils are equal, round, and reactive to light. Right eye exhibits no discharge. Left eye exhibits no discharge.   Neck: Normal range of motion. Neck supple. No JVD present. No tracheal deviation present. No thyromegaly present.   Cardiovascular: Normal rate, regular rhythm and normal heart sounds. Exam reveals no gallop and no friction rub.   No murmur heard.  Pulmonary/Chest: Effort normal and breath sounds normal. No stridor. No respiratory distress. She has no wheezes. She has no rales. She exhibits no tenderness.   Abdominal: Soft. Bowel sounds are normal. She exhibits no distension and no mass. There is tenderness (appropriate incisional ttp). There is no guarding.   Wound dressings c/d/i   Genitourinary:   Genitourinary Comments: deferred   Musculoskeletal: Normal range of motion.  She exhibits no edema, tenderness or deformity.   Lymphadenopathy:     She has no cervical adenopathy.   Neurological: She is alert and oriented to person, place, and time. She has normal reflexes. No cranial nerve deficit. Coordination normal.   Skin: Skin is warm and dry. Capillary refill takes less than 2 seconds. No rash noted. She is not diaphoretic. No erythema. No pallor.   Psychiatric: She has a normal mood and affect. Her behavior is normal. Judgment and thought content normal.   Nursing note and vitals reviewed.      Significant Labs:   Recent Lab Results       10/27/18  1136   10/27/18  0633   10/26/18  2030   10/26/18  1806        POCT Glucose 97 93 97 99         All pertinent labs within the past 24 hours have been reviewed.    Significant Imaging: I have reviewed all pertinent imaging results/findings within the past 24 hours.    Assessment/Plan:      * Incisional hernia with bowel obstruction    POD #1 S/P Exp Lap performed by Dr. Caldwell - continue post op care by primary team  10/27:  --POD #2.   --no flatus, no bm  --full liquid diet  --PRN analgesia  --scheduled stool soft/laxative BID       Hypokalemia    resolved and monitor  10/27:  --stable  --replete as needed       Ventral hernia with bowel obstruction    10/27:  --surgically repaired  --monitor BS  --PRN analgesia         Type 2 diabetes mellitus    Hold metformin  accuchecks  Low Sliding scale insulin  10/27  --HA1C on 10/12/18 5.3  --accuchecks and SSI     Essential hypertension    10/27:  --continue lisinopril  --stable on medication       Hyperlipidemia    Continue atorvastatin         VTE Risk Mitigation (From admission, onward)        Ordered     IP VTE HIGH RISK PATIENT  Once      10/26/18 1327     Place sequential compression device  Until discontinued      10/26/18 1327              PEDRO Inman  Department of Hospital Medicine   [facility]

## 2018-10-27 NOTE — ASSESSMENT & PLAN NOTE
POD #1 S/P Exp Lap performed by Dr. Caldwell - continue post op care by primary team  10/27:  --POD #2.   --no flatus, no bm  --full liquid diet  --PRN analgesia  --scheduled stool soft/laxative BID

## 2018-10-28 PROBLEM — Z87.19 S/P HERNIA SURGERY: Status: ACTIVE | Noted: 2018-10-28

## 2018-10-28 PROBLEM — Z98.890 S/P HERNIA SURGERY: Status: ACTIVE | Noted: 2018-10-28

## 2018-10-28 LAB
ALBUMIN SERPL BCP-MCNC: 2.8 G/DL
ALP SERPL-CCNC: 61 U/L
ALT SERPL W/O P-5'-P-CCNC: 5 U/L
ANION GAP SERPL CALC-SCNC: 7 MMOL/L
AST SERPL-CCNC: 13 U/L
BASOPHILS # BLD AUTO: 0.01 K/UL
BASOPHILS NFR BLD: 0.2 %
BILIRUB SERPL-MCNC: 0.5 MG/DL
BUN SERPL-MCNC: 7 MG/DL
CALCIUM SERPL-MCNC: 9.1 MG/DL
CHLORIDE SERPL-SCNC: 107 MMOL/L
CO2 SERPL-SCNC: 26 MMOL/L
CREAT SERPL-MCNC: 0.7 MG/DL
DIFFERENTIAL METHOD: ABNORMAL
EOSINOPHIL # BLD AUTO: 0.1 K/UL
EOSINOPHIL NFR BLD: 2.3 %
ERYTHROCYTE [DISTWIDTH] IN BLOOD BY AUTOMATED COUNT: 15.5 %
EST. GFR  (AFRICAN AMERICAN): >60 ML/MIN/1.73 M^2
EST. GFR  (NON AFRICAN AMERICAN): >60 ML/MIN/1.73 M^2
GLUCOSE SERPL-MCNC: 87 MG/DL
HCT VFR BLD AUTO: 30.9 %
HGB BLD-MCNC: 10.1 G/DL
LYMPHOCYTES # BLD AUTO: 0.8 K/UL
LYMPHOCYTES NFR BLD: 13.3 %
MCH RBC QN AUTO: 28.5 PG
MCHC RBC AUTO-ENTMCNC: 32.7 G/DL
MCV RBC AUTO: 87 FL
MONOCYTES # BLD AUTO: 0.6 K/UL
MONOCYTES NFR BLD: 10.3 %
NEUTROPHILS # BLD AUTO: 4.5 K/UL
NEUTROPHILS NFR BLD: 73.9 %
PLATELET # BLD AUTO: 236 K/UL
PMV BLD AUTO: 10.7 FL
POCT GLUCOSE: 85 MG/DL (ref 70–110)
POCT GLUCOSE: 92 MG/DL (ref 70–110)
POCT GLUCOSE: 96 MG/DL (ref 70–110)
POCT GLUCOSE: 98 MG/DL (ref 70–110)
POTASSIUM SERPL-SCNC: 3.8 MMOL/L
PROT SERPL-MCNC: 5.5 G/DL
RBC # BLD AUTO: 3.55 M/UL
SODIUM SERPL-SCNC: 140 MMOL/L
WBC # BLD AUTO: 6.11 K/UL

## 2018-10-28 PROCEDURE — 25000003 PHARM REV CODE 250: Performed by: SURGERY

## 2018-10-28 PROCEDURE — 21400001 HC TELEMETRY ROOM

## 2018-10-28 PROCEDURE — 36415 COLL VENOUS BLD VENIPUNCTURE: CPT

## 2018-10-28 PROCEDURE — 97110 THERAPEUTIC EXERCISES: CPT

## 2018-10-28 PROCEDURE — 94799 UNLISTED PULMONARY SVC/PX: CPT

## 2018-10-28 PROCEDURE — 80053 COMPREHEN METABOLIC PANEL: CPT

## 2018-10-28 PROCEDURE — 85025 COMPLETE CBC W/AUTO DIFF WBC: CPT

## 2018-10-28 PROCEDURE — 94761 N-INVAS EAR/PLS OXIMETRY MLT: CPT

## 2018-10-28 PROCEDURE — 25000003 PHARM REV CODE 250: Performed by: NURSE PRACTITIONER

## 2018-10-28 PROCEDURE — 97116 GAIT TRAINING THERAPY: CPT

## 2018-10-28 RX ADMIN — CHLORHEXIDINE GLUCONATE 10 ML: 1.2 RINSE ORAL at 06:10

## 2018-10-28 RX ADMIN — STANDARDIZED SENNA CONCENTRATE AND DOCUSATE SODIUM 1 TABLET: 8.6; 5 TABLET, FILM COATED ORAL at 08:10

## 2018-10-28 RX ADMIN — STANDARDIZED SENNA CONCENTRATE AND DOCUSATE SODIUM 1 TABLET: 8.6; 5 TABLET, FILM COATED ORAL at 09:10

## 2018-10-28 RX ADMIN — CHLORHEXIDINE GLUCONATE 10 ML: 1.2 RINSE ORAL at 08:10

## 2018-10-28 RX ADMIN — CHLORHEXIDINE GLUCONATE 10 ML: 1.2 RINSE ORAL at 09:10

## 2018-10-28 RX ADMIN — HYDROCODONE BITARTRATE AND ACETAMINOPHEN 1 TABLET: 5; 325 TABLET ORAL at 02:10

## 2018-10-28 RX ADMIN — ATORVASTATIN CALCIUM 20 MG: 10 TABLET, FILM COATED ORAL at 09:10

## 2018-10-28 RX ADMIN — HYDROCODONE BITARTRATE AND ACETAMINOPHEN 1 TABLET: 5; 325 TABLET ORAL at 08:10

## 2018-10-28 RX ADMIN — PANTOPRAZOLE SODIUM 40 MG: 40 TABLET, DELAYED RELEASE ORAL at 08:10

## 2018-10-28 RX ADMIN — LISINOPRIL 10 MG: 10 TABLET ORAL at 09:10

## 2018-10-28 NOTE — ASSESSMENT & PLAN NOTE
POD #1 S/P Exp Lap performed by Dr. Caldwell - continue post op care by primary team  10/28:  --POD #2.   --no flatus, no bm  --regular diet  --PRN analgesia  --scheduled stool soft/laxative BID

## 2018-10-28 NOTE — PROGRESS NOTES
[facility] -   General Surgery  Progress Note    Subjective:     History of Present Illness:  Mariaelena Sheffield is a 78 y.o. female with large ventral incisional hernias who has presented multiple times with bowel obstruction 2/2 hernia incarceration. She presented for hernia repair.     Post-Op Info:  Procedure(s) (LRB):  LAPAROTOMY, EXPLORATORY (N/A)  REPAIR, HERNIA, INCISIONAL, INCARCERATED, WITHOUT HISTORY OF PRIOR REPAIR (N/A)  LYSIS, ADHESIONS (N/A)   3 Days Post-Op     Interval History: no bm/flatus. toelrating diet.     Medications:  Continuous Infusions:  Scheduled Meds:   atorvastatin  20 mg Oral QHS    chlorhexidine  10 mL Mouth/Throat BID    lisinopril  10 mg Oral QHS    nozaseptin   Each Nare BID    pantoprazole  40 mg Oral Daily    senna-docusate 8.6-50 mg  1 tablet Oral BID     PRN Meds:acetaminophen, bisacodyl, cloNIDine, dextrose 50%, diphenhydrAMINE, glucagon (human recombinant), HYDROcodone-acetaminophen, HYDROmorphone, insulin aspart U-100, lactulose, ondansetron, promethazine (PHENERGAN) IVPB, ramelteon     Review of patient's allergies indicates:  No Known Allergies  Objective:     Vital Signs (Most Recent):  Temp: 98.4 °F (36.9 °C) (10/28/18 1112)  Pulse: 83 (10/28/18 1112)  Resp: 18 (10/28/18 1112)  BP: 102/61 (10/28/18 1112)  SpO2: 98 % (10/28/18 1112) Vital Signs (24h Range):  Temp:  [97.8 °F (36.6 °C)-99 °F (37.2 °C)] 98.4 °F (36.9 °C)  Pulse:  [77-90] 83  Resp:  [16-18] 18  SpO2:  [96 %-98 %] 98 %  BP: (102-132)/(61-78) 102/61     Weight: 64.7 kg (142 lb 10.2 oz)  Body mass index is 24.48 kg/m².    Intake/Output - Last 3 Shifts       10/26 0700 - 10/27 0659 10/27 0700 - 10/28 0659 10/28 0700 - 10/29 0659    I.V. (mL/kg) 2500 (38.6)      Total Intake(mL/kg) 2500 (38.6)      Urine (mL/kg/hr) 1600 (1) 1500 (1)     Blood       Total Output 1600 1500     Net +900 -1500                  Physical Exam   Constitutional: She is oriented to person, place, and time. She appears  well-developed and well-nourished.   HENT:   Head: Normocephalic and atraumatic.   Eyes: EOM are normal.   Cardiovascular: Normal rate and regular rhythm.   Pulmonary/Chest: Effort normal. No respiratory distress.   Abdominal: Soft.   Incision c/d/i. No distention, normal bowel sounds   Musculoskeletal: Normal range of motion.   Neurological: She is alert and oriented to person, place, and time.   Skin: Skin is warm and dry.   Psychiatric: She has a normal mood and affect. Thought content normal.   Vitals reviewed.      Significant Labs:  CBC:   Recent Labs   Lab 10/28/18  0347   WBC 6.11   RBC 3.55*   HGB 10.1*   HCT 30.9*      MCV 87   MCH 28.5   MCHC 32.7     CMP:   Recent Labs   Lab 10/28/18  0347   GLU 87   CALCIUM 9.1   ALBUMIN 2.8*   PROT 5.5*      K 3.8   CO2 26      BUN 7*   CREATININE 0.7   ALKPHOS 61   ALT 5*   AST 13   BILITOT 0.5       Significant Diagnostics:  na    Assessment/Plan:     * Incisional hernia with bowel obstruction    POD#3 s/p incisional hernia repair  regular diet today  ISS  Home health referral, discharge likely tomorrow AM  Awaiting bowel function       Hypokalemia    Monitor, replete as needed     Type 2 diabetes mellitus    Accuchecks, sliding scale     Essential hypertension    Home meds     Hyperlipidemia    Home meds         Holly Neff PA-C  General Surgery  [facility]

## 2018-10-28 NOTE — SUBJECTIVE & OBJECTIVE
Interval History: no bm/flatus. toelrating diet.     Medications:  Continuous Infusions:  Scheduled Meds:   atorvastatin  20 mg Oral QHS    chlorhexidine  10 mL Mouth/Throat BID    lisinopril  10 mg Oral QHS    nozaseptin   Each Nare BID    pantoprazole  40 mg Oral Daily    senna-docusate 8.6-50 mg  1 tablet Oral BID     PRN Meds:acetaminophen, bisacodyl, cloNIDine, dextrose 50%, diphenhydrAMINE, glucagon (human recombinant), HYDROcodone-acetaminophen, HYDROmorphone, insulin aspart U-100, lactulose, ondansetron, promethazine (PHENERGAN) IVPB, ramelteon     Review of patient's allergies indicates:  No Known Allergies  Objective:     Vital Signs (Most Recent):  Temp: 98.4 °F (36.9 °C) (10/28/18 1112)  Pulse: 83 (10/28/18 1112)  Resp: 18 (10/28/18 1112)  BP: 102/61 (10/28/18 1112)  SpO2: 98 % (10/28/18 1112) Vital Signs (24h Range):  Temp:  [97.8 °F (36.6 °C)-99 °F (37.2 °C)] 98.4 °F (36.9 °C)  Pulse:  [77-90] 83  Resp:  [16-18] 18  SpO2:  [96 %-98 %] 98 %  BP: (102-132)/(61-78) 102/61     Weight: 64.7 kg (142 lb 10.2 oz)  Body mass index is 24.48 kg/m².    Intake/Output - Last 3 Shifts       10/26 0700 - 10/27 0659 10/27 0700 - 10/28 0659 10/28 0700 - 10/29 0659    I.V. (mL/kg) 2500 (38.6)      Total Intake(mL/kg) 2500 (38.6)      Urine (mL/kg/hr) 1600 (1) 1500 (1)     Blood       Total Output 1600 1500     Net +900 -1500                  Physical Exam   Constitutional: She is oriented to person, place, and time. She appears well-developed and well-nourished.   HENT:   Head: Normocephalic and atraumatic.   Eyes: EOM are normal.   Cardiovascular: Normal rate and regular rhythm.   Pulmonary/Chest: Effort normal. No respiratory distress.   Abdominal: Soft.   Incision c/d/i. No distention, normal bowel sounds   Musculoskeletal: Normal range of motion.   Neurological: She is alert and oriented to person, place, and time.   Skin: Skin is warm and dry.   Psychiatric: She has a normal mood and affect. Thought content  normal.   Vitals reviewed.      Significant Labs:  CBC:   Recent Labs   Lab 10/28/18  0347   WBC 6.11   RBC 3.55*   HGB 10.1*   HCT 30.9*      MCV 87   MCH 28.5   MCHC 32.7     CMP:   Recent Labs   Lab 10/28/18  0347   GLU 87   CALCIUM 9.1   ALBUMIN 2.8*   PROT 5.5*      K 3.8   CO2 26      BUN 7*   CREATININE 0.7   ALKPHOS 61   ALT 5*   AST 13   BILITOT 0.5       Significant Diagnostics:  na

## 2018-10-28 NOTE — ASSESSMENT & PLAN NOTE
Hold metformin  accuchecks  Low Sliding scale insulin  10/28  --HA1C on 10/12/18 5.3  --accuchecks and SSI

## 2018-10-28 NOTE — PLAN OF CARE
Problem: Patient Care Overview  Goal: Plan of Care Review  Outcome: Ongoing (interventions implemented as appropriate)  The patient has been sinus rhythm on the monitor. Midline abd incision clean, dry, staples intact, ad binder in place. Blood glucose monitored. Pt has had an uneventful night and is resting quietly, will continue to monitor.

## 2018-10-28 NOTE — ASSESSMENT & PLAN NOTE
POD#3 s/p incisional hernia repair  regular diet today  ISS  Home health referral, discharge likely tomorrow AM  Awaiting bowel function

## 2018-10-28 NOTE — PROGRESS NOTES
[facility] Hospital Medicine  Progress Note    Patient Name: Mariaelena Sheffield  MRN: [id number]  Patient Class: IP- Inpatient   Admission Date: 10/24/2018  Length of Stay: 3 days  Attending Physician: Kevin Caldwell MD  Primary Care Provider: Lui Blue MD        Subjective:     Principal Problem:Incisional hernia with bowel obstruction    HPI:  Mariaelena Sheffield is a 77 yo female with PMHx of HTN, HPL and DM II who has undergone an Exp Lap for an incarcerated incisional hernia performed by Dr. Caldwell. She had a history of multiple bowel obstructions 2/2 hernia incarceration. Presently, the pt is alert and eating ice chips. She denies nausea, chest pain, SOB and abdominal pain. Currently, her B/P is controlled, chemistries note hypokalemia, CBC is stable and blood glucose = 143. Hospital Medicine was consulted to assist with medical management.     Hospital Course:  Patient was admitted to telemetry for SBO under the care of General Surgery. Hospital Medicine was consulted for medical management. POD #1 S/p exlap with primary repair of hernia, closure of fascia. Pt on clear liquids. No flatus, no BM. Physical Therapy to eval and tx.    10/27: POD #2. Diet advanced to full liquid. No flatus or BM. Pain controlled with PRN analgesia      Interval History: Diet advanced to regular. Patient sitting up at side of bed, eating breakfast. No BM, not passing gas. BS normal. Will likely dc home with HH in AM. Pain is controlled    Review of Systems   Constitutional: Negative for activity change, appetite change, chills and fever.   HENT: Negative for congestion, postnasal drip, rhinorrhea and sore throat.    Eyes: Negative for pain and redness.   Respiratory: Negative for cough and shortness of breath.    Cardiovascular: Negative for chest pain.   Gastrointestinal: Positive for abdominal pain (tenderness). Negative for nausea and vomiting.   Endocrine: Negative for cold intolerance and heat intolerance.    Genitourinary: Negative for difficulty urinating, dysuria and hematuria.   Musculoskeletal: Negative for arthralgias and myalgias.   Skin: Positive for wound. Negative for color change.        Surgical incision   Allergic/Immunologic: Negative.    Neurological: Negative for dizziness, weakness and light-headedness.   Hematological: Negative.    Psychiatric/Behavioral: Negative for agitation, behavioral problems and confusion. The patient is not nervous/anxious.      Objective:     Vital Signs (Most Recent):  Temp: 98.7 °F (37.1 °C) (10/28/18 1524)  Pulse: 74 (10/28/18 1524)  Resp: 18 (10/28/18 1524)  BP: 123/74 (10/28/18 1524)  SpO2: 96 % (10/28/18 1524) Vital Signs (24h Range):  Temp:  [97.8 °F (36.6 °C)-99 °F (37.2 °C)] 98.7 °F (37.1 °C)  Pulse:  [] 74  Resp:  [16-18] 18  SpO2:  [96 %-98 %] 96 %  BP: (102-132)/(61-78) 123/74     Weight: 64.7 kg (142 lb 10.2 oz)  Body mass index is 24.48 kg/m².    Intake/Output Summary (Last 24 hours) at 10/28/2018 1609  Last data filed at 10/28/2018 0620  Gross per 24 hour   Intake --   Output 700 ml   Net -700 ml      Physical Exam   Constitutional: She is oriented to person, place, and time. She appears well-developed and well-nourished. No distress.   HENT:   Head: Normocephalic and atraumatic.   Nose: Nose normal.   Eyes: Conjunctivae and EOM are normal. Pupils are equal, round, and reactive to light. Right eye exhibits no discharge. Left eye exhibits no discharge.   Neck: Normal range of motion. Neck supple. No JVD present. No tracheal deviation present. No thyromegaly present.   Cardiovascular: Normal rate, regular rhythm and normal heart sounds. Exam reveals no gallop and no friction rub.   No murmur heard.  Pulmonary/Chest: Effort normal and breath sounds normal. No stridor. No respiratory distress. She has no wheezes. She has no rales. She exhibits no tenderness.   Abdominal: Soft. Bowel sounds are normal. She exhibits no distension and no mass. There is  tenderness (appropriate incisional ttp). There is no guarding.   Wound dressings c/d/i   Genitourinary:   Genitourinary Comments: deferred   Musculoskeletal: Normal range of motion. She exhibits no edema, tenderness or deformity.   Lymphadenopathy:     She has no cervical adenopathy.   Neurological: She is alert and oriented to person, place, and time. She has normal reflexes. No cranial nerve deficit. Coordination normal.   Skin: Skin is warm and dry. Capillary refill takes less than 2 seconds. No rash noted. She is not diaphoretic. No erythema. No pallor.   Psychiatric: She has a normal mood and affect. Her behavior is normal. Judgment and thought content normal.   Nursing note and vitals reviewed.      Significant Labs:   Recent Lab Results       10/28/18  1124   10/28/18  0620   10/28/18  0347   10/27/18  2119   10/27/18  1631        Albumin     2.8         Alkaline Phosphatase     61         ALT     5         Anion Gap     7         AST     13         Baso #     0.01         Basophil%     0.2         Total Bilirubin     0.5  Comment:  For infants and newborns, interpretation of results should be based  on gestational age, weight and in agreement with clinical  observations.  Premature Infant recommended reference ranges:  Up to 24 hours.............<8.0 mg/dL  Up to 48 hours............<12.0 mg/dL  3-5 days..................<15.0 mg/dL  6-29 days.................<15.0 mg/dL           BUN, Bld     7         Calcium     9.1         Chloride     107         CO2     26         Creatinine     0.7         Differential Method     Automated         eGFR if      >60         eGFR if non      >60  Comment:  Calculation used to obtain the estimated glomerular filtration  rate (eGFR) is the CKD-EPI equation.            Eos #     0.1         Eosinophil%     2.3         Glucose     87         Gran # (ANC)     4.5         Gran%     73.9         Hematocrit     30.9         Hemoglobin     10.1          Lymph #     0.8         Lymph%     13.3         MCH     28.5         MCHC     32.7         MCV     87         Mono #     0.6         Mono%     10.3         MPV     10.7         Platelets     236         POCT Glucose 96 85   107 91     Potassium     3.8         Total Protein     5.5         RBC     3.55         RDW     15.5         Sodium     140         WBC     6.11                            All pertinent labs within the past 24 hours have been reviewed.    Significant Imaging: I have reviewed all pertinent imaging results/findings within the past 24 hours.    Assessment/Plan:      * Incisional hernia with bowel obstruction    POD #1 S/P Exp Lap performed by Dr. Caldwell - continue post op care by primary team  10/28:  --POD #2.   --no flatus, no bm  --regular diet  --PRN analgesia  --scheduled stool soft/laxative BID       S/P hernia surgery    --surgery following  --no BM, not passing gas  --PRN analgesia  --likely dc home tomorrow with HH       Hypokalemia    resolved and monitor  10/28:  --stable  --replete as needed       Ventral hernia with bowel obstruction    10/28:  --surgically repaired  --monitor BS  --PRN analgesia         Type 2 diabetes mellitus    Hold metformin  accuchecks  Low Sliding scale insulin  10/28  --HA1C on 10/12/18 5.3  --accuchecks and SSI     Essential hypertension    10/27:  --continue lisinopril  --stable on medication       Hyperlipidemia    Continue atorvastatin         VTE Risk Mitigation (From admission, onward)        Ordered     IP VTE HIGH RISK PATIENT  Once      10/26/18 1327     Place sequential compression device  Until discontinued      10/26/18 1327              JAZIEL Inman-C  Department of Hospital Medicine   [facility]

## 2018-10-28 NOTE — PLAN OF CARE
Problem: Patient Care Overview  Goal: Plan of Care Review  Outcome: Ongoing (interventions implemented as appropriate)  Dx: abdominal hernia repair  POC:  Bed alarm on, bed low and locked, side rails up x 2.  Educated patient and family member about fall risk and prevention, armband present, signs in place.  Blood glucose checked AC/HS.  No insulin coverage needed during shift.  Pain under control with ordered pain medication.  Ambulation encouraged with assistance.  Deep breathing and coughing while awake demonstrated by patient independently.  IS demonstrated independently.  Repositioning encouraged and demonstrated by patient atleast every 2 hours.

## 2018-10-28 NOTE — ASSESSMENT & PLAN NOTE
--surgery following  --no BM, not passing gas  --PRN analgesia  --likely dc home tomorrow with HH

## 2018-10-28 NOTE — SUBJECTIVE & OBJECTIVE
Interval History: Diet advanced to regular. Patient sitting up at side of bed, eating breakfast. No BM, not passing gas. BS normal. Will likely dc home with HH in AM. Pain is controlled    Review of Systems   Constitutional: Negative for activity change, appetite change, chills and fever.   HENT: Negative for congestion, postnasal drip, rhinorrhea and sore throat.    Eyes: Negative for pain and redness.   Respiratory: Negative for cough and shortness of breath.    Cardiovascular: Negative for chest pain.   Gastrointestinal: Positive for abdominal pain (tenderness). Negative for nausea and vomiting.   Endocrine: Negative for cold intolerance and heat intolerance.   Genitourinary: Negative for difficulty urinating, dysuria and hematuria.   Musculoskeletal: Negative for arthralgias and myalgias.   Skin: Positive for wound. Negative for color change.        Surgical incision   Allergic/Immunologic: Negative.    Neurological: Negative for dizziness, weakness and light-headedness.   Hematological: Negative.    Psychiatric/Behavioral: Negative for agitation, behavioral problems and confusion. The patient is not nervous/anxious.      Objective:     Vital Signs (Most Recent):  Temp: 98.7 °F (37.1 °C) (10/28/18 1524)  Pulse: 74 (10/28/18 1524)  Resp: 18 (10/28/18 1524)  BP: 123/74 (10/28/18 1524)  SpO2: 96 % (10/28/18 1524) Vital Signs (24h Range):  Temp:  [97.8 °F (36.6 °C)-99 °F (37.2 °C)] 98.7 °F (37.1 °C)  Pulse:  [] 74  Resp:  [16-18] 18  SpO2:  [96 %-98 %] 96 %  BP: (102-132)/(61-78) 123/74     Weight: 64.7 kg (142 lb 10.2 oz)  Body mass index is 24.48 kg/m².    Intake/Output Summary (Last 24 hours) at 10/28/2018 1609  Last data filed at 10/28/2018 0620  Gross per 24 hour   Intake --   Output 700 ml   Net -700 ml      Physical Exam   Constitutional: She is oriented to person, place, and time. She appears well-developed and well-nourished. No distress.   HENT:   Head: Normocephalic and atraumatic.   Nose: Nose  normal.   Eyes: Conjunctivae and EOM are normal. Pupils are equal, round, and reactive to light. Right eye exhibits no discharge. Left eye exhibits no discharge.   Neck: Normal range of motion. Neck supple. No JVD present. No tracheal deviation present. No thyromegaly present.   Cardiovascular: Normal rate, regular rhythm and normal heart sounds. Exam reveals no gallop and no friction rub.   No murmur heard.  Pulmonary/Chest: Effort normal and breath sounds normal. No stridor. No respiratory distress. She has no wheezes. She has no rales. She exhibits no tenderness.   Abdominal: Soft. Bowel sounds are normal. She exhibits no distension and no mass. There is tenderness (appropriate incisional ttp). There is no guarding.   Wound dressings c/d/i   Genitourinary:   Genitourinary Comments: deferred   Musculoskeletal: Normal range of motion. She exhibits no edema, tenderness or deformity.   Lymphadenopathy:     She has no cervical adenopathy.   Neurological: She is alert and oriented to person, place, and time. She has normal reflexes. No cranial nerve deficit. Coordination normal.   Skin: Skin is warm and dry. Capillary refill takes less than 2 seconds. No rash noted. She is not diaphoretic. No erythema. No pallor.   Psychiatric: She has a normal mood and affect. Her behavior is normal. Judgment and thought content normal.   Nursing note and vitals reviewed.      Significant Labs:   Recent Lab Results       10/28/18  1124   10/28/18  0620   10/28/18  0347   10/27/18  2119   10/27/18  1631        Albumin     2.8         Alkaline Phosphatase     61         ALT     5         Anion Gap     7         AST     13         Baso #     0.01         Basophil%     0.2         Total Bilirubin     0.5  Comment:  For infants and newborns, interpretation of results should be based  on gestational age, weight and in agreement with clinical  observations.  Premature Infant recommended reference ranges:  Up to 24 hours.............<8.0  mg/dL  Up to 48 hours............<12.0 mg/dL  3-5 days..................<15.0 mg/dL  6-29 days.................<15.0 mg/dL           BUN, Bld     7         Calcium     9.1         Chloride     107         CO2     26         Creatinine     0.7         Differential Method     Automated         eGFR if      >60         eGFR if non      >60  Comment:  Calculation used to obtain the estimated glomerular filtration  rate (eGFR) is the CKD-EPI equation.            Eos #     0.1         Eosinophil%     2.3         Glucose     87         Gran # (ANC)     4.5         Gran%     73.9         Hematocrit     30.9         Hemoglobin     10.1         Lymph #     0.8         Lymph%     13.3         MCH     28.5         MCHC     32.7         MCV     87         Mono #     0.6         Mono%     10.3         MPV     10.7         Platelets     236         POCT Glucose 96 85   107 91     Potassium     3.8         Total Protein     5.5         RBC     3.55         RDW     15.5         Sodium     140         WBC     6.11                            All pertinent labs within the past 24 hours have been reviewed.    Significant Imaging: I have reviewed all pertinent imaging results/findings within the past 24 hours.

## 2018-10-28 NOTE — PLAN OF CARE
Problem: Physical Therapy Goal  Goal: Physical Therapy Goal  LTG'S TO BE MET IN 7 DAYS (11-2-18)  1. PT WILL REQUIRE SPV FOR BED MOBILITY  2. PT WILL REQUIRE SPV FOR TF'S  3. PT WILL ' WITH SPV  4. PT WILL DEMO G DYNAMIC BALANCE DURING GAIT   Outcome: Ongoing (interventions implemented as appropriate)  PATIENT PROGRESSING WITH GT INTO HALLWAY AT CGAX1 150'X2 , GOOD STEADY PACE .

## 2018-10-28 NOTE — PT/OT/SLP PROGRESS
Physical Therapy  Treatment    Mariaelena Sheffield   MRN: [id number]   Admitting Diagnosis: Incisional hernia with bowel obstruction    PT Received On: 10/28/18  PT Start Time: 1100     PT Stop Time: 1125    PT Total Time (min): 25 min       Billable Minutes:  Gait Training 15 and Therapeutic Exercise 10    Treatment Type: Treatment  PT/PTA: PTA     PTA Visit Number: 1       General Precautions: Standard, fall  Orthopedic Precautions: N/A   Braces: N/A         Subjective:  Communicated with Epic AND [facility] prior to session.  PT AGREE TO TX NOW.    Pain/Comfort  Pain Rating 1: 0/10    Objective:   Patient found with: peripheral IV, telemetry, SUPINE IN BED.    Functional Mobility:  Bed Mobility:    SUPINE TO SIT, SIT SUPINE AT CGAX1.    Transfers:   SIT TO STAND ,STAND TO SIT AT CGAX1.    Gait:    AMBULATE INT O HALLWAY 150'X2 , GOOD STEADY PACE AT CGAX1.    Stairs:    N/A    Balance:   Static Sit: GOOD-: Takes MODERATE challenges from all directions but inconsistently  Dynamic Sit: GOOD: Maintains balance through MODERATE excursions of active trunk movement  Static Stand: GOOD-: Takes MODERATE challenges from all directions inconsistently  Dynamic stand: GOOD-: Needs SUPERVISION only during gait and able to self right with moderate LOB inconsistently     Therapeutic Activities and Exercises:  LATRICIA LE EXERCISES SHORTSEATED, GT INTO HALLWAY, T/FS OOB.    AM-PAC 6 CLICK MOBILITY  How much help from another person does this patient currently need?   1 = Unable, Total/Dependent Assistance  2 = A lot, Maximum/Moderate Assistance  3 = A little, Minimum/Contact Guard/Supervision  4 = None, Modified [place]/Independent    Turning over in bed (including adjusting bedclothes, sheets and blankets)?: 4  Sitting down on and standing up from a chair with arms (e.g., wheelchair, bedside commode, etc.): 4  Moving from lying on back to sitting on the side of the bed?: 3  Moving to and from a bed to a chair (including a  wheelchair)?: 3  Need to walk in hospital room?: 3  Climbing 3-5 steps with a railing?: 1  Basic Mobility Total Score: 18    AM-PAC Raw Score CMS G-Code Modifier Level of Impairment Assistance   6 % Total / Unable   7 - 9 CM 80 - 100% Maximal Assist   10 - 14 CL 60 - 80% Moderate Assist   15 - 19 CK 40 - 60% Moderate Assist   20 - 22 CJ 20 - 40% Minimal Assist   23 CI 1-20% SBA / CGA   24 CH 0% Independent/ Mod I     Patient left right sidelying with all lines intact and call button in reach.    Assessment:  Mariaelena Sheffield is a 78 y.o. female with a medical diagnosis of Incisional hernia with bowel obstruction .    Rehab identified problem list/impairments: Rehab identified problem list/impairments: weakness, impaired endurance, impaired self care skills, impaired functional mobilty    Rehab potential is excellent.    Activity tolerance: Excellent    Discharge recommendations: Discharge Facility/Level Of Care Needs: home health PT     Barriers to discharge:      Equipment recommendations: Equipment Needed After Discharge: bath bench, walker, rolling     GOALS:   Multidisciplinary Problems     Physical Therapy Goals        Problem: Physical Therapy Goal    Goal Priority Disciplines Outcome Goal Variances Interventions   Physical Therapy Goal     PT, PT/OT Ongoing (interventions implemented as appropriate)     Description:  LTG'S TO BE MET IN 7 DAYS (11-2-18)  1. PT WILL REQUIRE SPV FOR BED MOBILITY  2. PT WILL REQUIRE SPV FOR TF'S  3. PT WILL ' WITH SPV  4. PT WILL DEMO G DYNAMIC BALANCE DURING GAIT                    PLAN:    Patient to be seen 5 x/week  to address the above listed problems via gait training, therapeutic activities, therapeutic exercises  Plan of Care expires: 11/02/18  Plan of Care reviewed with: patient         Mikki Quan, PTA  10/28/2018

## 2018-10-29 ENCOUNTER — TELEPHONE (OUTPATIENT)
Dept: SURGERY | Facility: CLINIC | Age: 78
End: 2018-10-29

## 2018-10-29 VITALS
TEMPERATURE: 98 F | RESPIRATION RATE: 18 BRPM | BODY MASS INDEX: 24.35 KG/M2 | HEART RATE: 80 BPM | OXYGEN SATURATION: 98 % | SYSTOLIC BLOOD PRESSURE: 105 MMHG | DIASTOLIC BLOOD PRESSURE: 73 MMHG | WEIGHT: 142.63 LBS | HEIGHT: 64 IN

## 2018-10-29 LAB
ALBUMIN SERPL BCP-MCNC: 3.1 G/DL
ALP SERPL-CCNC: 61 U/L
ALT SERPL W/O P-5'-P-CCNC: 5 U/L
ANION GAP SERPL CALC-SCNC: 9 MMOL/L
AST SERPL-CCNC: 14 U/L
BASOPHILS # BLD AUTO: 0.01 K/UL
BASOPHILS NFR BLD: 0.2 %
BILIRUB SERPL-MCNC: 0.6 MG/DL
BUN SERPL-MCNC: 8 MG/DL
CALCIUM SERPL-MCNC: 9.7 MG/DL
CHLORIDE SERPL-SCNC: 105 MMOL/L
CO2 SERPL-SCNC: 27 MMOL/L
CREAT SERPL-MCNC: 0.8 MG/DL
DIFFERENTIAL METHOD: ABNORMAL
EOSINOPHIL # BLD AUTO: 0.1 K/UL
EOSINOPHIL NFR BLD: 2.2 %
ERYTHROCYTE [DISTWIDTH] IN BLOOD BY AUTOMATED COUNT: 15.5 %
EST. GFR  (AFRICAN AMERICAN): >60 ML/MIN/1.73 M^2
EST. GFR  (NON AFRICAN AMERICAN): >60 ML/MIN/1.73 M^2
GLUCOSE SERPL-MCNC: 90 MG/DL
HCT VFR BLD AUTO: 34.1 %
HGB BLD-MCNC: 11 G/DL
LYMPHOCYTES # BLD AUTO: 1 K/UL
LYMPHOCYTES NFR BLD: 18.3 %
MCH RBC QN AUTO: 28.4 PG
MCHC RBC AUTO-ENTMCNC: 32.3 G/DL
MCV RBC AUTO: 88 FL
MONOCYTES # BLD AUTO: 0.5 K/UL
MONOCYTES NFR BLD: 9.1 %
NEUTROPHILS # BLD AUTO: 3.8 K/UL
NEUTROPHILS NFR BLD: 70.2 %
PLATELET # BLD AUTO: 232 K/UL
PMV BLD AUTO: 9.9 FL
POCT GLUCOSE: 112 MG/DL (ref 70–110)
POCT GLUCOSE: 78 MG/DL (ref 70–110)
POTASSIUM SERPL-SCNC: 4.3 MMOL/L
PROT SERPL-MCNC: 6 G/DL
RBC # BLD AUTO: 3.88 M/UL
SODIUM SERPL-SCNC: 141 MMOL/L
WBC # BLD AUTO: 5.4 K/UL

## 2018-10-29 PROCEDURE — 25000003 PHARM REV CODE 250: Performed by: NURSE PRACTITIONER

## 2018-10-29 PROCEDURE — 94799 UNLISTED PULMONARY SVC/PX: CPT

## 2018-10-29 PROCEDURE — 36415 COLL VENOUS BLD VENIPUNCTURE: CPT

## 2018-10-29 PROCEDURE — 80053 COMPREHEN METABOLIC PANEL: CPT

## 2018-10-29 PROCEDURE — 94760 N-INVAS EAR/PLS OXIMETRY 1: CPT

## 2018-10-29 PROCEDURE — 99900035 HC TECH TIME PER 15 MIN (STAT)

## 2018-10-29 PROCEDURE — 97110 THERAPEUTIC EXERCISES: CPT

## 2018-10-29 PROCEDURE — 85025 COMPLETE CBC W/AUTO DIFF WBC: CPT

## 2018-10-29 PROCEDURE — 97116 GAIT TRAINING THERAPY: CPT

## 2018-10-29 PROCEDURE — 25000003 PHARM REV CODE 250: Performed by: SURGERY

## 2018-10-29 RX ORDER — HYDROCODONE BITARTRATE AND ACETAMINOPHEN 5; 325 MG/1; MG/1
1 TABLET ORAL EVERY 6 HOURS PRN
Qty: 20 TABLET | Refills: 0 | Status: SHIPPED | OUTPATIENT
Start: 2018-10-29 | End: 2018-11-13 | Stop reason: ALTCHOICE

## 2018-10-29 RX ADMIN — CHLORHEXIDINE GLUCONATE 10 ML: 1.2 RINSE ORAL at 09:10

## 2018-10-29 RX ADMIN — PANTOPRAZOLE SODIUM 40 MG: 40 TABLET, DELAYED RELEASE ORAL at 09:10

## 2018-10-29 RX ADMIN — HYDROCODONE BITARTRATE AND ACETAMINOPHEN 1 TABLET: 5; 325 TABLET ORAL at 01:10

## 2018-10-29 RX ADMIN — STANDARDIZED SENNA CONCENTRATE AND DOCUSATE SODIUM 1 TABLET: 8.6; 5 TABLET, FILM COATED ORAL at 09:10

## 2018-10-29 RX ADMIN — HYDROCODONE BITARTRATE AND ACETAMINOPHEN 1 TABLET: 5; 325 TABLET ORAL at 09:10

## 2018-10-29 NOTE — PLAN OF CARE
Problem: Patient Care Overview  Goal: Plan of Care Review  Outcome: Ongoing (interventions implemented as appropriate)  The patient has been sinus rhythm on the monitor. Blood glucose monitored. Staples clean, dry, intact. Abd binder in place. Pt has had an uneventful night and is resting quietly, will continue to monitor.

## 2018-10-29 NOTE — PT/OT/SLP PROGRESS
Physical Therapy  Treatment    Mariaelena Sheffield   MRN: [id number]   Admitting Diagnosis: Incisional hernia with bowel obstruction    PT Received On: 10/29/18  PT Start Time: 1125     PT Stop Time: 1150    PT Total Time (min): 25 min       Billable Minutes:  Gait Training 15 and Therapeutic Exercise 10    Treatment Type: Treatment  PT/PTA: PT          General Precautions: Standard, fall  Orthopedic Precautions: N/A   Braces: (ABD. BINDER)    Subjective:  Communicated with NURSE DEB prior to session.  Pain/Comfort  Pain Rating 1: 3/10  Location 1: abdomen    Objective:   Patient found with: telemetry    Functional Mobility:  Therapeutic Activities and Exercises:  PT FOUND SUPINE IN BED UPON ARRIVAL, CGA FOR SUP>SIT TF DOING LOG ROLL, SBA FOR SEATED SCOOT TO EOB, CGA FOR SIT>STAND TF, PT ' NO AD WITH MIN/CGA HHA FOR SAFETY DUE TO DECREASED STABILITY, INCREASED LATERAL SWAY, F- DYNAMIC BALANCE , PT RETURN TO ROOM TO BEDSIDE CHAIR, PT EDUCATED IN AND PERFORMED BLE THEREX X 20 REPS AROM WITH REST, PT ENCOURAGED TO INCREASE TIME OOB IN CHAIR, PT AGREEABLE    AM-PAC 6 CLICK MOBILITY  How much help from another person does this patient currently need?   1 = Unable, Total/Dependent Assistance  2 = A lot, Maximum/Moderate Assistance  3 = A little, Minimum/Contact Guard/Supervision  4 = None, Modified [place]/Independent    Turning over in bed (including adjusting bedclothes, sheets and blankets)?: 4  Sitting down on and standing up from a chair with arms (e.g., wheelchair, bedside commode, etc.): 4  Moving from lying on back to sitting on the side of the bed?: 4  Moving to and from a bed to a chair (including a wheelchair)?: 4  Need to walk in hospital room?: 4  Climbing 3-5 steps with a railing?: 1  Basic Mobility Total Score: 21    AM-PAC Raw Score CMS G-Code Modifier Level of Impairment Assistance   6 % Total / Unable   7 - 9 CM 80 - 100% Maximal Assist   10 - 14 CL 60 - 80% Moderate Assist   15 - 19 CK  40 - 60% Moderate Assist   20 - 22 CJ 20 - 40% Minimal Assist   23 CI 1-20% SBA / CGA   24 CH 0% Independent/ Mod I     Patient left up in chair with all lines intact, call button in reach and NURSE notified.    Assessment:  Mariaelena Sheffield is a 78 y.o. female with a medical diagnosis of Incisional hernia with bowel obstruction and presents with IMPAIRED FUNCTIONAL MOBILITY. PT WILL BENEFIT FROM CONT. SKILLED P.T. TO ADDRESS IMPAIRMENTS    Rehab identified problem list/impairments: Rehab identified problem list/impairments: weakness, impaired endurance, impaired functional mobilty, gait instability, impaired balance, decreased coordination, decreased safety awareness    Rehab potential is good.    Activity tolerance: Good    Discharge recommendations: Discharge Facility/Level Of Care Needs: home health PT     Barriers to discharge:     Equipment recommendations: Equipment Needed After Discharge: walker, rolling, bath bench     GOALS:   Multidisciplinary Problems     Physical Therapy Goals        Problem: Physical Therapy Goal    Goal Priority Disciplines Outcome Goal Variances Interventions   Physical Therapy Goal     PT, PT/OT Ongoing (interventions implemented as appropriate)     Description:  LTG'S TO BE MET IN 7 DAYS (11-2-18)  1. PT WILL REQUIRE SPV FOR BED MOBILITY  2. PT WILL REQUIRE SPV FOR TF'S  3. PT WILL ' WITH SPV  4. PT WILL DEMO G DYNAMIC BALANCE DURING GAIT                    PLAN:    Patient to be seen 5 x/week  to address the above listed problems via gait training, therapeutic activities, therapeutic exercises  Plan of Care expires: 11/02/18  Plan of Care reviewed with: patient    PT ENCOURAGED TO CALL FOR ASSISTANCE WITH ALL NEEDS DUE TO FALL RISK STATUS, PT AGREEABLE    Jesenia Mcclendon, PT  10/29/2018

## 2018-10-29 NOTE — PLAN OF CARE
Pt does not have an [facility]. [facility] will not accept. Sw sent referral to [facility].

## 2018-10-29 NOTE — PLAN OF CARE
Problem: Patient Care Overview  Goal: Plan of Care Review  Outcome: Ongoing (interventions implemented as appropriate)  Dx: post op umbilical hernia repair  POC;      Bed in low position and locked, side rails up, call light and personal items always within reach.  Son at bedside, educated along with patient about fall risk and intervention.  Passing flatus this shift, no BM today.  Ambulates in room with standby assistance.  Blood glucose checked AC/HS, patient required no insulin coverage this shift.  Abdominal incision WNL, no drainage, no signs of infection.  ABD binder in place.  Patient instructed on and return demonstrated getting OOB safely with abdominal incision.  Deep breathing and coughing performed 10 x per hour while awake.  Performs IS independently. Advanced to regular diet this shift, tolerated well. LYNNE Rod

## 2018-10-29 NOTE — PROGRESS NOTES
Patient sitting up in chair, tolerating well. Using Incentive spirometer independently. Maximum volume is 800 at this time. Reinstructed on proper use of IS.  Encouraged to use 10 x per hour while awake.

## 2018-10-29 NOTE — TELEPHONE ENCOUNTER
----- Message from Rama Nieves sent at 10/29/2018  9:09 AM CDT -----  Contact: Bj- son  Bj, pt's son is calling checking the status of [facility] paperwork. Bj states paperwork was faxed over the weekend and he would like to know if it's been received. Please call back at [phone].       Thanks,   Rama Nieves

## 2018-10-29 NOTE — SUBJECTIVE & OBJECTIVE
Interval History: Patient is now passing gas[facility]    Review of Systems   Constitutional: Negative for activity change, appetite change, chills and fever.   HENT: Negative for congestion, postnasal drip, rhinorrhea and sore throat.    Eyes: Negative for pain and redness.   Respiratory: Negative for cough and shortness of breath.    Cardiovascular: Negative for chest pain.   Gastrointestinal: Positive for abdominal pain (tenderness). Negative for nausea and vomiting.   Endocrine: Negative for cold intolerance and heat intolerance.   Genitourinary: Negative for difficulty urinating, dysuria and hematuria.   Musculoskeletal: Negative for arthralgias and myalgias.   Skin: Positive for wound. Negative for color change.        Surgical incision   Allergic/Immunologic: Negative.    Neurological: Negative for dizziness, weakness and light-headedness.   Hematological: Negative.    Psychiatric/Behavioral: Negative for agitation, behavioral problems and confusion. The patient is not nervous/anxious.      Objective:     Vital Signs (Most Recent):  Temp: 98 °F (36.7 °C) (10/29/18 0726)  Pulse: 91 (10/29/18 0823)  Resp: 18 (10/29/18 0726)  BP: (!) 142/74 (10/29/18 0726)  SpO2: 96 % (10/29/18 0823) Vital Signs (24h Range):  Temp:  [97.8 °F (36.6 °C)-98.7 °F (37.1 °C)] 98 °F (36.7 °C)  Pulse:  [] 91  Resp:  [18] 18  SpO2:  [96 %-98 %] 96 %  BP: (102-145)/(58-80) 142/74     Weight: 64.7 kg (142 lb 10.2 oz)  Body mass index is 24.48 kg/m².    Intake/Output Summary (Last 24 hours) at 10/29/2018 0907  Last data filed at 10/29/2018 0031  Gross per 24 hour   Intake 560 ml   Output 100 ml   Net 460 ml      Physical Exam   Constitutional: She is oriented to person, place, and time. She appears well-developed and well-nourished. No distress.   HENT:   Head: Normocephalic and atraumatic.   Nose: Nose normal.   Eyes: Conjunctivae and EOM are normal. Pupils are equal, round, and reactive to light. Right eye exhibits  no discharge. Left eye exhibits no discharge.   Neck: Normal range of motion. Neck supple. No JVD present. No tracheal deviation present. No thyromegaly present.   Cardiovascular: Normal rate, regular rhythm and normal heart sounds. Exam reveals no gallop and no friction rub.   No murmur heard.  Pulmonary/Chest: Effort normal and breath sounds normal. No stridor. No respiratory distress. She has no wheezes. She has no rales. She exhibits no tenderness.   Abdominal: Soft. Bowel sounds are normal. She exhibits no distension and no mass. There is tenderness (appropriate incisional ttp). There is no guarding.   Wound dressings c/d/i   Genitourinary:   Genitourinary Comments: deferred   Musculoskeletal: Normal range of motion. She exhibits no edema, tenderness or deformity.   Lymphadenopathy:     She has no cervical adenopathy.   Neurological: She is alert and oriented to person, place, and time. She has normal reflexes. No cranial nerve deficit. Coordination normal.   Skin: Skin is warm and dry. Capillary refill takes less than 2 seconds. No rash noted. She is not diaphoretic. No erythema. No pallor.   Psychiatric: She has a normal mood and affect. Her behavior is normal. Judgment and thought content normal.   Nursing note and vitals reviewed.      Significant Labs:   Recent Lab Results       10/29/18  0630   10/29/18  0527   10/28/18  2110   10/28/18  1651   10/28/18  1124        Albumin   3.1           Alkaline Phosphatase   61           ALT   5           Anion Gap   9           AST   14           Baso #   0.01           Basophil%   0.2           Total Bilirubin   0.6  Comment:  For infants and newborns, interpretation of results should be based  on gestational age, weight and in agreement with clinical  observations.  Premature Infant recommended reference ranges:  Up to 24 hours.............<8.0 mg/dL  Up to 48 hours............<12.0 mg/dL  3-5 days..................<15.0 mg/dL  6-29 days.................<15.0  mg/dL             BUN, Bld   8           Calcium   9.7           Chloride   105           CO2   27           Creatinine   0.8           Differential Method   Automated           eGFR if    >60           eGFR if non    >60  Comment:  Calculation used to obtain the estimated glomerular filtration  rate (eGFR) is the CKD-EPI equation.              Eos #   0.1           Eosinophil%   2.2           Glucose   90           Gran # (ANC)   3.8           Gran%   70.2           Hematocrit   34.1           Hemoglobin   11.0           Lymph #   1.0           Lymph%   18.3           MCH   28.4           MCHC   32.3           MCV   88           Mono #   0.5           Mono%   9.1           MPV   9.9           Platelets   232           POCT Glucose 78   92 98 96     Potassium   4.3           Total Protein   6.0           RBC   3.88           RDW   15.5           Sodium   141           WBC   5.40                              All pertinent labs within the past 24 hours have been reviewed.    Significant Imaging: I have reviewed all pertinent imaging results/findings within the past 24 hours.

## 2018-10-29 NOTE — PROGRESS NOTES
[facility] Hospital Medicine  Progress Note    Patient Name: Mariaelena Sheffield  MRN: [id number]  Patient Class: IP- Inpatient   Admission Date: 10/24/2018  Length of Stay: 4 days  Attending Physician: Kevin Caldwell MD  Primary Care Provider: Lui Blue MD        Subjective:     Principal Problem:Incisional hernia with bowel obstruction    HPI:  Mariaelena Sheffield is a 77 yo female with PMHx of HTN, HPL and DM II who has undergone an Exp Lap for an incarcerated incisional hernia performed by Dr. Caldwell. She had a history of multiple bowel obstructions 2/2 hernia incarceration. Presently, the pt is alert and eating ice chips. She denies nausea, chest pain, SOB and abdominal pain. Currently, her B/P is controlled, chemistries note hypokalemia, CBC is stable and blood glucose = 143. Hospital Medicine was consulted to assist with medical management.     Hospital Course:  Patient was admitted to telemetry for SBO under the care of General Surgery. Hospital Medicine was consulted for medical management. POD #1 S/p exlap with primary repair of hernia, closure of fascia. Pt on clear liquids. No flatus, no BM. Physical Therapy to eval and tx.    10/27: POD #2. Diet advanced to full liquid. No flatus or BM. Pain controlled with PRN analgesia  10/28: POD #3. Diet advanced to regular. No flatus or BM. Will dc home once passing gas  10/29: Patient is passing gas and tolerating regular diet. Discussed with Gen Surgery, pt will be dc'd home today and will f/u with surgery in 2 weeks.   Patient was seen and examined today and deemed stable for dc home.      Interval History: Patient is now passing gas. Gen Surg to dc home today.    Review of Systems   Constitutional: Negative for activity change, appetite change, chills and fever.   HENT: Negative for congestion, postnasal drip, rhinorrhea and sore throat.    Eyes: Negative for pain and redness.   Respiratory: Negative for cough and shortness of breath.     Cardiovascular: Negative for chest pain.   Gastrointestinal: Positive for abdominal pain (tenderness). Negative for nausea and vomiting.   Endocrine: Negative for cold intolerance and heat intolerance.   Genitourinary: Negative for difficulty urinating, dysuria and hematuria.   Musculoskeletal: Negative for arthralgias and myalgias.   Skin: Positive for wound. Negative for color change.        Surgical incision   Allergic/Immunologic: Negative.    Neurological: Negative for dizziness, weakness and light-headedness.   Hematological: Negative.    Psychiatric/Behavioral: Negative for agitation, behavioral problems and confusion. The patient is not nervous/anxious.      Objective:     Vital Signs (Most Recent):  Temp: 98 °F (36.7 °C) (10/29/18 0726)  Pulse: 91 (10/29/18 0823)  Resp: 18 (10/29/18 0726)  BP: (!) 142/74 (10/29/18 0726)  SpO2: 96 % (10/29/18 0823) Vital Signs (24h Range):  Temp:  [97.8 °F (36.6 °C)-98.7 °F (37.1 °C)] 98 °F (36.7 °C)  Pulse:  [] 91  Resp:  [18] 18  SpO2:  [96 %-98 %] 96 %  BP: (102-145)/(58-80) 142/74     Weight: 64.7 kg (142 lb 10.2 oz)  Body mass index is 24.48 kg/m².    Intake/Output Summary (Last 24 hours) at 10/29/2018 0907  Last data filed at 10/29/2018 0031  Gross per 24 hour   Intake 560 ml   Output 100 ml   Net 460 ml      Physical Exam   Constitutional: She is oriented to person, place, and time. She appears well-developed and well-nourished. No distress.   HENT:   Head: Normocephalic and atraumatic.   Nose: Nose normal.   Eyes: Conjunctivae and EOM are normal. Pupils are equal, round, and reactive to light. Right eye exhibits no discharge. Left eye exhibits no discharge.   Neck: Normal range of motion. Neck supple. No JVD present. No tracheal deviation present. No thyromegaly present.   Cardiovascular: Normal rate, regular rhythm and normal heart sounds. Exam reveals no gallop and no friction rub.   No murmur heard.  Pulmonary/Chest: Effort normal and breath sounds normal.  No stridor. No respiratory distress. She has no wheezes. She has no rales. She exhibits no tenderness.   Abdominal: Soft. Bowel sounds are normal. She exhibits no distension and no mass. There is tenderness (appropriate incisional ttp). There is no guarding.   Wound dressings c/d/i   Genitourinary:   Genitourinary Comments: deferred   Musculoskeletal: Normal range of motion. She exhibits no edema, tenderness or deformity.   Lymphadenopathy:     She has no cervical adenopathy.   Neurological: She is alert and oriented to person, place, and time. She has normal reflexes. No cranial nerve deficit. Coordination normal.   Skin: Skin is warm and dry. Capillary refill takes less than 2 seconds. No rash noted. She is not diaphoretic. No erythema. No pallor.   Psychiatric: She has a normal mood and affect. Her behavior is normal. Judgment and thought content normal.   Nursing note and vitals reviewed.      Significant Labs:   Recent Lab Results       10/29/18  0630   10/29/18  0527   10/28/18  2110   10/28/18  1651   10/28/18  1124        Albumin   3.1           Alkaline Phosphatase   61           ALT   5           Anion Gap   9           AST   14           Baso #   0.01           Basophil%   0.2           Total Bilirubin   0.6  Comment:  For infants and newborns, interpretation of results should be based  on gestational age, weight and in agreement with clinical  observations.  Premature Infant recommended reference ranges:  Up to 24 hours.............<8.0 mg/dL  Up to 48 hours............<12.0 mg/dL  3-5 days..................<15.0 mg/dL  6-29 days.................<15.0 mg/dL             BUN, Bld   8           Calcium   9.7           Chloride   105           CO2   27           Creatinine   0.8           Differential Method   Automated           eGFR if    >60           eGFR if non    >60  Comment:  Calculation used to obtain the estimated glomerular filtration  rate (eGFR) is the CKD-EPI  equation.              Eos #   0.1           Eosinophil%   2.2           Glucose   90           Gran # (ANC)   3.8           Gran%   70.2           Hematocrit   34.1           Hemoglobin   11.0           Lymph #   1.0           Lymph%   18.3           MCH   28.4           MCHC   32.3           MCV   88           Mono #   0.5           Mono%   9.1           MPV   9.9           Platelets   232           POCT Glucose 78   92 98 96     Potassium   4.3           Total Protein   6.0           RBC   3.88           RDW   15.5           Sodium   141           WBC   5.40                              All pertinent labs within the past 24 hours have been reviewed.    Significant Imaging: I have reviewed all pertinent imaging results/findings within the past 24 hours.    Assessment/Plan:      * Incisional hernia with bowel obstruction    POD #1 S/P Exp Lap performed by Dr. Caldwell - continue post op care by primary team  10/28:  --POD #2.   --no flatus, no bm  --regular diet  --PRN analgesia  --scheduled stool soft/laxative BID       S/P hernia surgery    --surgery following  --no BM, not passing gas  --PRN analgesia  --likely dc home tomorrow with HH       Hypokalemia    resolved and monitor  10/28:  --stable  --replete as needed       Ventral hernia with bowel obstruction    10/28:  --surgically repaired  --monitor BS  --PRN analgesia         Type 2 diabetes mellitus    Hold metformin  accuchecks  Low Sliding scale insulin  10/28  --HA1C on 10/12/18 5.3  --accuchecks and SSI     Essential hypertension    10/27:  --continue lisinopril  --stable on medication       Hyperlipidemia    Continue atorvastatin         VTE Risk Mitigation (From admission, onward)        Ordered     IP VTE HIGH RISK PATIENT  Once      10/26/18 1327     Place sequential compression device  Until discontinued      10/26/18 1327        Thank you for your consult. I will sign off. Please contact us if you have any additional questions.        Diane JHA  PEDRO Kidd  Department of Hospital Medicine   [facility]

## 2018-10-29 NOTE — PLAN OF CARE
10/29/18 1511   Final Note   Assessment Type Final Discharge Note   Anticipated Discharge Disposition Home-Health   Right Care Referral Info   Post Acute Recommendation Home-care   Facility Name [facility] [place] [place]

## 2018-10-29 NOTE — DISCHARGE SUMMARY
[facility]   General Surgery  Discharge Summary      Patient Name: Mariaelena Sheffield  MRN: [id number]  Admission Date: 10/24/2018  Hospital Length of Stay: 4 days  Discharge Date and Time:  10/29/2018 11:00 AM  Attending Physician: Kevin Caldwell MD   Discharging Provider: Holly Neff PA-C  Primary Care Provider: Lui Blue MD    HPI:   Mariaelena Sheffield is a 78 y.o. female with large ventral incisional hernias who has presented multiple times with bowel obstruction 2/2 hernia incarceration. She presented for hernia repair.     Procedure(s) (LRB):  LAPAROTOMY, EXPLORATORY (N/A)  REPAIR, HERNIA, INCISIONAL, INCARCERATED, WITHOUT HISTORY OF PRIOR REPAIR (N/A)  LYSIS, ADHESIONS (N/A)      Indwelling Lines/Drains at time of discharge:   Lines/Drains/Airways          None        Hospital Course: She underwent exlap with primary repair of ventral hernia, lysis of adhesions  On POD1 she was ambulating with PT, pain was controlled, she denies nasuea or emesis.    10/27/18 - Doing well. No flatus or BM. Tolerating clears. Ambulated with PT yesterday.   10/28/2018: pain well controlled. No flatus or bm. Ambulates with assistance.     Consults:   Consults (From admission, onward)        Status Ordering Provider     Consult to Case Management/Social Work  Once     Provider:  (Not yet assigned)    Completed KEVIN CALDWELL     Inpatient consult to Internal Medicine  Once     Provider:  (Not yet assigned)    Acknowledged KEVIN CALDWELL          Significant Diagnostic Studies: Labs:   CMP   Recent Labs   Lab 10/27/18  1254 10/28/18  0347 10/29/18  0527    140 141   K 4.7 3.8 4.3    107 105   CO2 25 26 27    87 90   BUN 9 7* 8   CREATININE 0.8 0.7 0.8   CALCIUM 9.7 9.1 9.7   PROT 6.0 5.5* 6.0   ALBUMIN 3.2* 2.8* 3.1*   BILITOT 0.5 0.5 0.6   ALKPHOS 66 61 61   AST 16 13 14   ALT 5* 5* 5*   ANIONGAP 10 7* 9   ESTGFRAFRICA >60 >60 >60   EGFRNONAA >60 >60 >60    and CBC   Recent Labs   Lab  10/27/18  1254 10/28/18  0347 10/29/18  0527   WBC 9.73 6.11 5.40   HGB 11.2* 10.1* 11.0*   HCT 34.4* 30.9* 34.1*    236 232       Pending Diagnostic Studies:     None        Final Active Diagnoses:    Diagnosis Date Noted POA    PRINCIPAL PROBLEM:  Incisional hernia with bowel obstruction [K43.0] 10/19/2018 Yes    S/P hernia surgery [Z98.890, Z87.19] 10/28/2018 Not Applicable    Ventral hernia with bowel obstruction [K43.6] 10/25/2018 Yes    Hypokalemia [E87.6] 10/25/2018 No    Essential hypertension [I10] 04/26/2016 Yes     Chronic    Type 2 diabetes mellitus [E11.9] 02/19/2013 Yes     Chronic    Hyperlipidemia [E78.5] 02/19/2013 Yes      Problems Resolved During this Admission:      Discharged Condition: good    Disposition: Home or Self Care    Follow Up:    Patient Instructions:      Diet Adult Regular     Lifting restrictions   Order Comments: 20lb limit     No driving until:   Order Comments: No longer taking narcotic pain medication     Notify your health care provider if you experience any of the following:  temperature >100.4     Notify your health care provider if you experience any of the following:  persistent nausea and vomiting or diarrhea     Notify your health care provider if you experience any of the following:  severe uncontrolled pain     Notify your health care provider if you experience any of the following:  redness, tenderness, or signs of infection (pain, swelling, redness, odor or green/yellow discharge around incision site)     Notify your health care provider if you experience any of the following:  difficulty breathing or increased cough     No dressing needed   Order Comments: Wear binder when out of bed     Activity as tolerated     Medications:  Reconciled Home Medications:      Medication List      START taking these medications    HYDROcodone-acetaminophen 5-325 mg per tablet  Commonly known as:  NORCO  Take 1 tablet by mouth every 6 (six) hours as needed for Pain.      nozaseptin nasal   Commonly known as:  NOZIN  1 each by Each Nare route 2 (two) times daily.        CHANGE how you take these medications    lisinopril 10 MG tablet  Take 1 tablet (10 mg total) by mouth once daily.  What changed:  when to take this     polyethylene glycol 17 gram Pwpk  Commonly known as:  GLYCOLAX  Take 17 g by mouth daily  What changed:  when to take this        CONTINUE taking these medications    ACCU-CHEK FASTCLIX LANCING DEV Misc  Generic drug:  lancets  TEST BLOOD SUGAR  1  TO  2  TIMES  PER  DAY  ( NEED MD APPOINTMENT  )     ACCU-CHEK SMARTVIEW TEST STRIP Strp  Generic drug:  blood sugar diagnostic  TEST ONE TO TWO TIMES DAILY (NEED MD APPOINTMENT)     atorvastatin 20 MG tablet  Commonly known as:  LIPITOR  Take 20 mg by mouth every evening.     blood-glucose meter Misc  Test BID     colchicine 0.6 mg tablet  Commonly known as:  COLCRYS  Take 0.6 mg by mouth once daily.     dicyclomine 10 MG capsule  Commonly known as:  BENTYL  Take 10 mg by mouth 3 (three) times daily as needed.     ferrous sulfate 325 (65 FE) MG EC tablet  Take 325 mg by mouth once daily.     metFORMIN 500 MG tablet  Commonly known as:  GLUCOPHAGE  Take 1 tablet by mouth daily with dinner or evening meal.     omeprazole 40 MG capsule  Commonly known as:  PRILOSEC  Take 40 mg by mouth once daily.        STOP taking these medications    quinapril-hydrochlorothiazide 20-12.5 mg per tablet  Commonly known as:  ACCURETIC          Time spent on the discharge of patient: 25 minutes    Holly Neff PA-C  General Surgery  [facility]

## 2018-10-29 NOTE — PLAN OF CARE
Problem: Patient Care Overview  Goal: Plan of Care Review  Outcome: Outcome(s) achieved Date Met: 10/29/18  Pt remains free of falls and free of injury this shift. Staples to abdominal incision remain intact; incision wnl; abdominal binder in place.   Pt has verbalized eagerness and readiness to be discharged.  Right FA and Right AC saline locks discontinued with canula intact; tolerated well.   Discharge instructions, new home medication list, and post discharge follow up appointments discussed with pt and son.   Tele monitor removed and brought to tele monitor tech.  Discharged to family, via wheelchair, in no apparent distress. All personal belongings taken with pt and family. Encouraged continued compliance with MD directives.

## 2018-10-29 NOTE — TELEPHONE ENCOUNTER
Returned call s/w bj Lorenzanaborne/son in regards to FMLA paperwork informed him Dr. Caldwell is not in clinic today will return on 10/30/18, and once forms are completed they will be faxed to the number provided. Bj voiced an understanding

## 2018-10-31 ENCOUNTER — PATIENT OUTREACH (OUTPATIENT)
Dept: ADMINISTRATIVE | Facility: CLINIC | Age: 78
End: 2018-10-31

## 2018-10-31 NOTE — PATIENT INSTRUCTIONS
Discharge Instructions for Open Hernia Repair  You had a procedure called open hernia repair. Also called a rupture, a hernia is a tear or weakness in the wall of the belly. This weakness may be present at birth. Or it can be caused by the wear and tear of daily living. Hernias may get worse with time or with physical stress. But surgery can help repair the weakness and eliminate symptoms.  Activity after surgery  Recommendations include the following:  · After surgery, take it easy for the rest of the day. If you had general anesthesia, dont use machinery or power tools, drink alcohol, or make any major decisions for at least the first 24 hours.  · Dont drive while you are still taking opioid pain medicine, and dont drive until you are able to step firmly on the brake pedal without hesitation.  · Ask others to help with chores and errands while you recover.  · Dont lift anything heavier than 10 pounds until your healthcare provider says its OK.  · Dont mow the lawn, use a vacuum , or do other strenuous activities until your healthcare provider says its OK.  · Walk as often as you feel able.  · Continue the coughing and deep breathing exercises that you learned in the hospital.  · Ask your healthcare provider when you can expect to return to work.  · Avoid constipation:  ¨ Eat fruits, vegetables, and whole grains.  ¨ Drink 6 to 8 glasses of water a day, unless otherwise instructed.  ¨ Use a laxative or a mild stool softener as instructed by your healthcare provider.  Bandage and incision care  Tips include the following:  · Do not get the bandage or wound wet for 48 hours.  · If strips of tape were used to close your incision, dont pull them off. Let them fall off on their own.  · Remove any gauze bandage in 48 hours.  · Wash your incision with mild soap and water. Pat it dry. Dont use oils, powders, or lotions on your incision. Do not soak your incision or take tub baths until cleared by your  healthcare provider.  Follow-up care  Keep follow-up appointments during your recovery. These allow your healthcare provider to check your progress and make sure youre healing well. You may also need to have your stitches, staples, or bandage removed. During office visits, tell your healthcare provider if you have any new symptoms. And be sure to ask any questions you have.     When to call your healthcare provider  Call your healthcare provider immediately if you have any of the following:  · A large amount of swelling or bruising (some testicular swelling and bruising is common)  · Bleeding  · Increasing pain  · Increased redness or drainage of the incision  · Fever of 100.4°F (38.0°C) or higher, or as directed by your healthcare provider  · Trouble urinating  · Nausea or vomiting   Date Last Reviewed: 7/1/2016  © 8836-1984 [employer]. [address] [place] [address], [place] [address]. All rights reserved. This information is not intended as a substitute for professional medical care. Always follow your healthcare professional's instructions.

## 2018-11-06 RX ORDER — HYDROCODONE BITARTRATE AND ACETAMINOPHEN 5; 325 MG/1; MG/1
1 TABLET ORAL EVERY 6 HOURS PRN
Qty: 20 TABLET | Refills: 0 | OUTPATIENT
Start: 2018-11-06

## 2018-11-13 ENCOUNTER — OFFICE VISIT (OUTPATIENT)
Dept: SURGERY | Facility: CLINIC | Age: 78
End: 2018-11-13
Payer: MEDICARE

## 2018-11-13 VITALS
SYSTOLIC BLOOD PRESSURE: 133 MMHG | HEART RATE: 62 BPM | HEIGHT: 64 IN | TEMPERATURE: 97 F | BODY MASS INDEX: 25.67 KG/M2 | DIASTOLIC BLOOD PRESSURE: 63 MMHG | WEIGHT: 150.38 LBS

## 2018-11-13 DIAGNOSIS — Z87.19 S/P REPAIR OF VENTRAL HERNIA: Primary | ICD-10-CM

## 2018-11-13 DIAGNOSIS — Z98.890 S/P REPAIR OF VENTRAL HERNIA: Primary | ICD-10-CM

## 2018-11-13 PROCEDURE — 99024 POSTOP FOLLOW-UP VISIT: CPT | Mod: S$GLB,,, | Performed by: SURGERY

## 2018-11-13 PROCEDURE — 99999 PR PBB SHADOW E&M-EST. PATIENT-LVL III: CPT | Mod: PBBFAC,,, | Performed by: SURGERY

## 2018-11-13 RX ORDER — LISINOPRIL 10 MG/1
10 TABLET ORAL DAILY
Qty: 90 TABLET | Refills: 3 | Status: SHIPPED | OUTPATIENT
Start: 2018-11-13 | End: 2022-11-03

## 2018-11-13 NOTE — PROGRESS NOTES
Mariaelena Sheffield 78 y.o.female  This patient was seen for postoperative visit. Mariaelenasiri Sheffield has no specific complaints and denies of any issues relevant to the surgery. The wound has healed without any complications.  I have discussed the pathology result with the patient. Mariaelena Lorenzanaborne will follow up as needed.     Kevin Caldwell

## 2018-11-16 ENCOUNTER — TELEPHONE (OUTPATIENT)
Dept: ADMINISTRATIVE | Facility: CLINIC | Age: 78
End: 2018-11-16

## 2018-11-16 NOTE — TELEPHONE ENCOUNTER
Home Health SOC 10/30/2018 - 12/28/2018 with [facility] ([facility]) - Dr. Kevin Caldwell. PT services.

## 2019-05-11 ENCOUNTER — HOSPITAL ENCOUNTER (EMERGENCY)
Facility: HOSPITAL | Age: 79
Discharge: HOME | End: 2019-05-11
Attending: EMERGENCY MEDICINE
Payer: MEDICARE

## 2019-05-11 VITALS
DIASTOLIC BLOOD PRESSURE: 79 MMHG | SYSTOLIC BLOOD PRESSURE: 164 MMHG | WEIGHT: 157 LBS | OXYGEN SATURATION: 100 % | TEMPERATURE: 98 F | HEART RATE: 61 BPM | BODY MASS INDEX: 26.8 KG/M2 | HEIGHT: 64 IN | RESPIRATION RATE: 18 BRPM

## 2019-05-11 DIAGNOSIS — M25.561 BILATERAL KNEE PAIN: Primary | ICD-10-CM

## 2019-05-11 DIAGNOSIS — M25.562 BILATERAL KNEE PAIN: Primary | ICD-10-CM
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
## 2019-05-11 DIAGNOSIS — M25.521 BILATERAL ELBOW JOINT PAIN: ICD-10-CM

## 2019-05-11 DIAGNOSIS — R10.9 ABDOMINAL WALL PAIN: ICD-10-CM

## 2019-05-11 DIAGNOSIS — M25.522 BILATERAL ELBOW JOINT PAIN: ICD-10-CM

## 2019-05-11 PROCEDURE — 99283 EMERGENCY DEPT VISIT LOW MDM: CPT | Mod: 25,ER

## 2019-05-11 PROCEDURE — 25000003 PHARM REV CODE 250: Mod: ER | Performed by: NURSE PRACTITIONER

## 2019-05-11 RX ORDER — HYDROCODONE BITARTRATE AND ACETAMINOPHEN 5; 325 MG/1; MG/1
1 TABLET ORAL
Status: COMPLETED | OUTPATIENT
Start: 2019-05-11 | End: 2019-05-11

## 2019-05-11 RX ORDER — MELOXICAM 7.5 MG/1
7.5 TABLET ORAL 2 TIMES DAILY PRN
Qty: 20 TABLET | Refills: 0 | Status: SHIPPED | OUTPATIENT
Start: 2019-05-11 | End: 2019-05-21

## 2019-05-11 RX ADMIN — HYDROCODONE BITARTRATE AND ACETAMINOPHEN 1 TABLET: 5; 325 TABLET ORAL at 12:05

## 2019-05-11 NOTE — ED PROVIDER NOTES
Encounter Date: 5/11/2019       History     Chief Complaint   Patient presents with    Fall      C/o bilateral knee pain after tripping at grocery store, also complaining of abdominal soreness since fall.      The history is provided by the patient and the EMS personnel.   Fall   The accident occurred just prior to arrival. The fall occurred while walking (states that she tripped over a pallet while at the grocery store). She fell from a height of 3 to 5 ft. She landed on a hard floor. There was no blood loss. The point of impact was the left knee, right knee, right elbow and left elbow. The pain is present in the left knee, right knee, right elbow and left elbow (and abdominal wall). The pain is at a severity of 5/10. She was ambulatory at the scene. There was no entrapment after the fall. There was no drug use involved in the accident. There was no alcohol use involved in the accident. Pertinent negatives include no neck pain, no back pain, no paresthesias, no paralysis, no visual change, no fever, no numbness, no abdominal pain, no bowel incontinence, no nausea, no vomiting, no hematuria, no headaches, no hearing loss, no loss of consciousness and no tingling. The symptoms are aggravated by use of the injured limb and activity. She has tried nothing for the symptoms.   Method of Arrival:   [facility] [place]      PCP:     Lui Blue MD      Review of patient's allergies indicates:  No Known Allergies  Past Medical History:   Diagnosis Date    Cancer     breast cancer R     Diabetes mellitus     GERD (gastroesophageal reflux disease)     Gout     Hypercholesteremia     Hypertension      Past Surgical History:   Procedure Laterality Date    ABDOMINAL SURGERY      BREAST SURGERY Right     Breast biopsy    CATARACT EXTRACTION, BILATERAL      HERNIA REPAIR      LAPAROTOMY, EXPLORATORY N/A 10/25/2018    [facility]formed [facility] OR    LYSIS, ADHESIONS N/A 10/25/2018    Performed [facility] at [facility]  OR    REPAIR, HERNIA, INCISIONAL, INCARCERATED, WITHOUT HISTORY OF PRIOR REPAIR N/A 10/25/2018    Performed by [facility] at [facility] OR     History reviewed. No pertinent family history.  Social History     Tobacco Use    Smoking status: Never Smoker    Smokeless tobacco: Never Used   Substance Use Topics    Alcohol use: Yes     Comment: socially  No alcohol prior to sx    Drug use: No     Review of Systems   Constitutional: Negative for chills and fever.   HENT: Negative for congestion and sore throat.    Eyes: Negative for visual disturbance.   Respiratory: Negative for cough, chest tightness and shortness of breath.    Cardiovascular: Negative for chest pain and palpitations.   Gastrointestinal: Negative for abdominal pain, bowel incontinence, diarrhea, nausea and vomiting.   Genitourinary: Negative for dysuria and hematuria.   Musculoskeletal: Negative for back pain and neck pain.        Positive for pain of the bilateral knees and elbows and abdominal wall.    Skin: Negative for color change, rash and wound.   Neurological: Negative for dizziness, tingling, loss of consciousness, weakness, numbness, headaches and paresthesias.   Hematological: Does not bruise/bleed easily.   Psychiatric/Behavioral: Negative for agitation and confusion.       Physical Exam     Initial Vitals [05/11/19 1216]   BP Pulse Resp Temp SpO2   (!) 143/79 67 18 98.1 °F (36.7 °C) 98 %      MAP       --         Physical Exam    Nursing note and vitals reviewed.  Constitutional: She appears well-developed and well-nourished. She is cooperative. She does not appear ill. No distress.   HENT:   Head: Normocephalic and atraumatic.   Nose: Nose normal.   Mouth/Throat: Uvula is midline, oropharynx is clear and moist and mucous membranes are normal.   Eyes: Conjunctivae, EOM and lids are normal. Pupils are equal, round, and reactive to light.   Neck: Trachea normal and normal range of motion. Neck supple. No spinous process tenderness and no  "muscular tenderness present. Normal range of motion present. No neck rigidity.   Cardiovascular: Normal rate, regular rhythm, intact distal pulses and normal pulses.   Pulmonary/Chest: Effort normal and breath sounds normal. No accessory muscle usage. No respiratory distress. She has no decreased breath sounds. She has no wheezes. She has no rhonchi. She has no rales.   Abdominal: Soft. Normal appearance and bowel sounds are normal. She exhibits no distension and no mass. There is no tenderness. There is no rigidity, no rebound and no guarding.   Surgical scar noted from incisional hernia repair in October 2018.  Patient has mild subjective tenderness to lower abdominal wall. She states that she "must have strained an abdominal muscle when she fell".  No trauma or deformity noted.    Musculoskeletal: Normal range of motion. She exhibits no edema.        Right elbow: She exhibits normal range of motion, no swelling, no effusion and no deformity. Tenderness (mild reproducible tenderness noted to elbow joint - normal ROM - no deformity or trauma noted - neurovascular intact distally) found. No radial head tenderness noted.        Left elbow: She exhibits normal range of motion, no swelling, no effusion and no deformity. Tenderness (mild reproducible tenderness noted to elbow joint - normal ROM - no deformity or trauma noted - neurovascular intact distally) found.        Right knee: She exhibits swelling. She exhibits normal range of motion, no effusion, no ecchymosis, no deformity, no erythema, no LCL laxity and no MCL laxity. Tenderness (anterior knee pain - no crepitus or obvious deformity noted - neurovascular intact distally - swelling noted to medial aspect of knee consistent to chronic arthritis) found. Medial joint line tenderness noted.        Left knee: She exhibits normal range of motion, no swelling, no effusion, no ecchymosis, no deformity, no LCL laxity and no MCL laxity. Tenderness (anterior knee pain - " "no crepitus or obvious deformity noted - neurovascular intact distally) found.   Neurological: She is alert and oriented to person, place, and time. She has normal strength. No sensory deficit. Gait normal. GCS eye subscore is 4. GCS verbal subscore is 5. GCS motor subscore is 6.   Neurovascular intact to all extremities.    Skin: Skin is warm, dry and intact. Capillary refill takes less than 2 seconds. No abrasion, no bruising, no ecchymosis and no rash noted.   Psychiatric: She has a normal mood and affect. Her speech is normal and behavior is normal. Cognition and memory are normal.         ED Course   Procedures       ED Imaging Results:   Imaging Results          X-Ray Knee Complete 4 or more Views Bilat (Final result)  Result time 05/11/19 13:32:06   Procedure changed from X-Ray Knee 3 View Bilateral     Final result by Alon Ramires MD (05/11/19 13:32:06)                 Impression:      No acute findings.  Tricompartment osteoarthritis, particularly pronounced involving the medial knee compartment.      Electronically signed by: Alon Ramires MD  Date:    05/11/2019  Time:    13:32             Narrative:    EXAMINATION:  XR KNEE COMP 4 OR MORE VIEWS BILAT    CLINICAL HISTORY:  Pain in right knee,  knee pain;    TECHNIQUE:  Standard radiography performed.    COMPARISON:  None    FINDINGS:  Moderate medial joint space narrowing.  Moderate marginal spurring both medially and laterally.  Patellofemoral joint spurring is present as well.                                ED Medications:   Medications   HYDROcodone-acetaminophen 5-325 mg per tablet 1 tablet (1 tablet Oral Given 5/11/19 1247)         ED Course Vitals  Vitals:    05/11/19 1216 05/11/19 1333   BP: (!) 143/79 (!) 164/79   BP Location: Left arm Left arm   Patient Position: Sitting Sitting   Pulse: 67 61   Resp: 18 18   Temp: 98.1 °F (36.7 °C) 97.6 °F (36.4 °C)   TempSrc: Oral Oral   SpO2: 98% 100%   Weight: 71.2 kg (157 lb)    Height: 5' 4" " (1.626 m)          1350 HOURS RE-EVALUATION & DISPOSITION:   Reassessment at the time of disposition demonstrates that the patient is resting comfortably in no acute distress.  She has remained hemodynamically stable throughout the entire ED visit and is without objective evidence for acute process requiring urgent intervention or hospitalization. I discussed test results and provided counseling to patient with regard to condition, the treatment plan, specific conditions for return, and the importance of follow up.  Answered questions at this time. The patient is stable for discharge.         X-Rays:   Independently Interpreted Readings:   Other Readings:  Radiographs of the right knee reveal no acute findings.     Medical Decision Making:   History:   Old Records Summarized: records from clinic visits.                      Clinical Impression:       ICD-10-CM ICD-9-CM   1. Bilateral knee pain M25.561 719.46    M25.562    2. Bilateral elbow joint pain M25.521 719.42    M25.522    3. Abdominal wall pain R10.9 789.00   4. Fall, initial encounter W19.XXXA E888.9           Disposition:   Disposition: Discharged  Condition: Stable  I discussed with patient that the evaluation in the emergency department does not suggest any emergent or life threatening medical condition requiring immediate intervention beyond what was provided in the ED, and I believe patient is safe for discharge.  Regardless, an unremarkable evaluation in the ED does not preclude the development or presence of a serious of life threatening condition. As such, patient was instructed to return immediately for any worsening or change in current symptoms. I also discussed the results of my evaluation and diagnosis with patient and she concurs with the evaluation and management plan.  Detailed written and verbal instructions provided to patient and she expressed a verbal understanding of the discharge instructions and overall management plan. Reiterated the  importance of medication administration and safety and advised patient to follow up with primary care provider in 3-5 days or sooner if needed.  Also advised patient to return to the ER for any complications.     Regarding FALL PRECAUTIONS, I advised patient to: exercise regularly, keep all appointments with caregivers and bring updated, current list of all medications, keep diet healthy and include calcium and Vitamin D, and drink plenty of fluids.    Also recommended that patient keep home safe by: keeping pathways clear; have carpet installed in bathroom; have enough lighting to clearly see all pathways; keep all cords secured and out of the way; secure carpeting to the floor around all edges; remove throw rugs, or secure them with double-sided tape or special backing; if using stairs, install sturdy handrails; leave a light on at night to help you find way to the bathroom and kitchen; and select shoes with non-slip soles that are not too big or too small for your feet. Other home safety tips: do not leave objects in the bathtub or on the floor of the shower; install grab bars on walls around tubs or shower enclosures, and next to toilets; and install non-skid mats on shower floors and in the bathtub.    Regarding KNEE PAIN: Patient instructed to follow prescribed therapy.  I encouraged patient to get plenty of rest, work to obtain/maintain healthy weight and BMI, exercise to improve muscle strength and motion to joint area, protect joint from further injury, and avoid overexerting extremity - especially when stiffness or pain is present. Advised patient to follow up with primary care provider in one week if symptoms are still present or condition worsens.          Discharge Medication List as of 5/11/2019  1:50 PM      START taking these medications    Details   meloxicam (MOBIC) 7.5 MG tablet Take 1 tablet (7.5 mg total) by mouth 2 (two) times daily as needed for Pain., Starting Sat 5/11/2019, Until Tue  5/21/2019, Print               Follow-up Information     Call  Lui Blue MD.    Specialty:  Internal Medicine  Why:  If symptoms worsen or as needed  Contact information:  [address]  [facility] [address]  [phone]                                  Nolan Petersen NP  05/12/19 1146

## 2019-07-25 NOTE — ED NOTES
Patient verbally verified and Spelled Full Name and Date of Birth.  C/O central abdominal pain as of this morning & vomiting large amt  x 1 tonight. Scheduled for ventral hernia repair this am LOC: The patient is awake, alert and aware of environment with an appropriate affect, the patient is oriented x 3 and speaking appropriately.  APPEARANCE:  Patient is clean and well groomed, patient's clothing is properly fastened.  HEENT: Brief WNL  SKIN: Brief WNL.   MUSCULOSKELETAL: Brief WNL  RESPIRATORY: Brief WNL  CARDIAC: NSR at 78/min, denies chest pain  GASTRO: mid/central abdominal pain, obvious ventral hernia noted, positive pain  : Brief WNL  Peripheral Vasc: Brief WNL, no peripheral edema, positive distal pulses  NEURO: Brief WNL  PSYCH: Brief WNL   Cyclophosphamide Counseling:  I discussed with the patient the risks of cyclophosphamide including but not limited to hair loss, hormonal abnormalities, decreased fertility, abdominal pain, diarrhea, nausea and vomiting, bone marrow suppression and infection. The patient understands that monitoring is required while taking this medication.

## 2019-11-07 ENCOUNTER — TELEPHONE (OUTPATIENT)
Dept: SURGERY | Facility: CLINIC | Age: 79
End: 2019-11-07

## 2019-11-07 NOTE — TELEPHONE ENCOUNTER
----- Message from Zoya Rivera sent at 11/7/2019  2:07 PM CST -----  Contact: pt  States she has a appt with her primary doctor coming up on 11/18/19 but she needs 7 Lifimopril 10mg because she going to be out wants to know can something be faxed over to [facility] pharmacy. Please give a call back at [phone].          Thanks,  Zoya TAMAYO

## 2019-11-07 NOTE — TELEPHONE ENCOUNTER
Returned call, informed pt Dr is out until Dec. 2019. Advised pt to contact PCP for a sooner appt. Pt voiced an understanding

## 2020-04-22 ENCOUNTER — HOSPITAL ENCOUNTER (OUTPATIENT)
Dept: RADIOLOGY | Facility: HOSPITAL | Age: 80
Discharge: HOME | End: 2020-04-22
Attending: INTERNAL MEDICINE
Payer: MEDICARE

## 2020-04-22 DIAGNOSIS — D72.828 OTHER ELEVATED WHITE BLOOD CELL (WBC) COUNT: ICD-10-CM

## 2020-04-22 PROCEDURE — 76700 US EXAM ABDOM COMPLETE: CPT | Mod: 26,,, | Performed by: RADIOLOGY

## 2020-04-22 PROCEDURE — 76700 US ABDOMEN COMPLETE: ICD-10-PCS | Mod: 26,,, | Performed by: RADIOLOGY

## 2020-04-22 PROCEDURE — 76700 US EXAM ABDOM COMPLETE: CPT | Mod: TC,PO

## 2020-09-24 ENCOUNTER — HOSPITAL ENCOUNTER (OUTPATIENT)
Dept: RADIOLOGY | Facility: HOSPITAL | Age: 80
Discharge: HOME | End: 2020-09-24
Attending: INTERNAL MEDICINE
Payer: MEDICARE

## 2020-09-24 VITALS — WEIGHT: 156.94 LBS | HEIGHT: 64 IN | BODY MASS INDEX: 26.79 KG/M2

## 2020-09-24 DIAGNOSIS — Z12.31 ENCOUNTER FOR SCREENING MAMMOGRAM FOR MALIGNANT NEOPLASM OF BREAST: ICD-10-CM

## 2020-09-24 PROCEDURE — 77067 MAMMO DIGITAL SCREENING BILAT WITH TOMO: ICD-10-PCS | Mod: 26,,, | Performed by: RADIOLOGY

## 2020-09-24 PROCEDURE — 77063 MAMMO DIGITAL SCREENING BILAT WITH TOMO: ICD-10-PCS | Mod: 26,,, | Performed by: RADIOLOGY

## 2020-09-24 PROCEDURE — 77063 BREAST TOMOSYNTHESIS BI: CPT | Mod: 26,,, | Performed by: RADIOLOGY

## 2020-09-24 PROCEDURE — 77067 SCR MAMMO BI INCL CAD: CPT | Mod: TC,PO

## 2020-09-24 PROCEDURE — 77067 SCR MAMMO BI INCL CAD: CPT | Mod: 26,,, | Performed by: RADIOLOGY

## 2021-05-05 NOTE — HOSPITAL COURSE
Patient was admitted to telemetry for SBO under the care of General Surgery. Hospital Medicine was consulted for medical management. POD #1 S/p exlap with primary repair of hernia, closure of fascia. Pt on clear liquids. No flatus, no BM. Physical Therapy to eval and tx.    10/27: POD #2. Diet advanced to full liquid. No flatus or BM. Pain controlled with PRN analgesia  10/28: POD #3. Diet advanced to regular. No flatus or BM. Will dc home once passing gas  10/29: Patient is passing gas and tolerating regular diet. Discussed with Gen Surgery, pt will be dc'd home today and will f/u with surgery in 2 weeks.   Patient was seen and examined today and deemed stable for dc home.     no

## 2022-07-19 NOTE — ASSESSMENT & PLAN NOTE
Surgery As above     Estlander Flap (Upper To Lower Lip) Text: The defect of the lower lip was assessed and measured.  Given the location and size of the defect, an Estlander flap was deemed most appropriate.  Using a sterile surgical marker, an appropriate Estlander flap was measured and drawn on the upper lip. Local anesthesia was then infiltrated. A scalpel was then used to incise the lateral aspect of the flap, through skin, muscle and mucosa, leaving the flap pedicled medially.  The flap was then rotated and positioned to fill the lower lip defect.  The flap was then sutured into place with a three layer technique, closing the orbicularis oris muscle layer with subcutaneous buried sutures, followed by a mucosal layer and an epidermal layer.

## 2022-09-25 ENCOUNTER — HOSPITAL ENCOUNTER (EMERGENCY)
Facility: HOSPITAL | Age: 82
Discharge: ANOTHER HOSPITAL | End: 2022-09-25
Attending: EMERGENCY MEDICINE
Payer: MEDICARE

## 2022-09-25 ENCOUNTER — HOSPITAL ENCOUNTER (INPATIENT)
Facility: HOSPITAL | Age: 82
LOS: 8 days | Discharge: HOME HEALTH | DRG: 749 | End: 2022-10-03
Attending: SURGERY | Admitting: SURGERY
Payer: MEDICARE

## 2022-09-25 VITALS
RESPIRATION RATE: 20 BRPM | HEART RATE: 92 BPM | SYSTOLIC BLOOD PRESSURE: 134 MMHG | DIASTOLIC BLOOD PRESSURE: 83 MMHG | OXYGEN SATURATION: 95 % | WEIGHT: 149.94 LBS | HEIGHT: 64 IN | BODY MASS INDEX: 25.6 KG/M2 | TEMPERATURE: 99 F

## 2022-09-25 DIAGNOSIS — K56.609 SBO (SMALL BOWEL OBSTRUCTION): ICD-10-CM

## 2022-09-25 DIAGNOSIS — R11.2 NAUSEA AND VOMITING, INTRACTABILITY OF VOMITING NOT SPECIFIED, UNSPECIFIED VOMITING TYPE: ICD-10-CM

## 2022-09-25 DIAGNOSIS — D72.829 LEUKOCYTOSIS, UNSPECIFIED TYPE: ICD-10-CM

## 2022-09-25 DIAGNOSIS — K56.609 SMALL BOWEL OBSTRUCTION: ICD-10-CM

## 2022-09-25 DIAGNOSIS — R19.00 PELVIC MASS: ICD-10-CM

## 2022-09-25 DIAGNOSIS — R19.00 PELVIC MASS: Primary | ICD-10-CM

## 2022-09-25 DIAGNOSIS — K56.600 PARTIAL SMALL BOWEL OBSTRUCTION: Primary | ICD-10-CM

## 2022-09-25 LAB
ALBUMIN SERPL BCP-MCNC: 4.7 G/DL (ref 3.5–5.2)
ALP SERPL-CCNC: 191 U/L (ref 55–135)
ALT SERPL W/O P-5'-P-CCNC: 8 U/L (ref 10–44)
ANION GAP SERPL CALC-SCNC: 15 MMOL/L (ref 8–16)
AST SERPL-CCNC: 13 U/L (ref 10–40)
BACTERIA #/AREA URNS AUTO: ABNORMAL /HPF
BASOPHILS # BLD AUTO: 0.06 K/UL (ref 0–0.2)
BASOPHILS NFR BLD: 0.1 % (ref 0–1.9)
BILIRUB SERPL-MCNC: 0.9 MG/DL (ref 0.1–1)
BILIRUB UR QL STRIP: NEGATIVE
BUN SERPL-MCNC: 13 MG/DL (ref 8–23)
CALCIUM SERPL-MCNC: 10.1 MG/DL (ref 8.7–10.5)
CHLORIDE SERPL-SCNC: 102 MMOL/L (ref 95–110)
CLARITY UR REFRACT.AUTO: CLEAR
CO2 SERPL-SCNC: 19 MMOL/L (ref 23–29)
COLOR UR AUTO: YELLOW
CREAT SERPL-MCNC: 1 MG/DL (ref 0.5–1.4)
CTP QC/QA: YES
CTP QC/QA: YES
DIFFERENTIAL METHOD: ABNORMAL
EOSINOPHIL # BLD AUTO: 0 K/UL (ref 0–0.5)
EOSINOPHIL NFR BLD: 0 % (ref 0–8)
ERYTHROCYTE [DISTWIDTH] IN BLOOD BY AUTOMATED COUNT: 15.7 % (ref 11.5–14.5)
EST. GFR  (NO RACE VARIABLE): 57 ML/MIN/1.73 M^2
GLUCOSE SERPL-MCNC: 163 MG/DL (ref 70–110)
GLUCOSE UR QL STRIP: NEGATIVE
HCT VFR BLD AUTO: 49.8 % (ref 37–48.5)
HGB BLD-MCNC: 16.5 G/DL (ref 12–16)
HGB UR QL STRIP: ABNORMAL
HYALINE CASTS UR QL AUTO: 3 /LPF
IMM GRANULOCYTES # BLD AUTO: 0.92 K/UL (ref 0–0.04)
IMM GRANULOCYTES NFR BLD AUTO: 1.9 % (ref 0–0.5)
KETONES UR QL STRIP: NEGATIVE
LEUKOCYTE ESTERASE UR QL STRIP: NEGATIVE
LYMPHOCYTES # BLD AUTO: 0.8 K/UL (ref 1–4.8)
LYMPHOCYTES NFR BLD: 1.5 % (ref 18–48)
MCH RBC QN AUTO: 28.9 PG (ref 27–31)
MCHC RBC AUTO-ENTMCNC: 33.1 G/DL (ref 32–36)
MCV RBC AUTO: 87 FL (ref 82–98)
MICROSCOPIC COMMENT: ABNORMAL
MONOCYTES # BLD AUTO: 0.7 K/UL (ref 0.3–1)
MONOCYTES NFR BLD: 1.4 % (ref 4–15)
NEUTROPHILS # BLD AUTO: 47.2 K/UL (ref 1.8–7.7)
NEUTROPHILS NFR BLD: 95.1 % (ref 38–73)
NITRITE UR QL STRIP: NEGATIVE
NRBC BLD-RTO: 0 /100 WBC
PH UR STRIP: 5 [PH] (ref 5–8)
PLATELET # BLD AUTO: 273 K/UL (ref 150–450)
PMV BLD AUTO: 10.7 FL (ref 9.2–12.9)
POC MOLECULAR INFLUENZA A AGN: NEGATIVE
POC MOLECULAR INFLUENZA B AGN: NEGATIVE
POTASSIUM SERPL-SCNC: 3.6 MMOL/L (ref 3.5–5.1)
PROT SERPL-MCNC: 8.4 G/DL (ref 6–8.4)
PROT UR QL STRIP: ABNORMAL
RBC # BLD AUTO: 5.71 M/UL (ref 4–5.4)
RBC #/AREA URNS AUTO: 5 /HPF (ref 0–4)
SARS-COV-2 RDRP RESP QL NAA+PROBE: NEGATIVE
SODIUM SERPL-SCNC: 136 MMOL/L (ref 136–145)
SP GR UR STRIP: 1.02 (ref 1–1.03)
SQUAMOUS #/AREA URNS AUTO: 10 /HPF
URN SPEC COLLECT METH UR: ABNORMAL
UROBILINOGEN UR STRIP-ACNC: NEGATIVE EU/DL
WBC # BLD AUTO: 49.66 K/UL (ref 3.9–12.7)
WBC #/AREA URNS AUTO: 5 /HPF (ref 0–5)
YEAST UR QL AUTO: ABNORMAL

## 2022-09-25 PROCEDURE — 25000003 PHARM REV CODE 250: Mod: ER | Performed by: EMERGENCY MEDICINE

## 2022-09-25 PROCEDURE — 96376 TX/PRO/DX INJ SAME DRUG ADON: CPT | Mod: ER

## 2022-09-25 PROCEDURE — 63600175 PHARM REV CODE 636 W HCPCS: Performed by: STUDENT IN AN ORGANIZED HEALTH CARE EDUCATION/TRAINING PROGRAM

## 2022-09-25 PROCEDURE — 96375 TX/PRO/DX INJ NEW DRUG ADDON: CPT | Mod: ER

## 2022-09-25 PROCEDURE — 63600175 PHARM REV CODE 636 W HCPCS

## 2022-09-25 PROCEDURE — 85025 COMPLETE CBC W/AUTO DIFF WBC: CPT | Mod: ER | Performed by: EMERGENCY MEDICINE

## 2022-09-25 PROCEDURE — 99291 CRITICAL CARE FIRST HOUR: CPT | Mod: 25,ER

## 2022-09-25 PROCEDURE — 81000 URINALYSIS NONAUTO W/SCOPE: CPT | Mod: ER | Performed by: EMERGENCY MEDICINE

## 2022-09-25 PROCEDURE — 20600001 HC STEP DOWN PRIVATE ROOM

## 2022-09-25 PROCEDURE — 25000003 PHARM REV CODE 250

## 2022-09-25 PROCEDURE — 80053 COMPREHEN METABOLIC PANEL: CPT | Mod: ER | Performed by: EMERGENCY MEDICINE

## 2022-09-25 PROCEDURE — 63600175 PHARM REV CODE 636 W HCPCS: Mod: ER | Performed by: EMERGENCY MEDICINE

## 2022-09-25 PROCEDURE — U0002 COVID-19 LAB TEST NON-CDC: HCPCS | Mod: ER | Performed by: EMERGENCY MEDICINE

## 2022-09-25 PROCEDURE — 96374 THER/PROPH/DIAG INJ IV PUSH: CPT | Mod: ER

## 2022-09-25 RX ORDER — MORPHINE SULFATE 4 MG/ML
2 INJECTION, SOLUTION INTRAMUSCULAR; INTRAVENOUS
Status: COMPLETED | OUTPATIENT
Start: 2022-09-25 | End: 2022-09-25

## 2022-09-25 RX ORDER — AMLODIPINE BESYLATE 5 MG/1
5 TABLET ORAL DAILY
COMMUNITY
Start: 2022-08-30

## 2022-09-25 RX ORDER — ACETAMINOPHEN 325 MG/1
650 TABLET ORAL EVERY 8 HOURS PRN
Status: DISCONTINUED | OUTPATIENT
Start: 2022-09-25 | End: 2022-09-25

## 2022-09-25 RX ORDER — SODIUM CHLORIDE 0.9 % (FLUSH) 0.9 %
10 SYRINGE (ML) INJECTION
Status: DISCONTINUED | OUTPATIENT
Start: 2022-09-25 | End: 2022-10-03 | Stop reason: HOSPADM

## 2022-09-25 RX ORDER — ONDANSETRON 2 MG/ML
4 INJECTION INTRAMUSCULAR; INTRAVENOUS
Status: COMPLETED | OUTPATIENT
Start: 2022-09-25 | End: 2022-09-25

## 2022-09-25 RX ORDER — ENOXAPARIN SODIUM 100 MG/ML
40 INJECTION SUBCUTANEOUS EVERY 24 HOURS
Status: DISCONTINUED | OUTPATIENT
Start: 2022-09-25 | End: 2022-10-03 | Stop reason: HOSPADM

## 2022-09-25 RX ORDER — LIDOCAINE HYDROCHLORIDE 10 MG/ML
1 INJECTION, SOLUTION EPIDURAL; INFILTRATION; INTRACAUDAL; PERINEURAL
Status: COMPLETED | OUTPATIENT
Start: 2022-09-25 | End: 2022-09-25

## 2022-09-25 RX ORDER — MORPHINE SULFATE 2 MG/ML
2 INJECTION, SOLUTION INTRAMUSCULAR; INTRAVENOUS EVERY 4 HOURS PRN
Status: DISCONTINUED | OUTPATIENT
Start: 2022-09-25 | End: 2022-09-26

## 2022-09-25 RX ORDER — MORPHINE SULFATE 4 MG/ML
4 INJECTION, SOLUTION INTRAMUSCULAR; INTRAVENOUS EVERY 4 HOURS PRN
Status: DISCONTINUED | OUTPATIENT
Start: 2022-09-25 | End: 2022-09-29

## 2022-09-25 RX ORDER — DIPHENOXYLATE HYDROCHLORIDE AND ATROPINE SULFATE 2.5; .025 MG/1; MG/1
1 TABLET ORAL
Status: COMPLETED | OUTPATIENT
Start: 2022-09-25 | End: 2022-09-25

## 2022-09-25 RX ORDER — ACETAMINOPHEN 10 MG/ML
1000 INJECTION, SOLUTION INTRAVENOUS ONCE
Status: COMPLETED | OUTPATIENT
Start: 2022-09-26 | End: 2022-09-25

## 2022-09-25 RX ORDER — INSULIN ASPART 100 [IU]/ML
0-5 INJECTION, SOLUTION INTRAVENOUS; SUBCUTANEOUS EVERY 6 HOURS PRN
Status: DISCONTINUED | OUTPATIENT
Start: 2022-09-25 | End: 2022-10-03 | Stop reason: HOSPADM

## 2022-09-25 RX ORDER — MORPHINE SULFATE 4 MG/ML
4 INJECTION, SOLUTION INTRAMUSCULAR; INTRAVENOUS
Status: COMPLETED | OUTPATIENT
Start: 2022-09-25 | End: 2022-09-25

## 2022-09-25 RX ORDER — OXYCODONE HYDROCHLORIDE 10 MG/1
10 TABLET ORAL EVERY 6 HOURS PRN
Status: DISCONTINUED | OUTPATIENT
Start: 2022-09-25 | End: 2022-09-25

## 2022-09-25 RX ORDER — TALC
6 POWDER (GRAM) TOPICAL NIGHTLY PRN
Status: DISCONTINUED | OUTPATIENT
Start: 2022-09-25 | End: 2022-09-25

## 2022-09-25 RX ORDER — OXYCODONE HYDROCHLORIDE 5 MG/1
5 TABLET ORAL EVERY 6 HOURS PRN
Status: DISCONTINUED | OUTPATIENT
Start: 2022-09-25 | End: 2022-09-25

## 2022-09-25 RX ORDER — SODIUM CHLORIDE, SODIUM LACTATE, POTASSIUM CHLORIDE, CALCIUM CHLORIDE 600; 310; 30; 20 MG/100ML; MG/100ML; MG/100ML; MG/100ML
INJECTION, SOLUTION INTRAVENOUS CONTINUOUS
Status: DISCONTINUED | OUTPATIENT
Start: 2022-09-25 | End: 2022-10-01

## 2022-09-25 RX ORDER — ALLOPURINOL 300 MG/1
300 TABLET ORAL DAILY
COMMUNITY
Start: 2022-07-01

## 2022-09-25 RX ORDER — ONDANSETRON 8 MG/1
8 TABLET, ORALLY DISINTEGRATING ORAL EVERY 8 HOURS PRN
Status: DISCONTINUED | OUTPATIENT
Start: 2022-09-25 | End: 2022-09-25

## 2022-09-25 RX ORDER — GLUCAGON 1 MG
1 KIT INJECTION
Status: DISCONTINUED | OUTPATIENT
Start: 2022-09-25 | End: 2022-10-03 | Stop reason: HOSPADM

## 2022-09-25 RX ADMIN — ONDANSETRON 4 MG: 2 INJECTION INTRAMUSCULAR; INTRAVENOUS at 05:09

## 2022-09-25 RX ADMIN — LIDOCAINE HYDROCHLORIDE 10 MG: 10 INJECTION, SOLUTION EPIDURAL; INFILTRATION; INTRACAUDAL at 03:09

## 2022-09-25 RX ADMIN — MORPHINE SULFATE 4 MG: 4 INJECTION INTRAVENOUS at 02:09

## 2022-09-25 RX ADMIN — MORPHINE SULFATE 2 MG: 4 INJECTION INTRAVENOUS at 12:09

## 2022-09-25 RX ADMIN — ONDANSETRON 4 MG: 2 INJECTION INTRAMUSCULAR; INTRAVENOUS at 02:09

## 2022-09-25 RX ADMIN — DIPHENOXYLATE HYDROCHLORIDE AND ATROPINE SULFATE 1 TABLET: 2.5; .025 TABLET ORAL at 11:09

## 2022-09-25 RX ADMIN — OXYCODONE HYDROCHLORIDE 10 MG: 10 TABLET ORAL at 09:09

## 2022-09-25 RX ADMIN — ENOXAPARIN SODIUM 40 MG: 100 INJECTION SUBCUTANEOUS at 11:09

## 2022-09-25 RX ADMIN — SODIUM CHLORIDE, SODIUM LACTATE, POTASSIUM CHLORIDE, AND CALCIUM CHLORIDE: 600; 310; 30; 20 INJECTION, SOLUTION INTRAVENOUS at 09:09

## 2022-09-25 RX ADMIN — MORPHINE SULFATE 4 MG: 4 INJECTION INTRAVENOUS at 05:09

## 2022-09-25 RX ADMIN — ACETAMINOPHEN 1000 MG: 10 INJECTION INTRAVENOUS at 11:09

## 2022-09-25 NOTE — ED PROVIDER NOTES
Encounter Date: 9/25/2022       History     Chief Complaint   Patient presents with    Vomiting     N&V with diarrhea since yesterday. No fever reported . Abd pain reported.      The history is provided by the patient.   Emesis   This is a new problem. The current episode started yesterday. The problem occurs 2 - 4 times per day. The problem has been unchanged. The emesis has an appearance of stomach contents. Pertinent negatives include no fever. Risk factors include ill contacts.   Review of patient's allergies indicates:  No Known Allergies  Past Medical History:   Diagnosis Date    Cancer     breast cancer R     Diabetes mellitus     GERD (gastroesophageal reflux disease)     Gout     Hypercholesteremia     Hypertension      Past Surgical History:   Procedure Laterality Date    ABDOMINAL SURGERY      BREAST BIOPSY      BREAST SURGERY Right     Breast biopsy    CATARACT EXTRACTION, BILATERAL      HERNIA REPAIR      LYSIS OF ADHESIONS N/A 10/25/2018    Procedure: LYSIS, ADHESIONS;  Surgeon: Kevin Caldwell MD;  Location: [facility] OR;  Service: General;  Laterality: N/A;    REPAIR OF INCARCERATED INCISIONAL HERNIA WITHOUT HISTORY OF PRIOR REPAIR N/A 10/25/2018    Procedure: REPAIR, HERNIA, INCISIONAL, INCARCERATED, WITHOUT HISTORY OF PRIOR REPAIR;  Surgeon: Kevin Caldwell MD;  Location: [facility] OR;  Service: General;  Laterality: N/A;     History reviewed. No pertinent family history.  Social History     Tobacco Use    Smoking status: Never    Smokeless tobacco: Never   Substance Use Topics    Alcohol use: Yes     Comment: socially  No alcohol prior to sx    Drug use: No     Review of Systems   Constitutional:  Negative for fever.   HENT:  Negative for sore throat.    Respiratory:  Negative for shortness of breath.    Cardiovascular:  Negative for chest pain.   Gastrointestinal:  Positive for vomiting. Negative for nausea.   Genitourinary:  Negative for dysuria.   Musculoskeletal:  Negative for back pain.   Skin:  Negative for  rash.   Neurological:  Negative for weakness.   Hematological:  Does not bruise/bleed easily.     Physical Exam     Initial Vitals [09/25/22 1049]   BP Pulse Resp Temp SpO2   (!) 165/90 94 20 98.5 °F (36.9 °C) 98 %      MAP       --         Physical Exam    Nursing note and vitals reviewed.  Constitutional: She appears well-developed and well-nourished. She appears distressed.   Elderly and frail.   HENT:   Head: Normocephalic and atraumatic.   Mouth/Throat: Oropharynx is clear and moist.   Eyes: Conjunctivae and EOM are normal. Pupils are equal, round, and reactive to light.   Neck: Neck supple.   Normal range of motion.  Cardiovascular:  Normal rate, regular rhythm and normal heart sounds.     Exam reveals no gallop and no friction rub.       No murmur heard.  Pulmonary/Chest: Breath sounds normal. No respiratory distress. She has no wheezes. She has no rhonchi. She has no rales.   Abdominal: Abdomen is soft. Bowel sounds are normal. She exhibits distension. She exhibits no mass. There is generalized abdominal tenderness. There is no rebound and no guarding.   Musculoskeletal:         General: No tenderness or edema. Normal range of motion.      Cervical back: Normal range of motion and neck supple.     Neurological: She is alert and oriented to person, place, and time. She has normal strength.   Skin: Skin is warm and dry. No rash noted.   Psychiatric: She has a normal mood and affect. Thought content normal.       ED Course   Critical Care    Date/Time: 9/25/2022 2:48 PM  Performed by: Edison Moreira MD  Authorized by: Edison Moreira MD   Direct patient critical care time: 35 minutes  Additional history critical care time: 15 minutes  Ordering / reviewing critical care time: 12 minutes  Documentation critical care time: 18 minutes  Consulting other physicians critical care time: 15 minutes  Consult with family critical care time: 10 minutes  Total critical care time (exclusive of procedural time) : 105  minutes  Critical care was necessary to treat or prevent imminent or life-threatening deterioration of the following conditions: dehydration.      Labs Reviewed   CBC W/ AUTO DIFFERENTIAL - Abnormal; Notable for the following components:       Result Value    WBC 49.66 (*)     RBC 5.71 (*)     Hemoglobin 16.5 (*)     Hematocrit 49.8 (*)     RDW 15.7 (*)     Immature Granulocytes 1.9 (*)     Gran # (ANC) 47.2 (*)     Immature Grans (Abs) 0.92 (*)     Lymph # 0.8 (*)     Gran % 95.1 (*)     Lymph % 1.5 (*)     Mono % 1.4 (*)     All other components within normal limits   COMPREHENSIVE METABOLIC PANEL - Abnormal; Notable for the following components:    CO2 19 (*)     Glucose 163 (*)     Alkaline Phosphatase 191 (*)     ALT 8 (*)     eGFR 57.0 (*)     All other components within normal limits   URINALYSIS, REFLEX TO URINE CULTURE - Abnormal; Notable for the following components:    Protein, UA 2+ (*)     Occult Blood UA Trace (*)     All other components within normal limits    Narrative:     Specimen Source->Urine   URINALYSIS MICROSCOPIC - Abnormal; Notable for the following components:    RBC, UA 5 (*)     Bacteria Moderate (*)     Yeast, UA Rare (*)     Hyaline Casts, UA 3 (*)     All other components within normal limits    Narrative:     Specimen Source->Urine   SARS-COV-2 RDRP GENE   POCT INFLUENZA A/B MOLECULAR          Imaging Results              CT Renal Stone Study ABD Pelvis WO (Final result)  Result time 09/25/22 13:52:53      Final result by Freida Gonzalez MD (09/25/22 13:52:53)                   Impression:      Large pelvic mass.  Suspect malignancy.  Correlate clinically    Partial small bowel obstruction in the left upper hemiabdomen and inferior anterior abdominal wall.  Strangulation of the bowel loops in the subcutaneous fat of the anterior abdominal wall hernia not excluded.  Correlate clinically.    Herniation of colonic bowel loops in the anterior midline abdominal wall.    All CT scans   are  performed using dose optimization techniques including the following: automated exposure control; adjustment of the mA and/or kV; use of iterative reconstruction technique.  Dose modulation was employed for ALARA by means of: Automated exposure control; adjustment of the mA and/or kV according to patient size (this includes techniques or standardized protocols for targeted exams where dose is matched to indication/reason for exam; i.e. extremities or head); and/or use of iterative reconstructive technique.      Electronically signed by: Carlos Guan  Date:    09/25/2022  Time:    13:52               Narrative:    EXAMINATION:  CT RENAL STONE STUDY ABD PELVIS WO    CLINICAL HISTORY:  Flank pain, kidney stone suspected;    TECHNIQUE:  Low dose axial images, sagittal and coronal reformations were obtained from the lung bases to the pubic symphysis.  Contrast was not administered.    COMPARISON:  None    FINDINGS:  Large mass in the pelvis with a heterogeneous foci and suggestion of foci of calcification may relate to uterine or ovarian mass.  The mass measures approximately 20 cm in superior inferior dimension 14 cm in AP dimension and 19 cm in transverse dimension.  Anterior abdominal hernia containing colonic loops of bowel with the mouth measuring 3.6 cm.  No colonic bowel obstruction.  However there is  dilated small bowel loops in the left upper hemiabdomen measuring up to 3.4 cm consistent with partial small bowel obstruction.  In addition, in the inferior abdominal wall there suggestion of an additional inferior midline abdominal wall hernia likely containing dilated small bowel loops.  Strangulation not excluded.    Lung bases are clear.  Large hiatal hernia.  Atherosclerotic changes of the aorta.  Spleen is not enlarged.  Aortic annulus is ectatic measuring up to 4.2 cm.  No gallstones.  Pancreas is unremarkable.  No adrenal mass.                                       Medications   diphenoxylate-atropine  "2.5-0.025 mg per tablet 1 tablet (1 tablet Oral Given 9/25/22 1112)   morphine injection 2 mg (2 mg Intravenous Given 9/25/22 1200)   morphine injection 4 mg (4 mg Intravenous Given 9/25/22 1414)   ondansetron injection 4 mg (4 mg Intravenous Given 9/25/22 1426)   LIDOcaine (PF) 10 mg/ml (1%) injection 10 mg (10 mg Other Given 9/25/22 1541)      ED Vital Signs:  Vitals:    09/25/22 1049 09/25/22 1113 09/25/22 1117 09/25/22 1200   BP: (!) 165/90 (!) 158/90 (!) 165/97    Pulse: 94 94 93    Resp: 20 19 20 20   Temp: 98.5 °F (36.9 °C)      TempSrc: Oral      SpO2: 98% 97% 96%    Weight: 68 kg (149 lb 14.6 oz)      Height: 5' 4" (1.626 m)       09/25/22 1203 09/25/22 1313 09/25/22 1402 09/25/22 1414   BP: (!) 159/92  (!) 163/90    Pulse: 92 98 102    Resp: (!) 27 (!) 21 20 20   Temp:       TempSrc:       SpO2: 96% 96% 95%    Weight:       Height:        09/25/22 1433 09/25/22 1502 09/25/22 1532 09/25/22 1632   BP: (!) 147/76 123/72 126/74 135/86   Pulse: 102 96 92 91   Resp: (!) 21 (!) 25  (!) 24   Temp:       TempSrc:       SpO2: 95% 96% 95%    Weight:       Height:        09/25/22 1703 09/25/22 1723   BP: 131/78    Pulse: 91 92   Resp: (!) 26 (!) 24   Temp:     TempSrc:     SpO2:  96%   Weight:     Height:           Abnormal Lab Results:  Labs Reviewed   CBC W/ AUTO DIFFERENTIAL - Abnormal; Notable for the following components:       Result Value    WBC 49.66 (*)     RBC 5.71 (*)     Hemoglobin 16.5 (*)     Hematocrit 49.8 (*)     RDW 15.7 (*)     Immature Granulocytes 1.9 (*)     Gran # (ANC) 47.2 (*)     Immature Grans (Abs) 0.92 (*)     Lymph # 0.8 (*)     Gran % 95.1 (*)     Lymph % 1.5 (*)     Mono % 1.4 (*)     All other components within normal limits   COMPREHENSIVE METABOLIC PANEL - Abnormal; Notable for the following components:    CO2 19 (*)     Glucose 163 (*)     Alkaline Phosphatase 191 (*)     ALT 8 (*)     eGFR 57.0 (*)     All other components within normal limits   URINALYSIS, REFLEX TO URINE CULTURE " - Abnormal; Notable for the following components:    Protein, UA 2+ (*)     Occult Blood UA Trace (*)     All other components within normal limits    Narrative:     Specimen Source->Urine   URINALYSIS MICROSCOPIC - Abnormal; Notable for the following components:    RBC, UA 5 (*)     Bacteria Moderate (*)     Yeast, UA Rare (*)     Hyaline Casts, UA 3 (*)     All other components within normal limits    Narrative:     Specimen Source->Urine   SARS-COV-2 RDRP GENE   POCT INFLUENZA A/B MOLECULAR          All Lab Results:  Results for orders placed or performed during the hospital encounter of 09/25/22   CBC Auto Differential   Result Value Ref Range    WBC 49.66 (H) 3.90 - 12.70 K/uL    RBC 5.71 (H) 4.00 - 5.40 M/uL    Hemoglobin 16.5 (H) 12.0 - 16.0 g/dL    Hematocrit 49.8 (H) 37.0 - 48.5 %    MCV 87 82 - 98 fL    MCH 28.9 27.0 - 31.0 pg    MCHC 33.1 32.0 - 36.0 g/dL    RDW 15.7 (H) 11.5 - 14.5 %    Platelets 273 150 - 450 K/uL    MPV 10.7 9.2 - 12.9 fL    Immature Granulocytes 1.9 (H) 0.0 - 0.5 %    Gran # (ANC) 47.2 (H) 1.8 - 7.7 K/uL    Immature Grans (Abs) 0.92 (H) 0.00 - 0.04 K/uL    Lymph # 0.8 (L) 1.0 - 4.8 K/uL    Mono # 0.7 0.3 - 1.0 K/uL    Eos # 0.0 0.0 - 0.5 K/uL    Baso # 0.06 0.00 - 0.20 K/uL    nRBC 0 0 /100 WBC    Gran % 95.1 (H) 38.0 - 73.0 %    Lymph % 1.5 (L) 18.0 - 48.0 %    Mono % 1.4 (L) 4.0 - 15.0 %    Eosinophil % 0.0 0.0 - 8.0 %    Basophil % 0.1 0.0 - 1.9 %    Differential Method Automated    Comprehensive Metabolic Panel   Result Value Ref Range    Sodium 136 136 - 145 mmol/L    Potassium 3.6 3.5 - 5.1 mmol/L    Chloride 102 95 - 110 mmol/L    CO2 19 (L) 23 - 29 mmol/L    Glucose 163 (H) 70 - 110 mg/dL    BUN 13 8 - 23 mg/dL    Creatinine 1.0 0.5 - 1.4 mg/dL    Calcium 10.1 8.7 - 10.5 mg/dL    Total Protein 8.4 6.0 - 8.4 g/dL    Albumin 4.7 3.5 - 5.2 g/dL    Total Bilirubin 0.9 0.1 - 1.0 mg/dL    Alkaline Phosphatase 191 (H) 55 - 135 U/L    AST 13 10 - 40 U/L    ALT 8 (L) 10 - 44 U/L     Anion Gap 15 8 - 16 mmol/L    eGFR 57.0 (A) >60 mL/min/1.73 m^2   Urinalysis, Reflex to Urine Culture Urine, Clean Catch    Specimen: Urine   Result Value Ref Range    Specimen UA Urine, Clean Catch     Color, UA Yellow Yellow, Straw, Sravanthi    Appearance, UA Clear Clear    pH, UA 5.0 5.0 - 8.0    Specific Gravity, UA 1.025 1.005 - 1.030    Protein, UA 2+ (A) Negative    Glucose, UA Negative Negative    Ketones, UA Negative Negative    Bilirubin (UA) Negative Negative    Occult Blood UA Trace (A) Negative    Nitrite, UA Negative Negative    Urobilinogen, UA Negative <2.0 EU/dL    Leukocytes, UA Negative Negative   Urinalysis Microscopic   Result Value Ref Range    RBC, UA 5 (H) 0 - 4 /hpf    WBC, UA 5 0 - 5 /hpf    Bacteria Moderate (A) None-Occ /hpf    Yeast, UA Rare (A) None    Squam Epithel, UA 10 /hpf    Hyaline Casts, UA 3 (A) 0-1/lpf /lpf    Microscopic Comment SEE COMMENT    POCT COVID-19 Rapid Screening   Result Value Ref Range    POC Rapid COVID Negative Negative     Acceptable Yes    POCT Influenza A/B Molecular   Result Value Ref Range    POC Molecular Influenza A Ag Negative Negative, Not Reported    POC Molecular Influenza B Ag Negative Negative, Not Reported     Acceptable Yes            Imaging Results:  Imaging Results              CT Renal Stone Study ABD Pelvis WO (Final result)  Result time 09/25/22 13:52:53      Final result by Freida Gonzalez MD (09/25/22 13:52:53)                   Impression:      Large pelvic mass.  Suspect malignancy.  Correlate clinically    Partial small bowel obstruction in the left upper hemiabdomen and inferior anterior abdominal wall.  Strangulation of the bowel loops in the subcutaneous fat of the anterior abdominal wall hernia not excluded.  Correlate clinically.    Herniation of colonic bowel loops in the anterior midline abdominal wall.    All CT scans   are performed using dose optimization techniques including the following: automated  exposure control; adjustment of the mA and/or kV; use of iterative reconstruction technique.  Dose modulation was employed for ALARA by means of: Automated exposure control; adjustment of the mA and/or kV according to patient size (this includes techniques or standardized protocols for targeted exams where dose is matched to indication/reason for exam; i.e. extremities or head); and/or use of iterative reconstructive technique.      Electronically signed by: Carlos Guan  Date:    09/25/2022  Time:    13:52               Narrative:    EXAMINATION:  CT RENAL STONE STUDY ABD PELVIS WO    CLINICAL HISTORY:  Flank pain, kidney stone suspected;    TECHNIQUE:  Low dose axial images, sagittal and coronal reformations were obtained from the lung bases to the pubic symphysis.  Contrast was not administered.    COMPARISON:  None    FINDINGS:  Large mass in the pelvis with a heterogeneous foci and suggestion of foci of calcification may relate to uterine or ovarian mass.  The mass measures approximately 20 cm in superior inferior dimension 14 cm in AP dimension and 19 cm in transverse dimension.  Anterior abdominal hernia containing colonic loops of bowel with the mouth measuring 3.6 cm.  No colonic bowel obstruction.  However there is  dilated small bowel loops in the left upper hemiabdomen measuring up to 3.4 cm consistent with partial small bowel obstruction.  In addition, in the inferior abdominal wall there suggestion of an additional inferior midline abdominal wall hernia likely containing dilated small bowel loops.  Strangulation not excluded.    Lung bases are clear.  Large hiatal hernia.  Atherosclerotic changes of the aorta.  Spleen is not enlarged.  Aortic annulus is ectatic measuring up to 4.2 cm.  No gallstones.  Pancreas is unremarkable.  No adrenal mass.                                         The Emergency Provider reviewed the vital signs and test results, which are outlined above.    ED Discussions:  2:06  PM: Discussed the pt's case with Dr. Ortiz (Hem/onc) who recommends WBC can be worked up outpatient as it appears chronic and mostly neutrophils.        2:05 PM: Re-evaluated pt. I have discussed test results, shared treatment plan, and the need for admission with patient and family at bedside. Pt and family express understanding at this time and agree with all information. All questions answered. Pt and family have no further questions or concerns at this time. Pt is ready for admit. Requesting [facility]    2:15 PM [facility] at capacity    2:20 Spoke with Dr. Burns (surgery) recommends transfer to [place], given the size of the mass.    All historical, clinical, radiographic, and laboratory findings were reviewed with the patient/family in detail along with the indications for transfer to an outside facility (rather than admission to our facility in [place]) secondary to pelvic mass and PSBO and a need for Surgical oncology given the diagnosis of pelvic mass.  All remaining questions and concerns were addressed at that time and the patient/family communicates understanding and agrees to proceed accordingly.  Similarly all pertinent details of the encounter were discussed with Dr. Brooks at [facility] who agrees to accept the patient in transfer based on the needs/patient preferences outlined above.  [facility] secondary to a need for ongoing IV fluids, and NG tube needed en route.  Edison Moreira MD  2:48 PM                              Clinical Impression:   Final diagnoses:  [K56.600] Partial small bowel obstruction (Primary)  [R19.00] Pelvic mass  [R11.2] Nausea and vomiting, intractability of vomiting not specified, unspecified vomiting type      ED Disposition Condition    Transfer to Another Facility Stable                Edison Moreira MD  09/25/22 3097       Edison Moreira MD  09/25/22 0760

## 2022-09-25 NOTE — Clinical Note
Bj Sheffield accompanied their mother to the emergency department on 9/25/2022. They may return to work on 09/27/2022.      If you have any questions or concerns, please don't hesitate to call.      Edison Moreira MD

## 2022-09-26 PROBLEM — K56.609 SBO (SMALL BOWEL OBSTRUCTION): Status: ACTIVE | Noted: 2022-09-26

## 2022-09-26 LAB
ALBUMIN SERPL BCP-MCNC: 3.9 G/DL (ref 3.5–5.2)
ALP SERPL-CCNC: 212 U/L (ref 55–135)
ALT SERPL W/O P-5'-P-CCNC: <5 U/L (ref 10–44)
ANION GAP SERPL CALC-SCNC: 12 MMOL/L (ref 8–16)
AST SERPL-CCNC: 14 U/L (ref 10–40)
BASOPHILS # BLD AUTO: 0.14 K/UL (ref 0–0.2)
BASOPHILS NFR BLD: 0.3 % (ref 0–1.9)
BILIRUB SERPL-MCNC: 0.8 MG/DL (ref 0.1–1)
BUN SERPL-MCNC: 18 MG/DL (ref 8–23)
CALCIUM SERPL-MCNC: 9.2 MG/DL (ref 8.7–10.5)
CANCER AG125 SERPL-ACNC: 154 U/ML (ref 0–30)
CHLORIDE SERPL-SCNC: 105 MMOL/L (ref 95–110)
CO2 SERPL-SCNC: 19 MMOL/L (ref 23–29)
CREAT SERPL-MCNC: 1.2 MG/DL (ref 0.5–1.4)
DIFFERENTIAL METHOD: ABNORMAL
EOSINOPHIL # BLD AUTO: 0 K/UL (ref 0–0.5)
EOSINOPHIL NFR BLD: 0.1 % (ref 0–8)
ERYTHROCYTE [DISTWIDTH] IN BLOOD BY AUTOMATED COUNT: 16.1 % (ref 11.5–14.5)
EST. GFR  (NO RACE VARIABLE): 45.8 ML/MIN/1.73 M^2
GLUCOSE SERPL-MCNC: 107 MG/DL (ref 70–110)
HCT VFR BLD AUTO: 46 % (ref 37–48.5)
HGB BLD-MCNC: 14.9 G/DL (ref 12–16)
IMM GRANULOCYTES # BLD AUTO: 0.3 K/UL (ref 0–0.04)
IMM GRANULOCYTES NFR BLD AUTO: 0.7 % (ref 0–0.5)
LYMPHOCYTES # BLD AUTO: 1.1 K/UL (ref 1–4.8)
LYMPHOCYTES NFR BLD: 2.5 % (ref 18–48)
MAGNESIUM SERPL-MCNC: 2 MG/DL (ref 1.6–2.6)
MCH RBC QN AUTO: 29 PG (ref 27–31)
MCHC RBC AUTO-ENTMCNC: 32.4 G/DL (ref 32–36)
MCV RBC AUTO: 90 FL (ref 82–98)
MONOCYTES # BLD AUTO: 1.6 K/UL (ref 0.3–1)
MONOCYTES NFR BLD: 3.8 % (ref 4–15)
NEUTROPHILS # BLD AUTO: 39.3 K/UL (ref 1.8–7.7)
NEUTROPHILS NFR BLD: 92.6 % (ref 38–73)
NRBC BLD-RTO: 0 /100 WBC
PHOSPHATE SERPL-MCNC: 4.7 MG/DL (ref 2.7–4.5)
PLATELET # BLD AUTO: 263 K/UL (ref 150–450)
PMV BLD AUTO: 11.7 FL (ref 9.2–12.9)
POCT GLUCOSE: 101 MG/DL (ref 70–110)
POCT GLUCOSE: 104 MG/DL (ref 70–110)
POCT GLUCOSE: 91 MG/DL (ref 70–110)
POTASSIUM SERPL-SCNC: 4.1 MMOL/L (ref 3.5–5.1)
PROT SERPL-MCNC: 6.9 G/DL (ref 6–8.4)
RBC # BLD AUTO: 5.13 M/UL (ref 4–5.4)
SODIUM SERPL-SCNC: 136 MMOL/L (ref 136–145)
WBC # BLD AUTO: 42.38 K/UL (ref 3.9–12.7)

## 2022-09-26 PROCEDURE — 63600175 PHARM REV CODE 636 W HCPCS

## 2022-09-26 PROCEDURE — 80053 COMPREHEN METABOLIC PANEL: CPT

## 2022-09-26 PROCEDURE — 86304 IMMUNOASSAY TUMOR CA 125: CPT | Performed by: STUDENT IN AN ORGANIZED HEALTH CARE EDUCATION/TRAINING PROGRAM

## 2022-09-26 PROCEDURE — 83735 ASSAY OF MAGNESIUM: CPT

## 2022-09-26 PROCEDURE — 25500020 PHARM REV CODE 255: Performed by: STUDENT IN AN ORGANIZED HEALTH CARE EDUCATION/TRAINING PROGRAM

## 2022-09-26 PROCEDURE — 85025 COMPLETE CBC W/AUTO DIFF WBC: CPT

## 2022-09-26 PROCEDURE — 84100 ASSAY OF PHOSPHORUS: CPT

## 2022-09-26 PROCEDURE — 63600175 PHARM REV CODE 636 W HCPCS: Performed by: STUDENT IN AN ORGANIZED HEALTH CARE EDUCATION/TRAINING PROGRAM

## 2022-09-26 PROCEDURE — 20600001 HC STEP DOWN PRIVATE ROOM

## 2022-09-26 PROCEDURE — 36415 COLL VENOUS BLD VENIPUNCTURE: CPT

## 2022-09-26 PROCEDURE — 99223 1ST HOSP IP/OBS HIGH 75: CPT | Mod: ,,, | Performed by: OBSTETRICS & GYNECOLOGY

## 2022-09-26 PROCEDURE — 99223 PR INITIAL HOSPITAL CARE,LEVL III: ICD-10-PCS | Mod: ,,, | Performed by: OBSTETRICS & GYNECOLOGY

## 2022-09-26 PROCEDURE — 63600175 PHARM REV CODE 636 W HCPCS: Performed by: NURSE PRACTITIONER

## 2022-09-26 PROCEDURE — 94761 N-INVAS EAR/PLS OXIMETRY MLT: CPT

## 2022-09-26 RX ORDER — MORPHINE SULFATE 2 MG/ML
2 INJECTION, SOLUTION INTRAMUSCULAR; INTRAVENOUS
Status: DISCONTINUED | OUTPATIENT
Start: 2022-09-26 | End: 2022-09-29

## 2022-09-26 RX ORDER — ACETAMINOPHEN 325 MG/1
650 TABLET ORAL EVERY 6 HOURS PRN
Status: DISCONTINUED | OUTPATIENT
Start: 2022-09-26 | End: 2022-09-26

## 2022-09-26 RX ORDER — ONDANSETRON 2 MG/ML
4 INJECTION INTRAMUSCULAR; INTRAVENOUS ONCE
Status: COMPLETED | OUTPATIENT
Start: 2022-09-27 | End: 2022-09-27

## 2022-09-26 RX ADMIN — MORPHINE SULFATE 2 MG: 2 INJECTION, SOLUTION INTRAMUSCULAR; INTRAVENOUS at 02:09

## 2022-09-26 RX ADMIN — MORPHINE SULFATE 2 MG: 2 INJECTION, SOLUTION INTRAMUSCULAR; INTRAVENOUS at 08:09

## 2022-09-26 RX ADMIN — MORPHINE SULFATE 2 MG: 2 INJECTION, SOLUTION INTRAMUSCULAR; INTRAVENOUS at 11:09

## 2022-09-26 RX ADMIN — DIATRIZOATE MEGLUMINE AND DIATRIZOATE SODIUM 100 ML: 660; 100 LIQUID ORAL; RECTAL at 02:09

## 2022-09-26 RX ADMIN — ENOXAPARIN SODIUM 40 MG: 100 INJECTION SUBCUTANEOUS at 04:09

## 2022-09-26 RX ADMIN — MORPHINE SULFATE 4 MG: 4 INJECTION INTRAVENOUS at 05:09

## 2022-09-26 RX ADMIN — MORPHINE SULFATE 4 MG: 4 INJECTION INTRAVENOUS at 11:09

## 2022-09-26 NOTE — CONSULTS
"[facility]  Obstetrics & Gynecology  Consult Note    Patient Name: Mariaelena Sheffield  MRN: [id number]  Admission Date: 2022  Hospital Length of Stay: 1 days  Code Status: Full Code  Primary Care Provider: Lui Blue MD  Principal Problem: SBO (small bowel obstruction)    Inpatient consult to Gynecologic Oncology  Consult performed by: Mirna Yee MD  Consult ordered by: Cherie Resendiz MD      Subjective:     Chief Complaint:     History of Present Illness:   Mariaelena Sheffield is a 82 y.o.  with PMH significant for R breast CA (dx in  s/p chemo and radiation, denies ever having resection for this), DM, HLD, HTN who presented as a tx from an OSH 2/2 N/V/diarrhea x 2 days. At OSH she had a CT scan that was notable for a pelvic mass, a partial small bowel obstruction in the left upper hemiabdomen and inferior anterior abdominal wall, and herniation of colonic bowel loops in the anterior midline abdominal wall. NGT was placed and [place] [facility] [place].     Labs notable for CBC of 50/16/50/273. CMP with alk phos of 191, otherwise unremarkable, and UA with trace blood.     She states that she has been having intermittent abdominal pain off and on for approximately 4 months. She endorses somewhat decreased appetite over the past year as well, with associated weight loss.     PSH:  RITA (uncertain if ovaries were removed) in -2 for a "tumor"- states that she was not told this was cancerous. She was post-menopausal at the time of surgery.   Ex lap for bowel obstruction in   Incarcerated abdominal hernia repair in 2018.     GYN Hx;   x2  Denies ever having abnormal pap smears  Has not seen an GYN since her hysterectomy.  She denies ever having genetic testing following her breast CA dx or her hysterectomy.     Fam Hx:  Had a sister with breast cancer, uncertain age of dx    Current Facility-Administered Medications on File Prior to Encounter "   Medication    [COMPLETED] diphenoxylate-atropine 2.5-0.025 mg per tablet 1 tablet    [COMPLETED] LIDOcaine (PF) 10 mg/ml (1%) injection 10 mg    [COMPLETED] morphine injection 2 mg    [COMPLETED] morphine injection 4 mg    [COMPLETED] morphine injection 4 mg    [COMPLETED] ondansetron injection 4 mg    [COMPLETED] ondansetron injection 4 mg     Current Outpatient Medications on File Prior to Encounter   Medication Sig    allopurinoL (ZYLOPRIM) 300 MG tablet Take 300 mg by mouth once daily.    amLODIPine (NORVASC) 5 MG tablet Take 5 mg by mouth once daily.    atorvastatin (LIPITOR) 20 MG tablet Take 20 mg by mouth every evening.     blood sugar diagnostic Strp TEST ONE TO TWO TIMES DAILY (NEED MD APPOINTMENT)    blood-glucose meter Misc Test BID    colchicine 0.6 mg tablet Take 0.6 mg by mouth once daily.    dicyclomine (BENTYL) 10 MG capsule Take 10 mg by mouth 3 (three) times daily as needed.    ferrous sulfate 325 (65 FE) MG EC tablet Take 325 mg by mouth once daily.    lancets Misc TEST BLOOD SUGAR  1  TO  2  TIMES  PER  DAY  ( NEED MD APPOINTMENT  )    metFORMIN (GLUCOPHAGE) 500 MG tablet Take 1 tablet by mouth daily with dinner or evening meal.    nozaseptin (NOZIN) nasal  1 each by Each Nare route 2 (two) times daily.    omeprazole (PRILOSEC) 40 MG capsule Take 40 mg by mouth once daily.     polyethylene glycol (GLYCOLAX) 17 gram PwPk Take 17 g by mouth daily (Patient taking differently: Take 17 g by mouth once daily.)    lisinopril 10 MG tablet Take 1 tablet (10 mg total) by mouth once daily.       Review of patient's allergies indicates:  No Known Allergies    Past Medical History:   Diagnosis Date    Cancer     breast cancer R     Diabetes mellitus     GERD (gastroesophageal reflux disease)     Gout     Hypercholesteremia     Hypertension      OB History    Para Term  AB Living   2 2 2 0 0 0   SAB IAB Ectopic Multiple Live Births   0 0 0 0 0      # Outcome Date GA Lbr Shun/2nd Weight  Sex Delivery Anes PTL Lv   2 Term            1 Term              Past Surgical History:   Procedure Laterality Date    ABDOMINAL SURGERY      BREAST BIOPSY      BREAST SURGERY Right     Breast biopsy    CATARACT EXTRACTION, BILATERAL      HERNIA REPAIR      LYSIS OF ADHESIONS N/A 10/25/2018    Procedure: LYSIS, ADHESIONS;  Surgeon: Kevin Caldwell MD;  Location: [facility] OR;  Service: General;  Laterality: N/A;    REPAIR OF INCARCERATED INCISIONAL HERNIA WITHOUT HISTORY OF PRIOR REPAIR N/A 10/25/2018    Procedure: REPAIR, HERNIA, INCISIONAL, INCARCERATED, WITHOUT HISTORY OF PRIOR REPAIR;  Surgeon: Kevin Caldwell MD;  Location: [facility] OR;  Service: General;  Laterality: N/A;     Family History    None       Tobacco Use    Smoking status: Never    Smokeless tobacco: Never   Substance and Sexual Activity    Alcohol use: Yes     Comment: socially  No alcohol prior to sx    Drug use: No    Sexual activity: Not on file     Review of Systems   Constitutional:  Positive for activity change and appetite change. Negative for chills, fatigue and fever.   HENT:  Negative for nasal congestion.    Eyes:  Negative for visual disturbance.   Respiratory:  Negative for cough and shortness of breath.    Cardiovascular:  Negative for chest pain and leg swelling.   Gastrointestinal:  Positive for abdominal pain, bloating, diarrhea (improved, no episodes since Sunday morning), nausea and vomiting.   Endocrine: Negative for hot flashes.   Genitourinary:  Negative for dysuria, vaginal bleeding and vaginal discharge.   Musculoskeletal:  Positive for back pain.   Integumentary:  Positive for breast skin changes (prior radiation to R breast). Negative for rash and breast tenderness.   Neurological:  Negative for headaches.   Psychiatric/Behavioral:  Negative for depression.    Breast: Positive for skin changes (prior radiation to R breast).Negative for tenderness  Objective:     Vital Signs (Most Recent):  Temp: 98.4 °F (36.9 °C) (09/26/22 0727)  Pulse:  90 (09/26/22 0727)  Resp: 16 (09/26/22 0727)  BP: 132/87 (09/26/22 0727)  SpO2: (!) 93 % (09/26/22 0727)   Vital Signs (24h Range):  Temp:  [97.6 °F (36.4 °C)-98.5 °F (36.9 °C)] 98.4 °F (36.9 °C)  Pulse:  [] 90  Resp:  [13-27] 16  SpO2:  [92 %-98 %] 93 %  BP: (123-165)/(71-97) 132/87     Weight: 73 kg (160 lb 15 oz)  Body mass index is 27.62 kg/m².  No LMP recorded (lmp unknown). Patient is postmenopausal.    Physical Exam:   Constitutional: She is oriented to person, place, and time. She appears well-developed and well-nourished. No distress.   NGT in place draining greenish/clear contents     Eyes: Pupils are equal, round, and reactive to light.     Cardiovascular:  Normal rate.             Pulmonary/Chest: Effort normal. No respiratory distress.        Abdominal:   Abdomen is soft with a large midline hernia and to be palpated. A very large pelvic/abdominal mass is also able to be palpated from the pubic symphysis to the ASIS bilaterally and above the umbilicus. Non tender to palpation with no rebound or guarding.  Prior midline vertical abdominal incision noted.      Genitourinary:    Genitourinary Comments: BME reveals large mass filling the cul-de-sac with no cervix able to palpated, feels c/w a blind vaginal cuff.                   Neurological: She is alert and oriented to person, place, and time.    Skin: Skin is warm and dry.    Psychiatric: She has a normal mood and affect.     Laboratory:  Recent Labs   Lab 09/25/22  1107   WBC 49.66*   HGB 16.5*   HCT 49.8*   MCV 87         Recent Labs   Lab 09/25/22  1107 09/26/22  0609    136   K 3.6 4.1    105   CO2 19* 19*   BUN 13 18   CREATININE 1.0 1.2   * 107   PROT 8.4 6.9   BILITOT 0.9 0.8   ALKPHOS 191* 212*   ALT 8* <5*   AST 13 14   MG  --  2.0   PHOS  --  4.7*          Diagnostic Results:  CT renal study  Impression:     Large pelvic mass.  Suspect malignancy.  Correlate clinically     Partial small bowel obstruction in the  left upper hemiabdomen and inferior anterior abdominal wall.  Strangulation of the bowel loops in the subcutaneous fat of the anterior abdominal wall hernia not excluded.  Correlate clinically.     Herniation of colonic bowel loops in the anterior midline abdominal wall.    Assessment/Plan:     Active Diagnoses:    Diagnosis Date Noted POA    PRINCIPAL PROBLEM:  SBO (small bowel obstruction) [K56.609] 09/26/2022 Unknown      Problems Resolved During this Admission:         Pelvic Mass:  - patient presents as a transfer for SBO and found to have a large pelvic mass ( [id number]) with heterogenous foci. Mass is easily palpated on exam as well as a large midline hernia  -  elevated at 154   - TVUS ordered and scheduled for today  - CBC yesterday concerning for WBC of 50, unlikely for this mass to be a TOA, could be reactive however recommend followup cbc today and possibly testing for cdiff if diarrhea continues today.   - Patient has a documented history of RITA, however we are uncertain of the status of her ovaries and have no records of this surgery. This mass is not behaving like an ovarian cancer and has been present since at least 2018 (was documented as a uterus with multiple fibroids at that time, however this was many years after her hysterectomy) but has grown significantly since this time. No evidence of extrapelvic disease/ omental caking on scan from 2018 or this admit.   - Recommend for primary team to get CEA and CA 19-9, and possible biopsies to further characterize possible origin of mass.   - Gyn Onc will plan to continue to follow along     SBO  - Management per primary team  - NGT in place    All other medical problems per primary team.     Thank you for your consult. I will follow-up with patient. Please contact us if you have any additional questions.    Mirna Yee MD  Obstetrics & Gynecology  [facility]

## 2022-09-26 NOTE — PROGRESS NOTES
[facility]  General Surgery  Progress Note    Subjective:     Interval History: Patient admitted yesterday with large abdominal mass and concern for SBO. Patient resting comfortably in bed this morning. Denies further N/V this morning. Pain is well controlled.      Medications:  Continuous Infusions:   lactated ringers 100 mL/hr at 09/25/22 2150     Scheduled Meds:   enoxaparin  40 mg Subcutaneous Daily     PRN Meds:dextrose 10%, dextrose 10%, glucagon (human recombinant), insulin aspart U-100, morphine, morphine, sodium chloride 0.9%     Review of patient's allergies indicates:  No Known Allergies  Objective:     Vital Signs (Most Recent):  Temp: 98.4 °F (36.9 °C) (09/26/22 0727)  Pulse: 90 (09/26/22 0727)  Resp: 16 (09/26/22 0727)  BP: 132/87 (09/26/22 0727)  SpO2: (!) 93 % (09/26/22 0727)   Vital Signs (24h Range):  Temp:  [97.6 °F (36.4 °C)-98.5 °F (36.9 °C)] 98.4 °F (36.9 °C)  Pulse:  [] 90  Resp:  [13-27] 16  SpO2:  [92 %-98 %] 93 %  BP: (123-165)/(71-97) 132/87     Weight: 73 kg (160 lb 15 oz)  Body mass index is 27.62 kg/m².    Intake/Output - Last 3 Shifts         09/24 0700 09/25 0659 09/25 0700 09/26 0659 09/26 0700 09/27 0659           Stool Occurrence  0 x             Physical Exam  Constitutional:       General: She is not in acute distress.     Appearance: Normal appearance.   HENT:      Head: Normocephalic and atraumatic.      Mouth/Throat:      Mouth: Mucous membranes are moist.   Eyes:      Extraocular Movements: Extraocular movements intact.      Conjunctiva/sclera: Conjunctivae normal.   Cardiovascular:      Rate and Rhythm: Normal rate and regular rhythm.   Pulmonary:      Effort: Pulmonary effort is normal. No respiratory distress.   Abdominal:      General: There is distension.      Tenderness: There is no abdominal tenderness.   Skin:     General: Skin is warm and dry.   Neurological:      General: No focal deficit present.      Mental Status: She is alert and oriented to  person, place, and time. Mental status is at baseline.       Significant Labs:  I have reviewed all pertinent lab results within the past 24 hours.  CBC:   Recent Labs   Lab 09/25/22  1107   WBC 49.66*   RBC 5.71*   HGB 16.5*   HCT 49.8*      MCV 87   MCH 28.9   MCHC 33.1     BMP:   Recent Labs   Lab 09/26/22  0609         K 4.1      CO2 19*   BUN 18   CREATININE 1.2   CALCIUM 9.2   MG 2.0       Significant Diagnostics:  I have reviewed all pertinent imaging results/findings within the past 24 hours.    Assessment/Plan:     * SBO (small bowel obstruction)  81 yo woman with pelvic mass, significant ventral hernia, and partial small bowel obstruction who presents from OSH for n/v/diarrhea.     - Tylenol, morphine PRN for pain  - NPO, NGT to LIWS   - Gastrografin challenge today if passes gas   - Daily CMP, CBC, Mag, Phos  - Maintenance fluids  - SSI  - Gyn/Onc consulted for abdominopelvic mass, appreciate recs   - Ca 19-9   - Pelvic US  - Lovenox for DVT ppx        Bonifacio Simms MD  General Surgery  [facility]

## 2022-09-26 NOTE — PLAN OF CARE
Pt is A&Ox4 injury free. Breathing is regular equal and unlabored bilaterally on room air. Pt is able to ambulate with stand by assistance. Pain is controlled with IV medication. Nausea medication is IV. Pt does have IV fluids infusing as ordered. SCD's on bilaterally. Glasses at bedside. NG tube to LIWS pt NPO when she arrived on the 10 th floor.

## 2022-09-26 NOTE — PLAN OF CARE
POC reviewed with pt, verbalized understanding. AAOx4, VSS on RA. NGT to R RADHA hinson, green output. MIVF infusing at 100cc/hr. Up to bathroom with standby assist. Voids without difficulty, adequate UOP. One loose BM today. ACCU checks Q6, no coverage needed. NPO, tolerating well.   All needs met, no complaints offered at this time. Bed locked in lowest position, call bell within reach. Frequent rounds made for safety.     Problem: Adult Inpatient Plan of Care  Goal: Plan of Care Review  Outcome: Ongoing, Progressing  Goal: Patient-Specific Goal (Individualized)  Outcome: Ongoing, Progressing  Goal: Absence of Hospital-Acquired Illness or Injury  Outcome: Ongoing, Progressing  Goal: Optimal Comfort and Wellbeing  Outcome: Ongoing, Progressing  Goal: Readiness for Transition of Care  Outcome: Ongoing, Progressing     Problem: Diabetes Comorbidity  Goal: Blood Glucose Level Within Targeted Range  Outcome: Ongoing, Progressing

## 2022-09-26 NOTE — PLAN OF CARE
Gastrografin given, pt tolerated well, NGT clamped. Confirmed times of scheduled Xrays with radiology.

## 2022-09-26 NOTE — PLAN OF CARE
[facility]  Initial Discharge Assessment       Primary Care Provider: Lui Blue MD    Admission Diagnosis: Small bowel obstruction [K56.609]    Admission Date: 9/25/2022  Expected Discharge Date: 9/28/2022    Discharge Barriers Identified: None    Payor: [employer] MANAGED MEDICARE / Plan: [employer] MEDICARE HMO / Product Type: Capitation /     Extended Emergency Contact Information  Primary Emergency Contact: Bj Sheffield   [place]  Mobile Phone: [phone]  Relation: Son    Discharge Plan A: Home Health  Discharge Plan B: Home with family      [facility] - [place], [place] - [address]  [address] [address]  Phone: [phone] Fax: [phone]      Initial Assessment (most recent)       Adult Discharge Assessment - 09/26/22 1319          Discharge Assessment    Assessment Type Discharge Planning Brief Assessment     Confirmed/corrected address, phone number and insurance Yes     Confirmed Demographics Correct on Facesheet     Source of Information patient;family     When was your last doctors appointment? 09/15/22     Does patient/caregiver understand observation status Yes     Communicated MING with patient/caregiver Yes     Reason For Admission Small bowl obstruction     Lives With child(vipul), adult     Facility Arrived From: Home     Do you expect to return to your current living situation? Yes     Do you have help at home or someone to help you manage your care at home? Yes     Who are your caregiver(s) and their phone number(s)? [phone]     Prior to hospitilization cognitive status: Alert/Oriented     Current cognitive status: Alert/Oriented     Walking or Climbing Stairs Difficulty none     Dressing/Bathing Difficulty none     Home Layout Able to live on 1st floor     Equipment Currently Used at Home walker, standard     Readmission within 30 days? No     Patient currently being followed by outpatient case management? No     Do you  currently have service(s) that help you manage your care at home? No     Do you take prescription medications? Yes     Do you have prescription coverage? Yes     Coverage [employer]     Do you have any problems affording any of your prescribed medications? No     Is the patient taking medications as prescribed? yes     Who is going to help you get home at discharge? [phone]     How do you get to doctors appointments? car, drives self     Are you on dialysis? No     Do you take coumadin? No     Discharge Plan A Home Health     Discharge Plan B Home with family     DME Needed Upon Discharge  other (see comments)   Unknown at this time    Discharge Plan discussed with: Patient     Discharge Barriers Identified None        Relationship/Environment    Name(s) of Who Lives With Patient [phone]                         Spoke to pt. Pt lives at home with family. Post hospital stay [phone] will be pt support person and pt. has transportation at d/c with son. There have been no hospitalizations within the last 30 days per pt. Verified pt PCP and preferred pharmacy. Pt stated not on Coumadin and is not receiving dialysis. All questions answered regarding case management/ discharge planning , pt verbalized understanding. Discharge booklet with SW contact information given to pt.        Mary Paula LCSW  Case Management/[facility]  [phone]

## 2022-09-26 NOTE — SUBJECTIVE & OBJECTIVE
Interval History: Patient admitted yesterday with large abdominal mass and concern for SBO. Patient resting comfortably in bed this morning. Denies further N/V this morning. Pain is well controlled.      Medications:  Continuous Infusions:   lactated ringers 100 mL/hr at 09/25/22 2150     Scheduled Meds:   enoxaparin  40 mg Subcutaneous Daily     PRN Meds:dextrose 10%, dextrose 10%, glucagon (human recombinant), insulin aspart U-100, morphine, morphine, sodium chloride 0.9%     Review of patient's allergies indicates:  No Known Allergies  Objective:     Vital Signs (Most Recent):  Temp: 98.4 °F (36.9 °C) (09/26/22 0727)  Pulse: 90 (09/26/22 0727)  Resp: 16 (09/26/22 0727)  BP: 132/87 (09/26/22 0727)  SpO2: (!) 93 % (09/26/22 0727)   Vital Signs (24h Range):  Temp:  [97.6 °F (36.4 °C)-98.5 °F (36.9 °C)] 98.4 °F (36.9 °C)  Pulse:  [] 90  Resp:  [13-27] 16  SpO2:  [92 %-98 %] 93 %  BP: (123-165)/(71-97) 132/87     Weight: 73 kg (160 lb 15 oz)  Body mass index is 27.62 kg/m².    Intake/Output - Last 3 Shifts         09/24 0700 09/25 0659 09/25 0700 09/26 0659 09/26 0700 09/27 0659           Stool Occurrence  0 x             Physical Exam  Constitutional:       General: She is not in acute distress.     Appearance: Normal appearance.   HENT:      Head: Normocephalic and atraumatic.      Mouth/Throat:      Mouth: Mucous membranes are moist.   Eyes:      Extraocular Movements: Extraocular movements intact.      Conjunctiva/sclera: Conjunctivae normal.   Cardiovascular:      Rate and Rhythm: Normal rate and regular rhythm.   Pulmonary:      Effort: Pulmonary effort is normal. No respiratory distress.   Abdominal:      General: There is distension.      Tenderness: There is no abdominal tenderness.   Skin:     General: Skin is warm and dry.   Neurological:      General: No focal deficit present.      Mental Status: She is alert and oriented to person, place, and time. Mental status is at baseline.       Significant  Labs:  I have reviewed all pertinent lab results within the past 24 hours.  CBC:   Recent Labs   Lab 09/25/22  1107   WBC 49.66*   RBC 5.71*   HGB 16.5*   HCT 49.8*      MCV 87   MCH 28.9   MCHC 33.1     BMP:   Recent Labs   Lab 09/26/22  0609         K 4.1      CO2 19*   BUN 18   CREATININE 1.2   CALCIUM 9.2   MG 2.0       Significant Diagnostics:  I have reviewed all pertinent imaging results/findings within the past 24 hours.

## 2022-09-26 NOTE — H&P
"[facility]  General Surgery  History & Physical    Patient Name: Mariaelena Sheffield  MRN: [id number]  Admission Date: 9/25/2022  Attending Physician: Edison Brooks MD   Primary Care Provider: Lui Blue MD    Patient information was obtained from patient, past medical records, and ER records.     Subjective:     Chief Complaint/Reason for Admission: nausea, diarrhea, + vomiting    History of Present Illness:  Patient is a 82 y.o. female with PMH of R breast cancer, DM, and hysterectomy who presents as a transfer with the CC of nausea,  vomiting, and diarrhea since 24 Sep 22 . She originally presented to [facility] with an abrupt onset of symptoms that began Saturday morning and worsened Saturday night.  Patient denies fever, chills. Nausea is aggravated by moving. Symptoms did not improve with anti-diarrheals.     CT scan was completed at [facility] that showed a pelvic mass, a partial small bowel obstruction in the left upper hemiabdomen and inferior anterior abdominal wall, and herniation of colonic bowel loops in the anterior midline abdominal wall. Pt was then transferred to [facility]. The diarrhea has since stopped. Pt afebrile and hemodynamically stable upon arrival, with NG tube in place.    Pt mentions a history of tumor resection in her belly in 2001, hysterectomy 2001/02, chemo + radiation therapy to R breast, 2x "bowel restructures".       Current Facility-Administered Medications on File Prior to Encounter   Medication    [COMPLETED] diphenoxylate-atropine 2.5-0.025 mg per tablet 1 tablet    [COMPLETED] LIDOcaine (PF) 10 mg/ml (1%) injection 10 mg    [COMPLETED] morphine injection 2 mg    [COMPLETED] morphine injection 4 mg    [COMPLETED] morphine injection 4 mg    [COMPLETED] ondansetron injection 4 mg    [COMPLETED] ondansetron injection 4 mg     Current Outpatient Medications on File Prior to Encounter   Medication Sig    allopurinoL (ZYLOPRIM) 300 MG tablet Take 300 mg " by mouth once daily.    amLODIPine (NORVASC) 5 MG tablet Take 5 mg by mouth once daily.    atorvastatin (LIPITOR) 20 MG tablet Take 20 mg by mouth every evening.     blood sugar diagnostic Strp TEST ONE TO TWO TIMES DAILY (NEED MD APPOINTMENT)    blood-glucose meter Misc Test BID    colchicine 0.6 mg tablet Take 0.6 mg by mouth once daily.    dicyclomine (BENTYL) 10 MG capsule Take 10 mg by mouth 3 (three) times daily as needed.    ferrous sulfate 325 (65 FE) MG EC tablet Take 325 mg by mouth once daily.    lancets Misc TEST BLOOD SUGAR  1  TO  2  TIMES  PER  DAY  ( NEED MD APPOINTMENT  )    lisinopril 10 MG tablet Take 1 tablet (10 mg total) by mouth once daily.    metFORMIN (GLUCOPHAGE) 500 MG tablet Take 1 tablet by mouth daily with dinner or evening meal.    nozaseptin (NOZIN) nasal  1 each by Each Nare route 2 (two) times daily.    omeprazole (PRILOSEC) 40 MG capsule Take 40 mg by mouth once daily.     polyethylene glycol (GLYCOLAX) 17 gram PwPk Take 17 g by mouth daily (Patient taking differently: Take 17 g by mouth once daily.)       Review of patient's allergies indicates:  No Known Allergies    Past Medical History:   Diagnosis Date    Cancer     breast cancer R     Diabetes mellitus     GERD (gastroesophageal reflux disease)     Gout     Hypercholesteremia     Hypertension      Past Surgical History:   Procedure Laterality Date    ABDOMINAL SURGERY      BREAST BIOPSY      BREAST SURGERY Right     Breast biopsy    CATARACT EXTRACTION, BILATERAL      HERNIA REPAIR      LYSIS OF ADHESIONS N/A 10/25/2018    Procedure: LYSIS, ADHESIONS;  Surgeon: Kevin Caldwell MD;  Location: [facility] OR;  Service: General;  Laterality: N/A;    REPAIR OF INCARCERATED INCISIONAL HERNIA WITHOUT HISTORY OF PRIOR REPAIR N/A 10/25/2018    Procedure: REPAIR, HERNIA, INCISIONAL, INCARCERATED, WITHOUT HISTORY OF PRIOR REPAIR;  Surgeon: Kevin Caldwell MD;  Location: [facility] OR;  Service: General;  Laterality: N/A;     Family History     None       Tobacco Use    Smoking status: Never    Smokeless tobacco: Never   Substance and Sexual Activity    Alcohol use: Yes     Comment: socially  No alcohol prior to sx    Drug use: No    Sexual activity: Not on file     Review of Systems   Constitutional:  Negative for chills and fever.   HENT:  Negative for congestion and sore throat.    Eyes:  Negative for visual disturbance.   Respiratory:  Negative for cough and shortness of breath.    Cardiovascular:  Negative for chest pain.   Gastrointestinal:  Positive for diarrhea, nausea and vomiting. Negative for abdominal pain.   Genitourinary:  Negative for difficulty urinating and pelvic pain.   Objective:     Vital Signs (Most Recent):  Temp: 97.9 °F (36.6 °C) (09/25/22 2010)  Pulse: 87 (09/25/22 2010)  Resp: 16 (09/25/22 2010)  BP: 138/71 (09/25/22 2010)  SpO2: (!) 94 % (09/25/22 2010)   Vital Signs (24h Range):  Temp:  [97.9 °F (36.6 °C)-98.5 °F (36.9 °C)] 97.9 °F (36.6 °C)  Pulse:  [] 87  Resp:  [16-27] 16  SpO2:  [94 %-98 %] 94 %  BP: (123-165)/(71-97) 138/71     Weight: 73 kg (160 lb 15 oz)  Body mass index is 27.62 kg/m².    Physical Exam  Constitutional:       General: She is not in acute distress.     Appearance: She is ill-appearing.   HENT:      Nose: Nose normal.      Comments: NG tube in place  Cardiovascular:      Rate and Rhythm: Normal rate.   Pulmonary:      Effort: Pulmonary effort is normal.   Abdominal:      Palpations: Abdomen is soft. There is mass.      Comments: Significant mass protruding from RLQ. Not tender. Pt endorses its presence for three months   Musculoskeletal:         General: Normal range of motion.      Cervical back: Normal range of motion.   Skin:     General: Skin is warm and dry.   Neurological:      Mental Status: She is alert.       Significant Labs:  I have reviewed all pertinent lab results within the past 24 hours.  CBC:   Recent Labs   Lab 09/25/22  1107   WBC 49.66*   RBC 5.71*   HGB 16.5*   HCT 49.8*   PLT  273   MCV 87   MCH 28.9   MCHC 33.1     CMP:   Recent Labs   Lab 09/25/22  1107   *   CALCIUM 10.1   ALBUMIN 4.7   PROT 8.4      K 3.6   CO2 19*      BUN 13   CREATININE 1.0   ALKPHOS 191*   ALT 8*   AST 13   BILITOT 0.9       Significant Diagnostics:  9/25/22 CT Renal Stone Study ABD Pelvis Impression:     Large pelvic mass.  Suspect malignancy.  Correlate clinically     Partial small bowel obstruction in the left upper hemiabdomen and inferior anterior abdominal wall.  Strangulation of the bowel loops in the subcutaneous fat of the anterior abdominal wall hernia not excluded.  Correlate clinically.     Herniation of colonic bowel loops in the anterior midline abdominal wall.    Assessment/Plan:     There are no hospital problems to display for this patient.    VTE Risk Mitigation (From admission, onward)           Ordered     IP VTE HIGH RISK PATIENT  Once         09/25/22 2048     Place sequential compression device  Until discontinued         09/25/22 2048                  83 yo woman with pelvic mass, significant ventral hernia, and partial small bowel obstruction who presents from OSH for n/v/diarrhea.     - CMP, CBC, Mag, Phos  - NPO  - IV tylenol, morphine for pain  - NGT to LIWS  - Maintenance fluids  - Subq Lovenox  -   - Pelvic ultrasound  - Consult to gyn/onc, appreciate the recs          Cherie Resendiz MD  General Surgery  [facility]

## 2022-09-27 ENCOUNTER — ANESTHESIA EVENT (OUTPATIENT)
Dept: SURGERY | Facility: HOSPITAL | Age: 82
DRG: 749 | End: 2022-09-27
Payer: MEDICARE

## 2022-09-27 LAB
ALBUMIN SERPL BCP-MCNC: 3.6 G/DL (ref 3.5–5.2)
ALP SERPL-CCNC: 167 U/L (ref 55–135)
ALT SERPL W/O P-5'-P-CCNC: <5 U/L (ref 10–44)
ANION GAP SERPL CALC-SCNC: 12 MMOL/L (ref 8–16)
AST SERPL-CCNC: 12 U/L (ref 10–40)
BASOPHILS # BLD AUTO: 0.07 K/UL (ref 0–0.2)
BASOPHILS # BLD AUTO: 0.09 K/UL (ref 0–0.2)
BASOPHILS NFR BLD: 0.3 % (ref 0–1.9)
BASOPHILS NFR BLD: 0.3 % (ref 0–1.9)
BILIRUB SERPL-MCNC: 0.7 MG/DL (ref 0.1–1)
BUN SERPL-MCNC: 22 MG/DL (ref 8–23)
CALCIUM SERPL-MCNC: 9.2 MG/DL (ref 8.7–10.5)
CHLORIDE SERPL-SCNC: 107 MMOL/L (ref 95–110)
CO2 SERPL-SCNC: 21 MMOL/L (ref 23–29)
CREAT SERPL-MCNC: 1.2 MG/DL (ref 0.5–1.4)
DIFFERENTIAL METHOD: ABNORMAL
DIFFERENTIAL METHOD: ABNORMAL
EOSINOPHIL # BLD AUTO: 0.1 K/UL (ref 0–0.5)
EOSINOPHIL # BLD AUTO: 0.1 K/UL (ref 0–0.5)
EOSINOPHIL NFR BLD: 0.3 % (ref 0–8)
EOSINOPHIL NFR BLD: 0.5 % (ref 0–8)
ERYTHROCYTE [DISTWIDTH] IN BLOOD BY AUTOMATED COUNT: 15.9 % (ref 11.5–14.5)
ERYTHROCYTE [DISTWIDTH] IN BLOOD BY AUTOMATED COUNT: 15.9 % (ref 11.5–14.5)
EST. GFR  (NO RACE VARIABLE): 45.8 ML/MIN/1.73 M^2
GIANT PLATELETS BLD QL SMEAR: PRESENT
GLUCOSE SERPL-MCNC: 85 MG/DL (ref 70–110)
HCT VFR BLD AUTO: 45.6 % (ref 37–48.5)
HCT VFR BLD AUTO: 46.7 % (ref 37–48.5)
HGB BLD-MCNC: 14.3 G/DL (ref 12–16)
HGB BLD-MCNC: 14.5 G/DL (ref 12–16)
HYPOCHROMIA BLD QL SMEAR: ABNORMAL
IMM GRANULOCYTES # BLD AUTO: 0.15 K/UL (ref 0–0.04)
IMM GRANULOCYTES # BLD AUTO: 0.2 K/UL (ref 0–0.04)
IMM GRANULOCYTES NFR BLD AUTO: 0.6 % (ref 0–0.5)
IMM GRANULOCYTES NFR BLD AUTO: 0.7 % (ref 0–0.5)
LYMPHOCYTES # BLD AUTO: 1.1 K/UL (ref 1–4.8)
LYMPHOCYTES # BLD AUTO: 1.2 K/UL (ref 1–4.8)
LYMPHOCYTES NFR BLD: 4.2 % (ref 18–48)
LYMPHOCYTES NFR BLD: 4.9 % (ref 18–48)
MAGNESIUM SERPL-MCNC: 2.1 MG/DL (ref 1.6–2.6)
MCH RBC QN AUTO: 28.3 PG (ref 27–31)
MCH RBC QN AUTO: 28.5 PG (ref 27–31)
MCHC RBC AUTO-ENTMCNC: 31 G/DL (ref 32–36)
MCHC RBC AUTO-ENTMCNC: 31.4 G/DL (ref 32–36)
MCV RBC AUTO: 91 FL (ref 82–98)
MCV RBC AUTO: 91 FL (ref 82–98)
MONOCYTES # BLD AUTO: 1.7 K/UL (ref 0.3–1)
MONOCYTES # BLD AUTO: 1.7 K/UL (ref 0.3–1)
MONOCYTES NFR BLD: 6.4 % (ref 4–15)
MONOCYTES NFR BLD: 7.2 % (ref 4–15)
NEUTROPHILS # BLD AUTO: 20.6 K/UL (ref 1.8–7.7)
NEUTROPHILS # BLD AUTO: 23.8 K/UL (ref 1.8–7.7)
NEUTROPHILS NFR BLD: 86.5 % (ref 38–73)
NEUTROPHILS NFR BLD: 88.1 % (ref 38–73)
NRBC BLD-RTO: 0 /100 WBC
NRBC BLD-RTO: 0 /100 WBC
PATH REV BLD -IMP: NORMAL
PHOSPHATE SERPL-MCNC: 4.2 MG/DL (ref 2.7–4.5)
PLATELET # BLD AUTO: 246 K/UL (ref 150–450)
PLATELET # BLD AUTO: 251 K/UL (ref 150–450)
PLATELET BLD QL SMEAR: ABNORMAL
PMV BLD AUTO: 11.5 FL (ref 9.2–12.9)
PMV BLD AUTO: 12 FL (ref 9.2–12.9)
POCT GLUCOSE: 122 MG/DL (ref 70–110)
POCT GLUCOSE: 91 MG/DL (ref 70–110)
POCT GLUCOSE: 92 MG/DL (ref 70–110)
POCT GLUCOSE: 93 MG/DL (ref 70–110)
POTASSIUM SERPL-SCNC: 3.7 MMOL/L (ref 3.5–5.1)
PROT SERPL-MCNC: 6.7 G/DL (ref 6–8.4)
RBC # BLD AUTO: 5.01 M/UL (ref 4–5.4)
RBC # BLD AUTO: 5.13 M/UL (ref 4–5.4)
SODIUM SERPL-SCNC: 140 MMOL/L (ref 136–145)
WBC # BLD AUTO: 23.85 K/UL (ref 3.9–12.7)
WBC # BLD AUTO: 27.05 K/UL (ref 3.9–12.7)

## 2022-09-27 PROCEDURE — 99900035 HC TECH TIME PER 15 MIN (STAT)

## 2022-09-27 PROCEDURE — 20600001 HC STEP DOWN PRIVATE ROOM

## 2022-09-27 PROCEDURE — 85060 PATHOLOGIST REVIEW: ICD-10-PCS | Mod: ,,, | Performed by: PATHOLOGY

## 2022-09-27 PROCEDURE — 63600175 PHARM REV CODE 636 W HCPCS: Performed by: STUDENT IN AN ORGANIZED HEALTH CARE EDUCATION/TRAINING PROGRAM

## 2022-09-27 PROCEDURE — 99233 PR SUBSEQUENT HOSPITAL CARE,LEVL III: ICD-10-PCS | Mod: 57,,, | Performed by: OBSTETRICS & GYNECOLOGY

## 2022-09-27 PROCEDURE — 85025 COMPLETE CBC W/AUTO DIFF WBC: CPT

## 2022-09-27 PROCEDURE — 99233 SBSQ HOSP IP/OBS HIGH 50: CPT | Mod: 57,,, | Performed by: OBSTETRICS & GYNECOLOGY

## 2022-09-27 PROCEDURE — 25000003 PHARM REV CODE 250: Performed by: NURSE PRACTITIONER

## 2022-09-27 PROCEDURE — 94799 UNLISTED PULMONARY SVC/PX: CPT

## 2022-09-27 PROCEDURE — 85060 BLOOD SMEAR INTERPRETATION: CPT | Mod: ,,, | Performed by: PATHOLOGY

## 2022-09-27 PROCEDURE — 63600175 PHARM REV CODE 636 W HCPCS

## 2022-09-27 PROCEDURE — 97165 OT EVAL LOW COMPLEX 30 MIN: CPT

## 2022-09-27 PROCEDURE — 27000646 HC AEROBIKA DEVICE

## 2022-09-27 PROCEDURE — 83735 ASSAY OF MAGNESIUM: CPT

## 2022-09-27 PROCEDURE — 84100 ASSAY OF PHOSPHORUS: CPT

## 2022-09-27 PROCEDURE — 85025 COMPLETE CBC W/AUTO DIFF WBC: CPT | Mod: 91 | Performed by: INTERNAL MEDICINE

## 2022-09-27 PROCEDURE — 36415 COLL VENOUS BLD VENIPUNCTURE: CPT

## 2022-09-27 PROCEDURE — 36415 COLL VENOUS BLD VENIPUNCTURE: CPT | Performed by: INTERNAL MEDICINE

## 2022-09-27 PROCEDURE — 97535 SELF CARE MNGMENT TRAINING: CPT

## 2022-09-27 PROCEDURE — 80053 COMPREHEN METABOLIC PANEL: CPT

## 2022-09-27 PROCEDURE — 63600175 PHARM REV CODE 636 W HCPCS: Performed by: NURSE PRACTITIONER

## 2022-09-27 PROCEDURE — 94761 N-INVAS EAR/PLS OXIMETRY MLT: CPT

## 2022-09-27 PROCEDURE — 94664 DEMO&/EVAL PT USE INHALER: CPT

## 2022-09-27 RX ORDER — ONDANSETRON 2 MG/ML
4 INJECTION INTRAMUSCULAR; INTRAVENOUS EVERY 6 HOURS PRN
Status: DISCONTINUED | OUTPATIENT
Start: 2022-09-27 | End: 2022-10-03 | Stop reason: HOSPADM

## 2022-09-27 RX ORDER — AMLODIPINE BESYLATE 5 MG/1
5 TABLET ORAL DAILY
Status: DISCONTINUED | OUTPATIENT
Start: 2022-09-27 | End: 2022-10-03 | Stop reason: HOSPADM

## 2022-09-27 RX ORDER — PANTOPRAZOLE SODIUM 40 MG/1
40 TABLET, DELAYED RELEASE ORAL DAILY
Status: DISCONTINUED | OUTPATIENT
Start: 2022-09-27 | End: 2022-10-03 | Stop reason: HOSPADM

## 2022-09-27 RX ORDER — ALLOPURINOL 100 MG/1
300 TABLET ORAL DAILY
Status: DISCONTINUED | OUTPATIENT
Start: 2022-09-27 | End: 2022-10-03 | Stop reason: HOSPADM

## 2022-09-27 RX ORDER — ONDANSETRON 2 MG/ML
4 INJECTION INTRAMUSCULAR; INTRAVENOUS DAILY PRN
Status: CANCELLED | OUTPATIENT
Start: 2022-09-27

## 2022-09-27 RX ORDER — SODIUM CHLORIDE 0.9 % (FLUSH) 0.9 %
10 SYRINGE (ML) INJECTION
Status: CANCELLED | OUTPATIENT
Start: 2022-09-27

## 2022-09-27 RX ORDER — HYDROMORPHONE HYDROCHLORIDE 1 MG/ML
0.2 INJECTION, SOLUTION INTRAMUSCULAR; INTRAVENOUS; SUBCUTANEOUS EVERY 5 MIN PRN
Status: CANCELLED | OUTPATIENT
Start: 2022-09-27

## 2022-09-27 RX ORDER — ONDANSETRON 4 MG/1
4 TABLET, ORALLY DISINTEGRATING ORAL EVERY 6 HOURS PRN
Status: DISCONTINUED | OUTPATIENT
Start: 2022-09-27 | End: 2022-10-03 | Stop reason: HOSPADM

## 2022-09-27 RX ADMIN — ENOXAPARIN SODIUM 40 MG: 100 INJECTION SUBCUTANEOUS at 05:09

## 2022-09-27 RX ADMIN — ONDANSETRON 4 MG: 2 INJECTION INTRAMUSCULAR; INTRAVENOUS at 12:09

## 2022-09-27 RX ADMIN — ONDANSETRON 4 MG: 2 INJECTION INTRAMUSCULAR; INTRAVENOUS at 09:09

## 2022-09-27 RX ADMIN — PANTOPRAZOLE SODIUM 40 MG: 40 TABLET, DELAYED RELEASE ORAL at 12:09

## 2022-09-27 RX ADMIN — ALLOPURINOL 300 MG: 300 TABLET ORAL at 12:09

## 2022-09-27 RX ADMIN — MORPHINE SULFATE 4 MG: 4 INJECTION INTRAVENOUS at 08:09

## 2022-09-27 RX ADMIN — AMLODIPINE BESYLATE 5 MG: 5 TABLET ORAL at 12:09

## 2022-09-27 RX ADMIN — MORPHINE SULFATE 4 MG: 4 INJECTION INTRAVENOUS at 09:09

## 2022-09-27 NOTE — PROGRESS NOTES
[facility]  General Surgery  Progress Note    Subjective:     History of Present Illness:  No notes on file    Post-Op Info:  Procedure(s) (LRB):  LAPAROTOMY, EXPLORATORY; excision of pelvic mass; possible bowel resection (N/A)         Interval History: Patient resting comfortably in bed this morning. Gastrografin challenge done yesterday showing contrast passing through to the rectum. Patient having bowel movements. Denies N/V this morning. Pain is well controlled.      Medications:  Continuous Infusions:   lactated ringers 100 mL/hr at 09/25/22 2150     Scheduled Meds:   enoxaparin  40 mg Subcutaneous Daily     PRN Meds:dextrose 10%, dextrose 10%, glucagon (human recombinant), insulin aspart U-100, morphine, morphine, sodium chloride 0.9%     Review of patient's allergies indicates:  No Known Allergies  Objective:     Vital Signs (Most Recent):  Temp: 98.5 °F (36.9 °C) (09/26/22 2320)  Pulse: 86 (09/26/22 2320)  Resp: 17 (09/26/22 2333)  BP: 129/73 (09/26/22 2320)  SpO2: (!) 94 % (09/26/22 2320)   Vital Signs (24h Range):  Temp:  [98.1 °F (36.7 °C)-98.5 °F (36.9 °C)] 98.5 °F (36.9 °C)  Pulse:  [80-94] 86  Resp:  [16-19] 17  SpO2:  [91 %-94 %] 94 %  BP: (129-139)/(73-87) 129/73     Weight: 73 kg (160 lb 15 oz)  Body mass index is 27.62 kg/m².    Intake/Output - Last 3 Shifts         09/25 0700  09/26 0659 09/26 0700  09/27 0659    I.V. (mL/kg)  2467.4 (33.8)    Total Intake(mL/kg)  2467.4 (33.8)    Urine (mL/kg/hr)  250 (0.1)    Drains  300    Stool  0    Total Output  550    Net  +1917.4          Urine Occurrence  3 x    Stool Occurrence 0 x 3 x            Physical Exam  Constitutional:       General: She is not in acute distress.     Appearance: Normal appearance.   HENT:      Head: Normocephalic and atraumatic.      Mouth/Throat:      Mouth: Mucous membranes are moist.   Eyes:      Extraocular Movements: Extraocular movements intact.      Conjunctiva/sclera: Conjunctivae normal.   Cardiovascular:       Rate and Rhythm: Normal rate and regular rhythm.   Pulmonary:      Effort: Pulmonary effort is normal. No respiratory distress.   Abdominal:      General: There is distension.      Tenderness: There is no abdominal tenderness.   Skin:     General: Skin is warm and dry.   Neurological:      General: No focal deficit present.      Mental Status: She is alert and oriented to person, place, and time. Mental status is at baseline.       Significant Labs:  I have reviewed all pertinent lab results within the past 24 hours.  CBC:   Recent Labs   Lab 09/26/22  0609   WBC 42.38*   RBC 5.13   HGB 14.9   HCT 46.0      MCV 90   MCH 29.0   MCHC 32.4       BMP:   Recent Labs   Lab 09/26/22  0609         K 4.1      CO2 19*   BUN 18   CREATININE 1.2   CALCIUM 9.2   MG 2.0         Significant Diagnostics:  I have reviewed all pertinent imaging results/findings within the past 24 hours.    Assessment/Plan:     * SBO (small bowel obstruction)  81 yo woman with pelvic mass, significant ventral hernia, and partial small bowel obstruction who presents from OSH for n/v/diarrhea.     - Tylenol, morphine PRN for pain  - D/c NGT, CLD   - NPO at midnight for OR tomorrow   - Daily CMP, CBC, Mag, Phos  - Maintenance fluids  - SSI  - Gyn/Onc consulted for abdominopelvic mass, appreciate recs  - Lovenox for DVT ppx        Bonifacio Simms MD  General Surgery  [facility]

## 2022-09-27 NOTE — PLAN OF CARE
END OF SHIFT NOTE  Pt rested well throughout shift, no distress at this time, complaints of pain and nausea, up with stand by assist, NGT in place,  VSS, bed in lowest position, side rails up x2. Bed alarm set, personal items within reach. DALTON Young RN         Problem: Adult Inpatient Plan of Care  Goal: Plan of Care Review  Outcome: Ongoing, Progressing  Goal: Patient-Specific Goal (Individualized)  Outcome: Ongoing, Progressing  Goal: Absence of Hospital-Acquired Illness or Injury  Outcome: Ongoing, Progressing  Goal: Optimal Comfort and Wellbeing  Outcome: Ongoing, Progressing  Goal: Readiness for Transition of Care  Outcome: Ongoing, Progressing     Problem: Diabetes Comorbidity  Goal: Blood Glucose Level Within Targeted Range  Outcome: Ongoing, Progressing     Problem: Fall Injury Risk  Goal: Absence of Fall and Fall-Related Injury  Outcome: Ongoing, Progressing     Problem: Pain Acute  Goal: Acceptable Pain Control and Functional Ability  Outcome: Ongoing, Progressing

## 2022-09-27 NOTE — PROGRESS NOTES
"Progress Note  Gynecology    Admit Date: 2022  LOS: 2    Reason for Admission:  SBO (small bowel obstruction)    SUBJECTIVE:     Mariaelena Sheffield is a 82 y.o.  with PMH significant for R breast CA (dx in  s/p chemo and radiation, denies ever having resection for this), DM, HLD, HTN who presented as a tx from an OSH 2/2 N/V/diarrhea x 2 days. At OSH she had a CT scan that was notable for a pelvic mass, a partial small bowel obstruction in the left upper hemiabdomen and inferior anterior abdominal wall, and herniation of colonic bowel loops in the anterior midline abdominal wall. NGT was placed and she was transferred to [facility] for general surgery admission.      Labs notable for CBC of 50/16/50/273. CMP with alk phos of 191, otherwise unremarkable, and UA with trace blood.      She states that she has been having intermittent abdominal pain off and on for approximately 4 months. She endorses somewhat decreased appetite over the past year as well, with associated weight loss.     Today, pt reports pain well controlled. Passing flatus and having Bms, last one this AM. Gastrograffin challenge done yesterday showing contrast passing through to the rectum. NGT in place, denies that it is bothersome.  Denies N/V, SOB, fever, cough.     PSH:  RITA (uncertain if ovaries were removed) in -2 for a "tumor"- states that she was not told this was cancerous. She was post-menopausal at the time of surgery.   Ex lap for bowel obstruction in   Incarcerated abdominal hernia repair in 2018.      GYN Hx;   x2  Denies ever having abnormal pap smears  Has not seen an GYN since her hysterectomy.  She denies ever having genetic testing following her breast CA dx or her hysterectomy.      Fam Hx:  Had a sister with breast cancer, uncertain age of dx    OBJECTIVE:     Vital Signs   Temp:  [98 °F (36.7 °C)-98.5 °F (36.9 °C)] 98 °F (36.7 °C)  Pulse:  [80-94] 88  Resp:  [16-19] 17  SpO2:  [91 %-94 %] 94 %  BP: " (129-144)/(73-87) 144/73      Intake/Output Summary (Last 24 hours) at 9/27/2022 0755  Last data filed at 9/27/2022 0600  Gross per 24 hour   Intake 1586.65 ml   Output 800 ml   Net 786.65 ml       Physical Exam:  Gen: A&Ox3, NAD  NGT in place draining greenish/clear contents (<100cc in canister)  CV: regular rate  Pulm: LCTAB, normal respiratory effort  Abdomen is soft with a large midline hernia, A very large fixed pelvic/abdominal mass is also able to be palpated from the pubic symphysis to the ASIS bilaterally and above the umbilicus. Non tender to palpation with no rebound or guarding.  Prior midline vertical abdominal incision noted.     Genitourinary:    Genitourinary Comments: BME reveals large mass filling the cul-de-sac with no cervix able to palpated, feels c/w a blind vaginal cuff.  Ext: PPP, no peripheral edema, TEDs/SCDs in place      Laboratory:  Recent Results (from the past 24 hour(s))   POCT glucose    Collection Time: 09/26/22 12:04 PM   Result Value Ref Range    POCT Glucose 91 70 - 110 mg/dL   POCT glucose    Collection Time: 09/26/22  5:33 PM   Result Value Ref Range    POCT Glucose 101 70 - 110 mg/dL   POCT glucose    Collection Time: 09/27/22 12:51 AM   Result Value Ref Range    POCT Glucose 92 70 - 110 mg/dL   CBC auto differential    Collection Time: 09/27/22  4:11 AM   Result Value Ref Range    WBC 27.05 (H) 3.90 - 12.70 K/uL    RBC 5.13 4.00 - 5.40 M/uL    Hemoglobin 14.5 12.0 - 16.0 g/dL    Hematocrit 46.7 37.0 - 48.5 %    MCV 91 82 - 98 fL    MCH 28.3 27.0 - 31.0 pg    MCHC 31.0 (L) 32.0 - 36.0 g/dL    RDW 15.9 (H) 11.5 - 14.5 %    Platelets 246 150 - 450 K/uL    MPV 11.5 9.2 - 12.9 fL    Immature Granulocytes 0.7 (H) 0.0 - 0.5 %    Gran # (ANC) 23.8 (H) 1.8 - 7.7 K/uL    Immature Grans (Abs) 0.20 (H) 0.00 - 0.04 K/uL    Lymph # 1.1 1.0 - 4.8 K/uL    Mono # 1.7 (H) 0.3 - 1.0 K/uL    Eos # 0.1 0.0 - 0.5 K/uL    Baso # 0.09 0.00 - 0.20 K/uL    nRBC 0 0 /100 WBC    Gran % 88.1 (H) 38.0 -  73.0 %    Lymph % 4.2 (L) 18.0 - 48.0 %    Mono % 6.4 4.0 - 15.0 %    Eosinophil % 0.3 0.0 - 8.0 %    Basophil % 0.3 0.0 - 1.9 %    Differential Method Automated    Comprehensive metabolic panel    Collection Time: 09/27/22  4:11 AM   Result Value Ref Range    Sodium 140 136 - 145 mmol/L    Potassium 3.7 3.5 - 5.1 mmol/L    Chloride 107 95 - 110 mmol/L    CO2 21 (L) 23 - 29 mmol/L    Glucose 85 70 - 110 mg/dL    BUN 22 8 - 23 mg/dL    Creatinine 1.2 0.5 - 1.4 mg/dL    Calcium 9.2 8.7 - 10.5 mg/dL    Total Protein 6.7 6.0 - 8.4 g/dL    Albumin 3.6 3.5 - 5.2 g/dL    Total Bilirubin 0.7 0.1 - 1.0 mg/dL    Alkaline Phosphatase 167 (H) 55 - 135 U/L    AST 12 10 - 40 U/L    ALT <5 (L) 10 - 44 U/L    Anion Gap 12 8 - 16 mmol/L    eGFR 45.8 (A) >60 mL/min/1.73 m^2   Magnesium    Collection Time: 09/27/22  4:11 AM   Result Value Ref Range    Magnesium 2.1 1.6 - 2.6 mg/dL   Phosphorus    Collection Time: 09/27/22  4:11 AM   Result Value Ref Range    Phosphorus 4.2 2.7 - 4.5 mg/dL   POCT glucose    Collection Time: 09/27/22  7:19 AM   Result Value Ref Range    POCT Glucose 91 70 - 110 mg/dL       Diagnostic Results:  CT renal study  Impression:     Large pelvic mass.  Suspect malignancy.  Correlate clinically     Partial small bowel obstruction in the left upper hemiabdomen and inferior anterior abdominal wall.  Strangulation of the bowel loops in the subcutaneous fat of the anterior abdominal wall hernia not excluded.  Correlate clinically.     Herniation of colonic bowel loops in the anterior midline abdominal wall.    TVUS:   FINDINGS:  Additional history: Reported history of hysterectomy.     Limited sonographic imaging was performed in the region of the midline pelvis.  There is a large solid pelvic mass demonstrating increased vascularity measuring approximately 20.6 x 17.6 x 18.8 cm.  The ovaries are not definitively visualized exam.  No pelvic free fluid is seen.     Impression:  Large solid pelvic mass demonstrating  increased vascularity, better evaluated on prior CT 2022.  Etiology is nonspecific however concerning for malignancy  ASSESSMENT/PLAN:     Active Hospital Problems    Diagnosis  POA    *SBO (small bowel obstruction) [K56.609]  Yes      Resolved Hospital Problems   No resolved problems to display.       Assessment: 82 y.o.  with large pelvic mass.     Plan:   Pelvic Mass:  - patient presents as a transfer for SBO and found to have a large pelvic mass ( [id number]) with heterogenous foci. Mass is easily palpated on exam as well as a large midline hernia  -  elevated at 154   - TVUS and CT as above   - CBC  concerning for WBC of 50, unlikely for this mass to be a TOA, could be reactive however recommend followup cbc today and possibly testing for cdiff if diarrhea continues today.   - Patient has a documented history of RITA, however we are uncertain of the status of her ovaries and have no records of this surgery. This mass is not behaving like an ovarian cancer and has been present since at least 2018 (was documented as a uterus with multiple fibroids at that time, however this was many years after her hysterectomy) but has grown significantly since this time. No evidence of extrapelvic disease/ omental caking on scan from 2018 or this admit.   - Recommend for primary team to get CEA and CA 19-9, and possible biopsies to further characterize possible origin of mass.   - Gyn Onc will plan to continue to follow along  -- Will consent today for ex lap and other indicated procedures per gyn/onc  --To OR tomorrow, gyn/onc to be available throughout procedure      SBO  - Management per primary team as follows  - - Tylenol, morphine PRN for pain  - D/c NGT, CLD          - NPO at midnight for OR tomorrow   - Daily CMP, CBC, Mag, Phos  - Maintenance fluids  - SSI  - Gyn/Onc consulted for abdominopelvic mass, appreciate recs  - Lovenox for DVT ppx     All other medical problems per primary team.     Kirsty  MD [place]  OBGYN PGY-2

## 2022-09-27 NOTE — PT/OT/SLP EVAL
"Occupational Therapy   Evaluation and tx    Name: Mariaelena Sheffield  MRN: [id number]  Admitting Diagnosis:  SBO (small bowel obstruction)  Recent Surgery: Procedure(s) (LRB):  LAPAROTOMY, EXPLORATORY; excision of pelvic mass; possible bowel resection (N/A)  EXCISION, MASS, PELVIS (N/A)  EXCISION, SMALL INTESTINE (N/A)      Recommendations:     Discharge Recommendations: other (see comments) (TBD after surgery)  Discharge Equipment Recommendations:  none  Barriers to discharge:  None    Assessment:     Mariaelena Sheffield is a 82 y.o. female with a medical diagnosis of SBO (small bowel obstruction).  She presents with excellent AFL function but relies on IV pole for stability during ambulation. Pt to be seen for maintaining/increasing ADL independence and endurance.  Performance deficits affecting function: gait instability, impaired endurance.  Recommending HHOT if needed once medically appropriate for discharge to increase maximal independence, reduce burden of care, and ensure safety. Patient would benefit from continued skilled acute OT 2x/wk to improve functional mobility, increase independence with ADLs, and address established goals.    Rehab Prognosis: Good; patient would benefit from acute skilled OT services to address these deficits and reach maximum level of function.       Plan:     Patient to be seen 2 x/week to address the above listed problems via self-care/home management, therapeutic activities, therapeutic exercises  Plan of Care Expires: 10/11/22  Plan of Care Reviewed with: patient    Subjective   " I keep moving"  Chief Complaint: none  Patient/Family Comments/goals: for a successful surgery.    Occupational Profile:  Living Environment: Pt lives with son in [facility] with 2 LANDON, right rail and tub shower combo with rail.  Previous level of function: Independent  Roles and Routines: retired  Equipment Used at Home:  walker, standard  Assistance upon Discharge: TBD    Pain/Comfort:  Pain Rating 1: " 0/10    Patients cultural, spiritual, Cheondoism conflicts given the current situation: no    Objective:     Communicated with: nurse prior to session.  Patient found up in chair with peripheral IV upon OT entry to room.    General Precautions: Standard,     Orthopedic Precautions:N/A   Braces: N/A  Respiratory Status: Room air    Occupational Performance:    Bed Mobility:    Pt OOB    Functional Mobility/Transfers:  Patient completed Sit <> Stand Transfer with modified independence  with  support from IV pole.   Patient completed Toilet Transfer Step Transfer technique with modified independence with  support from IV pole.  Functional Mobility: Pt able to ambulate in room with Mod I holding onto IV pole.    Activities of Daily Living:  Feeding:  modified independence with set up.  Grooming: modified independence standing at sink.  Lower Body Dressing: modified independence for B socks.  Toileting: modified independence for clothing management and hygiene.    Cognitive/Visual Perceptual:  Cognitive/Psychosocial Skills:   WNL for all  Visual/Perceptual:      -Intact   WNL    Physical Exam: No abnormalities noted. Pt had goof posture and balance while ambulating.      AMPAC 6 Click ADL:  AMPAC Total Score: 24    Treatment & Education:  Role of OT and POC  Safety  ADL retraining  Functional mobility training  Discharge planning  Importance of EOB/OOB activity      Patient left up in chair with all lines intact, call button in reach, and anesthesia physician  present.    GOALS:   Multidisciplinary Problems       Occupational Therapy Goals          Problem: Occupational Therapy    Goal Priority Disciplines Outcome Interventions   Occupational Therapy Goal     OT, PT/OT Ongoing, Progressing    Description: Goals to be met by: 10/11/22     Patient will increase functional independence with ADLs by performing:    UE Dressing with [place].  LE Dressing with [place].  Grooming while standing at sink with  [place].  Toileting from toilet with [place] for hygiene and clothing management.   Step transfer with [place]  Toilet transfer to toilet with [place].                         History:     Past Medical History:   Diagnosis Date    Cancer     breast cancer R     Diabetes mellitus     GERD (gastroesophageal reflux disease)     Gout     Hypercholesteremia     Hypertension          Past Surgical History:   Procedure Laterality Date    ABDOMINAL SURGERY      BREAST BIOPSY      BREAST SURGERY Right     Breast biopsy    CATARACT EXTRACTION, BILATERAL      HERNIA REPAIR      LYSIS OF ADHESIONS N/A 10/25/2018    Procedure: LYSIS, ADHESIONS;  Surgeon: Kevin Caldwell MD;  Location: [facility] OR;  Service: General;  Laterality: N/A;    REPAIR OF INCARCERATED INCISIONAL HERNIA WITHOUT HISTORY OF PRIOR REPAIR N/A 10/25/2018    Procedure: REPAIR, HERNIA, INCISIONAL, INCARCERATED, WITHOUT HISTORY OF PRIOR REPAIR;  Surgeon: Kevin Caldwell MD;  Location: [facility] OR;  Service: General;  Laterality: N/A;       Time Tracking:     OT Date of Treatment: 09/27/22  OT Start Time: 1445  OT Stop Time: 1509  OT Total Time (min): 24 min    Billable Minutes:Evaluation 8  Self Care/Home Management 16    9/27/2022

## 2022-09-27 NOTE — ASSESSMENT & PLAN NOTE
83 yo woman with pelvic mass, significant ventral hernia, and partial small bowel obstruction who presents from OSH for n/v/diarrhea.     - Tylenol, morphine PRN for pain  - D/c NGT, CLD   - NPO at midnight for OR tomorrow   - Daily CMP, CBC, Mag, Phos  - Maintenance fluids  - SSI  - Gyn/Onc consulted for abdominopelvic mass, appreciate recs  - Lovenox for DVT ppx

## 2022-09-27 NOTE — SUBJECTIVE & OBJECTIVE
Interval History: Patient resting comfortably in bed this morning. Gastrografin challenge done yesterday showing contrast passing through to the rectum. Patient having bowel movements. Denies N/V this morning. Pain is well controlled.      Medications:  Continuous Infusions:   lactated ringers 100 mL/hr at 09/25/22 2150     Scheduled Meds:   enoxaparin  40 mg Subcutaneous Daily     PRN Meds:dextrose 10%, dextrose 10%, glucagon (human recombinant), insulin aspart U-100, morphine, morphine, sodium chloride 0.9%     Review of patient's allergies indicates:  No Known Allergies  Objective:     Vital Signs (Most Recent):  Temp: 98.5 °F (36.9 °C) (09/26/22 2320)  Pulse: 86 (09/26/22 2320)  Resp: 17 (09/26/22 2333)  BP: 129/73 (09/26/22 2320)  SpO2: (!) 94 % (09/26/22 2320)   Vital Signs (24h Range):  Temp:  [98.1 °F (36.7 °C)-98.5 °F (36.9 °C)] 98.5 °F (36.9 °C)  Pulse:  [80-94] 86  Resp:  [16-19] 17  SpO2:  [91 %-94 %] 94 %  BP: (129-139)/(73-87) 129/73     Weight: 73 kg (160 lb 15 oz)  Body mass index is 27.62 kg/m².    Intake/Output - Last 3 Shifts         09/25 0700  09/26 0659 09/26 0700 09/27 0659    I.V. (mL/kg)  2467.4 (33.8)    Total Intake(mL/kg)  2467.4 (33.8)    Urine (mL/kg/hr)  250 (0.1)    Drains  300    Stool  0    Total Output  550    Net  +1917.4          Urine Occurrence  3 x    Stool Occurrence 0 x 3 x            Physical Exam  Constitutional:       General: She is not in acute distress.     Appearance: Normal appearance.   HENT:      Head: Normocephalic and atraumatic.      Mouth/Throat:      Mouth: Mucous membranes are moist.   Eyes:      Extraocular Movements: Extraocular movements intact.      Conjunctiva/sclera: Conjunctivae normal.   Cardiovascular:      Rate and Rhythm: Normal rate and regular rhythm.   Pulmonary:      Effort: Pulmonary effort is normal. No respiratory distress.   Abdominal:      General: There is distension.      Tenderness: There is no abdominal tenderness.   Skin:      General: Skin is warm and dry.   Neurological:      General: No focal deficit present.      Mental Status: She is alert and oriented to person, place, and time. Mental status is at baseline.       Significant Labs:  I have reviewed all pertinent lab results within the past 24 hours.  CBC:   Recent Labs   Lab 09/26/22  0609   WBC 42.38*   RBC 5.13   HGB 14.9   HCT 46.0      MCV 90   MCH 29.0   MCHC 32.4       BMP:   Recent Labs   Lab 09/26/22  0609         K 4.1      CO2 19*   BUN 18   CREATININE 1.2   CALCIUM 9.2   MG 2.0         Significant Diagnostics:  I have reviewed all pertinent imaging results/findings within the past 24 hours.

## 2022-09-27 NOTE — PLAN OF CARE
[facility]  Discharge Reassessment    Primary Care Provider: Lui Blue MD    Expected Discharge Date: 10/4/2022    Reassessment (most recent)       Discharge Reassessment - 09/27/22 1020          Discharge Reassessment    Assessment Type Discharge Planning Brief Assessment     Did the patient's condition or plan change since previous assessment? No     Discharge Plan discussed with: Patient     Communicated MING with patient/caregiver Yes     Discharge Plan A Home Health     DME Needed Upon Discharge  other (see comments)   Unknown at this time    Discharge Barriers Identified None     Why the patient remains in the hospital Requires continued medical care        Post-Acute Status    Hospital Resources/Appts/Education Provided Provided patient/caregiver with written discharge plan information     Discharge Delays None known at this time                     Pt not ready for discharge due to: Upcoming surgery  SW will remain available for families in [facility].  Currently pt has d/c plans in progress at this time.        Mary Paula LCSW  Case Management/[facility]  [phone]

## 2022-09-27 NOTE — ANESTHESIA PREPROCEDURE EVALUATION
[facility]  Anesthesia Pre-Operative Evaluation         Patient Name/: Mariaelena Sheffield, 1940  MRN: [id number]    SUBJECTIVE:     Pre-operative evaluation for Procedure(s) (LRB):  LAPAROTOMY, EXPLORATORY; excision of pelvic mass; possible bowel resection (N/A)  EXCISION, MASS, PELVIS (N/A)  EXCISION, SMALL INTESTINE (N/A)     2022    Mariaelena Sheffield is a 82 y.o. female w/ a significant PMHx of R breast CA (dx in  s/p chemo and radiation, denies ever having resection for this), DM2, HLD, HTN who presented as a tx from an OSH 2/2 N/V/diarrhea x 2 days. CT scan that at OSH notable for a pelvic mass, a partial small bowel obstruction in the left upper hemiabdomen and inferior anterior abdominal wall, and herniation of colonic bowel loops in the anterior midline abdominal wall. Now planning for ex lap.     Patient now presents for the above procedure(s).      Prev airway: None documented.    LDA:        Peripheral IV - Single Lumen 22 22 G Anterior;Left Forearm (Active)   Site Assessment Clean;Dry;Intact 22   Extremity Assessment Distal to IV No abnormal discoloration 22   Line Status Flushed;Infusing 22   Dressing Status Clean;Dry;Intact 22 07   Dressing Intervention Integrity maintained 22   Dressing Change Due 10/01/22 09/27/22 0745   Site Change Due 10/01/22 09/27/22 0745   Reason Not Rotated Not due 22   Number of days: 0       Drips:    lactated ringers 100 mL/hr at 22 2150       Patient Active Problem List   Diagnosis    Ventral hernia with obstruction and without gangrene    Acute idiopathic gout involving toe of right foot    Anemia    BMI 25.0-25.9,adult    Chronic pain of right knee    Colon polyps    Hyperlipidemia    Essential hypertension    Type 2 diabetes mellitus    Personal history of breast cancer    Viral gastroenteritis    Incisional hernia with bowel obstruction     Ventral hernia with bowel obstruction    Hypokalemia    S/P hernia surgery    SBO (small bowel obstruction)       Review of patient's allergies indicates:  No Known Allergies    Current Inpatient Medications:    allopurinoL  300 mg Oral Daily    amLODIPine  5 mg Oral Daily    enoxaparin  40 mg Subcutaneous Daily    pantoprazole  40 mg Oral Daily       No current facility-administered medications on file prior to encounter.     Current Outpatient Medications on File Prior to Encounter   Medication Sig Dispense Refill    allopurinoL (ZYLOPRIM) 300 MG tablet Take 300 mg by mouth once daily.      amLODIPine (NORVASC) 5 MG tablet Take 5 mg by mouth once daily.      atorvastatin (LIPITOR) 20 MG tablet Take 20 mg by mouth every evening.       blood sugar diagnostic Strp TEST ONE TO TWO TIMES DAILY (NEED MD APPOINTMENT)      blood-glucose meter Misc Test BID      colchicine 0.6 mg tablet Take 0.6 mg by mouth once daily.      dicyclomine (BENTYL) 10 MG capsule Take 10 mg by mouth 3 (three) times daily as needed.      ferrous sulfate 325 (65 FE) MG EC tablet Take 325 mg by mouth once daily.      lancets Misc TEST BLOOD SUGAR  1  TO  2  TIMES  PER  DAY  ( NEED MD APPOINTMENT  )      metFORMIN (GLUCOPHAGE) 500 MG tablet Take 1 tablet by mouth daily with dinner or evening meal.      nozaseptin (NOZIN) nasal  1 each by Each Nare route 2 (two) times daily. 1 applicator 0    omeprazole (PRILOSEC) 40 MG capsule Take 40 mg by mouth once daily.       polyethylene glycol (GLYCOLAX) 17 gram PwPk Take 17 g by mouth daily (Patient taking differently: Take 17 g by mouth once daily.) 28 packet 0    lisinopril 10 MG tablet Take 1 tablet (10 mg total) by mouth once daily. 90 tablet 3       Past Surgical History:   Procedure Laterality Date    ABDOMINAL SURGERY      BREAST BIOPSY      BREAST SURGERY Right     Breast biopsy    CATARACT EXTRACTION, BILATERAL      HERNIA REPAIR      LYSIS OF ADHESIONS N/A  10/25/2018    Procedure: LYSIS, ADHESIONS;  Surgeon: Kevin Caldwell MD;  Location: [facility] OR;  Service: General;  Laterality: N/A;    REPAIR OF INCARCERATED INCISIONAL HERNIA WITHOUT HISTORY OF PRIOR REPAIR N/A 10/25/2018    Procedure: REPAIR, HERNIA, INCISIONAL, INCARCERATED, WITHOUT HISTORY OF PRIOR REPAIR;  Surgeon: Kevin Caldwell MD;  Location: [facility] OR;  Service: General;  Laterality: N/A;       Social History:  Tobacco Use: Low Risk     Smoking Tobacco Use: Never    Smokeless Tobacco Use: Never    Passive Exposure: Not on file       Alcohol Use: Not on file       OBJECTIVE:     Vital Signs Range:  BMI Readings from Last 1 Encounters:   09/25/22 27.62 kg/m²       Temp:  [36.4 °C (97.6 °F)-36.7 °C (98 °F)]   Pulse:  [88-93]   Resp:  [16-17]   BP: (128-144)/(73-84)   SpO2:  [91 %-95 %]        Significant Labs:        Component Value Date/Time    WBC 27.05 (H) 09/27/2022 0411    HGB 14.5 09/27/2022 0411    HCT 46.7 09/27/2022 0411     09/27/2022 0411     09/27/2022 0411    K 3.7 09/27/2022 0411     09/27/2022 0411    CO2 21 (L) 09/27/2022 0411    GLU 85 09/27/2022 0411    BUN 22 09/27/2022 0411    CREATININE 1.2 09/27/2022 0411    MG 2.1 09/27/2022 0411    PHOS 4.2 09/27/2022 0411    CALCIUM 9.2 09/27/2022 0411    ALBUMIN 3.6 09/27/2022 0411    PROT 6.7 09/27/2022 0411    ALKPHOS 167 (H) 09/27/2022 0411    BILITOT 0.7 09/27/2022 0411    AST 12 09/27/2022 0411    ALT <5 (L) 09/27/2022 0411    HGBA1C 6.0 (H) 06/30/2022 1151    HGBA1C 5.3 10/12/2018 0636        Please see Results Review for additional labs.     Diagnostic Studies: No relevant studies.    EKG:   Results for orders placed or performed during the hospital encounter of 10/11/18   EKG 12-lead    Collection Time: 10/11/18  8:09 PM    Narrative    Test Reason : R11.2,  Blood Pressure : / mmHG  Vent. Rate : 083 BPM     Atrial Rate : 083 BPM     P-R Int : 138 ms          QRS Dur : 080 ms      QT Int : 382 ms       P-R-T Axes : 005 -67 058  degrees     QTc Int : 448 ms    Normal sinus rhythm  Normal ECG  When compared with ECG of 30-MAR-2018 19:44,  No significant change was found  Confirmed by MYRON PATEL, ARLETTE RYAN ([id number]) on 10/14/2018 7:41:31 AM    Referred By: SHY   SELF           Confirmed By:ARLETTE SANCHES MD       ECHO:  No results found for this or any previous visit.        ASSESSMENT/PLAN:       Pre-op Assessment    I have reviewed the Patient Summary Reports.     I have reviewed the Nursing Notes.    I have reviewed the Medications.     Review of Systems  Anesthesia Hx:  No problems with previous Anesthesia  Denies Family Hx of Anesthesia complications.   Denies Personal Hx of Anesthesia complications.   Social:  Non-Smoker, Social Alcohol Use    Hematology/Oncology:  Hematology Normal      Oncology Comments: Breast ca    EENT/Dental:EENT/Dental Normal   Cardiovascular:   Hypertension Denies MI.   Denies CABG/stent.   Denies CHF. hyperlipidemia ECG has been reviewed.    Pulmonary:   Denies COPD.  Denies Asthma.  Denies Sleep Apnea.    Renal/:  Renal/ Normal     Hepatic/GI:   GERD Denies Liver Disease. Denies Hepatitis. Incisional hernia   Musculoskeletal:   Gout   Neurological:   Denies CVA. Denies Seizures.    Endocrine:   Diabetes, well controlled, type 2 Denies Hypothyroidism. Denies Hyperthyroidism.        Physical Exam  General: Well nourished, Cooperative, Alert and Oriented    Airway:  Mallampati: III / I/ II  Mouth Opening: Normal  TM Distance: Normal  Tongue: Normal  Neck ROM: Normal ROM    Dental:  Dentures, Periodontal disease        Anesthesia Plan  Type of Anesthesia, risks & benefits discussed:    Anesthesia Type: Gen ETT, Regional  Intra-op Monitoring Plan: Standard ASA Monitors and Art Line  Post Op Pain Control Plan: multimodal analgesia and IV/PO Opioids PRN  Induction:  IV  Airway Plan: Direct and Video, Post-Induction  Informed Consent: Informed consent signed with the Patient and all parties understand the  risks and agree with anesthesia plan.  All questions answered.   ASA Score: 3  Day of Surgery Review of History & Physical: H&P Update referred to the surgeon/provider.    Ready For Surgery From Anesthesia Perspective.     .

## 2022-09-27 NOTE — PLAN OF CARE
Problem: Occupational Therapy  Goal: Occupational Therapy Goal  Description: Goals to be met by: 10/11/22     Patient will increase functional independence with ADLs by performing:    UE Dressing with [place].  LE Dressing with [place].  Grooming while standing at sink with [place].  Toileting from toilet with [place] for hygiene and clothing management.   Step transfer with [place]  Toilet transfer to toilet with [place].    Outcome: Ongoing, Progressing   Pts goals are set.

## 2022-09-27 NOTE — PLAN OF CARE
POC reviewed with pt, verbalized understanding. AAOx4, VSS on RA. NGT removed. MIVF infusing at 100cc/hr. Up to bathroom with standby assist. Voids without difficulty, adequate UOP.  ACCU checks Q6, no coverage needed. Regular diet, tolerating well. Up in chair today. Surgery scheduled for tomorrow.    All needs met, no complaints offered at this time. Bed locked in lowest position, call bell within reach. Frequent rounds made for safety.      Problem: Adult Inpatient Plan of Care  Goal: Plan of Care Review  Outcome: Ongoing, Progressing  Goal: Patient-Specific Goal (Individualized)  Outcome: Ongoing, Progressing  Goal: Absence of Hospital-Acquired Illness or Injury  Outcome: Ongoing, Progressing  Goal: Optimal Comfort and Wellbeing  Outcome: Ongoing, Progressing  Goal: Readiness for Transition of Care  Outcome: Ongoing, Progressing     Problem: Diabetes Comorbidity  Goal: Blood Glucose Level Within Targeted Range  Outcome: Ongoing, Progressing

## 2022-09-28 ENCOUNTER — ANESTHESIA (OUTPATIENT)
Dept: SURGERY | Facility: HOSPITAL | Age: 82
DRG: 749 | End: 2022-09-28
Payer: MEDICARE

## 2022-09-28 PROBLEM — D72.823 LEUKEMOID REACTION: Status: ACTIVE | Noted: 2022-09-28

## 2022-09-28 LAB
ABO + RH BLD: NORMAL
ALBUMIN SERPL BCP-MCNC: 1.9 G/DL (ref 3.5–5.2)
ALBUMIN SERPL BCP-MCNC: 1.9 G/DL (ref 3.5–5.2)
ALBUMIN SERPL BCP-MCNC: 3.5 G/DL (ref 3.5–5.2)
ALP SERPL-CCNC: 167 U/L (ref 55–135)
ALP SERPL-CCNC: 88 U/L (ref 55–135)
ALP SERPL-CCNC: 88 U/L (ref 55–135)
ALT SERPL W/O P-5'-P-CCNC: <5 U/L (ref 10–44)
ANION GAP SERPL CALC-SCNC: 11 MMOL/L (ref 8–16)
ANION GAP SERPL CALC-SCNC: 9 MMOL/L (ref 8–16)
ANION GAP SERPL CALC-SCNC: 9 MMOL/L (ref 8–16)
ANISOCYTOSIS BLD QL SMEAR: SLIGHT
ANISOCYTOSIS BLD QL SMEAR: SLIGHT
AST SERPL-CCNC: 11 U/L (ref 10–40)
AST SERPL-CCNC: 12 U/L (ref 10–40)
AST SERPL-CCNC: 12 U/L (ref 10–40)
BASOPHILS # BLD AUTO: 0.05 K/UL (ref 0–0.2)
BASOPHILS # BLD AUTO: 0.12 K/UL (ref 0–0.2)
BASOPHILS # BLD AUTO: 0.12 K/UL (ref 0–0.2)
BASOPHILS NFR BLD: 0.2 % (ref 0–1.9)
BASOPHILS NFR BLD: 0.2 % (ref 0–1.9)
BASOPHILS NFR BLD: 0.3 % (ref 0–1.9)
BILIRUB SERPL-MCNC: 0.6 MG/DL (ref 0.1–1)
BILIRUB SERPL-MCNC: 1.4 MG/DL (ref 0.1–1)
BILIRUB SERPL-MCNC: 1.4 MG/DL (ref 0.1–1)
BLD GP AB SCN CELLS X3 SERPL QL: NORMAL
BLD PROD TYP BPU: NORMAL
BLD PROD TYP BPU: NORMAL
BLOOD UNIT EXPIRATION DATE: NORMAL
BLOOD UNIT EXPIRATION DATE: NORMAL
BLOOD UNIT TYPE CODE: 5100
BLOOD UNIT TYPE CODE: 5100
BLOOD UNIT TYPE: NORMAL
BLOOD UNIT TYPE: NORMAL
BUN SERPL-MCNC: 13 MG/DL (ref 8–23)
BUN SERPL-MCNC: 13 MG/DL (ref 8–23)
BUN SERPL-MCNC: 20 MG/DL (ref 8–23)
CALCIUM SERPL-MCNC: 7.2 MG/DL (ref 8.7–10.5)
CALCIUM SERPL-MCNC: 7.2 MG/DL (ref 8.7–10.5)
CALCIUM SERPL-MCNC: 9.4 MG/DL (ref 8.7–10.5)
CHLORIDE SERPL-SCNC: 106 MMOL/L (ref 95–110)
CHLORIDE SERPL-SCNC: 113 MMOL/L (ref 95–110)
CHLORIDE SERPL-SCNC: 113 MMOL/L (ref 95–110)
CO2 SERPL-SCNC: 17 MMOL/L (ref 23–29)
CO2 SERPL-SCNC: 17 MMOL/L (ref 23–29)
CO2 SERPL-SCNC: 22 MMOL/L (ref 23–29)
CODING SYSTEM: NORMAL
CODING SYSTEM: NORMAL
CREAT SERPL-MCNC: 0.7 MG/DL (ref 0.5–1.4)
CREAT SERPL-MCNC: 0.7 MG/DL (ref 0.5–1.4)
CREAT SERPL-MCNC: 1 MG/DL (ref 0.5–1.4)
DIFFERENTIAL METHOD: ABNORMAL
DISPENSE STATUS: NORMAL
DISPENSE STATUS: NORMAL
EOSINOPHIL # BLD AUTO: 0.1 K/UL (ref 0–0.5)
EOSINOPHIL # BLD AUTO: 0.1 K/UL (ref 0–0.5)
EOSINOPHIL # BLD AUTO: 0.2 K/UL (ref 0–0.5)
EOSINOPHIL NFR BLD: 0.1 % (ref 0–8)
EOSINOPHIL NFR BLD: 0.1 % (ref 0–8)
EOSINOPHIL NFR BLD: 0.9 % (ref 0–8)
ERYTHROCYTE [DISTWIDTH] IN BLOOD BY AUTOMATED COUNT: 15.1 % (ref 11.5–14.5)
ERYTHROCYTE [DISTWIDTH] IN BLOOD BY AUTOMATED COUNT: 15.1 % (ref 11.5–14.5)
ERYTHROCYTE [DISTWIDTH] IN BLOOD BY AUTOMATED COUNT: 15.7 % (ref 11.5–14.5)
EST. GFR  (NO RACE VARIABLE): 57 ML/MIN/1.73 M^2
EST. GFR  (NO RACE VARIABLE): >60 ML/MIN/1.73 M^2
EST. GFR  (NO RACE VARIABLE): >60 ML/MIN/1.73 M^2
GLUCOSE SERPL-MCNC: 158 MG/DL (ref 70–110)
GLUCOSE SERPL-MCNC: 177 MG/DL (ref 70–110)
GLUCOSE SERPL-MCNC: 193 MG/DL (ref 70–110)
GLUCOSE SERPL-MCNC: 193 MG/DL (ref 70–110)
GLUCOSE SERPL-MCNC: 99 MG/DL (ref 70–110)
HCO3 UR-SCNC: 20.2 MMOL/L (ref 24–28)
HCO3 UR-SCNC: 20.9 MMOL/L (ref 24–28)
HCT VFR BLD AUTO: 37.6 % (ref 37–48.5)
HCT VFR BLD AUTO: 37.6 % (ref 37–48.5)
HCT VFR BLD AUTO: 43.7 % (ref 37–48.5)
HCT VFR BLD CALC: 36 %PCV (ref 36–54)
HCT VFR BLD CALC: 45 %PCV (ref 36–54)
HGB BLD-MCNC: 12.2 G/DL (ref 12–16)
HGB BLD-MCNC: 12.2 G/DL (ref 12–16)
HGB BLD-MCNC: 13.5 G/DL (ref 12–16)
IMM GRANULOCYTES # BLD AUTO: 0.13 K/UL (ref 0–0.04)
IMM GRANULOCYTES # BLD AUTO: 1.04 K/UL (ref 0–0.04)
IMM GRANULOCYTES # BLD AUTO: 1.04 K/UL (ref 0–0.04)
IMM GRANULOCYTES NFR BLD AUTO: 0.8 % (ref 0–0.5)
IMM GRANULOCYTES NFR BLD AUTO: 2.2 % (ref 0–0.5)
IMM GRANULOCYTES NFR BLD AUTO: 2.2 % (ref 0–0.5)
LACTATE SERPL-SCNC: 1.5 MMOL/L (ref 0.5–2.2)
LACTATE SERPL-SCNC: 1.5 MMOL/L (ref 0.5–2.2)
LYMPHOCYTES # BLD AUTO: 0.7 K/UL (ref 1–4.8)
LYMPHOCYTES # BLD AUTO: 0.7 K/UL (ref 1–4.8)
LYMPHOCYTES # BLD AUTO: 1 K/UL (ref 1–4.8)
LYMPHOCYTES NFR BLD: 1.5 % (ref 18–48)
LYMPHOCYTES NFR BLD: 1.5 % (ref 18–48)
LYMPHOCYTES NFR BLD: 6.2 % (ref 18–48)
MAGNESIUM SERPL-MCNC: 1.7 MG/DL (ref 1.6–2.6)
MAGNESIUM SERPL-MCNC: 1.9 MG/DL (ref 1.6–2.6)
MCH RBC QN AUTO: 28.1 PG (ref 27–31)
MCH RBC QN AUTO: 29.5 PG (ref 27–31)
MCH RBC QN AUTO: 29.5 PG (ref 27–31)
MCHC RBC AUTO-ENTMCNC: 30.9 G/DL (ref 32–36)
MCHC RBC AUTO-ENTMCNC: 32.4 G/DL (ref 32–36)
MCHC RBC AUTO-ENTMCNC: 32.4 G/DL (ref 32–36)
MCV RBC AUTO: 91 FL (ref 82–98)
MONOCYTES # BLD AUTO: 1.5 K/UL (ref 0.3–1)
MONOCYTES # BLD AUTO: 2.8 K/UL (ref 0.3–1)
MONOCYTES # BLD AUTO: 2.8 K/UL (ref 0.3–1)
MONOCYTES NFR BLD: 5.8 % (ref 4–15)
MONOCYTES NFR BLD: 5.8 % (ref 4–15)
MONOCYTES NFR BLD: 9.1 % (ref 4–15)
NEUTROPHILS # BLD AUTO: 13.9 K/UL (ref 1.8–7.7)
NEUTROPHILS # BLD AUTO: 43.5 K/UL (ref 1.8–7.7)
NEUTROPHILS # BLD AUTO: 43.5 K/UL (ref 1.8–7.7)
NEUTROPHILS NFR BLD: 82.7 % (ref 38–73)
NEUTROPHILS NFR BLD: 90.2 % (ref 38–73)
NEUTROPHILS NFR BLD: 90.2 % (ref 38–73)
NRBC BLD-RTO: 0 /100 WBC
NUM UNITS TRANS PACKED RBC: NORMAL
NUM UNITS TRANS PACKED RBC: NORMAL
PATH REV BLD -IMP: NORMAL
PCO2 BLDA: 40 MMHG (ref 35–45)
PCO2 BLDA: 41.2 MMHG (ref 35–45)
PH SMN: 7.31 [PH] (ref 7.35–7.45)
PH SMN: 7.31 [PH] (ref 7.35–7.45)
PHOSPHATE SERPL-MCNC: 2.7 MG/DL (ref 2.7–4.5)
PLATELET # BLD AUTO: 185 K/UL (ref 150–450)
PLATELET # BLD AUTO: 185 K/UL (ref 150–450)
PLATELET # BLD AUTO: 217 K/UL (ref 150–450)
PLATELET BLD QL SMEAR: ABNORMAL
PLATELET BLD QL SMEAR: ABNORMAL
PMV BLD AUTO: 11.1 FL (ref 9.2–12.9)
PMV BLD AUTO: 11.1 FL (ref 9.2–12.9)
PMV BLD AUTO: 11.3 FL (ref 9.2–12.9)
PO2 BLDA: 149 MMHG (ref 80–100)
PO2 BLDA: 172 MMHG (ref 80–100)
POC BE: -5 MMOL/L
POC BE: -6 MMOL/L
POC IONIZED CALCIUM: 1.03 MMOL/L (ref 1.06–1.42)
POC IONIZED CALCIUM: 1.13 MMOL/L (ref 1.06–1.42)
POC SATURATED O2: 99 % (ref 95–100)
POC SATURATED O2: 99 % (ref 95–100)
POC TCO2: 21 MMOL/L (ref 23–27)
POC TCO2: 22 MMOL/L (ref 23–27)
POCT GLUCOSE: 104 MG/DL (ref 70–110)
POCT GLUCOSE: 99 MG/DL (ref 70–110)
POTASSIUM BLD-SCNC: 3.9 MMOL/L (ref 3.5–5.1)
POTASSIUM BLD-SCNC: 4.4 MMOL/L (ref 3.5–5.1)
POTASSIUM SERPL-SCNC: 3.7 MMOL/L (ref 3.5–5.1)
POTASSIUM SERPL-SCNC: 4.1 MMOL/L (ref 3.5–5.1)
POTASSIUM SERPL-SCNC: 4.1 MMOL/L (ref 3.5–5.1)
PROT SERPL-MCNC: 3.5 G/DL (ref 6–8.4)
PROT SERPL-MCNC: 3.5 G/DL (ref 6–8.4)
PROT SERPL-MCNC: 6.5 G/DL (ref 6–8.4)
RBC # BLD AUTO: 4.13 M/UL (ref 4–5.4)
RBC # BLD AUTO: 4.13 M/UL (ref 4–5.4)
RBC # BLD AUTO: 4.81 M/UL (ref 4–5.4)
SAMPLE: ABNORMAL
SAMPLE: ABNORMAL
SMUDGE CELLS BLD QL SMEAR: PRESENT
SMUDGE CELLS BLD QL SMEAR: PRESENT
SODIUM BLD-SCNC: 141 MMOL/L (ref 136–145)
SODIUM BLD-SCNC: 141 MMOL/L (ref 136–145)
SODIUM SERPL-SCNC: 139 MMOL/L (ref 136–145)
WBC # BLD AUTO: 16.85 K/UL (ref 3.9–12.7)
WBC # BLD AUTO: 48.17 K/UL (ref 3.9–12.7)
WBC # BLD AUTO: 48.17 K/UL (ref 3.9–12.7)

## 2022-09-28 PROCEDURE — 86920 COMPATIBILITY TEST SPIN: CPT | Performed by: SURGERY

## 2022-09-28 PROCEDURE — 25000003 PHARM REV CODE 250: Performed by: NURSE ANESTHETIST, CERTIFIED REGISTERED

## 2022-09-28 PROCEDURE — 36000706: Performed by: SURGERY

## 2022-09-28 PROCEDURE — 83605 ASSAY OF LACTIC ACID: CPT | Performed by: SURGERY

## 2022-09-28 PROCEDURE — 88302 TISSUE EXAM BY PATHOLOGIST: CPT | Mod: 26,,, | Performed by: PATHOLOGY

## 2022-09-28 PROCEDURE — 83735 ASSAY OF MAGNESIUM: CPT

## 2022-09-28 PROCEDURE — 63600175 PHARM REV CODE 636 W HCPCS

## 2022-09-28 PROCEDURE — 94010 BREATHING CAPACITY TEST: CPT

## 2022-09-28 PROCEDURE — 80053 COMPREHEN METABOLIC PANEL: CPT | Mod: 91 | Performed by: SURGERY

## 2022-09-28 PROCEDURE — 88342 IMHCHEM/IMCYTCHM 1ST ANTB: CPT | Mod: 26,,, | Performed by: PATHOLOGY

## 2022-09-28 PROCEDURE — 63600175 PHARM REV CODE 636 W HCPCS: Performed by: NURSE PRACTITIONER

## 2022-09-28 PROCEDURE — 27000221 HC OXYGEN, UP TO 24 HOURS

## 2022-09-28 PROCEDURE — 76942: ICD-10-PCS | Mod: 26,,, | Performed by: ANESTHESIOLOGY

## 2022-09-28 PROCEDURE — 63600175 PHARM REV CODE 636 W HCPCS: Performed by: STUDENT IN AN ORGANIZED HEALTH CARE EDUCATION/TRAINING PROGRAM

## 2022-09-28 PROCEDURE — 88309 PR  SURG PATH,LEVEL VI: ICD-10-PCS | Mod: 26,,, | Performed by: PATHOLOGY

## 2022-09-28 PROCEDURE — 36415 COLL VENOUS BLD VENIPUNCTURE: CPT

## 2022-09-28 PROCEDURE — P9016 RBC LEUKOCYTES REDUCED: HCPCS | Performed by: SURGERY

## 2022-09-28 PROCEDURE — 25000003 PHARM REV CODE 250: Performed by: STUDENT IN AN ORGANIZED HEALTH CARE EDUCATION/TRAINING PROGRAM

## 2022-09-28 PROCEDURE — 88341 IMHCHEM/IMCYTCHM EA ADD ANTB: CPT | Mod: 26,,, | Performed by: PATHOLOGY

## 2022-09-28 PROCEDURE — 80053 COMPREHEN METABOLIC PANEL: CPT

## 2022-09-28 PROCEDURE — 86850 RBC ANTIBODY SCREEN: CPT | Performed by: SURGERY

## 2022-09-28 PROCEDURE — 85025 COMPLETE CBC W/AUTO DIFF WBC: CPT | Mod: 91 | Performed by: SURGERY

## 2022-09-28 PROCEDURE — 25000003 PHARM REV CODE 250: Performed by: ANESTHESIOLOGY

## 2022-09-28 PROCEDURE — 76942 ECHO GUIDE FOR BIOPSY: CPT | Performed by: STUDENT IN AN ORGANIZED HEALTH CARE EDUCATION/TRAINING PROGRAM

## 2022-09-28 PROCEDURE — 20000000 HC ICU ROOM

## 2022-09-28 PROCEDURE — 88302 TISSUE EXAM BY PATHOLOGIST: CPT | Performed by: PATHOLOGY

## 2022-09-28 PROCEDURE — 36620 ARTERIAL: ICD-10-PCS | Mod: 59,,, | Performed by: ANESTHESIOLOGY

## 2022-09-28 PROCEDURE — 88302 PR  SURG PATH,LEVEL II: ICD-10-PCS | Mod: 26,,, | Performed by: PATHOLOGY

## 2022-09-28 PROCEDURE — 25000003 PHARM REV CODE 250: Performed by: NURSE PRACTITIONER

## 2022-09-28 PROCEDURE — D9220A PRA ANESTHESIA: Mod: CRNA,,, | Performed by: NURSE ANESTHETIST, CERTIFIED REGISTERED

## 2022-09-28 PROCEDURE — 63600175 PHARM REV CODE 636 W HCPCS: Performed by: NURSE ANESTHETIST, CERTIFIED REGISTERED

## 2022-09-28 PROCEDURE — 27201423 OPTIME MED/SURG SUP & DEVICES STERILE SUPPLY: Performed by: SURGERY

## 2022-09-28 PROCEDURE — 36000707: Performed by: SURGERY

## 2022-09-28 PROCEDURE — 64450 NJX AA&/STRD OTHER PN/BRANCH: CPT | Mod: 59,50,, | Performed by: ANESTHESIOLOGY

## 2022-09-28 PROCEDURE — P9045 ALBUMIN (HUMAN), 5%, 250 ML: HCPCS | Mod: JG

## 2022-09-28 PROCEDURE — 99900035 HC TECH TIME PER 15 MIN (STAT)

## 2022-09-28 PROCEDURE — 94761 N-INVAS EAR/PLS OXIMETRY MLT: CPT

## 2022-09-28 PROCEDURE — 49205 PR EXCISION/DESTRUCTION OPEN ABDOMINAL TUMORS >10.0 CM: ICD-10-PCS | Mod: 82,,, | Performed by: SURGERY

## 2022-09-28 PROCEDURE — 94002 VENT MGMT INPAT INIT DAY: CPT

## 2022-09-28 PROCEDURE — 64450 PR NERVE BLOCK INJ, ANES/STEROID, OTHER PERIPHERAL: ICD-10-PCS | Mod: 59,50,, | Performed by: ANESTHESIOLOGY

## 2022-09-28 PROCEDURE — 49205 PR EXCISION/DESTRUCTION OPEN ABDOMINAL TUMORS >10.0 CM: ICD-10-PCS | Mod: ,,, | Performed by: SURGERY

## 2022-09-28 PROCEDURE — D9220A PRA ANESTHESIA: Mod: ANES,,, | Performed by: STUDENT IN AN ORGANIZED HEALTH CARE EDUCATION/TRAINING PROGRAM

## 2022-09-28 PROCEDURE — 84100 ASSAY OF PHOSPHORUS: CPT

## 2022-09-28 PROCEDURE — 76942 ECHO GUIDE FOR BIOPSY: CPT | Mod: 26,,, | Performed by: ANESTHESIOLOGY

## 2022-09-28 PROCEDURE — D9220A PRA ANESTHESIA: ICD-10-PCS | Mod: ANES,,, | Performed by: STUDENT IN AN ORGANIZED HEALTH CARE EDUCATION/TRAINING PROGRAM

## 2022-09-28 PROCEDURE — 49205 PR EXCISION/DESTRUCTION OPEN ABDOMINAL TUMORS >10.0 CM: CPT | Mod: 82,,, | Performed by: SURGERY

## 2022-09-28 PROCEDURE — 27201037 HC PRESSURE MONITORING SET UP

## 2022-09-28 PROCEDURE — 85025 COMPLETE CBC W/AUTO DIFF WBC: CPT

## 2022-09-28 PROCEDURE — 25000003 PHARM REV CODE 250: Performed by: SURGERY

## 2022-09-28 PROCEDURE — 88341 IMHCHEM/IMCYTCHM EA ADD ANTB: CPT | Performed by: PATHOLOGY

## 2022-09-28 PROCEDURE — D9220A PRA ANESTHESIA: ICD-10-PCS | Mod: CRNA,,, | Performed by: NURSE ANESTHETIST, CERTIFIED REGISTERED

## 2022-09-28 PROCEDURE — 83735 ASSAY OF MAGNESIUM: CPT | Mod: 91 | Performed by: SURGERY

## 2022-09-28 PROCEDURE — 99233 SBSQ HOSP IP/OBS HIGH 50: CPT | Mod: 57,,, | Performed by: OBSTETRICS & GYNECOLOGY

## 2022-09-28 PROCEDURE — 63600175 PHARM REV CODE 636 W HCPCS: Performed by: SURGERY

## 2022-09-28 PROCEDURE — 88342 CHG IMMUNOCYTOCHEMISTRY: ICD-10-PCS | Mod: 26,,, | Performed by: PATHOLOGY

## 2022-09-28 PROCEDURE — P9047 ALBUMIN (HUMAN), 25%, 50ML: HCPCS | Mod: JG | Performed by: STUDENT IN AN ORGANIZED HEALTH CARE EDUCATION/TRAINING PROGRAM

## 2022-09-28 PROCEDURE — 64447 NJX AA&/STRD FEMORAL NRV IMG: CPT | Performed by: STUDENT IN AN ORGANIZED HEALTH CARE EDUCATION/TRAINING PROGRAM

## 2022-09-28 PROCEDURE — 94150 VITAL CAPACITY TEST: CPT

## 2022-09-28 PROCEDURE — 88309 TISSUE EXAM BY PATHOLOGIST: CPT | Mod: 26,,, | Performed by: PATHOLOGY

## 2022-09-28 PROCEDURE — 88341 PR IHC OR ICC EACH ADD'L SINGLE ANTIBODY  STAINPR: ICD-10-PCS | Mod: 26,,, | Performed by: PATHOLOGY

## 2022-09-28 PROCEDURE — 37000009 HC ANESTHESIA EA ADD 15 MINS: Performed by: SURGERY

## 2022-09-28 PROCEDURE — 49205 PR EXCISION/DESTRUCTION OPEN ABDOMINAL TUMORS >10.0 CM: CPT | Mod: ,,, | Performed by: SURGERY

## 2022-09-28 PROCEDURE — 94640 AIRWAY INHALATION TREATMENT: CPT

## 2022-09-28 PROCEDURE — 25000242 PHARM REV CODE 250 ALT 637 W/ HCPCS: Performed by: NURSE PRACTITIONER

## 2022-09-28 PROCEDURE — C1729 CATH, DRAINAGE: HCPCS | Performed by: SURGERY

## 2022-09-28 PROCEDURE — 36620 INSERTION CATHETER ARTERY: CPT | Mod: 59,,, | Performed by: ANESTHESIOLOGY

## 2022-09-28 PROCEDURE — 99233 PR SUBSEQUENT HOSPITAL CARE,LEVL III: ICD-10-PCS | Mod: 57,,, | Performed by: OBSTETRICS & GYNECOLOGY

## 2022-09-28 PROCEDURE — 88309 TISSUE EXAM BY PATHOLOGIST: CPT | Performed by: PATHOLOGY

## 2022-09-28 PROCEDURE — 37000008 HC ANESTHESIA 1ST 15 MINUTES: Performed by: SURGERY

## 2022-09-28 PROCEDURE — 63600175 PHARM REV CODE 636 W HCPCS: Mod: JG

## 2022-09-28 PROCEDURE — 63600175 PHARM REV CODE 636 W HCPCS: Mod: JG | Performed by: STUDENT IN AN ORGANIZED HEALTH CARE EDUCATION/TRAINING PROGRAM

## 2022-09-28 PROCEDURE — 88342 IMHCHEM/IMCYTCHM 1ST ANTB: CPT | Performed by: PATHOLOGY

## 2022-09-28 RX ORDER — KETAMINE HCL IN 0.9 % NACL 50 MG/5 ML
SYRINGE (ML) INTRAVENOUS
Status: DISCONTINUED | OUTPATIENT
Start: 2022-09-28 | End: 2022-09-28

## 2022-09-28 RX ORDER — CALCIUM GLUCONATE 20 MG/ML
3 INJECTION, SOLUTION INTRAVENOUS
Status: DISCONTINUED | OUTPATIENT
Start: 2022-09-28 | End: 2022-09-30

## 2022-09-28 RX ORDER — NOREPINEPHRINE BITARTRATE/D5W 4MG/250ML
PLASTIC BAG, INJECTION (ML) INTRAVENOUS
Status: DISPENSED
Start: 2022-09-28 | End: 2022-09-29

## 2022-09-28 RX ORDER — CALCIUM GLUCONATE 20 MG/ML
1 INJECTION, SOLUTION INTRAVENOUS
Status: DISCONTINUED | OUTPATIENT
Start: 2022-09-28 | End: 2022-09-30

## 2022-09-28 RX ORDER — BUPIVACAINE HYDROCHLORIDE 7.5 MG/ML
INJECTION, SOLUTION EPIDURAL; RETROBULBAR
Status: COMPLETED | OUTPATIENT
Start: 2022-09-28 | End: 2022-09-28

## 2022-09-28 RX ORDER — LIDOCAINE HYDROCHLORIDE 20 MG/ML
INJECTION INTRAVENOUS
Status: DISCONTINUED | OUTPATIENT
Start: 2022-09-28 | End: 2022-09-28

## 2022-09-28 RX ORDER — POTASSIUM CHLORIDE 7.45 MG/ML
40 INJECTION INTRAVENOUS
Status: DISCONTINUED | OUTPATIENT
Start: 2022-09-28 | End: 2022-09-30

## 2022-09-28 RX ORDER — VASOPRESSIN 20 [USP'U]/ML
INJECTION, SOLUTION INTRAMUSCULAR; SUBCUTANEOUS
Status: DISCONTINUED | OUTPATIENT
Start: 2022-09-28 | End: 2022-09-28

## 2022-09-28 RX ORDER — ALBUMIN HUMAN 50 G/1000ML
25 SOLUTION INTRAVENOUS ONCE
Status: DISCONTINUED | OUTPATIENT
Start: 2022-09-28 | End: 2022-09-28

## 2022-09-28 RX ORDER — MAGNESIUM SULFATE HEPTAHYDRATE 40 MG/ML
2 INJECTION, SOLUTION INTRAVENOUS
Status: DISCONTINUED | OUTPATIENT
Start: 2022-09-28 | End: 2022-09-30

## 2022-09-28 RX ORDER — SODIUM CHLORIDE 0.9 % (FLUSH) 0.9 %
10 SYRINGE (ML) INJECTION
Status: DISCONTINUED | OUTPATIENT
Start: 2022-09-28 | End: 2022-10-03 | Stop reason: HOSPADM

## 2022-09-28 RX ORDER — NOREPINEPHRINE BITARTRATE/D5W 4MG/250ML
0-3 PLASTIC BAG, INJECTION (ML) INTRAVENOUS CONTINUOUS
Status: DISCONTINUED | OUTPATIENT
Start: 2022-09-28 | End: 2022-09-29

## 2022-09-28 RX ORDER — PHENYLEPHRINE HYDROCHLORIDE 10 MG/ML
INJECTION INTRAVENOUS
Status: DISCONTINUED | OUTPATIENT
Start: 2022-09-28 | End: 2022-09-28

## 2022-09-28 RX ORDER — FENTANYL CITRATE 50 UG/ML
25-200 INJECTION, SOLUTION INTRAMUSCULAR; INTRAVENOUS
Status: DISCONTINUED | OUTPATIENT
Start: 2022-09-28 | End: 2022-09-28 | Stop reason: HOSPADM

## 2022-09-28 RX ORDER — CEFAZOLIN SODIUM 1 G/3ML
INJECTION, POWDER, FOR SOLUTION INTRAMUSCULAR; INTRAVENOUS
Status: DISCONTINUED | OUTPATIENT
Start: 2022-09-28 | End: 2022-09-28

## 2022-09-28 RX ORDER — DEXMEDETOMIDINE HYDROCHLORIDE 4 UG/ML
INJECTION, SOLUTION INTRAVENOUS
Status: DISPENSED
Start: 2022-09-28 | End: 2022-09-29

## 2022-09-28 RX ORDER — SUCCINYLCHOLINE CHLORIDE 20 MG/ML
INJECTION INTRAMUSCULAR; INTRAVENOUS
Status: DISCONTINUED | OUTPATIENT
Start: 2022-09-28 | End: 2022-09-28

## 2022-09-28 RX ORDER — MIDAZOLAM HYDROCHLORIDE 1 MG/ML
.5-4 INJECTION INTRAMUSCULAR; INTRAVENOUS
Status: DISCONTINUED | OUTPATIENT
Start: 2022-09-28 | End: 2022-09-28 | Stop reason: HOSPADM

## 2022-09-28 RX ORDER — PHENYLEPHRINE HCL IN 0.9% NACL 20MG/250ML
0-5 PLASTIC BAG, INJECTION (ML) INTRAVENOUS CONTINUOUS
Status: DISCONTINUED | OUTPATIENT
Start: 2022-09-28 | End: 2022-09-28

## 2022-09-28 RX ORDER — ONDANSETRON 2 MG/ML
INJECTION INTRAMUSCULAR; INTRAVENOUS
Status: DISCONTINUED | OUTPATIENT
Start: 2022-09-28 | End: 2022-09-28

## 2022-09-28 RX ORDER — ROCURONIUM BROMIDE 10 MG/ML
INJECTION, SOLUTION INTRAVENOUS
Status: DISCONTINUED | OUTPATIENT
Start: 2022-09-28 | End: 2022-09-28

## 2022-09-28 RX ORDER — METHYLENE BLUE 5 MG/ML
INJECTION INTRAVENOUS
Status: DISCONTINUED | OUTPATIENT
Start: 2022-09-28 | End: 2022-09-28

## 2022-09-28 RX ORDER — PROPOFOL 10 MG/ML
VIAL (ML) INTRAVENOUS
Status: DISCONTINUED | OUTPATIENT
Start: 2022-09-28 | End: 2022-09-28

## 2022-09-28 RX ORDER — ALBUMIN HUMAN 50 G/1000ML
SOLUTION INTRAVENOUS
Status: COMPLETED
Start: 2022-09-28 | End: 2022-09-28

## 2022-09-28 RX ORDER — POTASSIUM CHLORIDE 20 MEQ/1
20 TABLET, EXTENDED RELEASE ORAL ONCE
Status: COMPLETED | OUTPATIENT
Start: 2022-09-28 | End: 2022-09-28

## 2022-09-28 RX ORDER — ALBUMIN HUMAN 50 G/1000ML
25 SOLUTION INTRAVENOUS ONCE
Status: COMPLETED | OUTPATIENT
Start: 2022-09-28 | End: 2022-09-28

## 2022-09-28 RX ORDER — MAGNESIUM SULFATE HEPTAHYDRATE 40 MG/ML
4 INJECTION, SOLUTION INTRAVENOUS
Status: DISCONTINUED | OUTPATIENT
Start: 2022-09-28 | End: 2022-09-30

## 2022-09-28 RX ORDER — LEVALBUTEROL INHALATION SOLUTION 0.63 MG/3ML
0.63 SOLUTION RESPIRATORY (INHALATION)
Status: COMPLETED | OUTPATIENT
Start: 2022-09-28 | End: 2022-09-30

## 2022-09-28 RX ORDER — POTASSIUM CHLORIDE 7.45 MG/ML
80 INJECTION INTRAVENOUS
Status: DISCONTINUED | OUTPATIENT
Start: 2022-09-28 | End: 2022-09-30

## 2022-09-28 RX ORDER — FENTANYL CITRATE 50 UG/ML
INJECTION, SOLUTION INTRAMUSCULAR; INTRAVENOUS
Status: DISCONTINUED | OUTPATIENT
Start: 2022-09-28 | End: 2022-09-28

## 2022-09-28 RX ORDER — DEXMEDETOMIDINE HYDROCHLORIDE 4 UG/ML
0-1.4 INJECTION, SOLUTION INTRAVENOUS CONTINUOUS
Status: DISCONTINUED | OUTPATIENT
Start: 2022-09-28 | End: 2022-09-29

## 2022-09-28 RX ORDER — CALCIUM GLUCONATE 20 MG/ML
2 INJECTION, SOLUTION INTRAVENOUS
Status: DISCONTINUED | OUTPATIENT
Start: 2022-09-28 | End: 2022-09-30

## 2022-09-28 RX ORDER — ALBUMIN HUMAN 250 G/1000ML
12.5 SOLUTION INTRAVENOUS ONCE
Status: COMPLETED | OUTPATIENT
Start: 2022-09-28 | End: 2022-09-28

## 2022-09-28 RX ORDER — POTASSIUM CHLORIDE 7.45 MG/ML
60 INJECTION INTRAVENOUS
Status: DISCONTINUED | OUTPATIENT
Start: 2022-09-28 | End: 2022-09-30

## 2022-09-28 RX ORDER — PHENYLEPHRINE HCL IN 0.9% NACL 20MG/250ML
0-3 PLASTIC BAG, INJECTION (ML) INTRAVENOUS CONTINUOUS
Status: DISCONTINUED | OUTPATIENT
Start: 2022-09-28 | End: 2022-09-29

## 2022-09-28 RX ADMIN — SODIUM CHLORIDE, SODIUM LACTATE, POTASSIUM CHLORIDE, AND CALCIUM CHLORIDE 1000 ML: .6; .31; .03; .02 INJECTION, SOLUTION INTRAVENOUS at 10:09

## 2022-09-28 RX ADMIN — PHENYLEPHRINE HYDROCHLORIDE 200 MCG: 10 INJECTION INTRAVENOUS at 05:09

## 2022-09-28 RX ADMIN — PHENYLEPHRINE HYDROCHLORIDE 100 MCG: 10 INJECTION INTRAVENOUS at 05:09

## 2022-09-28 RX ADMIN — VASOPRESSIN 1 UNITS: 20 INJECTION INTRAVENOUS at 05:09

## 2022-09-28 RX ADMIN — SODIUM CHLORIDE, SODIUM LACTATE, POTASSIUM CHLORIDE, AND CALCIUM CHLORIDE: 600; 310; 30; 20 INJECTION, SOLUTION INTRAVENOUS at 08:09

## 2022-09-28 RX ADMIN — VASOPRESSIN 1 UNITS: 20 INJECTION INTRAVENOUS at 06:09

## 2022-09-28 RX ADMIN — Medication 25 MG: at 12:09

## 2022-09-28 RX ADMIN — ALLOPURINOL 300 MG: 300 TABLET ORAL at 08:09

## 2022-09-28 RX ADMIN — LEVALBUTEROL HYDROCHLORIDE 0.63 MG: 0.63 SOLUTION RESPIRATORY (INHALATION) at 08:09

## 2022-09-28 RX ADMIN — CEFAZOLIN 2 G: 330 INJECTION, POWDER, FOR SOLUTION INTRAMUSCULAR; INTRAVENOUS at 04:09

## 2022-09-28 RX ADMIN — ROCURONIUM BROMIDE 25 MG: 10 INJECTION INTRAVENOUS at 12:09

## 2022-09-28 RX ADMIN — ONDANSETRON 4 MG: 2 INJECTION INTRAMUSCULAR; INTRAVENOUS at 06:09

## 2022-09-28 RX ADMIN — ROCURONIUM BROMIDE 5 MG: 10 INJECTION INTRAVENOUS at 12:09

## 2022-09-28 RX ADMIN — MORPHINE SULFATE 4 MG: 4 INJECTION INTRAVENOUS at 04:09

## 2022-09-28 RX ADMIN — CALCIUM CHLORIDE 500 MG: 100 INJECTION, SOLUTION INTRAVENOUS at 06:09

## 2022-09-28 RX ADMIN — SUCCINYLCHOLINE CHLORIDE 120 MG: 20 INJECTION, SOLUTION INTRAMUSCULAR; INTRAVENOUS at 12:09

## 2022-09-28 RX ADMIN — ALBUMIN HUMAN 12.5 G: 0.25 SOLUTION INTRAVENOUS at 09:09

## 2022-09-28 RX ADMIN — PHENYLEPHRINE HYDROCHLORIDE 200 MCG: 10 INJECTION INTRAVENOUS at 02:09

## 2022-09-28 RX ADMIN — ALBUMIN HUMAN 25 G: 50 SOLUTION INTRAVENOUS at 07:09

## 2022-09-28 RX ADMIN — Medication 25 MG: at 05:09

## 2022-09-28 RX ADMIN — NOREPINEPHRINE BITARTRATE 0.02 MCG/KG/MIN: 4 INJECTION, SOLUTION INTRAVENOUS at 07:09

## 2022-09-28 RX ADMIN — SODIUM CHLORIDE, SODIUM LACTATE, POTASSIUM CHLORIDE, AND CALCIUM CHLORIDE 500 ML: .6; .31; .03; .02 INJECTION, SOLUTION INTRAVENOUS at 08:09

## 2022-09-28 RX ADMIN — METHYLENE BLUE 25 MG: 5 INJECTION INTRAVENOUS at 02:09

## 2022-09-28 RX ADMIN — ROCURONIUM BROMIDE 30 MG: 10 INJECTION INTRAVENOUS at 01:09

## 2022-09-28 RX ADMIN — LIDOCAINE HYDROCHLORIDE 100 MG: 20 INJECTION INTRAVENOUS at 12:09

## 2022-09-28 RX ADMIN — BUPIVACAINE HYDROCHLORIDE 30 ML: 7.5 INJECTION, SOLUTION EPIDURAL; RETROBULBAR at 10:09

## 2022-09-28 RX ADMIN — PHENYLEPHRINE HYDROCHLORIDE 100 MCG: 10 INJECTION INTRAVENOUS at 03:09

## 2022-09-28 RX ADMIN — ROCURONIUM BROMIDE 20 MG: 10 INJECTION INTRAVENOUS at 12:09

## 2022-09-28 RX ADMIN — CEFAZOLIN 2 G: 330 INJECTION, POWDER, FOR SOLUTION INTRAMUSCULAR; INTRAVENOUS at 12:09

## 2022-09-28 RX ADMIN — PIPERACILLIN SODIUM AND TAZOBACTAM SODIUM 4.5 G: 2; .25 INJECTION, POWDER, LYOPHILIZED, FOR SOLUTION INTRAVENOUS at 05:09

## 2022-09-28 RX ADMIN — FENTANYL CITRATE 50 MCG: 50 INJECTION, SOLUTION INTRAMUSCULAR; INTRAVENOUS at 12:09

## 2022-09-28 RX ADMIN — SODIUM CHLORIDE: 0.9 INJECTION, SOLUTION INTRAVENOUS at 12:09

## 2022-09-28 RX ADMIN — PROPOFOL 100 MG: 10 INJECTION, EMULSION INTRAVENOUS at 12:09

## 2022-09-28 RX ADMIN — ROCURONIUM BROMIDE 20 MG: 10 INJECTION INTRAVENOUS at 02:09

## 2022-09-28 RX ADMIN — SODIUM CHLORIDE, SODIUM GLUCONATE, SODIUM ACETATE, POTASSIUM CHLORIDE, MAGNESIUM CHLORIDE, SODIUM PHOSPHATE, DIBASIC, AND POTASSIUM PHOSPHATE: .53; .5; .37; .037; .03; .012; .00082 INJECTION, SOLUTION INTRAVENOUS at 03:09

## 2022-09-28 RX ADMIN — PANTOPRAZOLE SODIUM 40 MG: 40 TABLET, DELAYED RELEASE ORAL at 08:09

## 2022-09-28 RX ADMIN — DEXMEDETOMIDINE HYDROCHLORIDE 0.2 MCG/KG/HR: 4 INJECTION INTRAVENOUS at 10:09

## 2022-09-28 RX ADMIN — PROPOFOL 30 MG: 10 INJECTION, EMULSION INTRAVENOUS at 01:09

## 2022-09-28 RX ADMIN — ROCURONIUM BROMIDE 30 MG: 10 INJECTION INTRAVENOUS at 03:09

## 2022-09-28 RX ADMIN — SODIUM CHLORIDE, SODIUM GLUCONATE, SODIUM ACETATE, POTASSIUM CHLORIDE, MAGNESIUM CHLORIDE, SODIUM PHOSPHATE, DIBASIC, AND POTASSIUM PHOSPHATE: .53; .5; .37; .037; .03; .012; .00082 INJECTION, SOLUTION INTRAVENOUS at 04:09

## 2022-09-28 RX ADMIN — MORPHINE SULFATE 4 MG: 4 INJECTION INTRAVENOUS at 08:09

## 2022-09-28 RX ADMIN — MAGNESIUM SULFATE 2 G: 2 INJECTION INTRAVENOUS at 11:09

## 2022-09-28 RX ADMIN — SODIUM CHLORIDE, SODIUM GLUCONATE, SODIUM ACETATE, POTASSIUM CHLORIDE, MAGNESIUM CHLORIDE, SODIUM PHOSPHATE, DIBASIC, AND POTASSIUM PHOSPHATE: .53; .5; .37; .037; .03; .012; .00082 INJECTION, SOLUTION INTRAVENOUS at 12:09

## 2022-09-28 RX ADMIN — ALBUMIN (HUMAN) 25 G: 12.5 SOLUTION INTRAVENOUS at 07:09

## 2022-09-28 RX ADMIN — POTASSIUM CHLORIDE 20 MEQ: 1500 TABLET, EXTENDED RELEASE ORAL at 06:09

## 2022-09-28 RX ADMIN — PHENYLEPHRINE HYDROCHLORIDE 100 MCG: 10 INJECTION INTRAVENOUS at 04:09

## 2022-09-28 RX ADMIN — SODIUM CHLORIDE 0.1 MCG/KG/MIN: 9 INJECTION, SOLUTION INTRAVENOUS at 03:09

## 2022-09-28 RX ADMIN — ROCURONIUM BROMIDE 20 MG: 10 INJECTION INTRAVENOUS at 04:09

## 2022-09-28 RX ADMIN — AMLODIPINE BESYLATE 5 MG: 5 TABLET ORAL at 08:09

## 2022-09-28 RX ADMIN — PROPOFOL 30 MG: 10 INJECTION, EMULSION INTRAVENOUS at 07:09

## 2022-09-28 RX ADMIN — MORPHINE SULFATE 4 MG: 4 INJECTION INTRAVENOUS at 09:09

## 2022-09-28 RX ADMIN — PROPOFOL 70 MG: 10 INJECTION, EMULSION INTRAVENOUS at 12:09

## 2022-09-28 NOTE — CONSULTS
[facility] - Surgery ([facility])  Hematology/Oncology  Consult Note    Patient Name: Mariaelena Sheffield  MRN: [id number]  Admission Date: 9/25/2022  Hospital Length of Stay: 3 days  Code Status: Full Code   Attending Provider: Edison Brooks MD  Consulting Provider: Autumn Ospina DO  Primary Care Physician: Lui Blue MD  Principal Problem:SBO (small bowel obstruction)    Inpatient consult to Hematology/Oncology  Consult performed by: uAtumn Ospina DO  Consult ordered by: Bonifacio Simms MD        Subjective:     HPI:  Mrs. Sheffield is an 81 yo woman with chronic myeloproliferative disorder, right breast cancer (s/p chemo and radiation in 1995), hernia repair in 2018, T2DM, HTN and HLD who presented to [facility] with nausea, vomiting and diarrhea. She was found to have a large pelvic mass and partial small bowel obstruction and was [place] [facility] [place]. On admission, she had a WBC count of 49, that now has trended down to 16. She has been afebrile, per chart review she had no other infectious symptoms on presentation and diarrhea had resolved prior to transfer. SBO improved after NG tube placement. Patient seen by gynecology oncology and is having debulking surgery today.     Hematology was consulted for leukocytosis.     Unable to see patient today due to surgery.        Assessment/Plan:     Leukemoid reaction  81 yo presenting with leukocytosis with neutrophilic predominance that is improving, in setting of large pelvic mass, partial SBO and colonic hernia.  Prior work up for persistent leukocytosis with negative BCR-ABL and Karl 2, neutrophilic leukocytosis on peripheral smear and bone marrow without clonal or primary marrow disorder.     Unable to see patient as she has been in surgery.    Leukocytosis likely reactive from SBO vs intra-abdominal process. Lower suspicion for primary hematologic disorder.  Recommend biopsy from pelvic mass.   Will continue to monitor  counts.  Will officially see patient tomorrow.                Thank you for your consult. I will follow-up with patient. Please contact us if you have any additional questions.    Autumn Ospina,   Hematology/Oncology  [facility] - Surgery ([facility])

## 2022-09-28 NOTE — ASSESSMENT & PLAN NOTE
83 yo woman with pelvic mass, significant ventral hernia, and partial small bowel obstruction who presents from OSH for n/v/diarrhea.     - Tylenol, morphine PRN for pain  - NPO for OR today  - Daily CMP, CBC, Mag, Phos  - Maintenance fluids  - SSI  - Gyn/Onc consulted for abdominopelvic mass, appreciate recs   - will be available intra-op  - Lovenox for DVT ppx  - To OR today for ex-lap, resection of abdominopelvic mass, possible SBR

## 2022-09-28 NOTE — ANESTHESIA PROCEDURE NOTES
Bilateral QL nerve block single shot    Patient location during procedure: pre-op   Block not for primary anesthetic.  Reason for block: at surgeon's request and post-op pain management   Post-op Pain Location: Bilateral abdominal pain   Start time: 9/28/2022 10:05 AM  Timeout: 9/28/2022 10:04 AM   End time: 9/28/2022 10:10 AM    Staffing  Authorizing Provider: Scott Alvarez MD  Performing Provider: Vladimir Brown MD    Preanesthetic Checklist  Completed: patient identified, IV checked, site marked, risks and benefits discussed, surgical consent, monitors and equipment checked, pre-op evaluation and timeout performed  Peripheral Block  Patient position: supine  Prep: ChloraPrep  Patient monitoring: heart rate, cardiac monitor, continuous pulse ox, continuous capnometry and frequent blood pressure checks  Block type: Quadratus Lumborum  Laterality: bilateral  Injection technique: single shot  Needle  Needle type: Echogenic   Needle gauge: 20 G  Needle length: 4 in  Needle localization: anatomical landmarks and ultrasound guidance   -ultrasound image captured on disc.  Assessment  Injection assessment: negative aspiration, negative parasthesia and local visualized surrounding nerve  Paresthesia pain: none  Heart rate change: no  Slow fractionated injection: yes  Pain Tolerance: comfortable throughout block and no complaints  Medications:    Medications: bupivacaine (pf) (MARCAINE) injection 0.75% - Perineural   30 mL - 9/28/2022 10:10:00 AM    Additional Notes  VSS.  [facility] RN monitoring vitals throughout procedure.  Patient tolerated procedure well.

## 2022-09-28 NOTE — ASSESSMENT & PLAN NOTE
81 yo presenting with leukocytosis with neutrophilic predominance that is improving, in setting of large pelvic mass, partial SBO and colonic hernia.  Prior work up for persistent leukocytosis with negative BCR-ABL and Karl 2, neutrophilic leukocytosis on peripheral smear and bone marrow without clonal or primary marrow disorder.     Unable to see patient as she has been in surgery.    Leukocytosis likely reactive from SBO vs intra-abdominal process. Lower suspicion for primary hematologic disorder.  Recommend biopsy from pelvic mass.   Will continue to monitor counts.  Will officially see patient tomorrow.

## 2022-09-28 NOTE — SUBJECTIVE & OBJECTIVE
Interval History: NAEO. Patient resting comfortably in bed this morning. Tolerated regular diet yesterday. Has been NPO since midnight for OR today. Patient having bowel movements. Denies N/V this morning. Pain is well controlled.      Medications:  Continuous Infusions:   lactated ringers 100 mL/hr at 09/25/22 2150     Scheduled Meds:   allopurinoL  300 mg Oral Daily    amLODIPine  5 mg Oral Daily    enoxaparin  40 mg Subcutaneous Daily    pantoprazole  40 mg Oral Daily    potassium chloride  20 mEq Oral Once     PRN Meds:dextrose 10%, dextrose 10%, glucagon (human recombinant), insulin aspart U-100, morphine, morphine, ondansetron, ondansetron, sodium chloride 0.9%     Review of patient's allergies indicates:  No Known Allergies  Objective:     Vital Signs (Most Recent):  Temp: 98.7 °F (37.1 °C) (09/28/22 0420)  Pulse: 70 (09/28/22 0420)  Resp: 18 (09/28/22 0422)  BP: (!) 146/82 (09/28/22 0420)  SpO2: 96 % (09/28/22 0420)   Vital Signs (24h Range):  Temp:  [97.6 °F (36.4 °C)-98.7 °F (37.1 °C)] 98.7 °F (37.1 °C)  Pulse:  [70-93] 70  Resp:  [16-20] 18  SpO2:  [91 %-98 %] 96 %  BP: (121-146)/(76-85) 146/82     Weight: 73 kg (160 lb 15 oz)  Body mass index is 27.62 kg/m².    Intake/Output - Last 3 Shifts         09/26 0700 09/27 0659 09/27 0700 09/28 0659    I.V. (mL/kg) 2467.4 (33.8) 2164.4 (29.6)    Total Intake(mL/kg) 2467.4 (33.8) 2164.4 (29.6)    Urine (mL/kg/hr) 500 (0.3)     Drains 300     Stool 0     Total Output 800     Net +1667.4 +2164.4          Urine Occurrence 3 x 3 x    Stool Occurrence 3 x 2 x            Physical Exam  Constitutional:       General: She is not in acute distress.     Appearance: Normal appearance.   HENT:      Head: Normocephalic and atraumatic.      Mouth/Throat:      Mouth: Mucous membranes are moist.   Eyes:      Extraocular Movements: Extraocular movements intact.      Conjunctiva/sclera: Conjunctivae normal.   Cardiovascular:      Rate and Rhythm: Normal rate and regular rhythm.    Pulmonary:      Effort: Pulmonary effort is normal. No respiratory distress.   Abdominal:      General: There is distension.      Tenderness: There is no abdominal tenderness.   Skin:     General: Skin is warm and dry.   Neurological:      General: No focal deficit present.      Mental Status: She is alert and oriented to person, place, and time. Mental status is at baseline.       Significant Labs:  I have reviewed all pertinent lab results within the past 24 hours.  CBC:   Recent Labs   Lab 09/28/22  0410   WBC 16.85*   RBC 4.81   HGB 13.5   HCT 43.7      MCV 91   MCH 28.1   MCHC 30.9*       BMP:   Recent Labs   Lab 09/28/22  0410   GLU 99      K 3.7      CO2 22*   BUN 20   CREATININE 1.0   CALCIUM 9.4   MG 1.9         Significant Diagnostics:  I have reviewed all pertinent imaging results/findings within the past 24 hours.

## 2022-09-28 NOTE — PLAN OF CARE
END OF SHIFT NOTE  Pt rested well throughout shift, no distress at this time, mild complaints of pain, VSS, ambulatory, NPO at MN for sx today, bed in lowest position, side rails up x2. Bed alarm refused, call light within reach,  personal items within reach. [facility]      DALTON Segura RN         Problem: Adult Inpatient Plan of Care  Goal: Plan of Care Review  Outcome: Ongoing, Progressing  Goal: Patient-Specific Goal (Individualized)  Outcome: Ongoing, Progressing  Goal: Absence of Hospital-Acquired Illness or Injury  Outcome: Ongoing, Progressing  Goal: Optimal Comfort and Wellbeing  Outcome: Ongoing, Progressing  Goal: Readiness for Transition of Care  Outcome: Ongoing, Progressing     Problem: Diabetes Comorbidity  Goal: Blood Glucose Level Within Targeted Range  Outcome: Ongoing, Progressing     Problem: Fall Injury Risk  Goal: Absence of Fall and Fall-Related Injury  Outcome: Ongoing, Progressing     Problem: Pain Acute  Goal: Acceptable Pain Control and Functional Ability  Outcome: Ongoing, Progressing

## 2022-09-28 NOTE — PROGRESS NOTES
"Progress Note  Gynecology    Admit Date: 2022  LOS: 3    Reason for Admission:  SBO (small bowel obstruction)    SUBJECTIVE:     Mariaelena Sheffield is a 82 y.o.  with PMH significant for R breast CA (dx in  s/p chemo and radiation, denies ever having resection for this), DM, HLD, HTN who presented as a tx from an OSH 2/2 N/V/diarrhea x 2 days. At OSH she had a CT scan that was notable for a pelvic mass, a partial small bowel obstruction in the left upper hemiabdomen and inferior anterior abdominal wall, and herniation of colonic bowel loops in the anterior midline abdominal wall. NGT was placed and she was transferred to [facility] for [place] surgery admission.      Labs notable for CBC of 50/16/50/273. CMP with alk phos of 191, otherwise unremarkable, and UA with trace blood.      She states that she has been having intermittent abdominal pain off and on for approximately 4 months. She endorses somewhat decreased appetite over the past year as well, with associated weight loss.     Today, pt reports pain well controlled. Passing flatus and having Bms,. Planning for surgery today per Surg-onc. S/p NGT.   Denies N/V, SOB, fever, cough.     PSH:  RITA (uncertain if ovaries were removed) in -2 for a "tumor"- states that she was not told this was cancerous. She was post-menopausal at the time of surgery.   Ex lap for bowel obstruction in   Incarcerated abdominal hernia repair in 2018.      GYN Hx;   x2  Denies ever having abnormal pap smears  Has not seen an GYN since her hysterectomy.  She denies ever having genetic testing following her breast CA dx or her hysterectomy.      Fam Hx:  Had a sister with breast cancer, uncertain age of dx    OBJECTIVE:     Vital Signs   Temp:  [97.6 °F (36.4 °C)-98.7 °F (37.1 °C)] 97.9 °F (36.6 °C)  Pulse:  [66-93] 66  Resp:  [16-20] 16  SpO2:  [91 %-98 %] 96 %  BP: (121-159)/(76-91) 159/91      Intake/Output Summary (Last 24 hours) at 2022 0802  Last data filed " at 9/28/2022 0501  Gross per 24 hour   Intake 2164.42 ml   Output --   Net 2164.42 ml         Physical Exam:  Gen: A&Ox3, NAD  CV: regular rate  Pulm: LCTAB, normal respiratory effort  Abdomen is soft with a large midline hernia, A very large fixed pelvic/abdominal mass is also able to be palpated from the pubic symphysis to the ASIS bilaterally and above the umbilicus. Non tender to palpation with no rebound or guarding.  Prior midline vertical abdominal incision noted.     Genitourinary:  deferred  Ext: PPP, no peripheral edema, TEDs/SCDs in place      Laboratory:  Recent Results (from the past 24 hour(s))   POCT glucose    Collection Time: 09/27/22 12:06 PM   Result Value Ref Range    POCT Glucose 93 70 - 110 mg/dL   Pathologist Interpretation Differential    Collection Time: 09/27/22  4:38 PM   Result Value Ref Range    Pathologist Review Review required    CBC Auto Differential    Collection Time: 09/27/22  4:38 PM   Result Value Ref Range    WBC 23.85 (H) 3.90 - 12.70 K/uL    RBC 5.01 4.00 - 5.40 M/uL    Hemoglobin 14.3 12.0 - 16.0 g/dL    Hematocrit 45.6 37.0 - 48.5 %    MCV 91 82 - 98 fL    MCH 28.5 27.0 - 31.0 pg    MCHC 31.4 (L) 32.0 - 36.0 g/dL    RDW 15.9 (H) 11.5 - 14.5 %    Platelets 251 150 - 450 K/uL    MPV 12.0 9.2 - 12.9 fL    Immature Granulocytes 0.6 (H) 0.0 - 0.5 %    Gran # (ANC) 20.6 (H) 1.8 - 7.7 K/uL    Immature Grans (Abs) 0.15 (H) 0.00 - 0.04 K/uL    Lymph # 1.2 1.0 - 4.8 K/uL    Mono # 1.7 (H) 0.3 - 1.0 K/uL    Eos # 0.1 0.0 - 0.5 K/uL    Baso # 0.07 0.00 - 0.20 K/uL    nRBC 0 0 /100 WBC    Gran % 86.5 (H) 38.0 - 73.0 %    Lymph % 4.9 (L) 18.0 - 48.0 %    Mono % 7.2 4.0 - 15.0 %    Eosinophil % 0.5 0.0 - 8.0 %    Basophil % 0.3 0.0 - 1.9 %    Platelet Estimate Appears normal     Hypo Occasional     Large/Giant Platelets Present     Differential Method Automated    POCT glucose    Collection Time: 09/27/22  5:52 PM   Result Value Ref Range    POCT Glucose 122 (H) 70 - 110 mg/dL   POCT  glucose    Collection Time: 09/28/22 12:01 AM   Result Value Ref Range    POCT Glucose 104 70 - 110 mg/dL   CBC auto differential    Collection Time: 09/28/22  4:10 AM   Result Value Ref Range    WBC 16.85 (H) 3.90 - 12.70 K/uL    RBC 4.81 4.00 - 5.40 M/uL    Hemoglobin 13.5 12.0 - 16.0 g/dL    Hematocrit 43.7 37.0 - 48.5 %    MCV 91 82 - 98 fL    MCH 28.1 27.0 - 31.0 pg    MCHC 30.9 (L) 32.0 - 36.0 g/dL    RDW 15.7 (H) 11.5 - 14.5 %    Platelets 217 150 - 450 K/uL    MPV 11.3 9.2 - 12.9 fL    Immature Granulocytes 0.8 (H) 0.0 - 0.5 %    Gran # (ANC) 13.9 (H) 1.8 - 7.7 K/uL    Immature Grans (Abs) 0.13 (H) 0.00 - 0.04 K/uL    Lymph # 1.0 1.0 - 4.8 K/uL    Mono # 1.5 (H) 0.3 - 1.0 K/uL    Eos # 0.2 0.0 - 0.5 K/uL    Baso # 0.05 0.00 - 0.20 K/uL    nRBC 0 0 /100 WBC    Gran % 82.7 (H) 38.0 - 73.0 %    Lymph % 6.2 (L) 18.0 - 48.0 %    Mono % 9.1 4.0 - 15.0 %    Eosinophil % 0.9 0.0 - 8.0 %    Basophil % 0.3 0.0 - 1.9 %    Differential Method Automated    Comprehensive metabolic panel    Collection Time: 09/28/22  4:10 AM   Result Value Ref Range    Sodium 139 136 - 145 mmol/L    Potassium 3.7 3.5 - 5.1 mmol/L    Chloride 106 95 - 110 mmol/L    CO2 22 (L) 23 - 29 mmol/L    Glucose 99 70 - 110 mg/dL    BUN 20 8 - 23 mg/dL    Creatinine 1.0 0.5 - 1.4 mg/dL    Calcium 9.4 8.7 - 10.5 mg/dL    Total Protein 6.5 6.0 - 8.4 g/dL    Albumin 3.5 3.5 - 5.2 g/dL    Total Bilirubin 0.6 0.1 - 1.0 mg/dL    Alkaline Phosphatase 167 (H) 55 - 135 U/L    AST 11 10 - 40 U/L    ALT <5 (L) 10 - 44 U/L    Anion Gap 11 8 - 16 mmol/L    eGFR 57.0 (A) >60 mL/min/1.73 m^2   Magnesium    Collection Time: 09/28/22  4:10 AM   Result Value Ref Range    Magnesium 1.9 1.6 - 2.6 mg/dL   Phosphorus    Collection Time: 09/28/22  4:10 AM   Result Value Ref Range    Phosphorus 2.7 2.7 - 4.5 mg/dL       Diagnostic Results:  CT renal study  Impression:     Large pelvic mass.  Suspect malignancy.  Correlate clinically     Partial small bowel obstruction in the  left upper hemiabdomen and inferior anterior abdominal wall.  Strangulation of the bowel loops in the subcutaneous fat of the anterior abdominal wall hernia not excluded.  Correlate clinically.     Herniation of colonic bowel loops in the anterior midline abdominal wall.    TVUS:   FINDINGS:  Additional history: Reported history of hysterectomy.     Limited sonographic imaging was performed in the region of the midline pelvis.  There is a large solid pelvic mass demonstrating increased vascularity measuring approximately 20.6 x 17.6 x 18.8 cm.  The ovaries are not definitively visualized exam.  No pelvic free fluid is seen.     Impression:  Large solid pelvic mass demonstrating increased vascularity, better evaluated on prior CT 2022.  Etiology is nonspecific however concerning for malignancy  ASSESSMENT/PLAN:     Active Hospital Problems    Diagnosis  POA    *SBO (small bowel obstruction) [K56.609]  Yes      Resolved Hospital Problems   No resolved problems to display.       Assessment: 82 y.o.  with large pelvic mass.     Plan:   Pelvic Mass:  - patient presents as a transfer for SBO and found to have a large pelvic mass ( [id number]) with heterogenous foci. Mass is easily palpated on exam as well as a large midline hernia  -  elevated at 154   - TVUS and CT as above   - CBC  concerning for WBC of 50, unlikely for this mass to be a TOA, could be reactive however recommend followup cbc today and possibly testing for cdiff if diarrhea continues today.   - Patient has a documented history of RITA, however we are uncertain of the status of her ovaries and have no records of this surgery. This mass is not behaving like an ovarian cancer and has been present since at least 2018 (was documented as a uterus with multiple fibroids at that time, however this was many years after her hysterectomy) but has grown significantly since this time. No evidence of extrapelvic disease/ omental caking on scan  from 2018 or this admit.   - Recommend for primary team to get CEA and CA 19-9, and possible biopsies to further characterize possible origin of mass.   - Gyn Onc will plan to continue to follow along. Surg onc planning to take to OR this AM for debulking, Gyn onc will be available for intraop assistance.   -- Will consent today for ex lap and other indicated procedures per gyn/onc       SBO  - Management per primary team as follows  - - Tylenol, morphine PRN for pain  - D/c NGT, CLD          - NPO at midnight for OR tomorrow   - Daily CMP, CBC, Mag, Phos  - Maintenance fluids  - SSI  - Gyn/Onc consulted for abdominopelvic mass, appreciate recs  - Lovenox for DVT ppx     All other medical problems per primary team.     Tahmina Gonzalez MD   PGY-3, OB-GYN

## 2022-09-28 NOTE — ANESTHESIA PROCEDURE NOTES
Arterial    Diagnosis: pelvic mass    Patient location during procedure: done in OR  Procedure start time: 9/28/2022 3:20 PM  Procedure end time: 9/28/2022 3:26 PM    Staffing  Authorizing Provider: Delmy De La O MD  Performing Provider: Zoey Allred MD    Anesthesiologist was present at the time of the procedure.    Preanesthetic Checklist  Completed: patient identified, IV checked, site marked, risks and benefits discussed, surgical consent, monitors and equipment checked, pre-op evaluation, timeout performed and anesthesia consent givenArterial  Skin Prep: chlorhexidine gluconate  Orientation: right  Location: radial    Catheter Size: 20 G  Catheter placement by Ultrasound guidance. Heme positive aspiration all ports.   Vessel Caliber: medium, patent, compressibility normal  Needle advanced into vessel with real time Ultrasound guidance.Insertion Attempts: 1  Assessment  Dressing: secured with tape and tegaderm  Patient: Tolerated well

## 2022-09-28 NOTE — ANESTHESIA PROCEDURE NOTES
Intubation    Date/Time: 9/28/2022 12:26 PM  Performed by: Dimas Mcpherson CRNA  Authorized by: Delmy De La O MD     Intubation:     Induction:  Intravenous    Intubated:  Postinduction    Mask Ventilation:  Easy mask    Attempts:  1    Attempted By:  Student    Method of Intubation:  Video laryngoscopy    Blade:  Regalado 3    Laryngeal View Grade: Grade IIA - cords partially seen      Difficult Airway Encountered?: No      Complications:  None    Airway Device:  Oral endotracheal tube    Airway Device Size:  7.5    Style/Cuff Inflation:  Cuffed (inflated to minimal occlusive pressure)    Tube secured:  23    Secured at:  The lips    Placement Verified By:  Capnometry    Complicating Factors:  None    Findings Post-Intubation:  BS equal bilateral and atraumatic/condition of teeth unchanged

## 2022-09-28 NOTE — HPI
Mrs. Sheffield is an 81 yo woman with chronic myeloproliferative disorder, right breast cancer (s/p chemo and radiation in 1995), hernia repair in 2018, T2DM, HTN and HLD who presented to [facility] with nausea, vomiting and diarrhea. She was found to have a large pelvic mass and partial small bowel obstruction and was transferred to [facility] for surgical evaluation. On admission, she had a WBC count of 49, that now has trended down to 16. She has been afebrile, per chart review she had no other infectious symptoms on presentation and diarrhea had resolved prior to transfer. SBO improved after NG tube placement. Patient seen by gynecology oncology and is having debulking surgery today.     Hematology was consulted for leukocytosis.

## 2022-09-28 NOTE — PROGRESS NOTES
[facility]  General Surgery  Progress Note    Subjective:     History of Present Illness:  No notes on file    Post-Op Info:  Procedure(s) (LRB):  LAPAROTOMY, EXPLORATORY; excision of pelvic mass; possible bowel resection (N/A)  EXCISION, MASS, PELVIS (N/A)  EXCISION, SMALL INTESTINE (N/A)         Interval History: NAEO. Patient resting comfortably in bed this morning. Tolerated regular diet yesterday. Has been NPO since midnight for OR today. Patient having bowel movements. Denies N/V this morning. Pain is well controlled.      Medications:  Continuous Infusions:   lactated ringers 100 mL/hr at 09/25/22 2150     Scheduled Meds:   allopurinoL  300 mg Oral Daily    amLODIPine  5 mg Oral Daily    enoxaparin  40 mg Subcutaneous Daily    pantoprazole  40 mg Oral Daily    potassium chloride  20 mEq Oral Once     PRN Meds:dextrose 10%, dextrose 10%, glucagon (human recombinant), insulin aspart U-100, morphine, morphine, ondansetron, ondansetron, sodium chloride 0.9%     Review of patient's allergies indicates:  No Known Allergies  Objective:     Vital Signs (Most Recent):  Temp: 98.7 °F (37.1 °C) (09/28/22 0420)  Pulse: 70 (09/28/22 0420)  Resp: 18 (09/28/22 0422)  BP: (!) 146/82 (09/28/22 0420)  SpO2: 96 % (09/28/22 0420)   Vital Signs (24h Range):  Temp:  [97.6 °F (36.4 °C)-98.7 °F (37.1 °C)] 98.7 °F (37.1 °C)  Pulse:  [70-93] 70  Resp:  [16-20] 18  SpO2:  [91 %-98 %] 96 %  BP: (121-146)/(76-85) 146/82     Weight: 73 kg (160 lb 15 oz)  Body mass index is 27.62 kg/m².    Intake/Output - Last 3 Shifts         09/26 0700 09/27 0659 09/27 0700 09/28 0659    I.V. (mL/kg) 2467.4 (33.8) 2164.4 (29.6)    Total Intake(mL/kg) 2467.4 (33.8) 2164.4 (29.6)    Urine (mL/kg/hr) 500 (0.3)     Drains 300     Stool 0     Total Output 800     Net +1667.4 +2164.4          Urine Occurrence 3 x 3 x    Stool Occurrence 3 x 2 x            Physical Exam  Constitutional:       General: She is not in acute distress.     Appearance:  Normal appearance.   HENT:      Head: Normocephalic and atraumatic.      Mouth/Throat:      Mouth: Mucous membranes are moist.   Eyes:      Extraocular Movements: Extraocular movements intact.      Conjunctiva/sclera: Conjunctivae normal.   Cardiovascular:      Rate and Rhythm: Normal rate and regular rhythm.   Pulmonary:      Effort: Pulmonary effort is normal. No respiratory distress.   Abdominal:      General: There is distension.      Tenderness: There is no abdominal tenderness.   Skin:     General: Skin is warm and dry.   Neurological:      General: No focal deficit present.      Mental Status: She is alert and oriented to person, place, and time. Mental status is at baseline.       Significant Labs:  I have reviewed all pertinent lab results within the past 24 hours.  CBC:   Recent Labs   Lab 09/28/22  0410   WBC 16.85*   RBC 4.81   HGB 13.5   HCT 43.7      MCV 91   MCH 28.1   MCHC 30.9*       BMP:   Recent Labs   Lab 09/28/22  0410   GLU 99      K 3.7      CO2 22*   BUN 20   CREATININE 1.0   CALCIUM 9.4   MG 1.9         Significant Diagnostics:  I have reviewed all pertinent imaging results/findings within the past 24 hours.    Assessment/Plan:     * SBO (small bowel obstruction)  81 yo woman with pelvic mass, significant ventral hernia, and partial small bowel obstruction who presents from OSH for n/v/diarrhea.     - Tylenol, morphine PRN for pain  - NPO for OR today  - Daily CMP, CBC, Mag, Phos  - Maintenance fluids  - SSI  - Gyn/Onc consulted for abdominopelvic mass, appreciate recs   - will be available intra-op  - Lovenox for DVT ppx  - To OR today for ex-lap, resection of abdominopelvic mass, possible SBR        Bonifacio Simms MD  General Surgery  [facility]

## 2022-09-28 NOTE — PT/OT/SLP PROGRESS
Physical Therapy  Consult    Patient Name:  Mariaelena Sheffield   MRN:  [id number]  Admitting Diagnosis:  SBO (small bowel obstruction)   Recent Surgery: Procedure(s) (LRB):  LAPAROTOMY, EXPLORATORY; excision of pelvic mass; possible bowel resection (N/A)  EXCISION, MASS, PELVIS (N/A)  EXCISION, SMALL INTESTINE (N/A) Day of Surgery  Admit Date: 9/25/2022  Length of Stay: 3 days    Physical Therapy orders received. Pt SAMARA for ex-lap on this date. PT will follow up for initiation of services as appropriate post-op. PT to attempt when Mariaelena Sheffield is medically appropriate for progressive mobility.    Valentine Alvarez, PT, DPT  9/28/2022  Pager: [phone]

## 2022-09-29 PROBLEM — R19.00 PELVIC MASS: Status: ACTIVE | Noted: 2022-09-29

## 2022-09-29 PROBLEM — D72.829 LEUKOCYTOSIS: Status: ACTIVE | Noted: 2022-09-29

## 2022-09-29 LAB
ALBUMIN SERPL BCP-MCNC: 2.7 G/DL (ref 3.5–5.2)
ALLENS TEST: ABNORMAL
ALLENS TEST: ABNORMAL
ALP SERPL-CCNC: 69 U/L (ref 55–135)
ALT SERPL W/O P-5'-P-CCNC: <5 U/L (ref 10–44)
ANION GAP SERPL CALC-SCNC: 10 MMOL/L (ref 8–16)
ANISOCYTOSIS BLD QL SMEAR: SLIGHT
AST SERPL-CCNC: 15 U/L (ref 10–40)
BASOPHILS # BLD AUTO: 0.1 K/UL (ref 0–0.2)
BASOPHILS NFR BLD: 0.2 % (ref 0–1.9)
BILIRUB SERPL-MCNC: 2.6 MG/DL (ref 0.1–1)
BUN SERPL-MCNC: 12 MG/DL (ref 8–23)
BURR CELLS BLD QL SMEAR: ABNORMAL
CA-I BLDV-SCNC: 1.07 MMOL/L (ref 1.06–1.42)
CALCIUM SERPL-MCNC: 7.9 MG/DL (ref 8.7–10.5)
CHLORIDE SERPL-SCNC: 111 MMOL/L (ref 95–110)
CO2 SERPL-SCNC: 19 MMOL/L (ref 23–29)
CREAT SERPL-MCNC: 0.7 MG/DL (ref 0.5–1.4)
DELSYS: ABNORMAL
DELSYS: ABNORMAL
DIFFERENTIAL METHOD: ABNORMAL
EOSINOPHIL # BLD AUTO: 0 K/UL (ref 0–0.5)
EOSINOPHIL NFR BLD: 0 % (ref 0–8)
ERYTHROCYTE [DISTWIDTH] IN BLOOD BY AUTOMATED COUNT: 14.8 % (ref 11.5–14.5)
ERYTHROCYTE [DISTWIDTH] IN BLOOD BY AUTOMATED COUNT: 15.2 % (ref 11.5–14.5)
ERYTHROCYTE [SEDIMENTATION RATE] IN BLOOD BY WESTERGREN METHOD: 18 MM/H
ERYTHROCYTE [SEDIMENTATION RATE] IN BLOOD BY WESTERGREN METHOD: 18 MM/H
EST. GFR  (NO RACE VARIABLE): >60 ML/MIN/1.73 M^2
FERRITIN SERPL-MCNC: 728 NG/ML (ref 20–300)
FIO2: 100
FIO2: 80
GLUCOSE SERPL-MCNC: 155 MG/DL (ref 70–110)
HCO3 UR-SCNC: 20 MMOL/L (ref 24–28)
HCT VFR BLD AUTO: 25.2 % (ref 37–48.5)
HCT VFR BLD AUTO: 30.4 % (ref 37–48.5)
HCT VFR BLD CALC: 38 %PCV (ref 36–54)
HGB BLD-MCNC: 8.4 G/DL (ref 12–16)
HGB BLD-MCNC: 9.4 G/DL (ref 12–16)
HYPOCHROMIA BLD QL SMEAR: ABNORMAL
IMM GRANULOCYTES # BLD AUTO: 0.66 K/UL (ref 0–0.04)
IMM GRANULOCYTES NFR BLD AUTO: 1.5 % (ref 0–0.5)
IRON SERPL-MCNC: 18 UG/DL (ref 30–160)
LDH SERPL L TO P-CCNC: 1.67 MMOL/L (ref 0.36–1.25)
LYMPHOCYTES # BLD AUTO: 0.6 K/UL (ref 1–4.8)
LYMPHOCYTES NFR BLD: 1.2 % (ref 18–48)
MAGNESIUM SERPL-MCNC: 1.4 MG/DL (ref 1.6–2.6)
MCH RBC QN AUTO: 29.2 PG (ref 27–31)
MCH RBC QN AUTO: 29.6 PG (ref 27–31)
MCHC RBC AUTO-ENTMCNC: 30.9 G/DL (ref 32–36)
MCHC RBC AUTO-ENTMCNC: 33.3 G/DL (ref 32–36)
MCV RBC AUTO: 89 FL (ref 82–98)
MCV RBC AUTO: 94 FL (ref 82–98)
MODE: ABNORMAL
MODE: ABNORMAL
MONOCYTES # BLD AUTO: 2.1 K/UL (ref 0.3–1)
MONOCYTES NFR BLD: 4.7 % (ref 4–15)
NEUTROPHILS # BLD AUTO: 41.3 K/UL (ref 1.8–7.7)
NEUTROPHILS NFR BLD: 92.4 % (ref 38–73)
NRBC BLD-RTO: 0 /100 WBC
OVALOCYTES BLD QL SMEAR: ABNORMAL
PCO2 BLDA: 41.9 MMHG (ref 35–45)
PEEP: 5
PEEP: 5
PH SMN: 7.29 [PH] (ref 7.35–7.45)
PHOSPHATE SERPL-MCNC: 3.3 MG/DL (ref 2.7–4.5)
PLATELET # BLD AUTO: 146 K/UL (ref 150–450)
PLATELET # BLD AUTO: 155 K/UL (ref 150–450)
PLATELET BLD QL SMEAR: ABNORMAL
PMV BLD AUTO: 11.4 FL (ref 9.2–12.9)
PMV BLD AUTO: 11.8 FL (ref 9.2–12.9)
PO2 BLDA: 411 MMHG (ref 80–100)
POC BE: -7 MMOL/L
POC IONIZED CALCIUM: 1.1 MMOL/L (ref 1.06–1.42)
POC SATURATED O2: 100 % (ref 95–100)
POC TCO2: 21 MMOL/L (ref 23–27)
POIKILOCYTOSIS BLD QL SMEAR: SLIGHT
POLYCHROMASIA BLD QL SMEAR: ABNORMAL
POTASSIUM BLD-SCNC: 4 MMOL/L (ref 3.5–5.1)
POTASSIUM SERPL-SCNC: 4 MMOL/L (ref 3.5–5.1)
PROT SERPL-MCNC: 3.9 G/DL (ref 6–8.4)
RBC # BLD AUTO: 2.84 M/UL (ref 4–5.4)
RBC # BLD AUTO: 3.22 M/UL (ref 4–5.4)
SAMPLE: ABNORMAL
SAMPLE: ABNORMAL
SATURATED IRON: 11 % (ref 20–50)
SITE: ABNORMAL
SITE: ABNORMAL
SODIUM BLD-SCNC: 140 MMOL/L (ref 136–145)
SODIUM SERPL-SCNC: 140 MMOL/L (ref 136–145)
SPHEROCYTES BLD QL SMEAR: ABNORMAL
TOTAL IRON BINDING CAPACITY: 157 UG/DL (ref 250–450)
TRANSFERRIN SERPL-MCNC: 106 MG/DL (ref 200–375)
VT: 350
VT: 350
WBC # BLD AUTO: 26.24 K/UL (ref 3.9–12.7)
WBC # BLD AUTO: 44.72 K/UL (ref 3.9–12.7)

## 2022-09-29 PROCEDURE — 63600175 PHARM REV CODE 636 W HCPCS

## 2022-09-29 PROCEDURE — 99221 PR INITIAL HOSPITAL CARE,LEVL I: ICD-10-PCS | Mod: GC,,, | Performed by: INTERNAL MEDICINE

## 2022-09-29 PROCEDURE — 63600175 PHARM REV CODE 636 W HCPCS: Performed by: STUDENT IN AN ORGANIZED HEALTH CARE EDUCATION/TRAINING PROGRAM

## 2022-09-29 PROCEDURE — 80053 COMPREHEN METABOLIC PANEL: CPT

## 2022-09-29 PROCEDURE — 25000003 PHARM REV CODE 250: Performed by: STUDENT IN AN ORGANIZED HEALTH CARE EDUCATION/TRAINING PROGRAM

## 2022-09-29 PROCEDURE — 94664 DEMO&/EVAL PT USE INHALER: CPT

## 2022-09-29 PROCEDURE — 94761 N-INVAS EAR/PLS OXIMETRY MLT: CPT

## 2022-09-29 PROCEDURE — 25000242 PHARM REV CODE 250 ALT 637 W/ HCPCS: Performed by: NURSE PRACTITIONER

## 2022-09-29 PROCEDURE — 25000003 PHARM REV CODE 250

## 2022-09-29 PROCEDURE — 99024 POSTOP FOLLOW-UP VISIT: CPT | Mod: ,,, | Performed by: OBSTETRICS & GYNECOLOGY

## 2022-09-29 PROCEDURE — 97168 OT RE-EVAL EST PLAN CARE: CPT

## 2022-09-29 PROCEDURE — 97535 SELF CARE MNGMENT TRAINING: CPT

## 2022-09-29 PROCEDURE — 99221 1ST HOSP IP/OBS SF/LOW 40: CPT | Mod: GC,,, | Performed by: INTERNAL MEDICINE

## 2022-09-29 PROCEDURE — 25000003 PHARM REV CODE 250: Performed by: SURGERY

## 2022-09-29 PROCEDURE — 97162 PT EVAL MOD COMPLEX 30 MIN: CPT

## 2022-09-29 PROCEDURE — 99024 PR POST-OP FOLLOW-UP VISIT: ICD-10-PCS | Mod: ,,, | Performed by: OBSTETRICS & GYNECOLOGY

## 2022-09-29 PROCEDURE — 25000003 PHARM REV CODE 250: Performed by: NURSE PRACTITIONER

## 2022-09-29 PROCEDURE — 84466 ASSAY OF TRANSFERRIN: CPT | Performed by: INTERNAL MEDICINE

## 2022-09-29 PROCEDURE — 27000646 HC AEROBIKA DEVICE

## 2022-09-29 PROCEDURE — 82728 ASSAY OF FERRITIN: CPT | Performed by: INTERNAL MEDICINE

## 2022-09-29 PROCEDURE — 83735 ASSAY OF MAGNESIUM: CPT

## 2022-09-29 PROCEDURE — 85025 COMPLETE CBC W/AUTO DIFF WBC: CPT

## 2022-09-29 PROCEDURE — 82330 ASSAY OF CALCIUM: CPT | Performed by: SURGERY

## 2022-09-29 PROCEDURE — 20000000 HC ICU ROOM

## 2022-09-29 PROCEDURE — 27000221 HC OXYGEN, UP TO 24 HOURS

## 2022-09-29 PROCEDURE — 85027 COMPLETE CBC AUTOMATED: CPT | Performed by: STUDENT IN AN ORGANIZED HEALTH CARE EDUCATION/TRAINING PROGRAM

## 2022-09-29 PROCEDURE — 94640 AIRWAY INHALATION TREATMENT: CPT

## 2022-09-29 PROCEDURE — 97530 THERAPEUTIC ACTIVITIES: CPT

## 2022-09-29 PROCEDURE — 99900035 HC TECH TIME PER 15 MIN (STAT)

## 2022-09-29 PROCEDURE — 84100 ASSAY OF PHOSPHORUS: CPT

## 2022-09-29 RX ORDER — HYDRALAZINE HYDROCHLORIDE 20 MG/ML
10 INJECTION INTRAMUSCULAR; INTRAVENOUS EVERY 6 HOURS PRN
Status: DISCONTINUED | OUTPATIENT
Start: 2022-09-29 | End: 2022-10-03 | Stop reason: HOSPADM

## 2022-09-29 RX ORDER — LIDOCAINE 50 MG/G
1 PATCH TOPICAL
Status: DISCONTINUED | OUTPATIENT
Start: 2022-09-29 | End: 2022-10-03 | Stop reason: HOSPADM

## 2022-09-29 RX ORDER — MUPIROCIN 20 MG/G
OINTMENT TOPICAL 2 TIMES DAILY
Status: DISCONTINUED | OUTPATIENT
Start: 2022-09-29 | End: 2022-10-03 | Stop reason: HOSPADM

## 2022-09-29 RX ORDER — ACETAMINOPHEN 500 MG
1000 TABLET ORAL EVERY 8 HOURS
Status: DISCONTINUED | OUTPATIENT
Start: 2022-09-29 | End: 2022-10-03 | Stop reason: HOSPADM

## 2022-09-29 RX ORDER — LABETALOL HCL 20 MG/4 ML
5 SYRINGE (ML) INTRAVENOUS EVERY 6 HOURS PRN
Status: DISCONTINUED | OUTPATIENT
Start: 2022-09-29 | End: 2022-10-03 | Stop reason: HOSPADM

## 2022-09-29 RX ORDER — LISINOPRIL 10 MG/1
10 TABLET ORAL DAILY
Status: DISCONTINUED | OUTPATIENT
Start: 2022-09-29 | End: 2022-10-03 | Stop reason: HOSPADM

## 2022-09-29 RX ORDER — OXYCODONE HYDROCHLORIDE 5 MG/1
5 TABLET ORAL EVERY 6 HOURS PRN
Status: DISCONTINUED | OUTPATIENT
Start: 2022-09-29 | End: 2022-10-03 | Stop reason: HOSPADM

## 2022-09-29 RX ORDER — HYDROMORPHONE HYDROCHLORIDE 1 MG/ML
0.2 INJECTION, SOLUTION INTRAMUSCULAR; INTRAVENOUS; SUBCUTANEOUS EVERY 6 HOURS PRN
Status: DISCONTINUED | OUTPATIENT
Start: 2022-09-29 | End: 2022-10-03 | Stop reason: HOSPADM

## 2022-09-29 RX ORDER — CELECOXIB 200 MG/1
200 CAPSULE ORAL 2 TIMES DAILY
Status: DISCONTINUED | OUTPATIENT
Start: 2022-09-29 | End: 2022-10-03 | Stop reason: HOSPADM

## 2022-09-29 RX ADMIN — PANTOPRAZOLE SODIUM 40 MG: 40 TABLET, DELAYED RELEASE ORAL at 08:09

## 2022-09-29 RX ADMIN — MUPIROCIN: 20 OINTMENT TOPICAL at 08:09

## 2022-09-29 RX ADMIN — CELECOXIB 200 MG: 200 CAPSULE ORAL at 08:09

## 2022-09-29 RX ADMIN — SODIUM CHLORIDE, SODIUM LACTATE, POTASSIUM CHLORIDE, AND CALCIUM CHLORIDE 500 ML: .6; .31; .03; .02 INJECTION, SOLUTION INTRAVENOUS at 04:09

## 2022-09-29 RX ADMIN — SODIUM CHLORIDE, SODIUM LACTATE, POTASSIUM CHLORIDE, AND CALCIUM CHLORIDE: 600; 310; 30; 20 INJECTION, SOLUTION INTRAVENOUS at 10:09

## 2022-09-29 RX ADMIN — ACETAMINOPHEN 1000 MG: 500 TABLET ORAL at 09:09

## 2022-09-29 RX ADMIN — MORPHINE SULFATE 4 MG: 4 INJECTION INTRAVENOUS at 01:09

## 2022-09-29 RX ADMIN — LIDOCAINE 1 PATCH: 50 PATCH CUTANEOUS at 05:09

## 2022-09-29 RX ADMIN — LEVALBUTEROL HYDROCHLORIDE 0.63 MG: 0.63 SOLUTION RESPIRATORY (INHALATION) at 07:09

## 2022-09-29 RX ADMIN — LEVALBUTEROL HYDROCHLORIDE 0.63 MG: 0.63 SOLUTION RESPIRATORY (INHALATION) at 03:09

## 2022-09-29 RX ADMIN — ENOXAPARIN SODIUM 40 MG: 100 INJECTION SUBCUTANEOUS at 04:09

## 2022-09-29 RX ADMIN — ACETAMINOPHEN 1000 MG: 500 TABLET ORAL at 05:09

## 2022-09-29 RX ADMIN — AMLODIPINE BESYLATE 5 MG: 5 TABLET ORAL at 08:09

## 2022-09-29 RX ADMIN — OXYCODONE 5 MG: 5 TABLET ORAL at 12:09

## 2022-09-29 RX ADMIN — SODIUM CHLORIDE, SODIUM LACTATE, POTASSIUM CHLORIDE, AND CALCIUM CHLORIDE: 600; 310; 30; 20 INJECTION, SOLUTION INTRAVENOUS at 03:09

## 2022-09-29 RX ADMIN — HYDROMORPHONE HYDROCHLORIDE 0.2 MG: 1 INJECTION, SOLUTION INTRAMUSCULAR; INTRAVENOUS; SUBCUTANEOUS at 10:09

## 2022-09-29 RX ADMIN — LISINOPRIL 10 MG: 10 TABLET ORAL at 09:09

## 2022-09-29 RX ADMIN — OXYCODONE 5 MG: 5 TABLET ORAL at 07:09

## 2022-09-29 RX ADMIN — ALLOPURINOL 300 MG: 300 TABLET ORAL at 08:09

## 2022-09-29 RX ADMIN — OXYCODONE 5 MG: 5 TABLET ORAL at 06:09

## 2022-09-29 RX ADMIN — MAGNESIUM SULFATE 4 G: 2 INJECTION INTRAVENOUS at 03:09

## 2022-09-29 RX ADMIN — ACETAMINOPHEN 1000 MG: 500 TABLET ORAL at 01:09

## 2022-09-29 NOTE — NURSING
Nurses Note -- 4 Eyes      9/28/2022  1930      Skin assessed during: Admit      [x] No Pressure Injuries Present    [x]Prevention Measures Documented      [] Yes- Altered Skin Integrity Present or Discovered   [] LDA Added if Not in Epic (Describe Wound)   [] New Altered Skin Integrity was Present on Admit and Documented in LDA   [] Wound Image Taken    Wound Care Consulted? No    Attending Nurse:  Polly Schmitt RN     Second RN/Staff Member:  Ernesto Pan RN

## 2022-09-29 NOTE — PROGRESS NOTES
[facility]  Hematology/Oncology  Progress Note    Patient Name: Mariaelena Sheffield  Admission Date: 9/25/2022  Hospital Length of Stay: 4 days  Code Status: Full Code     Subjective:     HPI:  Mrs. Sheffield is an 83 yo woman with chronic myeloproliferative disorder, right breast cancer (s/p chemo and radiation in 1995), hernia repair in 2018, T2DM, HTN and HLD who presented to [facility] with nausea, vomiting and diarrhea. She was found to have a large pelvic mass and partial small bowel obstruction and was transferred to [facility] for surgical evaluation. On admission, she had a WBC count of 49, that now has trended down to 16. She has been afebrile, per chart review she had no other infectious symptoms on presentation and diarrhea had resolved prior to transfer. SBO improved after NG tube placement. Patient seen by gynecology oncology and is having debulking surgery today.     Hematology was consulted for leukocytosis.               Interval History: Patient had ex-lap with resection of pelvic mass 9/28.    Oncology Treatment Plan:   [No matching plan found]    Medications:  Continuous Infusions:   lactated ringers 125 mL/hr at 09/29/22 1007     Scheduled Meds:   acetaminophen  1,000 mg Oral Q8H    allopurinoL  300 mg Oral Daily    amLODIPine  5 mg Oral Daily    celecoxib  200 mg Oral BID    enoxaparin  40 mg Subcutaneous Daily    levalbuterol  0.63 mg Nebulization Q6H WAKE    LIDOcaine  1 patch Transdermal Q24H    mupirocin   Nasal BID    pantoprazole  40 mg Oral Daily     PRN Meds:calcium gluconate IVPB, calcium gluconate IVPB, calcium gluconate IVPB, dextrose 10%, dextrose 10%, glucagon (human recombinant), HYDROmorphone, insulin aspart U-100, magnesium sulfate IVPB, magnesium sulfate IVPB, ondansetron, ondansetron, oxyCODONE, potassium chloride **AND** potassium chloride **AND** potassium chloride, sodium chloride 0.9%, sodium chloride 0.9%, sodium phosphate IVPB, sodium  phosphate IVPB, sodium phosphate IVPB     Review of Systems   Constitutional:  Negative for chills and fever.   HENT:  Negative for congestion and sore throat.    Respiratory:  Negative for cough and shortness of breath.    Cardiovascular:  Negative for chest pain and leg swelling.   Gastrointestinal:  Positive for abdominal pain. Negative for diarrhea, nausea and vomiting.   Genitourinary:  Negative for difficulty urinating and dysuria.   Musculoskeletal:  Negative for myalgias.   Neurological:  Negative for dizziness and light-headedness.   Psychiatric/Behavioral:  Negative for confusion.    Objective:     Vital Signs (Most Recent):  Temp: 98 °F (36.7 °C) (09/29/22 0700)  Pulse: 92 (09/29/22 1030)  Resp: (!) 38 (09/29/22 1030)  BP: 121/62 (09/29/22 1030)  SpO2: 99 % (09/29/22 1030)   Vital Signs (24h Range):  Temp:  [95 °F (35 °C)-98.4 °F (36.9 °C)] 98 °F (36.7 °C)  Pulse:  [] 92  Resp:  [10-42] 38  SpO2:  [94 %-100 %] 99 %  BP: (110-192)/() 121/62  Arterial Line BP: ()/(46-88) 112/51     Weight: 73 kg (160 lb 15 oz)  Body mass index is 27.62 kg/m².  Body surface area is 1.82 meters squared.      Intake/Output Summary (Last 24 hours) at 9/29/2022 1126  Last data filed at 9/29/2022 1000  Gross per 24 hour   Intake 48257.97 ml   Output 3293 ml   Net 6778.97 ml       Physical Exam  Constitutional:       Appearance: Normal appearance.   HENT:      Head: Normocephalic and atraumatic.      Mouth/Throat:      Mouth: Mucous membranes are moist.   Eyes:      General: No scleral icterus.     Extraocular Movements: Extraocular movements intact.      Conjunctiva/sclera: Conjunctivae normal.   Neck:      Thyroid: No thyromegaly.      Trachea: No tracheal deviation.   Cardiovascular:      Rate and Rhythm: Normal rate and regular rhythm.      Heart sounds: No murmur heard.  Pulmonary:      Effort: Pulmonary effort is normal.      Breath sounds: Normal breath sounds. No wheezing or rales.   Abdominal:       General: Bowel sounds are normal.      Palpations: Abdomen is soft. There is no mass.      Tenderness: There is no abdominal tenderness.      Comments: AKASH drains with serosanguinous fluid. Midline wound vac in place.    Skin:     General: Skin is warm and dry.      Coloration: Skin is not pale.   Neurological:      Mental Status: She is alert. Mental status is at baseline.       Significant Labs:   CBC:   Recent Labs   Lab 09/28/22  1925 09/28/22  1927 09/29/22  0233 09/29/22  1010   WBC 48.17*  48.17*  --  44.72* 26.24*   HGB 12.2  12.2  --  9.4* 8.4*   HCT 37.6  37.6 38 30.4* 25.2*     185  --  155 146*       Diagnostic Results:  I have reviewed all pertinent imaging results/findings within the past 24 hours.    Assessment/Plan:     Leukemoid reaction  81 yo presenting with leukocytosis with neutrophilic predominance that is improving, in setting of large pelvic mass, partial SBO and colonic hernia.  Prior work up for persistent leukocytosis with negative BCR-ABL and Karl 2, neutrophilic leukocytosis on peripheral smear and bone marrow without clonal or primary marrow disorder.   S/p debulking surgery on 9/28.    WBC went from 16 to 44, and now down trending again to 26.   Leukocytosis likely reactive from SBO vs intra-abdominal process vs surgery. Lower suspicion for primary hematologic disorder.  Follow up pathology results  Schedule CBC 2 weeks after discharge and follow up with hematology, Dr. Dewitt when discharged.     Thank you your consult. The team will sign off. If there are any additional questions, please reach out to team.       Autumn Ospina, DO  Hematology/Oncology  [facility]

## 2022-09-29 NOTE — H&P
[facility]  Critical Care - Surgery  History & Physical    Patient Name: Mariaelena Sheffield  MRN: [id number]  Admission Date: 9/25/2022  Code Status: Full Code  Attending Physician: Edison Brooks MD   Primary Care Provider: Lui Blue MD   Principal Problem: SBO (small bowel obstruction)    Subjective:     HPI:Patient is a 82 y.o. female with PMH of R breast cancer, DM, HTN, HLD, and hysterectomy who presented as a transfer with the CC of nausea, vomiting, and diarrhea since 9/24/22, with vague abdominal pain, duration 4 months. She originally presented to [facility] with an abrupt onset of symptoms that began Saturday morning and worsened Saturday night. CT scan was completed at [place] that showed a pelvic mass, a partial small bowel obstruction in the left upper hemiabdomen and inferior anterior abdominal wall, and herniation of colonic bowel loops in the anterior midline abdominal wall. Pt was then transferred to [facility].      Hospital/ICU Course: SBO resolved with NGT and patient passed gastrograffin challenge 9/26. Surg onc took patient to OR for debulking with assistance from gyn onc. Large pelvic mass ([id number]) removed 9/28 after lengthy surgery. Reported EBL approximately 1L. Patient received 3 units PRBC intraoperatively as well as 3L crystalloid. Upon arrival to SICU, patient initially hypotensive, requiring levo and kai. Patient received 500cc albumin, as well as 1L LR and pressors were able to be weaned. Plan to extubate patient pending decreased pressors requirement    Follow-up For: Procedure(s) (LRB):  LAPAROTOMY, EXPLORATORY (N/A)  EXCISION, MASS, PELVIS (N/A)  REPAIR, HERNIA, INCISIONAL (N/A)  SIGMOIDOSCOPY, FLEXIBLE    Post-Operative Day: Day of Surgery     Past Medical History:   Diagnosis Date    Cancer     breast cancer R     Diabetes mellitus     GERD (gastroesophageal reflux disease)     Gout     Hypercholesteremia     Hypertension         Past Surgical History:   Procedure Laterality Date    ABDOMINAL SURGERY      BREAST BIOPSY      BREAST SURGERY Right     Breast biopsy    CATARACT EXTRACTION, BILATERAL      HERNIA REPAIR      LYSIS OF ADHESIONS N/A 10/25/2018    Procedure: LYSIS, ADHESIONS;  Surgeon: Kevin Caldwell MD;  Location: [facility] OR;  Service: General;  Laterality: N/A;    REPAIR OF INCARCERATED INCISIONAL HERNIA WITHOUT HISTORY OF PRIOR REPAIR N/A 10/25/2018    Procedure: REPAIR, HERNIA, INCISIONAL, INCARCERATED, WITHOUT HISTORY OF PRIOR REPAIR;  Surgeon: Kevin Caldwell MD;  Location: [facility] OR;  Service: General;  Laterality: N/A;       Review of patient's allergies indicates:  No Known Allergies    Family History    None       Tobacco Use    Smoking status: Never    Smokeless tobacco: Never   Substance and Sexual Activity    Alcohol use: Yes     Comment: socially  No alcohol prior to sx    Drug use: No    Sexual activity: Not on file      Review of Systems   Unable to perform ROS: Intubated   Objective:     Vital Signs (Most Recent):  Temp: 98.4 °F (36.9 °C) (09/28/22 0834)  Pulse: 86 (09/28/22 2000)  Resp: 19 (09/28/22 2000)  BP: (!) 112/50 (09/28/22 2000)  SpO2: 98 % (09/28/22 2000)   Vital Signs (24h Range):  Temp:  [97.9 °F (36.6 °C)-98.7 °F (37.1 °C)] 98.4 °F (36.9 °C)  Pulse:  [66-86] 86  Resp:  [16-60] 19  SpO2:  [94 %-100 %] 98 %  BP: (112-188)/(50-91) 112/50     Weight: 73 kg (160 lb 15 oz)  Body mass index is 27.62 kg/m².      Intake/Output Summary (Last 24 hours) at 9/28/2022 2012  Last data filed at 9/28/2022 2000  Gross per 24 hour   Intake 7014.42 ml   Output 2035 ml   Net 4979.42 ml       Physical Exam  Constitutional:       Appearance: She is normal weight.      Comments: Intubated, sedated   HENT:      Head: Normocephalic and atraumatic.      Mouth/Throat:      Mouth: Mucous membranes are dry.      Pharynx: Oropharynx is clear.      Comments: OG and ETT in place  Cardiovascular:      Rate and Rhythm: Normal rate and regular  rhythm.      Pulses: Normal pulses.      Heart sounds: Normal heart sounds.   Pulmonary:      Comments: Intubated, mechanical breath sounds  Abdominal:      General: Abdomen is flat.      Palpations: Abdomen is soft.      Comments: Midline wound vac in place with two drains. Incisions CDI   Skin:     General: Skin is warm and dry.       Vents:  Vent Mode: A/C (09/28/22 2000)  Set Rate: 18 BPM (09/28/22 2000)  Vt Set: 350 mL (09/28/22 2000)  PEEP/CPAP: 5 cmH20 (09/28/22 2000)  Oxygen Concentration (%): 40 (09/28/22 2000)  Peak Airway Pressure: 16 cmH2O (09/28/22 2000)  Plateau Pressure: 0 cmH20 (09/28/22 2000)  Total Ve: 7.5 mL (09/28/22 2000)  Negative Inspiratory Force (cm H2O): 0 (09/28/22 2000)  F/VT Ratio<105 (RSBI): (!) 45.45 (09/28/22 2000)    Lines/Drains/Airways       Drain  Duration                  Closed/Suction Drain 09/28/22 1841 Lateral LLQ 15 Fr. <1 day         Closed/Suction Drain 09/28/22 1842 Left;Lateral Abdomen Bulb 19 Fr. <1 day         Urethral Catheter 09/28/22 1302 Straight-tip;Non-latex;Silicone;Double-lumen 16 Fr. <1 day              Airway  Duration                  Airway - Non-Surgical 09/28/22 1226 <1 day              Arterial Line  Duration             Arterial Line 09/28/22 1520 Right Radial <1 day              Peripheral Intravenous Line  Duration                  Peripheral IV - Single Lumen 09/26/22 2022 22 G Anterior;Left Forearm 1 day         Peripheral IV - Single Lumen 09/28/22 1254 18 G Left Hand <1 day                    Significant Labs:    CBC/Anemia Profile:  Recent Labs   Lab 09/27/22  1638 09/28/22  0410 09/28/22  1925   WBC 23.85* 16.85* 48.17*  48.17*   HGB 14.3 13.5 12.2  12.2   HCT 45.6 43.7 37.6  37.6    217 185  185   MCV 91 91 91  91   RDW 15.9* 15.7* 15.1*  15.1*        Chemistries:  Recent Labs   Lab 09/27/22  0411 09/28/22  0410    139   K 3.7 3.7    106   CO2 21* 22*   BUN 22 20   CREATININE 1.2 1.0   CALCIUM 9.2 9.4   ALBUMIN 3.6 3.5    PROT 6.7 6.5   BILITOT 0.7 0.6   ALKPHOS 167* 167*   ALT <5* <5*   AST 12 11   MG 2.1 1.9   PHOS 4.2 2.7       All pertinent labs within the past 24 hours have been reviewed.    Significant Imaging: I have reviewed all pertinent imaging results/findings within the past 24 hours.    Assessment/Plan:     Active Diagnoses:    Diagnosis Date Noted POA    PRINCIPAL PROBLEM:  SBO (small bowel obstruction) [K56.609] 09/26/2022 Yes    Leukemoid reaction [D72.823] 09/28/2022 Yes      Problems Resolved During this Admission:         Neuro/Psych:   -- Sedation: None  -- Pain: PRN morphine             Cards:   -- HDS  -- Currently weaned off of pressors  -- Hx HTN, holding home meds in acute setting      Pulm:   -- Goal O2 sat > 90%  -- Transported from OR intubated  -- Wean as able  -- Levalbuterol Q6 awake      Renal:  -- Keep fleming for strict I/O  -- BUN/Cr 13/0.7      FEN / GI:   -- Net +3040  -- Maintenance at 150cc/HR  -- Received 1L LR bolus, 500cc albumin in SICU  -- Replace lytes as needed  -- Nutrition: NPO      ID:   -- Tm: afebrile; WBC 48  -- Patient has known myeloproliferative disorder, heme/onc following  -- Abx None indicated      Heme/Onc:   -- H/H stable 12.2/37.6  -- Daily CBC      Endo:   -- Gluc goal 140-180  -- SSI      PPx:   Feeding: NPO  Analgesia/Sedation: PRN morphine / None  Thromboembolic prevention: Lovenox  HOB >30: Yes  Stress Ulcer ppx: Pantoprazole  Glucose control: Critical care goal 140-180 g/dl, ISS    Lines/Drains/Airway: 2 PIV, a-line, ETT, wound vac, 2 drains, fleming      Dispo/Code Status/Palliative:   -- SICU / Full Code    Critical care was time spent personally by me on the following activities: development of treatment plan with patient or surrogate and bedside caregivers, discussions with consultants, evaluation of patient's response to treatment, examination of patient, ordering and performing treatments and interventions, ordering and review of laboratory studies, ordering and  review of radiographic studies, pulse oximetry, re-evaluation of patient's condition.  This critical care time did not overlap with that of any other provider or involve time for any procedures.     Curly Hernandez DO  Critical Care - Surgery  [facility]

## 2022-09-29 NOTE — ASSESSMENT & PLAN NOTE
  Neuro/Psych:   -- Sedation: None  -- Pain: multimodal and PRN oxy/dilaudid             Cards:   -- HDS  -- Hx HTN, restart home meds when clinically indicated      Pulm:   -- Goal O2 sat > 90%  -- Extubated to NC 9/28  -- Wean as able  -- Levalbuterol Q6 awake      Renal:  -- Keep fleming for strict I/O  -- BUN/Cr 12/0.7      FEN / GI:   -- Net +6L  -- Maintenance at 150cc/HR  -- Replace lytes as needed  -- Nutrition: NPO      ID:   -- Tm: afebrile; WBC 44.7  -- Patient has known myeloproliferative disorder, heme/onc following  -- Abx None indicated      Heme/Onc:   -- H/H stable 9.4  -- 3u pRBC intra-op 9/28, EBL 1L  -- Daily CBC      Endo:   -- Gluc goal 140-180  -- SSI      PPx:   Feeding: NPO  Analgesia/Sedation: as above  Thromboembolic prevention: Lovenox  HOB >30: Yes  Stress Ulcer ppx: Pantoprazole  Glucose control: Critical care goal 140-180 g/dl, ISS     Lines/Drains/Airway: 2 PIV, a-line, wound vac, 2 drains, fleming      Dispo/Code Status/Palliative:   -- SICU / Full Code

## 2022-09-29 NOTE — PT/OT/SLP RE-EVAL
"Occupational Therapy   Re-Assessment / Treatment    Patient Name:  Mariaelena Sheffield   MRN:  [id number]  Admit Date: 9/25/2022  Admitting Diagnosis:  SBO (small bowel obstruction)   Length of Stay: 4 days  Recent Surgery: Procedure(s) (LRB):  LAPAROTOMY, EXPLORATORY (N/A)  EXCISION, MASS, PELVIS (N/A)  REPAIR, HERNIA, INCISIONAL (N/A)  SIGMOIDOSCOPY, FLEXIBLE 1 Day Post-Op    OT recommendation:  HHOT  Please refer to OT initial evaluation for PLOF, OP and social history.  Recommendations:     Discharge Recommendations: home health OT  Discharge Equipment Recommendations:  walker, rolling  Barriers to discharge:  None    Assessment:     Mariaelena Sheffield is a 82 y.o. female with a medical diagnosis of SBO (small bowel obstruction).  She presents with performance deficits affecting function are weakness, impaired endurance, impaired self care skills, impaired functional mobility, gait instability, impaired balance, decreased lower extremity function, decreased safety awareness, impaired cardiopulmonary response to activity.      Performance deficits affecting function: weakness, impaired endurance, impaired self care skills, impaired functional mobility, gait instability, impaired balance, decreased lower extremity function, decreased safety awareness, impaired cardiopulmonary response to activity    Rehab Prognosis: Good; patient would benefit from acute skilled OT services to address these deficits and reach maximum level of function.       Plan:     Patient to be seen 3 x/week to address the above listed problems via self-care/home management, therapeutic activities, therapeutic exercises  Plan of Care Expires: 10/29/22  Plan of Care Reviewed with: patient    Subjective     Communicated with RN prior to session.  Patient found HOB elevated upon OT entry to room, agreeable to evaluation.     Chief Complaint: No chief complaint on file.    Patient comments/goals: "can I move with all thse lines?" " "    Pain/Comfort:  Pain Rating 1: 0/10  Pain Rating Post-Intervention 1: 0/10    Objective:   Patient found with: peripheral IV, pulse ox (continuous), telemetry, AKASH drain, arterial line     General Precautions: Standard, Cardiac fall   Orthopedic Precautions:N/A   Braces: N/A   Respiratory Status:   Room air  Vitals: /66 (BP Location: Right leg, Patient Position: Lying)   Pulse 92   Temp 98 °F (36.7 °C) (Oral)   Resp 18   Ht 5' 4" (1.626 m)   Wt 73 kg (160 lb 15 oz)   LMP  (LMP Unknown)   SpO2 99%   Breastfeeding No   BMI 27.62 kg/m²     Cognitive and Psychosocial Function:   AxOx4 --    Follows Commands/attention: follows two-step commands  Communication: clear/fluent  Memory: No Deficits noted  Safety awareness/insight to disability: intact   Mood/Affect/Coping skills/emotional control: Appropriate to situation    Hearing: [place] De[place]    Vision:  Wear readers    Physical Exam:  Postural examination/scapula alignment:    -       Rounded shoulders  -       Forward head  Skin integrity: Visible skin intact    BUE WFL for strength, sensation, GM/FM for use during functional tasks.  Pt is R handed.      Occupational Performance:  Bed Mobility:    Patient completed Supine to Sit with supervision on R side of bed  Scooting anteriorly to EOB to have both feet planted on floor: supervision    Functional Mobility/Transfers:  Static Sitting EOB: sup  Dynamic Sitting EOB: sup  Patient completed Sit <> Stand Transfer with stand by assistance  with  hand-held assist   Static Standing Balance: CGA w HH, x1 LOB anteriorly with Min A to correct  Dynamic Standing Balance: CGA-Hi w HH assist  Patient completed Bed <> Chair Transfer using Step Transfer technique with  CGA-Hi w  HH assist    Patient ambualted bathroom distance to complete grooming in standing at sink, pt then ambulated within room , all with CGA-Hi      Activities of Daily Living:  Grooming: contact guard assistance in standing at sink  Lower Body " Dressing: maximal assistance seated EOB donning socks      AMPA 6 Click ADL:  AMPAC Total Score: 21    Treatment & Education:  -OT POC, safety during ADLs and mobility   -Education on energy conservation and task modification to maximize safety and independence  -Questions answered within OT scope of practice.      Patient left up in chair with all lines intact, call button in reach, RN  notified, and   present    GOALS:   Multidisciplinary Problems       Occupational Therapy Goals          Problem: Occupational Therapy    Goal Priority Disciplines Outcome Interventions   Occupational Therapy Goal     OT, PT/OT Ongoing, Progressing    Description: Goals to be met by: 10/11/22     Patient will increase functional independence with ADLs by performing:    UE Dressing with [place].  LE Dressing with [place].  Grooming while standing at sink with [place].  Toileting from toilet with [place] for hygiene and clothing management.   Step transfer with [place]  Toilet transfer to toilet with [place].                         History:     Past Medical History:   Diagnosis Date    Cancer     breast cancer R     Diabetes mellitus     GERD (gastroesophageal reflux disease)     Gout     Hypercholesteremia     Hypertension          Past Surgical History:   Procedure Laterality Date    ABDOMINAL SURGERY      BREAST BIOPSY      BREAST SURGERY Right     Breast biopsy    CATARACT EXTRACTION, BILATERAL      EXCISION OF PELVIC MASS N/A 9/28/2022    Procedure: EXCISION, MASS, PELVIS;  Surgeon: Edison Brooks MD;  Location: [address];  Service: General;  Laterality: N/A;    FLEXIBLE SIGMOIDOSCOPY  9/28/2022    Procedure: SIGMOIDOSCOPY, FLEXIBLE;  Surgeon: Edison Brooks MD;  Location: [address];  Service: General;;    HERNIA REPAIR      LYSIS OF ADHESIONS N/A 10/25/2018    Procedure: LYSIS, ADHESIONS;  Surgeon: Kevin Caldwell MD;  Location: [facility];  Service: General;  Laterality: N/A;    REPAIR  OF INCARCERATED INCISIONAL HERNIA WITHOUT HISTORY OF PRIOR REPAIR N/A 10/25/2018    Procedure: REPAIR, HERNIA, INCISIONAL, INCARCERATED, WITHOUT HISTORY OF PRIOR REPAIR;  Surgeon: Kevin Caldwell MD;  Location: [facility];  Service: General;  Laterality: N/A;       Time Tracking:     OT Date of Treatment: 09/29/22  OT Start Time: 1313  OT Stop Time: 1335  OT Total Time (min): 22 min  Additional staff present: PT      Billable Minutes:Re-eval 10  Self Care/Home Management 12      9/29/2022

## 2022-09-29 NOTE — HOSPITAL COURSE
SBO resolved with NGT and patient passed gastrograffin challenge 9/26. Surg onc took patient to OR for debulking with assistance from gyn onc. Large pelvic mass ([id number]) removed 9/28 after lengthy surgery. Reported EBL approximately 1L. Patient received 3 units PRBC intraoperatively as well as 3L crystalloid. Upon arrival to SICU, patient initially hypotensive, requiring levo and kai. Patient received 500cc albumin, as well as 1L LR and pressors were able to be weaned. Patient extubated POD0.

## 2022-09-29 NOTE — PT/OT/SLP EVAL
"Physical Therapy Co-Evaluation and Co-Treatment    Patient Name:  Mariaelena Sheffield   MRN:  [id number]    Recommendations:     Discharge Recommendations:  home health PT   Discharge Equipment Recommendations: walker, rolling   Barriers to discharge: Inaccessible home    Assessment:     Mariaelena Sheffield is a 82 y.o. female admitted with a medical diagnosis of SBO (small bowel obstruction).  She presents with the following impairments/functional limitations:  weakness, impaired endurance, impaired self care skills, impaired functional mobility, gait instability, impaired balance. Patient tolerated session well. Demonstrates gait instability that would benefit from AD trial at next visit.    Rehab Prognosis: Good; patient would benefit from acute skilled PT services 3 x/week to address these deficits and reach maximum level of function.  Recent Surgery: Procedure(s) (LRB):  LAPAROTOMY, EXPLORATORY (N/A)  EXCISION, MASS, PELVIS (N/A)  REPAIR, HERNIA, INCISIONAL (N/A)  SIGMOIDOSCOPY, FLEXIBLE 1 Day Post-Op    Plan:     During this hospitalization, patient to be seen 3 x/week to address the identified rehab impairments via gait training, therapeutic activities, therapeutic exercises, neuromuscular re-education and progress toward the following goals:    Plan of Care Expires:  10/29/22    Subjective     Chief Complaint: None verbalized   Patient/Family Comments/Goals: "I don't have to worry about this?" Referring to lines  Pain/Comfort:  Pain Rating 1: 0/10    Patients cultural, spiritual, Rastafari conflicts given the current situation: no    Living Environment:  Patient lives with their son in a single story home, number of outside stairs: 3 with R HR, tub only.  Prior to admission, patient was independent with ADLs, driving, retired. Patient uses DME as follows: grab bar. DME owned (not currently used): none. Upon discharge, patient will have assistance from family.    Objective:     Communicated with nursing prior to " session.  Patient found HOB elevated with telemetry, blood pressure cuff, pulse ox (continuous), arterial line, AKASH drain upon PT entry to room.    General Precautions: Standard, fall   Orthopedic Precautions:N/A   Braces: N/A    Exams:  Cognitive Exam:  Patient is oriented to Person, Place, Time, Situation, follows commands 100% of the time  RLE ROM: WFL  RLE Strength: WFL, grossly 4+/5  LLE ROM: WFL  LLE Strength: WFL, grossly 4+/5  Sensation: Intact light touch to BLEs    Functional Mobility:  Bed Mobility:     Rolling Right: moderate assistance  Supine to Sit: moderate assistance  Transfers:     Sit to Stand: contact guard assistance with no AD  Gait: Patient ambulated 30 ft with hand-held assist and minimum assistance. Patient demonstrates unsteady gait and B toe out. Cuing for safety. All lines remained intact throughout ambulation trial.  Balance:   Static Sitting: Good, able to maintain for 6 minute(s) with stand by assistance  Dynamic Sitting: Good: Patient accepts moderate challenge, stand by assistance  Static Standing: Good, able to maintain for 3 minute(s) with contact guard assistance  Dynamic Standing: Fair: Patient accepts minimal challenge, minimum assistance    Therapeutic Activities and Exercises:  Patient educated on role of acute care PT and PT POC, safety while in hospital including calling nurse for mobility, and call light usage  Patient is clear to ambulate to/from bathroom with RN/PCT, minimal assist x1  Educated about importance of OOB mobility and remaining UIC most of the day    AM-PAC 6 CLICK MOBILITY  Total Score:14     Patient left up in chair with all lines intact, call button in reach, and RN notified.    GOALS:   Multidisciplinary Problems       Physical Therapy Goals          Problem: Physical Therapy    Goal Priority Disciplines Outcome Goal Variances Interventions   Physical Therapy Goal     PT, PT/OT Ongoing, Progressing     Description: Goals to be met by: 10/13/2022      Patient will increase functional independence with mobility by performin. Supine to sit with independence  2. Sit to supine with independence  3. Sit to stand transfer with modified independence  4. Gait  x 200 feet with modified independence using LRAD as needed  5. Ascend/descend 3 stair with no handrails supervision using LRAD as needed  6. Lower extremity exercise program x10 reps per handout, with independence                        History:     Past Medical History:   Diagnosis Date    Cancer     breast cancer R     Diabetes mellitus     GERD (gastroesophageal reflux disease)     Gout     Hypercholesteremia     Hypertension        Past Surgical History:   Procedure Laterality Date    ABDOMINAL SURGERY      BREAST BIOPSY      BREAST SURGERY Right     Breast biopsy    CATARACT EXTRACTION, BILATERAL      EXCISION OF PELVIC MASS N/A 2022    Procedure: EXCISION, MASS, PELVIS;  Surgeon: Edison Brooks MD;  Location: [facility] OR [address] [place] [address];  Service: General;  Laterality: N/A;    FLEXIBLE SIGMOIDOSCOPY  2022    Procedure: SIGMOIDOSCOPY, FLEXIBLE;  Surgeon: Edison Brooks MD;  Location: [facility] OR [address] [place] [address];  Service: General;;    HERNIA REPAIR      LYSIS OF ADHESIONS N/A 10/25/2018    Procedure: LYSIS, ADHESIONS;  Surgeon: Kevin Caldwell MD;  Location: [facility];  Service: General;  Laterality: N/A;    REPAIR OF INCARCERATED INCISIONAL HERNIA WITHOUT HISTORY OF PRIOR REPAIR N/A 10/25/2018    Procedure: REPAIR, HERNIA, INCISIONAL, INCARCERATED, WITHOUT HISTORY OF PRIOR REPAIR;  Surgeon: Kevin Caldwell MD;  Location: [facility];  Service: General;  Laterality: N/A;       Time Tracking:     PT Received On: 22  PT Start Time: 1314     PT Stop Time: 1335  PT Total Time (min): 21 min     Billable Minutes: Evaluation 10 min Therapeutic Activity 8 min      2022    Co-evaluation and co-treatment performed for this visit due to suspected patient need for two skilled therapists to ensure patient and staff safety  and to accommodate for patient activity tolerance/pain management

## 2022-09-29 NOTE — PROGRESS NOTES
BRIEF NOTE    Pt seen for follow-up after OR case on 9/28.  Pt in room, sitting in chair, in no acute cardiopulmonary distress with son at bedside. Explained to patient and son decision for post-operative ventilation and by notes and history, pt extubated as pressors weaned yesterday evening.  Also, explained to pt and son that as she was prepared to move to the ICU bed, phenylephrine infusion started at incorrect rate and that with BP rising, I determined that the pump was wrongly programmed.  I explained that the I immediately stopped the infusion and discussed the programming error with the CRNA in the OR.  Pt received medication to lower BP appropriately and no expected long-term sequale from error.  Pt states that she is awaiting transfer to step down room and in good spirits.  Questions answered and pt appreciative of discussion.     Jesús Cunningham  9/29/2022  3:09 PM

## 2022-09-29 NOTE — NURSING
Arterial line MAPs in 40-50's with BP cuff correlating. David PATEL aware and at bedside. Orders for 1L LR bolus. Will continue to monitor.

## 2022-09-29 NOTE — PLAN OF CARE
Problem: Occupational Therapy  Goal: Occupational Therapy Goal  Description: Goals to be met by: 10/11/22     Patient will increase functional independence with ADLs by performing:    UE Dressing with Chemult.  LE Dressing with Chemult.  Grooming while standing at sink with Chemult.  Toileting from toilet with Chemult for hygiene and clothing management.   Step transfer with Chemult  Toilet transfer to toilet with Chemult.    Outcome: Ongoing, Progressing     Once patient is medically stable, patient is safe to discharge home with continued OT services via HHOT.

## 2022-09-29 NOTE — PROGRESS NOTES
[facility] - [facility]  Critical Care - Surgery  Progress Note    Patient Name: Mariaelena Sheffield  MRN: [id number]  Admission Date: 9/25/2022  Hospital Length of Stay: 4 days  Code Status: Full Code  Attending Provider: Edison Brooks MD  Primary Care Provider: Lui Blue MD   Principal Problem: SBO (small bowel obstruction)    Subjective:     Hospital/ICU Course:  SBO resolved with NGT and patient passed gastrograffin challenge 9/26. Surg onc took patient to OR for debulking with assistance from gyn onc. Large pelvic mass ([id number]) removed 9/28 after lengthy surgery. Reported EBL approximately 1L. Patient received 3 units PRBC intraoperatively as well as 3L crystalloid. Upon arrival to SICU, patient initially hypotensive, requiring levo and kai. Patient received 500cc albumin, as well as 1L LR and pressors were able to be weaned. Patient extubated POD0.       Interval History/Significant Events: NAEON. Extubated and currently on LFNC. Patient tachycardic overnight and given 500cc IVF. Afebrile, HDS. Good UOP, +6L in the last 24hrs.    Follow-up For: Procedure(s) (LRB):  LAPAROTOMY, EXPLORATORY (N/A)  EXCISION, MASS, PELVIS (N/A)  REPAIR, HERNIA, INCISIONAL (N/A)  SIGMOIDOSCOPY, FLEXIBLE    Post-Operative Day: 1 Day Post-Op    Objective:     Vital Signs (Most Recent):  Temp: 97.9 °F (36.6 °C) (09/29/22 0300)  Pulse: (!) 118 (09/29/22 0500)  Resp: 15 (09/29/22 0500)  BP: 123/60 (09/29/22 0500)  SpO2: 100 % (09/29/22 0500)   Vital Signs (24h Range):  Temp:  [95 °F (35 °C)-98.4 °F (36.9 °C)] 97.9 °F (36.6 °C)  Pulse:  [] 118  Resp:  [12-60] 15  SpO2:  [95 %-100 %] 100 %  BP: (112-192)/() 123/60  Arterial Line BP: ()/(46-88) 107/55     Weight: 73 kg (160 lb 15 oz)  Body mass index is 27.62 kg/m².      Intake/Output Summary (Last 24 hours) at 9/29/2022 0517  Last data filed at 9/29/2022 0500  Gross per 24 hour   Intake 8967.14 ml   Output 2965 ml   Net 6002.14 ml       Physical  Exam  Constitutional:       General: She is not in acute distress.     Appearance: Normal appearance.   HENT:      Head: Normocephalic and atraumatic.      Mouth/Throat:      Mouth: Mucous membranes are moist.   Eyes:      Extraocular Movements: Extraocular movements intact.      Conjunctiva/sclera: Conjunctivae normal.   Cardiovascular:      Rate and Rhythm: Normal rate and regular rhythm.   Pulmonary:      Effort: Pulmonary effort is normal. No respiratory distress.      Comments: NC  Abdominal:      General: There is no distension.      Tenderness: There is no abdominal tenderness.      Comments: Midline wound vac in place with two drains. Incisions CDI    Skin:     General: Skin is warm and dry.   Neurological:      General: No focal deficit present.      Mental Status: She is alert and oriented to person, place, and time. Mental status is at baseline.       Vents:  Vent Mode: Spont (09/28/22 2235)  Set Rate: 18 BPM (09/28/22 2000)  Vt Set: 350 mL (09/28/22 2000)  Pressure Support: 5 cmH20 (09/28/22 2235)  PEEP/CPAP: 5 cmH20 (09/28/22 2235)  Oxygen Concentration (%): 32 (09/28/22 2349)  Peak Airway Pressure: 12 cmH2O (09/28/22 2235)  Plateau Pressure: 0 cmH20 (09/28/22 2235)  Total Ve: 11.2 mL (09/28/22 2235)  Negative Inspiratory Force (cm H2O): -49 (09/28/22 2235)  F/VT Ratio<105 (RSBI): (!) 22.64 (09/28/22 2235)    Lines/Drains/Airways       Drain  Duration                  Closed/Suction Drain 09/28/22 1841 Lateral LLQ 15 Fr. <1 day         Closed/Suction Drain 09/28/22 1842 Left;Lateral Abdomen Bulb 19 Fr. <1 day         Urethral Catheter 09/28/22 1302 Straight-tip;Non-latex;Silicone;Double-lumen 16 Fr. <1 day              Arterial Line  Duration             Arterial Line 09/28/22 1520 Right Radial <1 day              Peripheral Intravenous Line  Duration                  Peripheral IV - Single Lumen 09/28/22 1254 18 G Left Hand <1 day         Peripheral IV - Single Lumen 09/28/22 2130 22 G  Anterior;Proximal;Right Forearm <1 day                    Significant Labs:    CBC/Anemia Profile:  Recent Labs   Lab 09/28/22 0410 09/28/22  1613 09/28/22 1925 09/28/22 1927 09/29/22 0233   WBC 16.85*  --  48.17*  48.17*  --  44.72*   HGB 13.5  --  12.2  12.2  --  9.4*   HCT 43.7   < > 37.6  37.6 38 30.4*     --  185  185  --  155   MCV 91  --  91  91  --  94   RDW 15.7*  --  15.1*  15.1*  --  15.2*    < > = values in this interval not displayed.        Chemistries:  Recent Labs   Lab 09/28/22 0410 09/28/22 1925 09/29/22 0233    139  139 140   K 3.7 4.1  4.1 4.0    113*  113* 111*   CO2 22* 17*  17* 19*   BUN 20 13  13 12   CREATININE 1.0 0.7  0.7 0.7   CALCIUM 9.4 7.2*  7.2* 7.9*   ALBUMIN 3.5 1.9*  1.9* 2.7*   PROT 6.5 3.5*  3.5* 3.9*   BILITOT 0.6 1.4*  1.4* 2.6*   ALKPHOS 167* 88  88 69   ALT <5* <5*  <5* <5*   AST 11 12  12 15   MG 1.9 1.7 1.4*   PHOS 2.7  --  3.3       All pertinent labs within the past 24 hours have been reviewed.    Significant Imaging:  I have reviewed all pertinent imaging results/findings within the past 24 hours.    Assessment/Plan:     * SBO (small bowel obstruction)    Neuro/Psych:   -- Sedation: None  -- Pain: multimodal and PRN oxy/dilaudid             Cards:   -- HDS  -- Hx HTN, restart home meds when clinically indicated      Pulm:   -- Goal O2 sat > 90%  -- Extubated to NC 9/28  -- Wean as able  -- Levalbuterol Q6 awake      Renal:  -- Keep fleming for strict I/O  -- BUN/Cr 12/0.7      FEN / GI:   -- Net +6L  -- Maintenance at 150cc/HR  -- Replace lytes as needed  -- Nutrition: NPO      ID:   -- Tm: afebrile; WBC 44.7  -- Patient has known myeloproliferative disorder, heme/onc following  -- Abx None indicated      Heme/Onc:   -- H/H stable 9.4  -- 3u pRBC intra-op 9/28, EBL 1L  -- Daily CBC      Endo:   -- Gluc goal 140-180  -- SSI      PPx:   Feeding: NPO  Analgesia/Sedation: as above  Thromboembolic prevention: Lovenox  HOB  >30: Yes  Stress Ulcer ppx: Pantoprazole  Glucose control: Critical care goal 140-180 g/dl, ISS     Lines/Drains/Airway: 2 PIV, a-line, wound vac, 2 drains, fleming      Dispo/Code Status/Palliative:   -- SICU / Full Code             Critical care was time spent personally by me on the following activities: development of treatment plan with patient or surrogate and bedside caregivers, discussions with consultants, evaluation of patient's response to treatment, examination of patient, ordering and performing treatments and interventions, ordering and review of laboratory studies, ordering and review of radiographic studies, pulse oximetry, re-evaluation of patient's condition.  This critical care time did not overlap with that of any other provider or involve time for any procedures.     Audrey Quiñonez (Milo Name: MD Helga  Critical Care - Surgery  [facility]

## 2022-09-29 NOTE — PLAN OF CARE
SICU PLAN OF CARE NOTE     Dx: SBO (small bowel obstruction)     Shift Events: PT/OT eval ,OOB UIC, fleming and art line dc'd. Transfer orders       Goals of Care: MAP >65     Neuro: AAO x4, Follows Commands, and Moves All Extremities     Cardiac: SR. MD aware. MAP >65     Respiratory: Nasal Cannula     Diet: NPO except meds and sips     Gtts: MIVF     Urine Output: Voids spontaneously     Drains:   LLQ AKASH:   L lateral AKASH:   Prevena wound vac: 0cc     Labs/Accuchecks: Daily labs. Accuchecks Q6.     Skin: No new skin breakdown noted. Foams intact. See flowsheets for skin assessments.

## 2022-09-29 NOTE — HPI
Patient is a 82 y.o. female with PMH of R breast cancer, DM, HTN, HLD, and hysterectomy who presented as a transfer with the  of nausea, vomiting, and diarrhea since 9/24/22, with vague abdominal pain, duration 4 months. She originally presented to [facility] with an abrupt onset of symptoms that began Saturday morning and worsened Saturday night. CT scan was completed at [place] that showed a pelvic mass, a partial small bowel obstruction in the left upper hemiabdomen and inferior anterior abdominal wall, and herniation of colonic bowel loops in the anterior midline abdominal wall. Pt was then transferred to [facility].

## 2022-09-29 NOTE — PROGRESS NOTES
Progress Note  Gynecology    Admit Date: 2022  LOS: 4    Reason for Admission:  SBO (small bowel obstruction)    SUBJECTIVE:     Mariaelena Sheffield is a 82 y.o.  who is now POD#1 s/p Ex lap/ extensive JAMES/mass resection/ hernia repair/ flex sig for the treatment of a large pelvic mass. Surgery primary team surg/onc but gyn onc consulted and following.   Surgery was complicated by numerous adhesions to the mass and an EBL of approx 1L. She was tranfused 3u pRBC intraop, and was taken to the SICU intubated on pressors. Overnight she was able to be weaned off of pressors and was extubated around 1100 PM on POD#0.       Pt doing well this morning. Pain is moderately well controlled. She denies any nausea or emesis this morning. Voiding without difficulty via fleming. Has not ambulated since surgery.     OBJECTIVE:     Vital Signs   Temp:  [95 °F (35 °C)-98.4 °F (36.9 °C)] 97.9 °F (36.6 °C)  Pulse:  [] 108  Resp:  [12-60] 23  SpO2:  [95 %-100 %] 100 %  BP: (112-192)/() 124/78  Arterial Line BP: ()/(46-88) 100/52      Intake/Output Summary (Last 24 hours) at 2022 0614  Last data filed at 2022 0500  Gross per 24 hour   Intake 8967.14 ml   Output 2965 ml   Net 6002.14 ml       Physical Exam:  Gen: A&Ox3, NAD  CV: RR  Pulm: Normal respiratory effort, on 3L NC  Abd: hypoactive bowel sounds, soft, non-distended, appropriately-tender to palpation without rebound or guarding  Inc: Midline vertical incision covered with wound vac, 2 L side AKASH drains in place (1 upper quadrant 1 more midline) with serosanguinous fluid draining.   Ext: PPP, no peripheral edema, TEDs/SCDs in place  : Fleming in place draining green tinged urine from methylene blue dye     Laboratory:  Recent Labs   Lab 22  0410 22  1613 22  1925 22  1927 22  0233   WBC 16.85*  --  48.17*  48.17*  --  44.72*   HGB 13.5  --  12.2  12.2  --  9.4*   HCT 43.7   < > 37.6  37.6 38 30.4*   MCV 91  --  91   91  --  94     --  185  185  --  155    < > = values in this interval not displayed.      Recent Labs   Lab 22  0411 22  0410 22  1925 22  0233    139 139  139 140   K 3.7 3.7 4.1  4.1 4.0    106 113*  113* 111*   CO2 21* 22* 17*  17* 19*   BUN 22 20 13  13 12   CREATININE 1.2 1.0 0.7  0.7 0.7   GLU 85 99 193*  193* 155*   PROT 6.7 6.5 3.5*  3.5* 3.9*   BILITOT 0.7 0.6 1.4*  1.4* 2.6*   ALKPHOS 167* 167* 88  88 69   ALT <5* <5* <5*  <5* <5*   AST 12 11 12  12 15   MG 2.1 1.9 1.7 1.4*   PHOS 4.2 2.7  --  3.3            ASSESSMENT/PLAN:     Active Hospital Problems    Diagnosis  POA    *SBO (small bowel obstruction) [K56.609]  Yes    Pelvic mass [R19.00]  Yes    Leukemoid reaction [D72.823]  Yes      Resolved Hospital Problems   No resolved problems to display.       Assessment: 82 y.o.  POD#1 s/p procedure as above    Plan:   1. Post-op   - Routine post-op advances> advances per primary team  - Continue PRN pain medications  - Encourage ambulation   - Encourage IS  - UOP adequate   - Continue IVF until tolerating regular diet.  - Antiemetics prn nausea/vomiting.    2. Acute blood loss anemia  - CBC trend: 16//44/217>surgery/ 3u pRBC>48/12/38/185 >44/9/30/155 this AM  - S/p pressors and late extubation  - Asymptomatic     All other medical problems managed per primary team      Dispo: As patient meets appropriate post-op milestones, plan for discharge to home per primary team    Mirna Yee M.D.   OB/GYN  PGY-4

## 2022-09-29 NOTE — ASSESSMENT & PLAN NOTE
81 yo presenting with leukocytosis with neutrophilic predominance that is improving, in setting of large pelvic mass, partial SBO and colonic hernia.  Prior work up for persistent leukocytosis with negative BCR-ABL and Karl 2, neutrophilic leukocytosis on peripheral smear and bone marrow without clonal or primary marrow disorder.   S/p debulking surgery on 9/28.    WBC went from 16 to 44, and now down trending again to 26.   Leukocytosis likely reactive from SBO vs intra-abdominal process vs surgery. Lower suspicion for primary hematologic disorder.  Follow up pathology results  Schedule CBC 2 weeks after discharge and follow up with hematology, Dr. Dewitt when discharged.

## 2022-09-29 NOTE — PLAN OF CARE
[facility] - [facility] Intensive [facility]  Discharge Reassessment    Primary Care Provider: Lui Blue MD    Expected Discharge Date: 10/4/2022    Reassessment (most recent)       Discharge Reassessment - 09/29/22 1003          Discharge Reassessment    Assessment Type Discharge Planning Reassessment     Communicated MING with patient/caregiver Date not available/Unable to determine     Discharge Plan A Home Health     Discharge Plan B Home with family     DME Needed Upon Discharge  other (see comments)   TBD    Why the patient remains in the hospital Requires continued medical care                   Per MD's Note, KURT. Extubated and currently on LFNC. Patient tachycardic overnight and given 500cc IVF. Afebrile, HDS. Good UOP, +6L in the last 24hrs.     Nery Stone LMSW  Case Management [facility]  Ext: [phone]

## 2022-09-29 NOTE — OP NOTE
[facility] - [facility] Int[facility]    Procedure(s) (LRB):  LAPAROTOMY, EXPLORATORY (N/A)  EXCISION, MASS, PELVIS (N/A)  REPAIR, HERNIA, INCISIONAL (N/A)  SIGMOIDOSCOPY, FLEXIBLE    DATE OF SURGERY  9/28/2022     Surgeon(s) and Role  Primary: Deric Brooks MD  Assisting: Og Flores MD  Resident - Assisting: Kunal Jasso MD  Co-Surgeon: Sebastian Barrow MD     ANESTHESIA TYPE  General     PRE-OPERATIVE DIAGNOSIS  Pelvic mass [R19.00]    POST-OPERATIVE DIAGNOSIS  Post-Op Diagnosis Codes:     * Pelvic mass [R19.00]      Assistant Surgeon's Certification of Necessity:  I understand that section 1842 (b) (6) (d) of the Social Security Act generally prohibits Medicare Part B reasonable charge payment for the services of assistants at surgery in teaching hospitals when qualified residents are available to furnish such services. I certify that the services for which payment is claimed were medically necessary, and that no qualified resident was available to perform the services. I further understand that these services are subject to post-payment review by the Medicare carrier.    SPECIMENS  Specimens (From admission, onward)       Start     Ordered    09/28/22 1914  Specimen to Pathology, Surgery General Surgery  Once        Comments: Pre-op Diagnosis: Pelvic mass [R19.00]Procedure(s):LAPAROTOMY, EXPLORATORYEXCISION, MASS, PELVISREPAIR, HERNIA, INCISIONALSIGMOIDOSCOPY, FLEXIBLE Number of specimens: 2Name of specimens: 1. Hernia sac - permanent2. Pelvic mass - permanent     References:    Click here for ordering Quick Tip   Question Answer Comment   Procedure Type: General Surgery    Which provider would you like to cc? DERIC BROOKS    Release to patient Immediate        09/28/22 1923                     DRAINS       Closed/Suction Drain 09/28/22 1841 Lateral LLQ 15 Fr. (Active)   Site Description Reddened 09/29/22 0320   Drainage Sanguineous 09/29/22 0320   Status To bulb suction 09/29/22 0320   Output  "(mL) 10 mL 09/29/22 0243            Closed/Suction Drain 09/28/22 1842 Left;Lateral Abdomen Bulb 19 Fr. (Active)   Site Description Reddened 09/29/22 0320   Drainage Sanguineous 09/29/22 0320   Status To bulb suction 09/29/22 0320   Output (mL) 110 mL 09/29/22 0243            Urethral Catheter 09/28/22 1302 Straight-tip;Non-latex;Silicone;Double-lumen 16 Fr. (Active)   Site Assessment Clean;Intact 09/29/22 0320   Collection Container Urimeter 09/29/22 0320   Securement Method secured to top of thigh w/ adhesive device 09/29/22 0320   Catheter Care Performed yes 09/29/22 0320   Reason for Continuing Urinary Catheterization Critically ill in ICU and requiring hourly monitoring of intake/output 09/29/22 0320   CAUTI Prevention Bundle Securement Device in place with 1" slack;Intact seal between catheter & drainage tubing;Drainage bag/urimeter off the floor;No dependent loops or kinks;Drainage bag/urimeter not overfilled (<2/3 full);Drainage bag/urimeter below bladder;Sheeting clip in use 09/29/22 0320   Output (mL) 50 mL 09/29/22 0600        ESTIMATED BLOOD LOSS  1000 mL           IMPLANTS  * No implants in log *     "

## 2022-09-29 NOTE — SUBJECTIVE & OBJECTIVE
Interval History/Significant Events: NAEON. Extubated and currently on LFNC. Patient tachycardic overnight and given 500cc IVF. Afebrile, HDS. Good UOP, +6L in the last 24hrs.    Follow-up For: Procedure(s) (LRB):  LAPAROTOMY, EXPLORATORY (N/A)  EXCISION, MASS, PELVIS (N/A)  REPAIR, HERNIA, INCISIONAL (N/A)  SIGMOIDOSCOPY, FLEXIBLE    Post-Operative Day: 1 Day Post-Op    Objective:     Vital Signs (Most Recent):  Temp: 97.9 °F (36.6 °C) (09/29/22 0300)  Pulse: (!) 118 (09/29/22 0500)  Resp: 15 (09/29/22 0500)  BP: 123/60 (09/29/22 0500)  SpO2: 100 % (09/29/22 0500)   Vital Signs (24h Range):  Temp:  [95 °F (35 °C)-98.4 °F (36.9 °C)] 97.9 °F (36.6 °C)  Pulse:  [] 118  Resp:  [12-60] 15  SpO2:  [95 %-100 %] 100 %  BP: (112-192)/() 123/60  Arterial Line BP: ()/(46-88) 107/55     Weight: 73 kg (160 lb 15 oz)  Body mass index is 27.62 kg/m².      Intake/Output Summary (Last 24 hours) at 9/29/2022 0517  Last data filed at 9/29/2022 0500  Gross per 24 hour   Intake 8967.14 ml   Output 2965 ml   Net 6002.14 ml       Physical Exam  Constitutional:       General: She is not in acute distress.     Appearance: Normal appearance.   HENT:      Head: Normocephalic and atraumatic.      Mouth/Throat:      Mouth: Mucous membranes are moist.   Eyes:      Extraocular Movements: Extraocular movements intact.      Conjunctiva/sclera: Conjunctivae normal.   Cardiovascular:      Rate and Rhythm: Normal rate and regular rhythm.   Pulmonary:      Effort: Pulmonary effort is normal. No respiratory distress.      Comments: NC  Abdominal:      General: There is no distension.      Tenderness: There is no abdominal tenderness.      Comments: Midline wound vac in place with two drains. Incisions CDI    Skin:     General: Skin is warm and dry.   Neurological:      General: No focal deficit present.      Mental Status: She is alert and oriented to person, place, and time. Mental status is at baseline.       Vents:  Vent Mode:  Spont (09/28/22 2235)  Set Rate: 18 BPM (09/28/22 2000)  Vt Set: 350 mL (09/28/22 2000)  Pressure Support: 5 cmH20 (09/28/22 2235)  PEEP/CPAP: 5 cmH20 (09/28/22 2235)  Oxygen Concentration (%): 32 (09/28/22 2349)  Peak Airway Pressure: 12 cmH2O (09/28/22 2235)  Plateau Pressure: 0 cmH20 (09/28/22 2235)  Total Ve: 11.2 mL (09/28/22 2235)  Negative Inspiratory Force (cm H2O): -49 (09/28/22 2235)  F/VT Ratio<105 (RSBI): (!) 22.64 (09/28/22 2235)    Lines/Drains/Airways       Drain  Duration                  Closed/Suction Drain 09/28/22 1841 Lateral LLQ 15 Fr. <1 day         Closed/Suction Drain 09/28/22 1842 Left;Lateral Abdomen Bulb 19 Fr. <1 day         Urethral Catheter 09/28/22 1302 Straight-tip;Non-latex;Silicone;Double-lumen 16 Fr. <1 day              Arterial Line  Duration             Arterial Line 09/28/22 1520 Right Radial <1 day              Peripheral Intravenous Line  Duration                  Peripheral IV - Single Lumen 09/28/22 1254 18 G Left Hand <1 day         Peripheral IV - Single Lumen 09/28/22 2130 22 G Anterior;Proximal;Right Forearm <1 day                    Significant Labs:    CBC/Anemia Profile:  Recent Labs   Lab 09/28/22  0410 09/28/22  1613 09/28/22 1925 09/28/22 1927 09/29/22  0233   WBC 16.85*  --  48.17*  48.17*  --  44.72*   HGB 13.5  --  12.2  12.2  --  9.4*   HCT 43.7   < > 37.6  37.6 38 30.4*     --  185  185  --  155   MCV 91  --  91  91  --  94   RDW 15.7*  --  15.1*  15.1*  --  15.2*    < > = values in this interval not displayed.        Chemistries:  Recent Labs   Lab 09/28/22  0410 09/28/22 1925 09/29/22  0233    139  139 140   K 3.7 4.1  4.1 4.0    113*  113* 111*   CO2 22* 17*  17* 19*   BUN 20 13  13 12   CREATININE 1.0 0.7  0.7 0.7   CALCIUM 9.4 7.2*  7.2* 7.9*   ALBUMIN 3.5 1.9*  1.9* 2.7*   PROT 6.5 3.5*  3.5* 3.9*   BILITOT 0.6 1.4*  1.4* 2.6*   ALKPHOS 167* 88  88 69   ALT <5* <5*  <5* <5*   AST 11 12  12 15   MG 1.9 1.7 1.4*    PHOS 2.7  --  3.3       All pertinent labs within the past 24 hours have been reviewed.    Significant Imaging:  I have reviewed all pertinent imaging results/findings within the past 24 hours.

## 2022-09-29 NOTE — ANESTHESIA POSTPROCEDURE EVALUATION
Anesthesia Post Evaluation    Patient: Mariaelena Sheffield    Procedure(s) Performed: Procedure(s) (LRB):  LAPAROTOMY, EXPLORATORY (N/A)  EXCISION, MASS, PELVIS (N/A)  REPAIR, HERNIA, INCISIONAL (N/A)  SIGMOIDOSCOPY, FLEXIBLE    Final Anesthesia Type: general      Patient location during evaluation: ICU  Patient participation: No - Unable to Participate, Intubation  Level of consciousness: sedated  Post-procedure vital signs: reviewed and stable  Pain management: adequate  Airway patency: patent    PONV status at discharge: No PONV  Anesthetic complications: yes  Perioperative Events: medication error and other periop events (see comments)      Mile-operative Events Comments: Unplanned intubation.     Pt kept intubated due to hemodynamic instability and inability to wean from pressors at the end of the case.   Cardiovascular status: hemodynamically unstable and hypotensive (requiring phenylephrine for support)  Respiratory status: intubated and ventilator  Hydration status: euvolemic  Follow-up not needed.  Comments:   Pt transported fully monitored from OR to ICU.  Ventilated provided via bag mask ventilation at 10 LPM. Pt on phenyleprhine 0.3 mcg/kg/min during transport without drop in BP.      OR course discussed with ICU resident and nursing teams.              Vitals Value Taken Time   /67 09/28/22 1954   Temp 36.9 09/28/22 1957   Pulse 86 09/28/22 1957   Resp 19 09/28/22 1957   SpO2 98 % 09/28/22 1957   Vitals shown include unvalidated device data.      No case tracking events are documented in the log.      Pain/Lydia Score: Pain Rating Prior to Med Admin: 0 (9/28/2022 10:30 AM)  Pain Rating Post Med Admin: 6 (9/28/2022  9:20 AM)  Lydia Score: 8 (9/28/2022 10:30 AM)

## 2022-09-29 NOTE — PROGRESS NOTES
[facility]  General Surgery  Progress Note    Subjective:     History of Present Illness:  No notes on file    Post-Op Info:  Procedure(s) (LRB):  LAPAROTOMY, EXPLORATORY (N/A)  EXCISION, MASS, PELVIS (N/A)  REPAIR, HERNIA, INCISIONAL (N/A)  SIGMOIDOSCOPY, FLEXIBLE   1 Day Post-Op     Interval History: Patient taken to OR yesterday and underwent ex-lap with resection of pelvic mass. No acute events overnight. Patient resting comfortably in bed this morning. Denies N/V. Pain is well controlled.     Medications:  Continuous Infusions:   lactated ringers 125 mL/hr at 09/29/22 1100     Scheduled Meds:   acetaminophen  1,000 mg Oral Q8H    allopurinoL  300 mg Oral Daily    amLODIPine  5 mg Oral Daily    celecoxib  200 mg Oral BID    enoxaparin  40 mg Subcutaneous Daily    levalbuterol  0.63 mg Nebulization Q6H WAKE    LIDOcaine  1 patch Transdermal Q24H    mupirocin   Nasal BID    pantoprazole  40 mg Oral Daily     PRN Meds:calcium gluconate IVPB, calcium gluconate IVPB, calcium gluconate IVPB, dextrose 10%, dextrose 10%, glucagon (human recombinant), HYDROmorphone, insulin aspart U-100, magnesium sulfate IVPB, magnesium sulfate IVPB, ondansetron, ondansetron, oxyCODONE, potassium chloride **AND** potassium chloride **AND** potassium chloride, sodium chloride 0.9%, sodium chloride 0.9%, sodium phosphate IVPB, sodium phosphate IVPB, sodium phosphate IVPB     Review of patient's allergies indicates:  No Known Allergies  Objective:     Vital Signs (Most Recent):  Temp: 98 °F (36.7 °C) (09/29/22 0700)  Pulse: 90 (09/29/22 1145)  Resp: 14 (09/29/22 1145)  BP: 139/64 (09/29/22 1130)  SpO2: 100 % (09/29/22 1145) Vital Signs (24h Range):  Temp:  [95 °F (35 °C)-98.4 °F (36.9 °C)] 98 °F (36.7 °C)  Pulse:  [] 90  Resp:  [10-42] 14  SpO2:  [94 %-100 %] 100 %  BP: (110-192)/() 139/64  Arterial Line BP: ()/(46-88) 124/52     Weight: 73 kg (160 lb 15 oz)  Body mass index is 27.62  kg/m².    Intake/Output - Last 3 Shifts         09/27 0700  09/28 0659 09/28 0700 09/29 0659 09/29 0700 09/30 0659    I.V. (mL/kg) 2164.4 (29.6) 2134.9 (29.2) 712.9 (9.8)    Blood  850     IV Piggyback  6499     Total Intake(mL/kg) 2164.4 (29.6) 9483.9 (129.9) 712.9 (9.8)    Urine (mL/kg/hr)  1645 (0.9) 210 (0.6)    Drains  370 68    Stool  0     Blood  1000     Total Output  3015 278    Net +2164.4 +6468.9 +434.9           Urine Occurrence 3 x 2 x     Stool Occurrence 2 x 0 x             Physical Exam  Constitutional:       General: She is not in acute distress.     Appearance: Normal appearance.   HENT:      Head: Normocephalic and atraumatic.      Mouth/Throat:      Mouth: Mucous membranes are moist.   Eyes:      Extraocular Movements: Extraocular movements intact.      Conjunctiva/sclera: Conjunctivae normal.   Cardiovascular:      Rate and Rhythm: Normal rate and regular rhythm.   Pulmonary:      Effort: Pulmonary effort is normal. No respiratory distress.   Abdominal:      General: There is no distension.      Palpations: Abdomen is soft.      Comments: Appropriately TTP, midline incision with Prevena over top with no strikethrough, holding suction; two AKASH drains to left abdomen with SS drainage   Skin:     General: Skin is warm and dry.   Neurological:      General: No focal deficit present.      Mental Status: She is alert and oriented to person, place, and time. Mental status is at baseline.       Significant Labs:  I have reviewed all pertinent lab results within the past 24 hours.  CBC:   Recent Labs   Lab 09/29/22  1010   WBC 26.24*   RBC 2.84*   HGB 8.4*   HCT 25.2*   *   MCV 89   MCH 29.6   MCHC 33.3     BMP:   Recent Labs   Lab 09/29/22  0233   *      K 4.0   *   CO2 19*   BUN 12   CREATININE 0.7   CALCIUM 7.9*   MG 1.4*       Significant Diagnostics:  I have reviewed all pertinent imaging results/findings within the past 24 hours.    Assessment/Plan:     * SBO (small bowel  obstruction)  81 yo woman with pelvic mass, significant ventral hernia, and partial small bowel obstruction who presents from OSH for n/v/diarrhea.     - Multimodal pain: tylenol, celecoxib, lido patch, oxy/dilaudid PRN  - Aggressive pulm toilet: IS, levalbuterol  - H/o HTN, continue home amlodipine  - mIVF, keep NPO  - Home pantoprazole  - Zofran PRN  - Daily CMP, CBC, Mag, Phos  - SSI  - Gyn/Onc consulted for abdominopelvic mass, appreciate recs  - Lovenox for DVT ppx    Dispo: okay for step down to the floor        Bonifacio Smims MD  General Surgery  [facility]

## 2022-09-29 NOTE — SUBJECTIVE & OBJECTIVE
Interval History: Patient had ex-lap with resection of pelvic mass 9/28.    Oncology Treatment Plan:   [No matching plan found]    Medications:  Continuous Infusions:   lactated ringers 125 mL/hr at 09/29/22 1007     Scheduled Meds:   acetaminophen  1,000 mg Oral Q8H    allopurinoL  300 mg Oral Daily    amLODIPine  5 mg Oral Daily    celecoxib  200 mg Oral BID    enoxaparin  40 mg Subcutaneous Daily    levalbuterol  0.63 mg Nebulization Q6H WAKE    LIDOcaine  1 patch Transdermal Q24H    mupirocin   Nasal BID    pantoprazole  40 mg Oral Daily     PRN Meds:calcium gluconate IVPB, calcium gluconate IVPB, calcium gluconate IVPB, dextrose 10%, dextrose 10%, glucagon (human recombinant), HYDROmorphone, insulin aspart U-100, magnesium sulfate IVPB, magnesium sulfate IVPB, ondansetron, ondansetron, oxyCODONE, potassium chloride **AND** potassium chloride **AND** potassium chloride, sodium chloride 0.9%, sodium chloride 0.9%, sodium phosphate IVPB, sodium phosphate IVPB, sodium phosphate IVPB     Review of Systems   Constitutional:  Negative for chills and fever.   HENT:  Negative for congestion and sore throat.    Respiratory:  Negative for cough and shortness of breath.    Cardiovascular:  Negative for chest pain and leg swelling.   Gastrointestinal:  Positive for abdominal pain. Negative for diarrhea, nausea and vomiting.   Genitourinary:  Negative for difficulty urinating and dysuria.   Musculoskeletal:  Negative for myalgias.   Neurological:  Negative for dizziness and light-headedness.   Psychiatric/Behavioral:  Negative for confusion.    Objective:     Vital Signs (Most Recent):  Temp: 98 °F (36.7 °C) (09/29/22 0700)  Pulse: 92 (09/29/22 1030)  Resp: (!) 38 (09/29/22 1030)  BP: 121/62 (09/29/22 1030)  SpO2: 99 % (09/29/22 1030)   Vital Signs (24h Range):  Temp:  [95 °F (35 °C)-98.4 °F (36.9 °C)] 98 °F (36.7 °C)  Pulse:  [] 92  Resp:  [10-42] 38  SpO2:  [94 %-100 %] 99 %  BP: (110-192)/() 121/62  Arterial  Line BP: ()/(46-88) 112/51     Weight: 73 kg (160 lb 15 oz)  Body mass index is 27.62 kg/m².  Body surface area is 1.82 meters squared.      Intake/Output Summary (Last 24 hours) at 9/29/2022 1126  Last data filed at 9/29/2022 1000  Gross per 24 hour   Intake 93948.97 ml   Output 3293 ml   Net 6778.97 ml       Physical Exam  Constitutional:       Appearance: Normal appearance.   HENT:      Head: Normocephalic and atraumatic.      Mouth/Throat:      Mouth: Mucous membranes are moist.   Eyes:      General: No scleral icterus.     Extraocular Movements: Extraocular movements intact.      Conjunctiva/sclera: Conjunctivae normal.   Neck:      Thyroid: No thyromegaly.      Trachea: No tracheal deviation.   Cardiovascular:      Rate and Rhythm: Normal rate and regular rhythm.      Heart sounds: No murmur heard.  Pulmonary:      Effort: Pulmonary effort is normal.      Breath sounds: Normal breath sounds. No wheezing or rales.   Abdominal:      General: Bowel sounds are normal.      Palpations: Abdomen is soft. There is no mass.      Tenderness: There is no abdominal tenderness.      Comments: AKASH drains with serosanguinous fluid. Midline wound vac in place.    Skin:     General: Skin is warm and dry.      Coloration: Skin is not pale.   Neurological:      Mental Status: She is alert. Mental status is at baseline.       Significant Labs:   CBC:   Recent Labs   Lab 09/28/22 1925 09/28/22  1927 09/29/22  0233 09/29/22  1010   WBC 48.17*  48.17*  --  44.72* 26.24*   HGB 12.2  12.2  --  9.4* 8.4*   HCT 37.6  37.6 38 30.4* 25.2*     185  --  155 146*       Diagnostic Results:  I have reviewed all pertinent imaging results/findings within the past 24 hours.

## 2022-09-29 NOTE — NURSING
Pt arrives from OR via anesthesia team on portable monitor & BVM in use. David DO and charge RN at bedside. Pt connected to bedside monitor and ventilator. Current gtts are kai @ 0.2 mcg/kg/min. Upon assessment, pt unresponsive, 2 L abd AKASH drains with sanguineous output & midline provena wound vac noted. STAT labs and ABG drawn. CXR to be obtained. Levophed added for low MAPs (50's). 1L LR bolus, MIVF & 500ml albumin administered. Will continue to monitor.

## 2022-09-29 NOTE — ASSESSMENT & PLAN NOTE
81 yo woman with pelvic mass, significant ventral hernia, and partial small bowel obstruction who presents from OSH for n/v/diarrhea.     - Multimodal pain: tylenol, celecoxib, lido patch, oxy/dilaudid PRN  - Aggressive pulm toilet: IS, levalbuterol  - H/o HTN, continue home amlodipine  - mIVF, keep NPO  - Home pantoprazole  - Zofran PRN  - Daily CMP, CBC, Mag, Phos  - SSI  - Gyn/Onc consulted for abdominopelvic mass, appreciate recs  - Lovenox for DVT ppx    Dispo: okay for step down to the floor

## 2022-09-29 NOTE — SUBJECTIVE & OBJECTIVE
Interval History: Patient taken to OR yesterday and underwent ex-lap with resection of pelvic mass. No acute events overnight. Patient resting comfortably in bed this morning. Denies N/V. Pain is well controlled.     Medications:  Continuous Infusions:   lactated ringers 125 mL/hr at 09/29/22 1100     Scheduled Meds:   acetaminophen  1,000 mg Oral Q8H    allopurinoL  300 mg Oral Daily    amLODIPine  5 mg Oral Daily    celecoxib  200 mg Oral BID    enoxaparin  40 mg Subcutaneous Daily    levalbuterol  0.63 mg Nebulization Q6H WAKE    LIDOcaine  1 patch Transdermal Q24H    mupirocin   Nasal BID    pantoprazole  40 mg Oral Daily     PRN Meds:calcium gluconate IVPB, calcium gluconate IVPB, calcium gluconate IVPB, dextrose 10%, dextrose 10%, glucagon (human recombinant), HYDROmorphone, insulin aspart U-100, magnesium sulfate IVPB, magnesium sulfate IVPB, ondansetron, ondansetron, oxyCODONE, potassium chloride **AND** potassium chloride **AND** potassium chloride, sodium chloride 0.9%, sodium chloride 0.9%, sodium phosphate IVPB, sodium phosphate IVPB, sodium phosphate IVPB     Review of patient's allergies indicates:  No Known Allergies  Objective:     Vital Signs (Most Recent):  Temp: 98 °F (36.7 °C) (09/29/22 0700)  Pulse: 90 (09/29/22 1145)  Resp: 14 (09/29/22 1145)  BP: 139/64 (09/29/22 1130)  SpO2: 100 % (09/29/22 1145) Vital Signs (24h Range):  Temp:  [95 °F (35 °C)-98.4 °F (36.9 °C)] 98 °F (36.7 °C)  Pulse:  [] 90  Resp:  [10-42] 14  SpO2:  [94 %-100 %] 100 %  BP: (110-192)/() 139/64  Arterial Line BP: ()/(46-88) 124/52     Weight: 73 kg (160 lb 15 oz)  Body mass index is 27.62 kg/m².    Intake/Output - Last 3 Shifts         09/27 0700  09/28 0659 09/28 0700  09/29 0659 09/29 0700  09/30 0659    I.V. (mL/kg) 2164.4 (29.6) 2134.9 (29.2) 712.9 (9.8)    Blood  850     IV Piggyback  6499     Total Intake(mL/kg) 2164.4 (29.6) 9483.9 (129.9) 712.9 (9.8)    Urine (mL/kg/hr)  1645 (0.9) 210 (0.6)     Drains  370 68    Stool  0     Blood  1000     Total Output  3015 278    Net +2164.4 +6468.9 +434.9           Urine Occurrence 3 x 2 x     Stool Occurrence 2 x 0 x             Physical Exam  Constitutional:       General: She is not in acute distress.     Appearance: Normal appearance.   HENT:      Head: Normocephalic and atraumatic.      Mouth/Throat:      Mouth: Mucous membranes are moist.   Eyes:      Extraocular Movements: Extraocular movements intact.      Conjunctiva/sclera: Conjunctivae normal.   Cardiovascular:      Rate and Rhythm: Normal rate and regular rhythm.   Pulmonary:      Effort: Pulmonary effort is normal. No respiratory distress.   Abdominal:      General: There is no distension.      Palpations: Abdomen is soft.      Comments: Appropriately TTP, midline incision with Prevena over top with no strikethrough, holding suction; two AKASH drains to left abdomen with SS drainage   Skin:     General: Skin is warm and dry.   Neurological:      General: No focal deficit present.      Mental Status: She is alert and oriented to person, place, and time. Mental status is at baseline.       Significant Labs:  I have reviewed all pertinent lab results within the past 24 hours.  CBC:   Recent Labs   Lab 09/29/22  1010   WBC 26.24*   RBC 2.84*   HGB 8.4*   HCT 25.2*   *   MCV 89   MCH 29.6   MCHC 33.3     BMP:   Recent Labs   Lab 09/29/22  0233   *      K 4.0   *   CO2 19*   BUN 12   CREATININE 0.7   CALCIUM 7.9*   MG 1.4*       Significant Diagnostics:  I have reviewed all pertinent imaging results/findings within the past 24 hours.

## 2022-09-29 NOTE — PLAN OF CARE
SICU PLAN OF CARE NOTE    Dx: SBO (small bowel obstruction)    Shift Events: SICU admit s/p pelvic mass removal; 3.5L LR bolus, 500ml albumin, 50ml concentrated albumin. Extubated to NC @ 2300 on 9/28. Pain controlled with PRN morphine overnight    Goals of Care: MAP >65    Neuro: AAO x4, Follows Commands, and Moves All Extremities    Cardiac: ST/-110's. MD aware. MAP >65    Respiratory: Nasal Cannula    Diet: NPO    Gtts: MIVF    Urine Output: Urinary Catheter  cc/hour    Drains:   LLQ AKASH: 290cc  L lateral AKASH: 80cc  Prevena wound vac: 0cc     Labs/Accuchecks: Daily labs. Accuchecks Q6.    Skin: No new skin breakdown noted. Foams intact. See flowsheets for skin assessments.

## 2022-09-29 NOTE — PLAN OF CARE
PT eval complete, plan of care established    2022    Problem: Physical Therapy  Goal: Physical Therapy Goal  Description: Goals to be met by: 10/13/2022     Patient will increase functional independence with mobility by performin. Supine to sit with independence  2. Sit to supine with independence  3. Sit to stand transfer with modified independence  4. Gait  x 200 feet with modified independence using LRAD as needed  5. Ascend/descend 3 stair with no handrails supervision using LRAD as needed  6. Lower extremity exercise program x10 reps per handout, with independence   Outcome: Ongoing, Progressing

## 2022-09-29 NOTE — TRANSFER OF CARE
"Anesthesia Transfer of Care Note    Patient: Mariaelena Sheffield    Procedure(s) Performed: Procedure(s) (LRB):  LAPAROTOMY, EXPLORATORY (N/A)  EXCISION, MASS, PELVIS (N/A)  REPAIR, HERNIA, INCISIONAL (N/A)  SIGMOIDOSCOPY, FLEXIBLE    Patient location: ICU    Anesthesia Type: general    Transport from OR: Upon arrival to PACU/ICU, patient attached to ventilator and auscultated to confirm bilateral breath sounds and adequate TV    Post pain: adequate analgesia    Post assessment: no apparent anesthetic complications and tolerated procedure well    Post vital signs: stable    Level of consciousness: awake    Nausea/Vomiting: no nausea/vomiting    Complications: none    Transfer of care protocol was followed      Last vitals:   Visit Vitals  BP (!) 170/80 (BP Location: Left arm, Patient Position: Lying)   Pulse 73   Temp 36.9 °C (98.4 °F) (Temporal)   Resp (!) 29   Ht 5' 4" (1.626 m)   Wt 73 kg (160 lb 15 oz)   LMP  (LMP Unknown)   SpO2 99%   Breastfeeding No   BMI 27.62 kg/m²     "

## 2022-09-30 LAB
ALBUMIN SERPL BCP-MCNC: 2.4 G/DL (ref 3.5–5.2)
ALP SERPL-CCNC: 69 U/L (ref 55–135)
ALT SERPL W/O P-5'-P-CCNC: 6 U/L (ref 10–44)
ANION GAP SERPL CALC-SCNC: 7 MMOL/L (ref 8–16)
AST SERPL-CCNC: 23 U/L (ref 10–40)
BASOPHILS # BLD AUTO: 0.06 K/UL (ref 0–0.2)
BASOPHILS NFR BLD: 0.5 % (ref 0–1.9)
BILIRUB SERPL-MCNC: 2.5 MG/DL (ref 0.1–1)
BUN SERPL-MCNC: 8 MG/DL (ref 8–23)
CALCIUM SERPL-MCNC: 7.9 MG/DL (ref 8.7–10.5)
CHLORIDE SERPL-SCNC: 107 MMOL/L (ref 95–110)
CO2 SERPL-SCNC: 24 MMOL/L (ref 23–29)
CREAT SERPL-MCNC: 0.7 MG/DL (ref 0.5–1.4)
DIFFERENTIAL METHOD: ABNORMAL
EOSINOPHIL # BLD AUTO: 0.2 K/UL (ref 0–0.5)
EOSINOPHIL NFR BLD: 1.5 % (ref 0–8)
ERYTHROCYTE [DISTWIDTH] IN BLOOD BY AUTOMATED COUNT: 15 % (ref 11.5–14.5)
EST. GFR  (NO RACE VARIABLE): >60 ML/MIN/1.73 M^2
GLUCOSE SERPL-MCNC: 92 MG/DL (ref 70–110)
HCT VFR BLD AUTO: 24 % (ref 37–48.5)
HGB BLD-MCNC: 7.6 G/DL (ref 12–16)
IMM GRANULOCYTES # BLD AUTO: 0.05 K/UL (ref 0–0.04)
IMM GRANULOCYTES NFR BLD AUTO: 0.4 % (ref 0–0.5)
LYMPHOCYTES # BLD AUTO: 0.5 K/UL (ref 1–4.8)
LYMPHOCYTES NFR BLD: 3.9 % (ref 18–48)
MAGNESIUM SERPL-MCNC: 1.9 MG/DL (ref 1.6–2.6)
MCH RBC QN AUTO: 28.8 PG (ref 27–31)
MCHC RBC AUTO-ENTMCNC: 31.7 G/DL (ref 32–36)
MCV RBC AUTO: 91 FL (ref 82–98)
MONOCYTES # BLD AUTO: 1.2 K/UL (ref 0.3–1)
MONOCYTES NFR BLD: 10 % (ref 4–15)
NEUTROPHILS # BLD AUTO: 9.8 K/UL (ref 1.8–7.7)
NEUTROPHILS NFR BLD: 83.7 % (ref 38–73)
NRBC BLD-RTO: 0 /100 WBC
PHOSPHATE SERPL-MCNC: 2.7 MG/DL (ref 2.7–4.5)
PLATELET # BLD AUTO: 141 K/UL (ref 150–450)
PMV BLD AUTO: 11.3 FL (ref 9.2–12.9)
POCT GLUCOSE: 92 MG/DL (ref 70–110)
POTASSIUM SERPL-SCNC: 3.6 MMOL/L (ref 3.5–5.1)
PROT SERPL-MCNC: 4.3 G/DL (ref 6–8.4)
RBC # BLD AUTO: 2.64 M/UL (ref 4–5.4)
SODIUM SERPL-SCNC: 138 MMOL/L (ref 136–145)
WBC # BLD AUTO: 11.67 K/UL (ref 3.9–12.7)

## 2022-09-30 PROCEDURE — 99900035 HC TECH TIME PER 15 MIN (STAT)

## 2022-09-30 PROCEDURE — 80053 COMPREHEN METABOLIC PANEL: CPT

## 2022-09-30 PROCEDURE — 99024 PR POST-OP FOLLOW-UP VISIT: ICD-10-PCS | Mod: ,,, | Performed by: OBSTETRICS & GYNECOLOGY

## 2022-09-30 PROCEDURE — 20600001 HC STEP DOWN PRIVATE ROOM

## 2022-09-30 PROCEDURE — 25000003 PHARM REV CODE 250

## 2022-09-30 PROCEDURE — 25000003 PHARM REV CODE 250: Performed by: STUDENT IN AN ORGANIZED HEALTH CARE EDUCATION/TRAINING PROGRAM

## 2022-09-30 PROCEDURE — 25000242 PHARM REV CODE 250 ALT 637 W/ HCPCS: Performed by: NURSE PRACTITIONER

## 2022-09-30 PROCEDURE — 63600175 PHARM REV CODE 636 W HCPCS: Performed by: STUDENT IN AN ORGANIZED HEALTH CARE EDUCATION/TRAINING PROGRAM

## 2022-09-30 PROCEDURE — 25000003 PHARM REV CODE 250: Performed by: SURGERY

## 2022-09-30 PROCEDURE — 94761 N-INVAS EAR/PLS OXIMETRY MLT: CPT

## 2022-09-30 PROCEDURE — 83735 ASSAY OF MAGNESIUM: CPT

## 2022-09-30 PROCEDURE — 25000003 PHARM REV CODE 250: Performed by: NURSE PRACTITIONER

## 2022-09-30 PROCEDURE — 99024 POSTOP FOLLOW-UP VISIT: CPT | Mod: ,,, | Performed by: OBSTETRICS & GYNECOLOGY

## 2022-09-30 PROCEDURE — 85025 COMPLETE CBC W/AUTO DIFF WBC: CPT

## 2022-09-30 PROCEDURE — 94664 DEMO&/EVAL PT USE INHALER: CPT

## 2022-09-30 PROCEDURE — 94640 AIRWAY INHALATION TREATMENT: CPT

## 2022-09-30 PROCEDURE — 84100 ASSAY OF PHOSPHORUS: CPT

## 2022-09-30 PROCEDURE — 97116 GAIT TRAINING THERAPY: CPT

## 2022-09-30 PROCEDURE — 97530 THERAPEUTIC ACTIVITIES: CPT

## 2022-09-30 PROCEDURE — 94799 UNLISTED PULMONARY SVC/PX: CPT

## 2022-09-30 RX ORDER — SODIUM,POTASSIUM PHOSPHATES 280-250MG
1 POWDER IN PACKET (EA) ORAL ONCE
Status: DISCONTINUED | OUTPATIENT
Start: 2022-09-30 | End: 2022-10-03 | Stop reason: HOSPADM

## 2022-09-30 RX ORDER — SODIUM,POTASSIUM PHOSPHATES 280-250MG
2 POWDER IN PACKET (EA) ORAL
Status: DISCONTINUED | OUTPATIENT
Start: 2022-09-30 | End: 2022-10-03 | Stop reason: HOSPADM

## 2022-09-30 RX ORDER — POTASSIUM CHLORIDE 20 MEQ/1
40 TABLET, EXTENDED RELEASE ORAL ONCE
Status: DISCONTINUED | OUTPATIENT
Start: 2022-09-30 | End: 2022-10-03 | Stop reason: HOSPADM

## 2022-09-30 RX ORDER — LANOLIN ALCOHOL/MO/W.PET/CERES
800 CREAM (GRAM) TOPICAL
Status: DISCONTINUED | OUTPATIENT
Start: 2022-09-30 | End: 2022-10-03 | Stop reason: HOSPADM

## 2022-09-30 RX ADMIN — SODIUM PHOSPHATE, MONOBASIC, MONOHYDRATE 15 MMOL: 276; 142 INJECTION, SOLUTION INTRAVENOUS at 06:09

## 2022-09-30 RX ADMIN — SODIUM CHLORIDE 300 MG: 9 INJECTION, SOLUTION INTRAVENOUS at 07:09

## 2022-09-30 RX ADMIN — POTASSIUM BICARBONATE 50 MEQ: 977.5 TABLET, EFFERVESCENT ORAL at 08:09

## 2022-09-30 RX ADMIN — MUPIROCIN: 20 OINTMENT TOPICAL at 09:09

## 2022-09-30 RX ADMIN — SODIUM CHLORIDE, SODIUM LACTATE, POTASSIUM CHLORIDE, AND CALCIUM CHLORIDE: 600; 310; 30; 20 INJECTION, SOLUTION INTRAVENOUS at 09:09

## 2022-09-30 RX ADMIN — OXYCODONE 5 MG: 5 TABLET ORAL at 09:09

## 2022-09-30 RX ADMIN — MUPIROCIN: 20 OINTMENT TOPICAL at 08:09

## 2022-09-30 RX ADMIN — PANTOPRAZOLE SODIUM 40 MG: 40 TABLET, DELAYED RELEASE ORAL at 08:09

## 2022-09-30 RX ADMIN — ALLOPURINOL 300 MG: 300 TABLET ORAL at 08:09

## 2022-09-30 RX ADMIN — LISINOPRIL 10 MG: 10 TABLET ORAL at 08:09

## 2022-09-30 RX ADMIN — LEVALBUTEROL HYDROCHLORIDE 0.63 MG: 0.63 SOLUTION RESPIRATORY (INHALATION) at 12:09

## 2022-09-30 RX ADMIN — SODIUM CHLORIDE, SODIUM LACTATE, POTASSIUM CHLORIDE, AND CALCIUM CHLORIDE: 600; 310; 30; 20 INJECTION, SOLUTION INTRAVENOUS at 02:09

## 2022-09-30 RX ADMIN — ENOXAPARIN SODIUM 40 MG: 100 INJECTION SUBCUTANEOUS at 05:09

## 2022-09-30 RX ADMIN — LEVALBUTEROL HYDROCHLORIDE 0.63 MG: 0.63 SOLUTION RESPIRATORY (INHALATION) at 07:09

## 2022-09-30 RX ADMIN — OXYCODONE 5 MG: 5 TABLET ORAL at 08:09

## 2022-09-30 RX ADMIN — OXYCODONE 5 MG: 5 TABLET ORAL at 02:09

## 2022-09-30 RX ADMIN — ACETAMINOPHEN 1000 MG: 500 TABLET ORAL at 02:09

## 2022-09-30 RX ADMIN — LIDOCAINE 1 PATCH: 50 PATCH CUTANEOUS at 06:09

## 2022-09-30 RX ADMIN — CELECOXIB 200 MG: 200 CAPSULE ORAL at 09:09

## 2022-09-30 RX ADMIN — ACETAMINOPHEN 1000 MG: 500 TABLET ORAL at 09:09

## 2022-09-30 RX ADMIN — CELECOXIB 200 MG: 200 CAPSULE ORAL at 08:09

## 2022-09-30 RX ADMIN — AMLODIPINE BESYLATE 5 MG: 5 TABLET ORAL at 08:09

## 2022-09-30 RX ADMIN — ACETAMINOPHEN 1000 MG: 500 TABLET ORAL at 06:09

## 2022-09-30 NOTE — PROGRESS NOTES
Progress Note  Gynecology    Admit Date: 2022  LOS: 5    Reason for Admission:  SBO (small bowel obstruction)    SUBJECTIVE:     Mariaelena Sheffield is a 82 y.o.  who is now POD#2 s/p Ex lap/ extensive JAMES/mass resection/ hernia repair/ flex sig for the treatment of a large pelvic mass. Surgery primary team surg/onc but gyn onc consulted and following.   Surgery was complicated by numerous adhesions to the mass and an EBL of approx 1L. She was tranfused 3u pRBC intraop, and was taken to the SICU intubated on pressors.POD#0 evening she was weaned off of pressors and was extubated.      Pt doing well this morning. Pain is moderately well controlled, states she does get relief with pain meds. She denies any nausea or emesis this morning. Voiding without difficulty. Ambulating around her room with assistance, sitting up in the chair primarily     OBJECTIVE:     Vital Signs   Temp:  [98.3 °F (36.8 °C)-98.5 °F (36.9 °C)] 98.3 °F (36.8 °C)  Pulse:  [] 95  Resp:  [13-47] 17  SpO2:  [89 %-100 %] 91 %  BP: (110-189)/() 172/91  Arterial Line BP: ()/(42-75) 118/45      Intake/Output Summary (Last 24 hours) at 2022 0723  Last data filed at 2022 0600  Gross per 24 hour   Intake 2913.69 ml   Output 1329 ml   Net 1584.69 ml         Physical Exam:  Gen: A&Ox3, NAD  CV: RR  Pulm: Normal respiratory effort on RA  Abd: hypoactive bowel sounds, soft, non-distended, appropriately-tender to palpation without rebound or guarding  Inc: Midline vertical incision covered with wound vac, 2 L side AKASH drains in place (1 upper quadrant 1 more midline) with serosanguinous fluid draining.   Ext: PPP, no peripheral edema, TEDs/SCDs in place  : deferred     Laboratory:  Recent Labs   Lab 22  0233 22  1010 22  0353   WBC 44.72* 26.24* 11.67   HGB 9.4* 8.4* 7.6*   HCT 30.4* 25.2* 24.0*   MCV 94 89 91    146* 141*        Recent Labs   Lab 22  0410 22  1925 22  0232  22  0353    139  139 140 138   K 3.7 4.1  4.1 4.0 3.6    113*  113* 111* 107   CO2 22* 17*  17* 19* 24   BUN 20 13  13 12 8   CREATININE 1.0 0.7  0.7 0.7 0.7   GLU 99 193*  193* 155* 92   PROT 6.5 3.5*  3.5* 3.9* 4.3*   BILITOT 0.6 1.4*  1.4* 2.6* 2.5*   ALKPHOS 167* 88  88 69 69   ALT <5* <5*  <5* <5* 6*   AST 11 12  12 15 23   MG 1.9 1.7 1.4* 1.9   PHOS 2.7  --  3.3 2.7              ASSESSMENT/PLAN:     Active Hospital Problems    Diagnosis  POA    *SBO (small bowel obstruction) [K56.609]  Yes    Pelvic mass [R19.00]  Yes    Leukocytosis [D72.829]  Yes    Leukemoid reaction [D72.823]  Yes      Resolved Hospital Problems   No resolved problems to display.       Assessment: 82 y.o.  POD#2 s/p procedure as above    Plan:   1. Post-op   - Routine post-op advances> advances per primary team  - Continue PRN pain medications  - Encourage ambulation   - Encourage IS  - UOP adequate   - Continue IVF until tolerating regular diet.  - Antiemetics prn nausea/vomiting.    2. Acute blood loss anemia  - CBC trend: 16/13/44/217>surgery/ 3u pRBC>48/12/38/185 >11/7.6/24/141 this AM  - S/p pressors and late extubation  - Asymptomatic     All other medical problems managed per primary team      Dispo: As patient meets appropriate post-op milestones, plan for discharge to home per primary team. GYN oncology available for any questions.     Tahmina Gonzalez MD   PGY-3, OB-GYN

## 2022-09-30 NOTE — PROGRESS NOTES
[facility]  General Surgery  Progress Note    Subjective:     History of Present Illness:  No notes on file    Post-Op Info:  Procedure(s) (LRB):  LAPAROTOMY, EXPLORATORY (N/A)  EXCISION, MASS, PELVIS (N/A)  REPAIR, HERNIA, INCISIONAL (N/A)  SIGMOIDOSCOPY, FLEXIBLE   2 Days Post-Op     Interval History: Patient taken to OR yesterday and underwent ex-lap with resection of pelvic mass. No acute events overnight. Patient resting comfortably in bed this morning. Denies N/V. Pain is well controlled.     Medications:  Continuous Infusions:   lactated ringers 125 mL/hr at 09/30/22 0600     Scheduled Meds:   acetaminophen  1,000 mg Oral Q8H    allopurinoL  300 mg Oral Daily    amLODIPine  5 mg Oral Daily    celecoxib  200 mg Oral BID    enoxaparin  40 mg Subcutaneous Daily    iron sucrose (VENOFER) IVPB  300 mg Intravenous Once    levalbuterol  0.63 mg Nebulization Q6H WAKE    LIDOcaine  1 patch Transdermal Q24H    lisinopriL  10 mg Oral Daily    mupirocin   Nasal BID    pantoprazole  40 mg Oral Daily     PRN Meds:dextrose 10%, dextrose 10%, glucagon (human recombinant), hydrALAZINE, HYDROmorphone, insulin aspart U-100, labetalol, magnesium oxide, magnesium oxide, ondansetron, ondansetron, oxyCODONE, potassium bicarbonate, potassium bicarbonate, potassium bicarbonate, potassium, sodium phosphates, potassium, sodium phosphates, potassium, sodium phosphates, sodium chloride 0.9%, sodium chloride 0.9%     Review of patient's allergies indicates:  No Known Allergies  Objective:     Vital Signs (Most Recent):  Temp: 98.3 °F (36.8 °C) (09/30/22 0300)  Pulse: 91 (09/30/22 0734)  Resp: 16 (09/30/22 0734)  BP: (!) 172/91 (09/30/22 0600)  SpO2: 97 % (09/30/22 0734) Vital Signs (24h Range):  Temp:  [98.3 °F (36.8 °C)-98.5 °F (36.9 °C)] 98.3 °F (36.8 °C)  Pulse:  [] 91  Resp:  [13-47] 16  SpO2:  [89 %-100 %] 97 %  BP: (110-189)/(55-97) 172/91  Arterial Line BP: ()/(42-75) 118/45      Weight: 73 kg (160 lb 15 oz)  Body mass index is 27.62 kg/m².    Intake/Output - Last 3 Shifts         09/28 0700 09/29 0659 09/29 0700 09/30 0659 09/30 0700  10/01 0659    I.V. (mL/kg) 2134.9 (29.2) 3066.3 (42)     Blood 850      IV Piggyback 6499      Total Intake(mL/kg) 9483.9 (129.9) 3066.3 (42)     Urine (mL/kg/hr) 1645 (0.9) 1155 (0.7)     Drains 370 387     Stool 0      Blood 1000      Total Output 3015 1542     Net +6468.9 +1524.3            Urine Occurrence 2 x 5 x     Stool Occurrence 0 x              Physical Exam  Constitutional:       General: She is not in acute distress.     Appearance: Normal appearance.   HENT:      Head: Normocephalic and atraumatic.      Mouth/Throat:      Mouth: Mucous membranes are moist.   Eyes:      Extraocular Movements: Extraocular movements intact.      Conjunctiva/sclera: Conjunctivae normal.   Cardiovascular:      Rate and Rhythm: Normal rate and regular rhythm.   Pulmonary:      Effort: Pulmonary effort is normal. No respiratory distress.   Abdominal:      General: There is no distension.      Palpations: Abdomen is soft.      Comments: Appropriately TTP, midline incision with Prevena over top with no strikethrough, holding suction; two AKASH drains to left abdomen with SS drainage   Skin:     General: Skin is warm and dry.   Neurological:      General: No focal deficit present.      Mental Status: She is alert and oriented to person, place, and time. Mental status is at baseline.       Significant Labs:  I have reviewed all pertinent lab results within the past 24 hours.  CBC:   Recent Labs   Lab 09/30/22  0353   WBC 11.67   RBC 2.64*   HGB 7.6*   HCT 24.0*   *   MCV 91   MCH 28.8   MCHC 31.7*       BMP:   Recent Labs   Lab 09/30/22  0353   GLU 92      K 3.6      CO2 24   BUN 8   CREATININE 0.7   CALCIUM 7.9*   MG 1.9         Significant Diagnostics:  I have reviewed all pertinent imaging results/findings within the past 24  hours.    Assessment/Plan:     * SBO (small bowel obstruction)  83 yo woman with pelvic mass, significant ventral hernia, and partial small bowel obstruction who presents from OSH for n/v/diarrhea.     - Multimodal pain: tylenol, celecoxib, lido patch, oxy/dilaudid PRN  - Aggressive pulm toilet: IS, levalbuterol  - H/o HTN, continue home amlodipine  - mIVF, keep NPO   - AROBF  - Home pantoprazole  - Zofran PRN  - Daily CMP, CBC, Mag, Phos  - SSI  - Gyn/Onc consulted for abdominopelvic mass, appreciate recs  - Lovenox for DVT ppx    Dispo: okay for step down to the floor        Bonifacio Simms MD  General Surgery  [facility]

## 2022-09-30 NOTE — ASSESSMENT & PLAN NOTE
  Neuro/Psych:   -- Sedation: None  -- Pain: multimodal and PRN oxy/dilaudid             Cards:   -- HDS  -- Hx HTN, amlodipine 5mg daily      Pulm:   -- Goal O2 sat > 90%  -- Wean as able  -- Levalbuterol Q6 awake      Renal:  -- BUN/Cr 8/0.7      FEN / GI:   -- Net +1.3L  -- Maintenance at 150cc/HR  -- Replace lytes as needed  -- Nutrition: NPO      ID:   -- Tm: afebrile; WBC   -- Patient has known myeloproliferative disorder, heme/onc following  -- Abx None indicated      Heme/Onc:   -- H/H stable   -- Daily CBC      Endo:   -- Gluc goal 140-180  -- SSI      PPx:   Feeding: NPO  Analgesia/Sedation: as above  Thromboembolic prevention: Lovenox  HOB >30: Yes  Stress Ulcer ppx: Pantoprazole  Glucose control: Critical care goal 140-180 g/dl, ISS     Lines/Drains/Airway: 2 PIV, wound vac, 2 drains      Dispo/Code Status/Palliative:   -- SICU / Full Code

## 2022-09-30 NOTE — PLAN OF CARE
Patient transferred this afternoon from SICU.  Pt is A&Ox4, all VSS, on room air, on tele (NSR).  Patient is able to ambulate with standby assist and voids spontaneously.  No BM.  LR@125.  Abdominal dressings are clean and dry.  Patient receiving PRN pain medications q6h with good pain control.  Plan of care discussed with patient who verbalized understanding.  Will continue to monitor.    Problem: Adult Inpatient Plan of Care  Goal: Plan of Care Review  Outcome: Ongoing, Progressing  Goal: Patient-Specific Goal (Individualized)  Outcome: Ongoing, Progressing  Goal: Absence of Hospital-Acquired Illness or Injury  Outcome: Ongoing, Progressing  Goal: Optimal Comfort and Wellbeing  Outcome: Ongoing, Progressing  Goal: Readiness for Transition of Care  Outcome: Ongoing, Progressing     Problem: Diabetes Comorbidity  Goal: Blood Glucose Level Within Targeted Range  Outcome: Ongoing, Progressing     Problem: Fall Injury Risk  Goal: Absence of Fall and Fall-Related Injury  Outcome: Ongoing, Progressing     Problem: Pain Acute  Goal: Acceptable Pain Control and Functional Ability  Outcome: Ongoing, Progressing

## 2022-09-30 NOTE — PLAN OF CARE
[facility]       HOME  HEALTH ORDERS                                    FACE TO FACE ENCOUNTER      Patient Name: Mariaelena Sheffield  YOB: 1940    PCP: Lui Blue MD   PCP Address: [address] [facility]  PCP Phone Number: [phone]  PCP Fax: [phone]    Encounter Date: 10/03/2022    Admit to Home Health    Diagnoses:  Active Hospital Problems    Diagnosis  POA    *SBO (small bowel obstruction) [K56.609]  Yes    Pelvic mass [R19.00]  Yes    Leukocytosis [D72.829]  Yes    Leukemoid reaction [D72.823]  Yes      Resolved Hospital Problems   No resolved problems to display.       I have seen and examined this patient face to face today. My clinical findings that support the need for the home health skilled services and home bound status are the following:  Weakness/numbness causing balance and gait disturbance due to Surgery making it taxing to leave home.    Allergies:Review of patient's allergies indicates:  No Known Allergies    Diet: diabetic diet: 2000 calorie    Activities: activity as tolerated    Nursing:   SN to complete comprehensive assessment including routine vital signs. Instruct on disease process and s/s of complications to report to MD. Review/verify medication list sent home with the patient at time of discharge  and instruct patient/caregiver as needed. Frequency may be adjusted depending on start of care date.    Notify MD if SBP > 160 or < 90; DBP > 90 or < 50; HR > 120 or < 50; Temp > 101      CONSULTS:    Physical Therapy to evaluate and treat. Evaluate for home safety and equipment needs; Establish/upgrade home exercise program. Perform / instruct on therapeutic exercises, gait training, transfer training, and Range of Motion.  Occupational Therapy to evaluate and treat. Evaluate home environment for safety and equipment needs. Perform/Instruct on transfers, ADL training, ROM,  and therapeutic exercises.      MISCELLANEOUS CARE:  Diabetic Care:   SN to perform and educate Diabetic management with blood glucose monitoring:, Fingerstick blood sugar AC and HS, and Report CBG < 60 or > 350 to physician.    WOUND CARE ORDERS  Midline incision open to air.  Empty left abdomen bulb drains every 12 hours and record output.      Medications: Review discharge medications with patient and family and provide education.      Current Discharge Medication List        START taking these medications    Details   oxyCODONE (ROXICODONE) 5 MG immediate release tablet Take 1 tablet (5 mg total) by mouth every 6 (six) hours as needed.  Qty: 12 tablet, Refills: 0    Comments: Quantity prescribed more than 7 day supply? No           CONTINUE these medications which have NOT CHANGED    Details   allopurinoL (ZYLOPRIM) 300 MG tablet Take 300 mg by mouth once daily.      amLODIPine (NORVASC) 5 MG tablet Take 5 mg by mouth once daily.      atorvastatin (LIPITOR) 20 MG tablet Take 20 mg by mouth every evening.       blood sugar diagnostic Strp TEST ONE TO TWO TIMES DAILY (NEED MD APPOINTMENT)      blood-glucose meter Misc Test BID      colchicine 0.6 mg tablet Take 0.6 mg by mouth once daily.      dicyclomine (BENTYL) 10 MG capsule Take 10 mg by mouth 3 (three) times daily as needed.      ferrous sulfate 325 (65 FE) MG EC tablet Take 325 mg by mouth once daily.      lancets Misc TEST BLOOD SUGAR  1  TO  2  TIMES  PER  DAY  ( NEED MD APPOINTMENT  )      metFORMIN (GLUCOPHAGE) 500 MG tablet Take 1 tablet by mouth daily with dinner or evening meal.      nozaseptin (NOZIN) nasal  1 each by Each Nare route 2 (two) times daily.  Qty: 1 applicator, Refills: 0      omeprazole (PRILOSEC) 40 MG capsule Take 40 mg by mouth once daily.       polyethylene glycol (GLYCOLAX) 17 gram PwPk Take 17 g by mouth daily  Qty: 28 packet, Refills: 0      lisinopril 10 MG tablet Take 1 tablet (10 mg total) by mouth once daily.  Qty:  90 tablet, Refills: 3             I certify that this patient is confined to her home and needs intermittent skilled nursing care, physical therapy, and occupational therapy.          Electronically Signed  _________________________________  Marie Hamm, LEXX  10/03/2022

## 2022-09-30 NOTE — PROGRESS NOTES
[facility]  Critical Care - Surgery  Progress Note    Patient Name: Mariaelena Sheffield  MRN: [id number]  Admission Date: 9/25/2022  Hospital Length of Stay: 5 days  Code Status: Full Code  Attending Provider: Edison Brooks MD  Primary Care Provider: Lui Blue MD   Principal Problem: SBO (small bowel obstruction)    Subjective:     Hospital/ICU Course:  SBO resolved with NGT and patient passed gastrograffin challenge 9/26. Surg onc took patient to OR for debulking with assistance from gyn onc. Large pelvic mass ([id number]) removed 9/28 after lengthy surgery. Reported EBL approximately 1L. Patient received 3 units PRBC intraoperatively as well as 3L crystalloid. Upon arrival to SICU, patient initially hypotensive, requiring levo and kai. Patient received 500cc albumin, as well as 1L LR and pressors were able to be weaned. Patient extubated POD0.       Interval History/Significant Events: NAEON. HDS, afebrile, on minimal supp O2. Ready to stepdown, pending bed.    Follow-up For: Procedure(s) (LRB):  LAPAROTOMY, EXPLORATORY (N/A)  EXCISION, MASS, PELVIS (N/A)  REPAIR, HERNIA, INCISIONAL (N/A)  SIGMOIDOSCOPY, FLEXIBLE    Post-Operative Day: 2 Days Post-Op    Objective:     Vital Signs (Most Recent):  Temp: 98.3 °F (36.8 °C) (09/30/22 0300)  Pulse: 86 (09/30/22 0530)  Resp: 17 (09/30/22 0530)  BP: (!) 147/69 (09/30/22 0530)  SpO2: (!) 94 % (09/30/22 0530)   Vital Signs (24h Range):  Temp:  [98 °F (36.7 °C)-98.5 °F (36.9 °C)] 98.3 °F (36.8 °C)  Pulse:  [] 86  Resp:  [10-47] 17  SpO2:  [89 %-100 %] 94 %  BP: (110-189)/() 147/69  Arterial Line BP: ()/(42-75) 118/45     Weight: 73 kg (160 lb 15 oz)  Body mass index is 27.62 kg/m².      Intake/Output Summary (Last 24 hours) at 9/30/2022 0539  Last data filed at 9/30/2022 0400  Gross per 24 hour   Intake 3338.42 ml   Output 1592 ml   Net 1746.42 ml       Physical Exam  Constitutional:       General: She is not in acute  distress.     Appearance: Normal appearance.   HENT:      Head: Normocephalic and atraumatic.      Mouth/Throat:      Mouth: Mucous membranes are moist.   Eyes:      Extraocular Movements: Extraocular movements intact.      Conjunctiva/sclera: Conjunctivae normal.   Cardiovascular:      Rate and Rhythm: Normal rate and regular rhythm.   Pulmonary:      Effort: Pulmonary effort is normal. No respiratory distress.      Comments: NC  Abdominal:      General: There is no distension.      Tenderness: There is no abdominal tenderness.      Comments: Midline wound vac in place with two drains. Incisions CDI    Skin:     General: Skin is warm and dry.   Neurological:      General: No focal deficit present.      Mental Status: She is alert and oriented to person, place, and time. Mental status is at baseline.       Vents:  Vent Mode: Spont (09/28/22 2235)  Set Rate: 18 BPM (09/28/22 2000)  Vt Set: 350 mL (09/28/22 2000)  Pressure Support: 5 cmH20 (09/28/22 2235)  PEEP/CPAP: 5 cmH20 (09/28/22 2235)  Oxygen Concentration (%): 32 (09/28/22 2349)  Peak Airway Pressure: 12 cmH2O (09/28/22 2235)  Plateau Pressure: 0 cmH20 (09/28/22 2235)  Total Ve: 11.2 mL (09/28/22 2235)  Negative Inspiratory Force (cm H2O): -49 (09/28/22 2235)  F/VT Ratio<105 (RSBI): (!) 22.64 (09/28/22 2235)    Lines/Drains/Airways       Drain  Duration                  Closed/Suction Drain 09/28/22 1841 Lateral LLQ 15 Fr. 1 day         Closed/Suction Drain 09/28/22 1842 Left;Lateral Abdomen Bulb 19 Fr. 1 day              Peripheral Intravenous Line  Duration                  Peripheral IV - Single Lumen 09/28/22 1254 18 G Left Hand 1 day         Peripheral IV - Single Lumen 09/28/22 2130 22 G Anterior;Proximal;Right Forearm 1 day                    Significant Labs:    CBC/Anemia Profile:  Recent Labs   Lab 09/28/22 1925 09/28/22  1927 09/29/22  0233 09/29/22  1010 09/29/22  1311   WBC 48.17*  48.17*  --  44.72* 26.24*  --    HGB 12.2  12.2  --  9.4* 8.4*   --    HCT 37.6  37.6 38 30.4* 25.2*  --      185  --  155 146*  --    MCV 91  91  --  94 89  --    RDW 15.1*  15.1*  --  15.2* 14.8*  --    IRON  --   --   --   --  18*   FERRITIN  --   --   --   --  728*        Chemistries:  Recent Labs   Lab 09/28/22  1925 09/29/22  0233 09/30/22  0353     139 140 138   K 4.1  4.1 4.0 3.6   *  113* 111* 107   CO2 17*  17* 19* 24   BUN 13  13 12 8   CREATININE 0.7  0.7 0.7 0.7   CALCIUM 7.2*  7.2* 7.9* 7.9*   ALBUMIN 1.9*  1.9* 2.7* 2.4*   PROT 3.5*  3.5* 3.9* 4.3*   BILITOT 1.4*  1.4* 2.6* 2.5*   ALKPHOS 88  88 69 69   ALT <5*  <5* <5* 6*   AST 12  12 15 23   MG 1.7 1.4* 1.9   PHOS  --  3.3 2.7       All pertinent labs within the past 24 hours have been reviewed.    Significant Imaging:  I have reviewed all pertinent imaging results/findings within the past 24 hours.    Assessment/Plan:     * SBO (small bowel obstruction)    Neuro/Psych:   -- Sedation: None  -- Pain: multimodal and PRN oxy/dilaudid             Cards:   -- HDS  -- Hx HTN, amlodipine 5mg and lisinopril 10mg daily      Pulm:   -- Goal O2 sat > 90%  -- Wean as able  -- Levalbuterol Q6 awake      Renal:  -- BUN/Cr 8/0.7      FEN / GI:   -- Net +1.3L  -- Maintenance at 150cc/HR  -- Replace lytes as needed  -- Nutrition: NPO      ID:   -- Tm: afebrile; WBC 11.7  -- Patient has known myeloproliferative disorder, heme/onc following  -- Abx None indicated      Heme/Onc:   -- H/H stable 7.6  -- Daily CBC      Endo:   -- Gluc goal 140-180  -- SSI      PPx:   Feeding: NPO  Analgesia/Sedation: as above  Thromboembolic prevention: Lovenox  HOB >30: Yes  Stress Ulcer ppx: Pantoprazole  Glucose control: Critical care goal 140-180 g/dl, ISS     Lines/Drains/Airway: 2 PIV, wound vac, 2 drains      Dispo/Code Status/Palliative:   -- SICU / Full Code         Critical care was time spent personally by me on the following activities: development of treatment plan with patient or surrogate and bedside  caregivers, discussions with consultants, evaluation of patient's response to treatment, examination of patient, ordering and performing treatments and interventions, ordering and review of laboratory studies, ordering and review of radiographic studies, pulse oximetry, re-evaluation of patient's condition.  This critical care time did not overlap with that of any other provider or involve time for any procedures.     Audrey Quiñonez ([place] Name: Franky), MD  Critical Care - Surgery  [facility] - Surgical Intensive Care

## 2022-09-30 NOTE — PLAN OF CARE
09/30/22 1353   Post-Acute Status   Post-Acute Authorization Home Health   Home Health Status Referrals Sent   Discharge Delays None known at this time   Discharge Plan   Discharge Plan A Home Health   Discharge Plan B Home Health     Met with pt and family at bedside, pt has no HH preference at this time.This CM [facility]  Will continue to follow for discharge needs.    Sofi Gonzalez RN CM  Case Management  [phone]

## 2022-09-30 NOTE — SUBJECTIVE & OBJECTIVE
Interval History/Significant Events: NAEON. HDS, afebrile, on minimal supp O2. Ready to stepdown, pending bed.    Follow-up For: Procedure(s) (LRB):  LAPAROTOMY, EXPLORATORY (N/A)  EXCISION, MASS, PELVIS (N/A)  REPAIR, HERNIA, INCISIONAL (N/A)  SIGMOIDOSCOPY, FLEXIBLE    Post-Operative Day: 2 Days Post-Op    Objective:     Vital Signs (Most Recent):  Temp: 98.3 °F (36.8 °C) (09/30/22 0300)  Pulse: 86 (09/30/22 0530)  Resp: 17 (09/30/22 0530)  BP: (!) 147/69 (09/30/22 0530)  SpO2: (!) 94 % (09/30/22 0530)   Vital Signs (24h Range):  Temp:  [98 °F (36.7 °C)-98.5 °F (36.9 °C)] 98.3 °F (36.8 °C)  Pulse:  [] 86  Resp:  [10-47] 17  SpO2:  [89 %-100 %] 94 %  BP: (110-189)/() 147/69  Arterial Line BP: ()/(42-75) 118/45     Weight: 73 kg (160 lb 15 oz)  Body mass index is 27.62 kg/m².      Intake/Output Summary (Last 24 hours) at 9/30/2022 0539  Last data filed at 9/30/2022 0400  Gross per 24 hour   Intake 3338.42 ml   Output 1592 ml   Net 1746.42 ml       Physical Exam  Constitutional:       General: She is not in acute distress.     Appearance: Normal appearance.   HENT:      Head: Normocephalic and atraumatic.      Mouth/Throat:      Mouth: Mucous membranes are moist.   Eyes:      Extraocular Movements: Extraocular movements intact.      Conjunctiva/sclera: Conjunctivae normal.   Cardiovascular:      Rate and Rhythm: Normal rate and regular rhythm.   Pulmonary:      Effort: Pulmonary effort is normal. No respiratory distress.      Comments: NC  Abdominal:      General: There is no distension.      Tenderness: There is no abdominal tenderness.      Comments: Midline wound vac in place with two drains. Incisions CDI    Skin:     General: Skin is warm and dry.   Neurological:      General: No focal deficit present.      Mental Status: She is alert and oriented to person, place, and time. Mental status is at baseline.       Vents:  Vent Mode: Spont (09/28/22 2235)  Set Rate: 18 BPM (09/28/22 2000)  Vt Set:  350 mL (09/28/22 2000)  Pressure Support: 5 cmH20 (09/28/22 2235)  PEEP/CPAP: 5 cmH20 (09/28/22 2235)  Oxygen Concentration (%): 32 (09/28/22 2349)  Peak Airway Pressure: 12 cmH2O (09/28/22 2235)  Plateau Pressure: 0 cmH20 (09/28/22 2235)  Total Ve: 11.2 mL (09/28/22 2235)  Negative Inspiratory Force (cm H2O): -49 (09/28/22 2235)  F/VT Ratio<105 (RSBI): (!) 22.64 (09/28/22 2235)    Lines/Drains/Airways       Drain  Duration                  Closed/Suction Drain 09/28/22 1841 Lateral LLQ 15 Fr. 1 day         Closed/Suction Drain 09/28/22 1842 Left;Lateral Abdomen Bulb 19 Fr. 1 day              Peripheral Intravenous Line  Duration                  Peripheral IV - Single Lumen 09/28/22 1254 18 G Left Hand 1 day         Peripheral IV - Single Lumen 09/28/22 2130 22 G Anterior;Proximal;Right Forearm 1 day                    Significant Labs:    CBC/Anemia Profile:  Recent Labs   Lab 09/28/22 1925 09/28/22 1927 09/29/22  0233 09/29/22  1010 09/29/22  1311   WBC 48.17*  48.17*  --  44.72* 26.24*  --    HGB 12.2  12.2  --  9.4* 8.4*  --    HCT 37.6  37.6 38 30.4* 25.2*  --      185  --  155 146*  --    MCV 91  91  --  94 89  --    RDW 15.1*  15.1*  --  15.2* 14.8*  --    IRON  --   --   --   --  18*   FERRITIN  --   --   --   --  728*        Chemistries:  Recent Labs   Lab 09/28/22 1925 09/29/22 0233 09/30/22  0353     139 140 138   K 4.1  4.1 4.0 3.6   *  113* 111* 107   CO2 17*  17* 19* 24   BUN 13  13 12 8   CREATININE 0.7  0.7 0.7 0.7   CALCIUM 7.2*  7.2* 7.9* 7.9*   ALBUMIN 1.9*  1.9* 2.7* 2.4*   PROT 3.5*  3.5* 3.9* 4.3*   BILITOT 1.4*  1.4* 2.6* 2.5*   ALKPHOS 88  88 69 69   ALT <5*  <5* <5* 6*   AST 12  12 15 23   MG 1.7 1.4* 1.9   PHOS  --  3.3 2.7       All pertinent labs within the past 24 hours have been reviewed.    Significant Imaging:  I have reviewed all pertinent imaging results/findings within the past 24 hours.

## 2022-09-30 NOTE — ASSESSMENT & PLAN NOTE
81 yo woman with pelvic mass, significant ventral hernia, and partial small bowel obstruction who presents from OSH for n/v/diarrhea.     - Multimodal pain: tylenol, celecoxib, lido patch, oxy/dilaudid PRN  - Aggressive pulm toilet: IS, levalbuterol  - H/o HTN, continue home amlodipine  - mIVF, keep NPO   - AROBF  - Home pantoprazole  - Zofran PRN  - Daily CMP, CBC, Mag, Phos  - SSI  - Gyn/Onc consulted for abdominopelvic mass, appreciate recs  - Lovenox for DVT ppx    Dispo: okay for step down to the floor

## 2022-09-30 NOTE — NURSING TRANSFER
Nursing Transfer Note      9/30/2022     Reason patient is being transferred: stable    Transfer To: 1044    Transfer via wheelchair    Transfer with cardiac monitoring    Transported by RN    Medicines sent: N/A    Any special needs or follow-up needed: N/A    Chart send with patient: Yes    Notified: family    Patient reassessed at: 9/30/22 14:00  Report called to Megan RN     Upon arrival to floor: cardiac monitor applied, patient oriented to room, call bell in reach, and recliner wheels locked

## 2022-09-30 NOTE — PT/OT/SLP PROGRESS
"Physical Therapy Treatment    Patient Name:  Mariaelena Sheffield   MRN:  [id number]    Recommendations:     Discharge Recommendations:  home health PT   Discharge Equipment Recommendations: walker, rolling   Barriers to discharge: Inaccessible home    Assessment:     Mariaelena Sheffield is a 82 y.o. female admitted with a medical diagnosis of SBO (small bowel obstruction). Patient tolerated session well, demonstrates improved gait stability with rolling walker.     She presents with the following impairments/functional limitations: weakness, impaired endurance, impaired self care skills, impaired functional mobility, gait instability, impaired balance. Once medically stable, recommending pt discharge to home health PT.    Rehab Prognosis: Good; patient continues to benefit from acute skilled PT services to address these deficits and reach maximum level of function.  Recent Surgery: Procedure(s) (LRB):  LAPAROTOMY, EXPLORATORY (N/A)  EXCISION, MASS, PELVIS (N/A)  REPAIR, HERNIA, INCISIONAL (N/A)  SIGMOIDOSCOPY, FLEXIBLE 2 Days Post-Op    Plan:     During this hospitalization, patient to be seen 3 x/week to address the identified rehab impairments via gait training, therapeutic activities, therapeutic exercises, neuromuscular re-education and progress toward the following goals:    Plan of Care Expires:  10/29/22    Subjective     Chief Complaint: Surgical site pain  Patient/Family Comments/Goals: "I don't lift it up?" Referring to rolling walker  Pain/Comfort:  Pain Rating 1:  (7-8/10)  Location - Orientation 1: generalized  Location 1: abdomen  Pain Addressed 1: Pre-medicate for activity, Distraction  Pain Rating Post-Intervention 1:  (not rated)    Objective:     Communicated with RN prior to session. Patient found up in chair with telemetry, peripheral IV, blood pressure cuff, pulse ox (continuous), wound vac upon PT entry to room.     General Precautions: Standard, fall   Orthopedic Precautions:N/A   Braces: " N/A    Functional Mobility:  Bed Mobility:     Seated in chair at start of session and returned to chair  Transfers:     Sit to Stand: contact guard assistance with rolling walker with cues for hand placement  Toilet transfer: contact guard assistance with rolling walker using Step Transfer  Gait: Patient ambulated 38 ft with rolling walker and contact guard assistance. Patient demonstrates decreased step length, ambulates outside ARTUR of RW, and flexed posture. Cuing to ambulate within ARTUR of RW. All lines remained intact throughout ambulation trial.  Balance:   Static Sitting: Good, able to maintain for 4 minute(s) with supervision  Dynamic Sitting: Fair: Patient accepts minimal challenge, stand by assistance  Static Standing: Good, able to maintain for 1 minute(s) with stand by assistance  Dynamic Standing: Fair: Patient accepts minimal challenge, contact guard assistance    AM-PAC 6 CLICK MOBILITY  Turning over in bed (including adjusting bedclothes, sheets and blankets)?: 2  Sitting down on and standing up from a chair with arms (e.g., wheelchair, bedside commode, etc.): 3  Moving from lying on back to sitting on the side of the bed?: 2  Moving to and from a bed to a chair (including a wheelchair)?: 3  Need to walk in hospital room?: 3  Climbing 3-5 steps with a railing?: 2  Basic Mobility Total Score: 15     Therapeutic Activities and Exercises:  Patient educated on role of acute care PT and PT POC, safety while in hospital including calling nurse for mobility, and call light usage  Patient is clear to ambulate to/from bathroom with RN/PCT, assist x1 with rolling walker  Patient completed toileting in seated and pericare with stand by assist  Educated about importance of OOB mobility and remaining UIC most of the day    Patient left up in chair with all lines intact, call button in reach, and RN notified.    GOALS:   Multidisciplinary Problems       Physical Therapy Goals          Problem: Physical Therapy     Goal Priority Disciplines Outcome Goal Variances Interventions   Physical Therapy Goal     PT, PT/OT Ongoing, Progressing     Description: Goals to be met by: 10/13/2022     Patient will increase functional independence with mobility by performin. Supine to sit with independence  2. Sit to supine with independence  3. Sit to stand transfer with modified independence  4. Gait  x 200 feet with modified independence using LRAD as needed  5. Ascend/descend 3 stair with no handrails supervision using LRAD as needed  6. Lower extremity exercise program x10 reps per handout, with independence                        Time Tracking:     PT Received On: 22  PT Start Time: 908     PT Stop Time: 932  PT Total Time (min): 24 min     Billable Minutes: Gait Training 15 min and Therapeutic Activity 8 min      Treatment Type: Treatment  PT/PTA: PT     PTA Visit Number: 0     2022

## 2022-10-01 LAB
POCT GLUCOSE: 75 MG/DL (ref 70–110)
POCT GLUCOSE: 94 MG/DL (ref 70–110)

## 2022-10-01 PROCEDURE — 25000003 PHARM REV CODE 250: Performed by: STUDENT IN AN ORGANIZED HEALTH CARE EDUCATION/TRAINING PROGRAM

## 2022-10-01 PROCEDURE — 63600175 PHARM REV CODE 636 W HCPCS: Performed by: STUDENT IN AN ORGANIZED HEALTH CARE EDUCATION/TRAINING PROGRAM

## 2022-10-01 PROCEDURE — 25000003 PHARM REV CODE 250: Performed by: NURSE PRACTITIONER

## 2022-10-01 PROCEDURE — 25000003 PHARM REV CODE 250

## 2022-10-01 PROCEDURE — 25000003 PHARM REV CODE 250: Performed by: SURGERY

## 2022-10-01 PROCEDURE — 20600001 HC STEP DOWN PRIVATE ROOM

## 2022-10-01 RX ORDER — FUROSEMIDE 10 MG/ML
20 INJECTION INTRAMUSCULAR; INTRAVENOUS ONCE
Status: COMPLETED | OUTPATIENT
Start: 2022-10-01 | End: 2022-10-01

## 2022-10-01 RX ADMIN — OXYCODONE 5 MG: 5 TABLET ORAL at 12:10

## 2022-10-01 RX ADMIN — LIDOCAINE 1 PATCH: 50 PATCH CUTANEOUS at 05:10

## 2022-10-01 RX ADMIN — OXYCODONE 5 MG: 5 TABLET ORAL at 11:10

## 2022-10-01 RX ADMIN — LISINOPRIL 10 MG: 10 TABLET ORAL at 09:10

## 2022-10-01 RX ADMIN — PANTOPRAZOLE SODIUM 40 MG: 40 TABLET, DELAYED RELEASE ORAL at 09:10

## 2022-10-01 RX ADMIN — OXYCODONE 5 MG: 5 TABLET ORAL at 05:10

## 2022-10-01 RX ADMIN — MUPIROCIN: 20 OINTMENT TOPICAL at 08:10

## 2022-10-01 RX ADMIN — ACETAMINOPHEN 1000 MG: 500 TABLET ORAL at 05:10

## 2022-10-01 RX ADMIN — ENOXAPARIN SODIUM 40 MG: 100 INJECTION SUBCUTANEOUS at 05:10

## 2022-10-01 RX ADMIN — CELECOXIB 200 MG: 200 CAPSULE ORAL at 09:10

## 2022-10-01 RX ADMIN — SODIUM CHLORIDE, SODIUM LACTATE, POTASSIUM CHLORIDE, AND CALCIUM CHLORIDE: 600; 310; 30; 20 INJECTION, SOLUTION INTRAVENOUS at 05:10

## 2022-10-01 RX ADMIN — ALLOPURINOL 300 MG: 300 TABLET ORAL at 09:10

## 2022-10-01 RX ADMIN — ACETAMINOPHEN 1000 MG: 500 TABLET ORAL at 02:10

## 2022-10-01 RX ADMIN — AMLODIPINE BESYLATE 5 MG: 5 TABLET ORAL at 09:10

## 2022-10-01 RX ADMIN — ACETAMINOPHEN 1000 MG: 500 TABLET ORAL at 08:10

## 2022-10-01 RX ADMIN — MUPIROCIN: 20 OINTMENT TOPICAL at 09:10

## 2022-10-01 RX ADMIN — CELECOXIB 200 MG: 200 CAPSULE ORAL at 08:10

## 2022-10-01 RX ADMIN — FUROSEMIDE 20 MG: 10 INJECTION, SOLUTION INTRAMUSCULAR; INTRAVENOUS at 10:10

## 2022-10-01 NOTE — PROGRESS NOTES
[facility] - [facility]  General Surgery  Progress Note    Subjective:     History of Present Illness:  No notes on file    Post-Op Info:  Procedure(s) (LRB):  LAPAROTOMY, EXPLORATORY (N/A)  EXCISION, MASS, PELVIS (N/A)  REPAIR, HERNIA, INCISIONAL (N/A)  SIGMOIDOSCOPY, FLEXIBLE   3 Days Post-Op     Interval History: No acute events overnight. Patient resting comfortably in bed this morning. Denies N/V. Pain is well controlled. Passing gas.    Medications:  Continuous Infusions:   lactated ringers 125 mL/hr at 10/01/22 0551     Scheduled Meds:   acetaminophen  1,000 mg Oral Q8H    allopurinoL  300 mg Oral Daily    amLODIPine  5 mg Oral Daily    celecoxib  200 mg Oral BID    enoxaparin  40 mg Subcutaneous Daily    LIDOcaine  1 patch Transdermal Q24H    lisinopriL  10 mg Oral Daily    mupirocin   Nasal BID    pantoprazole  40 mg Oral Daily    potassium chloride  40 mEq Oral Once    potassium, sodium phosphates  1 packet Oral Once     PRN Meds:dextrose 10%, dextrose 10%, glucagon (human recombinant), hydrALAZINE, HYDROmorphone, insulin aspart U-100, labetalol, magnesium oxide, magnesium oxide, ondansetron, ondansetron, oxyCODONE, potassium bicarbonate, potassium bicarbonate, potassium bicarbonate, potassium, sodium phosphates, potassium, sodium phosphates, potassium, sodium phosphates, sodium chloride 0.9%, sodium chloride 0.9%     Review of patient's allergies indicates:  No Known Allergies  Objective:     Vital Signs (Most Recent):  Temp: 98.2 °F (36.8 °C) (10/01/22 0405)  Pulse: 87 (10/01/22 0745)  Resp: 17 (10/01/22 0554)  BP: 127/68 (10/01/22 0405)  SpO2: (!) 94 % (10/01/22 0405) Vital Signs (24h Range):  Temp:  [96.4 °F (35.8 °C)-98.2 °F (36.8 °C)] 98.2 °F (36.8 °C)  Pulse:  [] 87  Resp:  [16-21] 17  SpO2:  [94 %-99 %] 94 %  BP: (110-148)/(63-77) 127/68     Weight: 73 kg (160 lb 15 oz)  Body mass index is 27.62 kg/m².    Intake/Output - Last 3 Shifts         09/29 0700  09/30 0659 09/30 0700  10/01 0659  10/01 0700  10/02 0659    P.O.  60     I.V. (mL/kg) 3066.3 (42) 1500 (20.5)     Blood       IV Piggyback       Total Intake(mL/kg) 3066.3 (42) 1560 (21.4)     Urine (mL/kg/hr) 1155 (0.7) 1450 (0.8)     Drains 387 170     Other  0     Stool       Blood       Total Output 1542 1620     Net +1524.3 -60            Urine Occurrence 5 x 5 x             Physical Exam  Constitutional:       General: She is not in acute distress.     Appearance: Normal appearance.   HENT:      Head: Normocephalic and atraumatic.      Mouth/Throat:      Mouth: Mucous membranes are moist.   Eyes:      Extraocular Movements: Extraocular movements intact.      Conjunctiva/sclera: Conjunctivae normal.   Cardiovascular:      Rate and Rhythm: Normal rate and regular rhythm.   Pulmonary:      Effort: Pulmonary effort is normal. No respiratory distress.   Abdominal:      General: There is no distension.      Palpations: Abdomen is soft.      Comments: Appropriately TTP, midline incision with Prevena over top with no strikethrough, holding suction; two AKASH drains to left abdomen with SS drainage   Skin:     General: Skin is warm and dry.   Neurological:      General: No focal deficit present.      Mental Status: She is alert and oriented to person, place, and time. Mental status is at baseline.       Significant Labs:  I have reviewed all pertinent lab results within the past 24 hours.  CBC:   Recent Labs   Lab 09/30/22  0353   WBC 11.67   RBC 2.64*   HGB 7.6*   HCT 24.0*   *   MCV 91   MCH 28.8   MCHC 31.7*       BMP:   Recent Labs   Lab 09/30/22  0353   GLU 92      K 3.6      CO2 24   BUN 8   CREATININE 0.7   CALCIUM 7.9*   MG 1.9         Significant Diagnostics:  I have reviewed all pertinent imaging results/findings within the past 24 hours.    Assessment/Plan:     * SBO (small bowel obstruction)  81 yo woman with pelvic mass, significant ventral hernia, and partial small bowel obstruction who presents from OSH for n/v/diarrhea.      - Multimodal pain: tylenol, celecoxib, lido patch, oxy PRN  - Aggressive pulm toilet: IS, levalbuterol  - H/o HTN, continue home amlodipine  - Start CLD  - decrease mIVF  - Home pantoprazole  - Zofran PRN  - Daily CMP, CBC, Mag, Phos  - SSI  - Gyn/Onc consulted for abdominopelvic mass, appreciate recs  - Lovenox for DVT ppx    Dispo: MARLENA Simms MD  General Surgery  [facility]

## 2022-10-01 NOTE — SUBJECTIVE & OBJECTIVE
Interval History: No acute events overnight. Patient resting comfortably in bed this morning. Denies N/V. Pain is well controlled. Passing gas.    Medications:  Continuous Infusions:   lactated ringers 125 mL/hr at 10/01/22 0551     Scheduled Meds:   acetaminophen  1,000 mg Oral Q8H    allopurinoL  300 mg Oral Daily    amLODIPine  5 mg Oral Daily    celecoxib  200 mg Oral BID    enoxaparin  40 mg Subcutaneous Daily    LIDOcaine  1 patch Transdermal Q24H    lisinopriL  10 mg Oral Daily    mupirocin   Nasal BID    pantoprazole  40 mg Oral Daily    potassium chloride  40 mEq Oral Once    potassium, sodium phosphates  1 packet Oral Once     PRN Meds:dextrose 10%, dextrose 10%, glucagon (human recombinant), hydrALAZINE, HYDROmorphone, insulin aspart U-100, labetalol, magnesium oxide, magnesium oxide, ondansetron, ondansetron, oxyCODONE, potassium bicarbonate, potassium bicarbonate, potassium bicarbonate, potassium, sodium phosphates, potassium, sodium phosphates, potassium, sodium phosphates, sodium chloride 0.9%, sodium chloride 0.9%     Review of patient's allergies indicates:  No Known Allergies  Objective:     Vital Signs (Most Recent):  Temp: 98.2 °F (36.8 °C) (10/01/22 0405)  Pulse: 87 (10/01/22 0745)  Resp: 17 (10/01/22 0554)  BP: 127/68 (10/01/22 0405)  SpO2: (!) 94 % (10/01/22 0405) Vital Signs (24h Range):  Temp:  [96.4 °F (35.8 °C)-98.2 °F (36.8 °C)] 98.2 °F (36.8 °C)  Pulse:  [] 87  Resp:  [16-21] 17  SpO2:  [94 %-99 %] 94 %  BP: (110-148)/(63-77) 127/68     Weight: 73 kg (160 lb 15 oz)  Body mass index is 27.62 kg/m².    Intake/Output - Last 3 Shifts         09/29 0700  09/30 0659 09/30 0700  10/01 0659 10/01 0700  10/02 0659    P.O.  60     I.V. (mL/kg) 3066.3 (42) 1500 (20.5)     Blood       IV Piggyback       Total Intake(mL/kg) 3066.3 (42) 1560 (21.4)     Urine (mL/kg/hr) 1155 (0.7) 1450 (0.8)     Drains 387 170     Other  0     Stool       Blood       Total Output 1542 1620     Net +1524.3 -60             Urine Occurrence 5 x 5 x             Physical Exam  Constitutional:       General: She is not in acute distress.     Appearance: Normal appearance.   HENT:      Head: Normocephalic and atraumatic.      Mouth/Throat:      Mouth: Mucous membranes are moist.   Eyes:      Extraocular Movements: Extraocular movements intact.      Conjunctiva/sclera: Conjunctivae normal.   Cardiovascular:      Rate and Rhythm: Normal rate and regular rhythm.   Pulmonary:      Effort: Pulmonary effort is normal. No respiratory distress.   Abdominal:      General: There is no distension.      Palpations: Abdomen is soft.      Comments: Appropriately TTP, midline incision with Prevena over top with no strikethrough, holding suction; two AKASH drains to left abdomen with SS drainage   Skin:     General: Skin is warm and dry.   Neurological:      General: No focal deficit present.      Mental Status: She is alert and oriented to person, place, and time. Mental status is at baseline.       Significant Labs:  I have reviewed all pertinent lab results within the past 24 hours.  CBC:   Recent Labs   Lab 09/30/22  0353   WBC 11.67   RBC 2.64*   HGB 7.6*   HCT 24.0*   *   MCV 91   MCH 28.8   MCHC 31.7*       BMP:   Recent Labs   Lab 09/30/22  0353   GLU 92      K 3.6      CO2 24   BUN 8   CREATININE 0.7   CALCIUM 7.9*   MG 1.9         Significant Diagnostics:  I have reviewed all pertinent imaging results/findings within the past 24 hours.

## 2022-10-01 NOTE — PLAN OF CARE
Patient A&Ox4, all VSS, on RA, no tele.  Patient ambulating in room to bedside commode and bathroom independently.  Give 1x dose of Lasix 20mg this shift.  Patient states pain that is controlled by PRN pain medication.  AKASH drains with sanguineous output.  Wound vac with no output noted.  Son at bedside this shift.  Plan of care discussed with patient who verbalizes understanding.  Will continue to monitor.    Problem: Adult Inpatient Plan of Care  Goal: Plan of Care Review  Outcome: Ongoing, Progressing  Goal: Patient-Specific Goal (Individualized)  Outcome: Ongoing, Progressing  Goal: Absence of Hospital-Acquired Illness or Injury  Outcome: Ongoing, Progressing  Goal: Optimal Comfort and Wellbeing  Outcome: Ongoing, Progressing  Goal: Readiness for Transition of Care  Outcome: Ongoing, Progressing     Problem: Diabetes Comorbidity  Goal: Blood Glucose Level Within Targeted Range  Outcome: Ongoing, Progressing     Problem: Fall Injury Risk  Goal: Absence of Fall and Fall-Related Injury  Outcome: Ongoing, Progressing     Problem: Pain Acute  Goal: Acceptable Pain Control and Functional Ability  Outcome: Ongoing, Progressing     Problem: Skin Injury Risk Increased  Goal: Skin Health and Integrity  Outcome: Ongoing, Progressing

## 2022-10-01 NOTE — PLAN OF CARE
Patient alert and oriented. Pain controlled with scheduled and prn medication. Wound vac patent 0 output. Abebe drains a total of  70cc. Ambulated with stand by assist to bathroom. Vss. Will continue to monitor

## 2022-10-01 NOTE — ASSESSMENT & PLAN NOTE
83 yo woman with pelvic mass, significant ventral hernia, and partial small bowel obstruction who presents from OSH for n/v/diarrhea.     - Multimodal pain: tylenol, celecoxib, lido patch, oxy PRN  - Aggressive pulm toilet: IS, levalbuterol  - H/o HTN, continue home amlodipine  - Start CLD  - decrease mIVF  - Home pantoprazole  - Zofran PRN  - Daily CMP, CBC, Mag, Phos  - SSI  - Gyn/Onc consulted for abdominopelvic mass, appreciate recs  - Lovenox for DVT ppx    Dispo: MARLENA

## 2022-10-02 LAB
ANION GAP SERPL CALC-SCNC: 11 MMOL/L (ref 8–16)
BLD PROD TYP BPU: NORMAL
BLD PROD TYP BPU: NORMAL
BLOOD UNIT EXPIRATION DATE: NORMAL
BLOOD UNIT EXPIRATION DATE: NORMAL
BLOOD UNIT TYPE CODE: 5100
BLOOD UNIT TYPE CODE: 5100
BLOOD UNIT TYPE: NORMAL
BLOOD UNIT TYPE: NORMAL
BUN SERPL-MCNC: 7 MG/DL (ref 8–23)
CALCIUM SERPL-MCNC: 8.7 MG/DL (ref 8.7–10.5)
CHLORIDE SERPL-SCNC: 103 MMOL/L (ref 95–110)
CO2 SERPL-SCNC: 24 MMOL/L (ref 23–29)
CODING SYSTEM: NORMAL
CODING SYSTEM: NORMAL
CREAT SERPL-MCNC: 0.8 MG/DL (ref 0.5–1.4)
DISPENSE STATUS: NORMAL
DISPENSE STATUS: NORMAL
ERYTHROCYTE [DISTWIDTH] IN BLOOD BY AUTOMATED COUNT: 15 % (ref 11.5–14.5)
EST. GFR  (NO RACE VARIABLE): >60 ML/MIN/1.73 M^2
GLUCOSE SERPL-MCNC: 108 MG/DL (ref 70–110)
HCT VFR BLD AUTO: 27.7 % (ref 37–48.5)
HGB BLD-MCNC: 8.7 G/DL (ref 12–16)
MAGNESIUM SERPL-MCNC: 1.8 MG/DL (ref 1.6–2.6)
MCH RBC QN AUTO: 29 PG (ref 27–31)
MCHC RBC AUTO-ENTMCNC: 31.4 G/DL (ref 32–36)
MCV RBC AUTO: 92 FL (ref 82–98)
NUM UNITS TRANS PACKED RBC: NORMAL
NUM UNITS TRANS PACKED RBC: NORMAL
PHOSPHATE SERPL-MCNC: 3.2 MG/DL (ref 2.7–4.5)
PLATELET # BLD AUTO: 316 K/UL (ref 150–450)
PMV BLD AUTO: 10.9 FL (ref 9.2–12.9)
POCT GLUCOSE: 102 MG/DL (ref 70–110)
POCT GLUCOSE: 126 MG/DL (ref 70–110)
POCT GLUCOSE: 87 MG/DL (ref 70–110)
POTASSIUM SERPL-SCNC: 4.2 MMOL/L (ref 3.5–5.1)
RBC # BLD AUTO: 3 M/UL (ref 4–5.4)
SODIUM SERPL-SCNC: 138 MMOL/L (ref 136–145)
WBC # BLD AUTO: 6.71 K/UL (ref 3.9–12.7)

## 2022-10-02 PROCEDURE — 25000003 PHARM REV CODE 250: Performed by: NURSE PRACTITIONER

## 2022-10-02 PROCEDURE — 36415 COLL VENOUS BLD VENIPUNCTURE: CPT | Performed by: STUDENT IN AN ORGANIZED HEALTH CARE EDUCATION/TRAINING PROGRAM

## 2022-10-02 PROCEDURE — 25000003 PHARM REV CODE 250

## 2022-10-02 PROCEDURE — 83735 ASSAY OF MAGNESIUM: CPT | Performed by: STUDENT IN AN ORGANIZED HEALTH CARE EDUCATION/TRAINING PROGRAM

## 2022-10-02 PROCEDURE — 85027 COMPLETE CBC AUTOMATED: CPT | Performed by: STUDENT IN AN ORGANIZED HEALTH CARE EDUCATION/TRAINING PROGRAM

## 2022-10-02 PROCEDURE — 63600175 PHARM REV CODE 636 W HCPCS: Performed by: STUDENT IN AN ORGANIZED HEALTH CARE EDUCATION/TRAINING PROGRAM

## 2022-10-02 PROCEDURE — 84100 ASSAY OF PHOSPHORUS: CPT | Performed by: STUDENT IN AN ORGANIZED HEALTH CARE EDUCATION/TRAINING PROGRAM

## 2022-10-02 PROCEDURE — 80048 BASIC METABOLIC PNL TOTAL CA: CPT | Performed by: STUDENT IN AN ORGANIZED HEALTH CARE EDUCATION/TRAINING PROGRAM

## 2022-10-02 PROCEDURE — 20600001 HC STEP DOWN PRIVATE ROOM

## 2022-10-02 PROCEDURE — 25000003 PHARM REV CODE 250: Performed by: STUDENT IN AN ORGANIZED HEALTH CARE EDUCATION/TRAINING PROGRAM

## 2022-10-02 PROCEDURE — 63600175 PHARM REV CODE 636 W HCPCS

## 2022-10-02 RX ADMIN — OXYCODONE 5 MG: 5 TABLET ORAL at 11:10

## 2022-10-02 RX ADMIN — ENOXAPARIN SODIUM 40 MG: 100 INJECTION SUBCUTANEOUS at 04:10

## 2022-10-02 RX ADMIN — LISINOPRIL 10 MG: 10 TABLET ORAL at 08:10

## 2022-10-02 RX ADMIN — OXYCODONE 5 MG: 5 TABLET ORAL at 05:10

## 2022-10-02 RX ADMIN — ACETAMINOPHEN 1000 MG: 500 TABLET ORAL at 09:10

## 2022-10-02 RX ADMIN — HYDROMORPHONE HYDROCHLORIDE 0.2 MG: 1 INJECTION, SOLUTION INTRAMUSCULAR; INTRAVENOUS; SUBCUTANEOUS at 02:10

## 2022-10-02 RX ADMIN — CELECOXIB 200 MG: 200 CAPSULE ORAL at 09:10

## 2022-10-02 RX ADMIN — CELECOXIB 200 MG: 200 CAPSULE ORAL at 08:10

## 2022-10-02 RX ADMIN — MUPIROCIN: 20 OINTMENT TOPICAL at 09:10

## 2022-10-02 RX ADMIN — HYDROMORPHONE HYDROCHLORIDE 0.2 MG: 1 INJECTION, SOLUTION INTRAMUSCULAR; INTRAVENOUS; SUBCUTANEOUS at 08:10

## 2022-10-02 RX ADMIN — MUPIROCIN: 20 OINTMENT TOPICAL at 08:10

## 2022-10-02 RX ADMIN — LIDOCAINE 1 PATCH: 50 PATCH CUTANEOUS at 05:10

## 2022-10-02 RX ADMIN — ALLOPURINOL 300 MG: 300 TABLET ORAL at 08:10

## 2022-10-02 RX ADMIN — AMLODIPINE BESYLATE 5 MG: 5 TABLET ORAL at 08:10

## 2022-10-02 RX ADMIN — ACETAMINOPHEN 1000 MG: 500 TABLET ORAL at 05:10

## 2022-10-02 RX ADMIN — PANTOPRAZOLE SODIUM 40 MG: 40 TABLET, DELAYED RELEASE ORAL at 08:10

## 2022-10-02 RX ADMIN — ACETAMINOPHEN 1000 MG: 500 TABLET ORAL at 02:10

## 2022-10-02 NOTE — SUBJECTIVE & OBJECTIVE
Interval History: No acute events overnight. Patient resting comfortably in bed this morning. Tolerating CLD and denies N/V. Pain is well controlled. Had a bowel movement.    Medications:  Continuous Infusions:      Scheduled Meds:   acetaminophen  1,000 mg Oral Q8H    allopurinoL  300 mg Oral Daily    amLODIPine  5 mg Oral Daily    celecoxib  200 mg Oral BID    enoxaparin  40 mg Subcutaneous Daily    LIDOcaine  1 patch Transdermal Q24H    lisinopriL  10 mg Oral Daily    mupirocin   Nasal BID    pantoprazole  40 mg Oral Daily    potassium chloride  40 mEq Oral Once    potassium, sodium phosphates  1 packet Oral Once     PRN Meds:dextrose 10%, dextrose 10%, glucagon (human recombinant), hydrALAZINE, HYDROmorphone, insulin aspart U-100, labetalol, magnesium oxide, magnesium oxide, ondansetron, ondansetron, oxyCODONE, potassium bicarbonate, potassium bicarbonate, potassium bicarbonate, potassium, sodium phosphates, potassium, sodium phosphates, potassium, sodium phosphates, sodium chloride 0.9%, sodium chloride 0.9%     Review of patient's allergies indicates:  No Known Allergies  Objective:     Vital Signs (Most Recent):  Temp: 98.4 °F (36.9 °C) (10/02/22 0410)  Pulse: 86 (10/02/22 0410)  Resp: 18 (10/02/22 0537)  BP: (!) 104/58 (10/02/22 0410)  SpO2: (!) 93 % (10/02/22 0410) Vital Signs (24h Range):  Temp:  [96.7 °F (35.9 °C)-98.8 °F (37.1 °C)] 98.4 °F (36.9 °C)  Pulse:  [] 86  Resp:  [16-18] 18  SpO2:  [92 %-96 %] 93 %  BP: (103-126)/(56-71) 104/58     Weight: 73 kg (160 lb 15 oz)  Body mass index is 27.62 kg/m².    Intake/Output - Last 3 Shifts         09/30 0700  10/01 0659 10/01 0700  10/02 0659    P.O. 60 120    I.V. (mL/kg) 1500 (20.5)     Total Intake(mL/kg) 1560 (21.4) 120 (1.6)    Urine (mL/kg/hr) 1450 (0.8) 0 (0)    Drains 170 90    Other 0 0    Total Output 1620 90    Net -60 +30          Urine Occurrence 5 x 3 x            Physical Exam  Constitutional:       General: She is not in acute distress.      Appearance: Normal appearance.   HENT:      Head: Normocephalic and atraumatic.      Mouth/Throat:      Mouth: Mucous membranes are moist.   Eyes:      Extraocular Movements: Extraocular movements intact.      Conjunctiva/sclera: Conjunctivae normal.   Cardiovascular:      Rate and Rhythm: Normal rate and regular rhythm.   Pulmonary:      Effort: Pulmonary effort is normal. No respiratory distress.   Abdominal:      General: There is no distension.      Palpations: Abdomen is soft.      Comments: Appropriately TTP, midline incision with Prevena over top with no strikethrough, holding suction; two AKASH drains to left abdomen with SS drainage   Skin:     General: Skin is warm and dry.   Neurological:      General: No focal deficit present.      Mental Status: She is alert and oriented to person, place, and time. Mental status is at baseline.       Significant Labs:  I have reviewed all pertinent lab results within the past 24 hours.  CBC:   Recent Labs   Lab 09/30/22  0353   WBC 11.67   RBC 2.64*   HGB 7.6*   HCT 24.0*   *   MCV 91   MCH 28.8   MCHC 31.7*       BMP:   Recent Labs   Lab 09/30/22  0353   GLU 92      K 3.6      CO2 24   BUN 8   CREATININE 0.7   CALCIUM 7.9*   MG 1.9         Significant Diagnostics:  I have reviewed all pertinent imaging results/findings within the past 24 hours.

## 2022-10-02 NOTE — ASSESSMENT & PLAN NOTE
83 yo woman with pelvic mass, significant ventral hernia, and partial small bowel obstruction who presents from OSH for n/v/diarrhea.     - Multimodal pain: tylenol, celecoxib, lido patch, oxy PRN  - Aggressive pulm toilet: IS, levalbuterol  - H/o HTN, continue home amlodipine  - Advance to regular diet  - d/c mIVF  - Home pantoprazole  - Zofran PRN  - Daily CMP, CBC, Mag, Phos  - SSI  - Gyn/Onc consulted for abdominopelvic mass, appreciate recs  - Lovenox for DVT ppx    Dispo: MARLENA

## 2022-10-02 NOTE — PLAN OF CARE
Patient alert and oriented. Wound vac in place. C/d/I, 0 output. Abebe drain x2 total of 30cc output. Pain being controlled with medication. Ambulating to bathroom with stand by by assist. Vss. Will continue to monitor

## 2022-10-02 NOTE — PLAN OF CARE
Patient A&Ox4, all VSS, on room air, no tele.  Patient requesting both PRN pain meds today, which controlled pain much better.  Patient with small BM this shift and voiding spontaneously.  Ambulates freely in room.  Plan of care discussed with patient who verbalized understanding.  Will continue to monitor.    Problem: Adult Inpatient Plan of Care  Goal: Plan of Care Review  Outcome: Ongoing, Not Progressing  Goal: Patient-Specific Goal (Individualized)  Outcome: Ongoing, Not Progressing  Goal: Absence of Hospital-Acquired Illness or Injury  Outcome: Ongoing, Not Progressing  Goal: Optimal Comfort and Wellbeing  Outcome: Ongoing, Not Progressing  Goal: Readiness for Transition of Care  Outcome: Ongoing, Not Progressing     Problem: Diabetes Comorbidity  Goal: Blood Glucose Level Within Targeted Range  Outcome: Ongoing, Not Progressing     Problem: Fall Injury Risk  Goal: Absence of Fall and Fall-Related Injury  Outcome: Ongoing, Not Progressing     Problem: Pain Acute  Goal: Acceptable Pain Control and Functional Ability  Outcome: Ongoing, Not Progressing     Problem: Skin Injury Risk Increased  Goal: Skin Health and Integrity  Outcome: Ongoing, Not Progressing

## 2022-10-02 NOTE — PROGRESS NOTES
[facility] Larose - [facility]  General Surgery  Progress Note    Subjective:     History of Present Illness:  No notes on file    Post-Op Info:  Procedure(s) (LRB):  LAPAROTOMY, EXPLORATORY (N/A)  EXCISION, MASS, PELVIS (N/A)  REPAIR, HERNIA, INCISIONAL (N/A)  SIGMOIDOSCOPY, FLEXIBLE   4 Days Post-Op     Interval History: No acute events overnight. Patient resting comfortably in bed this morning. Tolerating CLD and denies N/V. Pain is well controlled. Had a bowel movement.    Medications:  Continuous Infusions:      Scheduled Meds:   acetaminophen  1,000 mg Oral Q8H    allopurinoL  300 mg Oral Daily    amLODIPine  5 mg Oral Daily    celecoxib  200 mg Oral BID    enoxaparin  40 mg Subcutaneous Daily    LIDOcaine  1 patch Transdermal Q24H    lisinopriL  10 mg Oral Daily    mupirocin   Nasal BID    pantoprazole  40 mg Oral Daily    potassium chloride  40 mEq Oral Once    potassium, sodium phosphates  1 packet Oral Once     PRN Meds:dextrose 10%, dextrose 10%, glucagon (human recombinant), hydrALAZINE, HYDROmorphone, insulin aspart U-100, labetalol, magnesium oxide, magnesium oxide, ondansetron, ondansetron, oxyCODONE, potassium bicarbonate, potassium bicarbonate, potassium bicarbonate, potassium, sodium phosphates, potassium, sodium phosphates, potassium, sodium phosphates, sodium chloride 0.9%, sodium chloride 0.9%     Review of patient's allergies indicates:  No Known Allergies  Objective:     Vital Signs (Most Recent):  Temp: 98.4 °F (36.9 °C) (10/02/22 0410)  Pulse: 86 (10/02/22 0410)  Resp: 18 (10/02/22 0537)  BP: (!) 104/58 (10/02/22 0410)  SpO2: (!) 93 % (10/02/22 0410) Vital Signs (24h Range):  Temp:  [96.7 °F (35.9 °C)-98.8 °F (37.1 °C)] 98.4 °F (36.9 °C)  Pulse:  [] 86  Resp:  [16-18] 18  SpO2:  [92 %-96 %] 93 %  BP: (103-126)/(56-71) 104/58     Weight: 73 kg (160 lb 15 oz)  Body mass index is 27.62 kg/m².    Intake/Output - Last 3 Shifts         09/30 0700  10/01 0659 10/01 0700  10/02 0659    P.O. 60  120    I.V. (mL/kg) 1500 (20.5)     Total Intake(mL/kg) 1560 (21.4) 120 (1.6)    Urine (mL/kg/hr) 1450 (0.8) 0 (0)    Drains 170 90    Other 0 0    Total Output 1620 90    Net -60 +30          Urine Occurrence 5 x 3 x            Physical Exam  Constitutional:       General: She is not in acute distress.     Appearance: Normal appearance.   HENT:      Head: Normocephalic and atraumatic.      Mouth/Throat:      Mouth: Mucous membranes are moist.   Eyes:      Extraocular Movements: Extraocular movements intact.      Conjunctiva/sclera: Conjunctivae normal.   Cardiovascular:      Rate and Rhythm: Normal rate and regular rhythm.   Pulmonary:      Effort: Pulmonary effort is normal. No respiratory distress.   Abdominal:      General: There is no distension.      Palpations: Abdomen is soft.      Comments: Appropriately TTP, midline incision with Prevena over top with no strikethrough, holding suction; two AKASH drains to left abdomen with SS drainage   Skin:     General: Skin is warm and dry.   Neurological:      General: No focal deficit present.      Mental Status: She is alert and oriented to person, place, and time. Mental status is at baseline.       Significant Labs:  I have reviewed all pertinent lab results within the past 24 hours.  CBC:   Recent Labs   Lab 09/30/22  0353   WBC 11.67   RBC 2.64*   HGB 7.6*   HCT 24.0*   *   MCV 91   MCH 28.8   MCHC 31.7*       BMP:   Recent Labs   Lab 09/30/22  0353   GLU 92      K 3.6      CO2 24   BUN 8   CREATININE 0.7   CALCIUM 7.9*   MG 1.9         Significant Diagnostics:  I have reviewed all pertinent imaging results/findings within the past 24 hours.    Assessment/Plan:     * SBO (small bowel obstruction)  83 yo woman with pelvic mass, significant ventral hernia, and partial small bowel obstruction who presents from OSH for n/v/diarrhea.     - Multimodal pain: tylenol, celecoxib, lido patch, oxy PRN  - Aggressive pulm toilet: IS, levalbuterol  - H/o HTN,  continue home amlodipine  - Advance to regular diet  - d/c mIVF  - Home pantoprazole  - Zofran PRN  - Daily CMP, CBC, Mag, Phos  - SSI  - Gyn/Onc consulted for abdominopelvic mass, appreciate recs  - Lovenox for DVT ppx    Dispo: MARLENA Simms MD  General Surgery  [facility]

## 2022-10-03 VITALS
SYSTOLIC BLOOD PRESSURE: 129 MMHG | WEIGHT: 160.94 LBS | TEMPERATURE: 97 F | OXYGEN SATURATION: 95 % | DIASTOLIC BLOOD PRESSURE: 71 MMHG | BODY MASS INDEX: 27.48 KG/M2 | HEIGHT: 64 IN | RESPIRATION RATE: 16 BRPM | HEART RATE: 96 BPM

## 2022-10-03 LAB
ALBUMIN SERPL BCP-MCNC: 2.7 G/DL (ref 3.5–5.2)
ALP SERPL-CCNC: 149 U/L (ref 55–135)
ALT SERPL W/O P-5'-P-CCNC: 13 U/L (ref 10–44)
ANION GAP SERPL CALC-SCNC: 9 MMOL/L (ref 8–16)
AST SERPL-CCNC: 28 U/L (ref 10–40)
BASOPHILS # BLD AUTO: 0.03 K/UL (ref 0–0.2)
BASOPHILS NFR BLD: 0.7 % (ref 0–1.9)
BILIRUB SERPL-MCNC: 0.9 MG/DL (ref 0.1–1)
BUN SERPL-MCNC: 6 MG/DL (ref 8–23)
CALCIUM SERPL-MCNC: 8.5 MG/DL (ref 8.7–10.5)
CHLORIDE SERPL-SCNC: 106 MMOL/L (ref 95–110)
CO2 SERPL-SCNC: 23 MMOL/L (ref 23–29)
CREAT SERPL-MCNC: 0.7 MG/DL (ref 0.5–1.4)
DIFFERENTIAL METHOD: ABNORMAL
EOSINOPHIL # BLD AUTO: 0.3 K/UL (ref 0–0.5)
EOSINOPHIL NFR BLD: 5.9 % (ref 0–8)
ERYTHROCYTE [DISTWIDTH] IN BLOOD BY AUTOMATED COUNT: 14.7 % (ref 11.5–14.5)
EST. GFR  (NO RACE VARIABLE): >60 ML/MIN/1.73 M^2
GLUCOSE SERPL-MCNC: 98 MG/DL (ref 70–110)
HCT VFR BLD AUTO: 23.9 % (ref 37–48.5)
HGB BLD-MCNC: 7.6 G/DL (ref 12–16)
IMM GRANULOCYTES # BLD AUTO: 0.01 K/UL (ref 0–0.04)
IMM GRANULOCYTES NFR BLD AUTO: 0.2 % (ref 0–0.5)
LYMPHOCYTES # BLD AUTO: 0.7 K/UL (ref 1–4.8)
LYMPHOCYTES NFR BLD: 15.1 % (ref 18–48)
MAGNESIUM SERPL-MCNC: 1.7 MG/DL (ref 1.6–2.6)
MCH RBC QN AUTO: 29.2 PG (ref 27–31)
MCHC RBC AUTO-ENTMCNC: 31.8 G/DL (ref 32–36)
MCV RBC AUTO: 92 FL (ref 82–98)
MONOCYTES # BLD AUTO: 0.5 K/UL (ref 0.3–1)
MONOCYTES NFR BLD: 10.3 % (ref 4–15)
NEUTROPHILS # BLD AUTO: 3 K/UL (ref 1.8–7.7)
NEUTROPHILS NFR BLD: 67.8 % (ref 38–73)
NRBC BLD-RTO: 0 /100 WBC
PHOSPHATE SERPL-MCNC: 3 MG/DL (ref 2.7–4.5)
PLATELET # BLD AUTO: 279 K/UL (ref 150–450)
PMV BLD AUTO: 10.7 FL (ref 9.2–12.9)
POCT GLUCOSE: 107 MG/DL (ref 70–110)
POCT GLUCOSE: 143 MG/DL (ref 70–110)
POCT GLUCOSE: 147 MG/DL (ref 70–110)
POCT GLUCOSE: 180 MG/DL (ref 70–110)
POCT GLUCOSE: 95 MG/DL (ref 70–110)
POCT GLUCOSE: 98 MG/DL (ref 70–110)
POTASSIUM SERPL-SCNC: 4 MMOL/L (ref 3.5–5.1)
PROT SERPL-MCNC: 5.2 G/DL (ref 6–8.4)
RBC # BLD AUTO: 2.6 M/UL (ref 4–5.4)
SODIUM SERPL-SCNC: 138 MMOL/L (ref 136–145)
WBC # BLD AUTO: 4.38 K/UL (ref 3.9–12.7)

## 2022-10-03 PROCEDURE — 83735 ASSAY OF MAGNESIUM: CPT

## 2022-10-03 PROCEDURE — 97535 SELF CARE MNGMENT TRAINING: CPT

## 2022-10-03 PROCEDURE — 84100 ASSAY OF PHOSPHORUS: CPT

## 2022-10-03 PROCEDURE — 85025 COMPLETE CBC W/AUTO DIFF WBC: CPT

## 2022-10-03 PROCEDURE — 25000003 PHARM REV CODE 250: Performed by: NURSE PRACTITIONER

## 2022-10-03 PROCEDURE — 25000003 PHARM REV CODE 250

## 2022-10-03 PROCEDURE — 80053 COMPREHEN METABOLIC PANEL: CPT

## 2022-10-03 PROCEDURE — 25000003 PHARM REV CODE 250: Performed by: STUDENT IN AN ORGANIZED HEALTH CARE EDUCATION/TRAINING PROGRAM

## 2022-10-03 PROCEDURE — 36415 COLL VENOUS BLD VENIPUNCTURE: CPT

## 2022-10-03 PROCEDURE — 63600175 PHARM REV CODE 636 W HCPCS

## 2022-10-03 RX ORDER — OXYCODONE HYDROCHLORIDE 5 MG/1
5 TABLET ORAL EVERY 6 HOURS PRN
Qty: 12 TABLET | Refills: 0 | Status: SHIPPED | OUTPATIENT
Start: 2022-10-03

## 2022-10-03 RX ADMIN — ALLOPURINOL 300 MG: 300 TABLET ORAL at 09:10

## 2022-10-03 RX ADMIN — LIDOCAINE 1 PATCH: 50 PATCH CUTANEOUS at 05:10

## 2022-10-03 RX ADMIN — MUPIROCIN: 20 OINTMENT TOPICAL at 09:10

## 2022-10-03 RX ADMIN — AMLODIPINE BESYLATE 5 MG: 5 TABLET ORAL at 09:10

## 2022-10-03 RX ADMIN — PANTOPRAZOLE SODIUM 40 MG: 40 TABLET, DELAYED RELEASE ORAL at 09:10

## 2022-10-03 RX ADMIN — OXYCODONE 5 MG: 5 TABLET ORAL at 01:10

## 2022-10-03 RX ADMIN — CELECOXIB 200 MG: 200 CAPSULE ORAL at 09:10

## 2022-10-03 RX ADMIN — HYDROMORPHONE HYDROCHLORIDE 0.2 MG: 1 INJECTION, SOLUTION INTRAMUSCULAR; INTRAVENOUS; SUBCUTANEOUS at 09:10

## 2022-10-03 RX ADMIN — OXYCODONE 5 MG: 5 TABLET ORAL at 05:10

## 2022-10-03 RX ADMIN — LISINOPRIL 10 MG: 10 TABLET ORAL at 09:10

## 2022-10-03 RX ADMIN — ACETAMINOPHEN 1000 MG: 500 TABLET ORAL at 05:10

## 2022-10-03 NOTE — NURSING
Called patient's son to ask him to bring her a shirt and pants so that she has them available for when she discharges.  Left voicemail explaining this as he did not answer.

## 2022-10-03 NOTE — DISCHARGE SUMMARY
HOSPITAL DISCHARGE SUMMARY      I.   IDENTIFYING DATA         A.  Name:Mariaelena Sheffield              Sex:female              MRN:[id number]              :1940              Date of admission:2022  8:09 PM              Date of discharge: No discharge date for patient encounter.       II. SUCCINCT SUMMARY OF ADMISSION STATUS AND HOSPITAL COURSE    For details of hospital stay, please refer to daily progress notes. Briefly, this is a 82 y.o. female presented on  admitted for SBO and large pelvic mass. Patient was taken to OR and underwent ex-lap with resection of the pelvic mass. Post-operatively patient was admitted to the surgical ICU. She was quickly weaned off pressors and extubated. She was transferred to the floor on POD2 and had an uncomplicated hospital recovery.     On the day of discharge, the patient was ambulating with minimal assistance, voiding spontaneously, was tolerating a diet without nausea or vomiting, and pain was well controlled on PO pain medications. Discharge instructions were explained to the patient and appropriate follow-up was arranged.      III. PATIENT'S MEDICAL CONDITION AT DISCHARGE       good    IV. SUMMARY OF PROCEDURE(S) PERFORMED        A. Bedside procedures: None          B. Operative procedures: ex-lap      V.  DISCHARGE DIAGNOSES        SBO (small bowel obstruction)    Leukemoid reaction    Pelvic mass    Leukocytosis       VI. RESULTS PENDING AT TIME OF DISCHARGE         Surgical pathology    VII. MEDICATIONS TAKEN PRIOR TO ADMISSION    Current Outpatient Medications   Medication Instructions    allopurinoL (ZYLOPRIM) 300 mg, Oral, Daily    amLODIPine (NORVASC) 5 mg, Oral, Daily    atorvastatin (LIPITOR) 20 mg, Oral, Nightly    blood sugar diagnostic Strp TEST ONE TO TWO TIMES DAILY (NEED MD APPOINTMENT)    blood-glucose meter Misc Test BID    colchicine (COLCRYS) 0.6 mg, Oral, Daily    dicyclomine (BENTYL) 10 mg, Oral, 3 times daily PRN    ferrous sulfate 325 mg,  Oral, Daily    lancets Misc TEST BLOOD SUGAR  1  TO  2  TIMES  PER  DAY  ( NEED MD APPOINTMENT  )    lisinopriL 10 mg, Oral, Daily    metFORMIN (GLUCOPHAGE) 500 MG tablet 1 tablet, Oral, With dinner    nozaseptin (NOZIN) nasal  1 each, Each Nostril, 2 times daily    omeprazole (PRILOSEC) 40 mg, Oral, Daily    oxyCODONE (ROXICODONE) 5 mg, Oral, Every 6 hours PRN    polyethylene glycol (GLYCOLAX) 17 gram PwPk Take 17 g by mouth daily        VIII. MEDICATIONS TO BE TAKEN FOLLOWING DISCHARGE       Medication List        START taking these medications      oxyCODONE 5 MG immediate release tablet  Commonly known as: ROXICODONE  Take 1 tablet (5 mg total) by mouth every 6 (six) hours as needed.            CHANGE how you take these medications      polyethylene glycol 17 gram Pwpk  Commonly known as: GLYCOLAX  Take 17 g by mouth daily  What changed: when to take this            CONTINUE taking these medications      allopurinoL 300 MG tablet  Commonly known as: ZYLOPRIM     amLODIPine 5 MG tablet  Commonly known as: NORVASC     atorvastatin 20 MG tablet  Commonly known as: LIPITOR     blood sugar diagnostic Strp     blood-glucose meter Misc     colchicine 0.6 mg tablet  Commonly known as: COLCRYS     dicyclomine 10 MG capsule  Commonly known as: BENTYL     ferrous sulfate 325 (65 FE) MG EC tablet     lancets Misc     lisinopriL 10 MG tablet  Take 1 tablet (10 mg total) by mouth once daily.     metFORMIN 500 MG tablet  Commonly known as: GLUCOPHAGE     nozaseptin  nasal   Commonly known as: NOZIN  1 each by Each Nare route 2 (two) times daily.     omeprazole 40 MG capsule  Commonly known as: PRILOSEC               Where to Get Your Medications        These medications were sent to [facility]  [address] [address]      Hours: Mon-Fri 7a-7p, Sat-Sun 10a-4p Phone: [phone]   oxyCODONE 5 MG immediate release tablet          IX. DISCHARGE DISPOSITION          Home or  Self Care      Bonifacio Simms M.D.  General Surgery, PGY-1

## 2022-10-03 NOTE — PLAN OF CARE
Patient alert and oriented. Wound vac c/d/I, malena drain 30cc output. Pain controlled with medications. Ambulating in room. Vss. Will continue to monitor

## 2022-10-03 NOTE — PT/OT/SLP PROGRESS
Occupational Therapy   Treatment    Name: Mariaelena Sheffield  MRN: [id number]  Admitting Diagnosis:  SBO (small bowel obstruction)  5 Days Post-Op    Recommendations:     Discharge Recommendations: home health OT  Discharge Equipment Recommendations:  walker, rolling  Barriers to discharge:  None    Assessment:     Mariaelena Sheffield is a 82 y.o. female with a medical diagnosis of SBO (small bowel obstruction).  She presents with limited mobility and decreased endurance. Pt was sitting EOB upon entering the room. Pt was eager to get up. She declined performing ADLs however was excited to complete walk to improve occupational endurance. Pt walked down juarez and back to room with CGA and hand held assist. It was noted that while walking pt demonstrated furniture grabbing however no loss of balance noticed. Performance deficits affecting function are weakness, impaired endurance, impaired self care skills, impaired functional mobility, gait instability, impaired balance, decreased lower extremity function, decreased safety awareness, impaired cardiopulmonary response to activity.     Rehab Prognosis:  Good; patient would benefit from acute skilled OT services to address these deficits and reach maximum level of function.       Plan:     Patient to be seen 3 x/week to address the above listed problems via self-care/home management, therapeutic activities, therapeutic exercises  Plan of Care Expires: 10/29/22  Plan of Care Reviewed with: patient, family    Subjective     Pain/Comfort:  Pain Rating 1:  (did not rate)    Objective:     Communicated with: RN prior to session.  Patient found sitting edge of bed with AKASH drain upon OT entry to room.    General Precautions: Standard, fall   Orthopedic Precautions:N/A   Braces: N/A  Respiratory Status: Room air     Occupational Performance:     Functional Mobility/Transfers:  Patient completed Sit <> Stand Transfer with contact guard assistance  with  hand-held assist   Functional  Mobility: Pt performed sit/ stand transfer and walked down juarez with CGA and hand held assist.       [facility] 6 Click ADL: 20    Treatment & Education:  -Pt educated on role of OT and POC.   -Pt educated on hand placement during transfers.   -Pt educated on energy conservation.     Patient left sitting edge of bed with all lines intact, call button in reach, RN notified, and family present    GOALS:   Multidisciplinary Problems       Occupational Therapy Goals          Problem: Occupational Therapy    Goal Priority Disciplines Outcome Interventions   Occupational Therapy Goal     OT, PT/OT Ongoing, Progressing    Description: Goals to be met by: 10/11/22     Patient will increase functional independence with ADLs by performing:    UE Dressing with [place].  LE Dressing with [place].  Grooming while standing at sink with [place].  Toileting from toilet with [place] for hygiene and clothing management.   Step transfer with [place]  Toilet transfer to toilet with [place].                         Time Tracking:     OT Date of Treatment: 10/03/22  OT Start Time: 1142  OT Stop Time: 1153  OT Total Time (min): 11 min    Billable Minutes:Self Care/Home Management 11    OT/SHEA: OT          10/3/2022

## 2022-10-03 NOTE — SUBJECTIVE & OBJECTIVE
Interval History: No acute events overnight. Patient resting comfortably in bed this morning. Tolerating regular diet and denies N/V. Pain is well controlled.Passing gas and having bowel movements.    Medications:  Continuous Infusions:      Scheduled Meds:   acetaminophen  1,000 mg Oral Q8H    allopurinoL  300 mg Oral Daily    amLODIPine  5 mg Oral Daily    celecoxib  200 mg Oral BID    enoxaparin  40 mg Subcutaneous Daily    LIDOcaine  1 patch Transdermal Q24H    lisinopriL  10 mg Oral Daily    mupirocin   Nasal BID    pantoprazole  40 mg Oral Daily    potassium chloride  40 mEq Oral Once    potassium, sodium phosphates  1 packet Oral Once     PRN Meds:dextrose 10%, dextrose 10%, glucagon (human recombinant), hydrALAZINE, HYDROmorphone, insulin aspart U-100, labetalol, magnesium oxide, magnesium oxide, ondansetron, ondansetron, oxyCODONE, potassium bicarbonate, potassium bicarbonate, potassium bicarbonate, potassium, sodium phosphates, potassium, sodium phosphates, potassium, sodium phosphates, sodium chloride 0.9%, sodium chloride 0.9%     Review of patient's allergies indicates:  No Known Allergies  Objective:     Vital Signs (Most Recent):  Temp: 98.3 °F (36.8 °C) (10/03/22 0359)  Pulse: 85 (10/03/22 0359)  Resp: 17 (10/03/22 0531)  BP: 111/71 (10/03/22 0359)  SpO2: 95 % (10/03/22 0359) Vital Signs (24h Range):  Temp:  [98 °F (36.7 °C)-98.6 °F (37 °C)] 98.3 °F (36.8 °C)  Pulse:  [85-99] 85  Resp:  [16-19] 17  SpO2:  [93 %-97 %] 95 %  BP: (106-155)/(59-73) 111/71     Weight: 73 kg (160 lb 15 oz)  Body mass index is 27.62 kg/m².    Intake/Output - Last 3 Shifts         10/01 0700  10/02 0659 10/02 0700  10/03 0659 10/03 0700  10/04 0659    P.O. 120 240     I.V. (mL/kg)       Total Intake(mL/kg) 120 (1.6) 240 (3.3)     Urine (mL/kg/hr) 0 (0) 0 (0)     Drains 90 60     Other 0 0     Stool  0     Total Output 90 60     Net +30 +180            Urine Occurrence 3 x 7 x     Stool Occurrence  1 x             Physical  Exam  Constitutional:       General: She is not in acute distress.     Appearance: Normal appearance.   HENT:      Head: Normocephalic and atraumatic.      Mouth/Throat:      Mouth: Mucous membranes are moist.   Eyes:      Extraocular Movements: Extraocular movements intact.      Conjunctiva/sclera: Conjunctivae normal.   Cardiovascular:      Rate and Rhythm: Normal rate and regular rhythm.   Pulmonary:      Effort: Pulmonary effort is normal. No respiratory distress.   Abdominal:      General: There is no distension.      Palpations: Abdomen is soft.      Comments: Appropriately TTP, midline incision with staples in place c/d/i, one AKASH drain to left abdomen with SS drainage   Skin:     General: Skin is warm and dry.   Neurological:      General: No focal deficit present.      Mental Status: She is alert and oriented to person, place, and time. Mental status is at baseline.       Significant Labs:  I have reviewed all pertinent lab results within the past 24 hours.  CBC:   Recent Labs   Lab 10/03/22  0533   WBC 4.38   RBC 2.60*   HGB 7.6*   HCT 23.9*      MCV 92   MCH 29.2   MCHC 31.8*       BMP:   Recent Labs   Lab 10/03/22  0533   GLU 98      K 4.0      CO2 23   BUN 6*   CREATININE 0.7   CALCIUM 8.5*   MG 1.7         Significant Diagnostics:  I have reviewed all pertinent imaging results/findings within the past 24 hours.

## 2022-10-03 NOTE — PLAN OF CARE
Problem: Adult Inpatient Plan of Care  Goal: Plan of Care Review  Outcome: Adequate for Care Transition  Goal: Patient-Specific Goal (Individualized)  Outcome: Adequate for Care Transition  Goal: Absence of Hospital-Acquired Illness or Injury  Outcome: Adequate for Care Transition  Goal: Optimal Comfort and Wellbeing  Outcome: Adequate for Care Transition  Goal: Readiness for Transition of Care  Outcome: Adequate for Care Transition     Problem: Diabetes Comorbidity  Goal: Blood Glucose Level Within Targeted Range  Outcome: Adequate for Care Transition     Problem: Fall Injury Risk  Goal: Absence of Fall and Fall-Related Injury  Outcome: Adequate for Care Transition     Problem: Pain Acute  Goal: Acceptable Pain Control and Functional Ability  Outcome: Adequate for Care Transition     Problem: Skin Injury Risk Increased  Goal: Skin Health and Integrity  Outcome: Adequate for Care Transition

## 2022-10-03 NOTE — PLAN OF CARE
[facility]  Discharge Final Note    Primary Care Provider: Lui Blue MD    Expected Discharge Date: 10/3/2022    Home with Home Health with [facility]    Final Discharge Note (most recent)       Final Note - 10/03/22 1327          Final Note    Assessment Type Final Discharge Note     Anticip[place]position [place] [facility] [place]     What phone number can be called within the next 1-3 days to see how you are doing after discharge? [phone]     Hospital Resources/Appts/Education Provided Provided patient/caregiver with written discharge plan information        Post-Acute Status    Post-Acute Authorization Home Health     Home Health Status Referrals Sent     Coverage [employer]     Discharge Delays None known at this time                     Important Message from Medicare             DALTON Anaya, RN    EXT [phone]

## 2022-10-03 NOTE — NURSING
Patient A&Ox4, all VSS, on room air, no tele.  Patient is independent and has been tolerating pain well.  Voids spontaneously, 1 BM this shift.  Patient will be discharging home this afternoon.  Family at bedside.  Discharge instructions explained and patient verbalizes understanding.  PIV removed from R FA with no issues.  Pending pharmacy delivering pain medications.  Will continue to monitor.

## 2022-10-03 NOTE — ASSESSMENT & PLAN NOTE
83 yo woman with pelvic mass, significant ventral hernia, and partial small bowel obstruction who presents from OSH for n/v/diarrhea.     - Multimodal pain: tylenol, celecoxib, lido patch, oxy PRN  - Aggressive pulm toilet: IS, levalbuterol  - H/o HTN, continue home amlodipine  - Continue regular diet  - Home pantoprazole  - Zofran PRN  - Daily CMP, CBC, Mag, Phos  - SSI  - Gyn/Onc consulted for abdominopelvic mass, appreciate recs  - Lovenox for DVT ppx    Dispo: MARLENA, potential discharge to home today

## 2022-10-03 NOTE — PROGRESS NOTES
Jaime Larose - [facility]  General Surgery  Progress Note    Subjective:     History of Present Illness:  No notes on file    Post-Op Info:  Procedure(s) (LRB):  LAPAROTOMY, EXPLORATORY (N/A)  EXCISION, MASS, PELVIS (N/A)  REPAIR, HERNIA, INCISIONAL (N/A)  SIGMOIDOSCOPY, FLEXIBLE   5 Days Post-Op     Interval History: No acute events overnight. Patient resting comfortably in bed this morning. Tolerating regular diet and denies N/V. Pain is well controlled.Passing gas and having bowel movements.    Medications:  Continuous Infusions:      Scheduled Meds:   acetaminophen  1,000 mg Oral Q8H    allopurinoL  300 mg Oral Daily    amLODIPine  5 mg Oral Daily    celecoxib  200 mg Oral BID    enoxaparin  40 mg Subcutaneous Daily    LIDOcaine  1 patch Transdermal Q24H    lisinopriL  10 mg Oral Daily    mupirocin   Nasal BID    pantoprazole  40 mg Oral Daily    potassium chloride  40 mEq Oral Once    potassium, sodium phosphates  1 packet Oral Once     PRN Meds:dextrose 10%, dextrose 10%, glucagon (human recombinant), hydrALAZINE, HYDROmorphone, insulin aspart U-100, labetalol, magnesium oxide, magnesium oxide, ondansetron, ondansetron, oxyCODONE, potassium bicarbonate, potassium bicarbonate, potassium bicarbonate, potassium, sodium phosphates, potassium, sodium phosphates, potassium, sodium phosphates, sodium chloride 0.9%, sodium chloride 0.9%     Review of patient's allergies indicates:  No Known Allergies  Objective:     Vital Signs (Most Recent):  Temp: 98.3 °F (36.8 °C) (10/03/22 0359)  Pulse: 85 (10/03/22 0359)  Resp: 17 (10/03/22 0531)  BP: 111/71 (10/03/22 0359)  SpO2: 95 % (10/03/22 0359) Vital Signs (24h Range):  Temp:  [98 °F (36.7 °C)-98.6 °F (37 °C)] 98.3 °F (36.8 °C)  Pulse:  [85-99] 85  Resp:  [16-19] 17  SpO2:  [93 %-97 %] 95 %  BP: (106-155)/(59-73) 111/71     Weight: 73 kg (160 lb 15 oz)  Body mass index is 27.62 kg/m².    Intake/Output - Last 3 Shifts         10/01 0700  10/02 0659 10/02 0700  10/03 0659  10/03 0700  10/04 0659    P.O. 120 240     I.V. (mL/kg)       Total Intake(mL/kg) 120 (1.6) 240 (3.3)     Urine (mL/kg/hr) 0 (0) 0 (0)     Drains 90 60     Other 0 0     Stool  0     Total Output 90 60     Net +30 +180            Urine Occurrence 3 x 7 x     Stool Occurrence  1 x             Physical Exam  Constitutional:       General: She is not in acute distress.     Appearance: Normal appearance.   HENT:      Head: Normocephalic and atraumatic.      Mouth/Throat:      Mouth: Mucous membranes are moist.   Eyes:      Extraocular Movements: Extraocular movements intact.      Conjunctiva/sclera: Conjunctivae normal.   Cardiovascular:      Rate and Rhythm: Normal rate and regular rhythm.   Pulmonary:      Effort: Pulmonary effort is normal. No respiratory distress.   Abdominal:      General: There is no distension.      Palpations: Abdomen is soft.      Comments: Appropriately TTP, midline incision with staples in place c/d/i, one AKASH drain to left abdomen with SS drainage   Skin:     General: Skin is warm and dry.   Neurological:      General: No focal deficit present.      Mental Status: She is alert and oriented to person, place, and time. Mental status is at baseline.       Significant Labs:  I have reviewed all pertinent lab results within the past 24 hours.  CBC:   Recent Labs   Lab 10/03/22  0533   WBC 4.38   RBC 2.60*   HGB 7.6*   HCT 23.9*      MCV 92   MCH 29.2   MCHC 31.8*       BMP:   Recent Labs   Lab 10/03/22  0533   GLU 98      K 4.0      CO2 23   BUN 6*   CREATININE 0.7   CALCIUM 8.5*   MG 1.7         Significant Diagnostics:  I have reviewed all pertinent imaging results/findings within the past 24 hours.    Assessment/Plan:     * SBO (small bowel obstruction)  83 yo woman with pelvic mass, significant ventral hernia, and partial small bowel obstruction who presents from OSH for n/v/diarrhea.     - Multimodal pain: tylenol, celecoxib, lido patch, oxy PRN  - Aggressive pulm toilet:  IS, levalbuterol  - H/o HTN, continue home amlodipine  - Continue regular diet  - Home pantoprazole  - Zofran PRN  - Daily CMP, CBC, Mag, Phos  - SSI  - Gyn/Onc consulted for abdominopelvic mass, appreciate recs  - Lovenox for DVT ppx    Dispo: MARLENA, potential discharge to home today        Bonifacio Simms MD  General Surgery  [facility]

## 2022-10-03 NOTE — PLAN OF CARE
[address]  GIS  Discharge Final Note    Primary Care Provider: Lui Blue MD    Expected Discharge Date: 10/4/2022    Final Discharge Note (most recent)       Final Note - 10/03/22 1135          Final Note    Assessment Type Final Discharge Note     Anticipated Discharge Disposition Home-Health Care c     What phone number can be called within the next 1-3 days to see how you are doing after discharge? [phone]        Post-Acute Status    Home Health Status Referrals Sent     Coverage [employer]     Discharge Delays None known at this time                     Important Message from Medicare         DALTON Anaya, RN    EXT [id number]

## 2022-10-03 NOTE — ADDENDUM NOTE
Addendum  created 10/03/22 0818 by Scott Alvarez MD    Attestation recorded in Intraprocedure, Intraprocedure Attestations filed

## 2022-10-04 ENCOUNTER — PATIENT OUTREACH (OUTPATIENT)
Dept: ADMINISTRATIVE | Facility: CLINIC | Age: 82
End: 2022-10-04
Payer: MEDICARE

## 2022-10-04 PROCEDURE — G0180 MD CERTIFICATION HHA PATIENT: HCPCS | Mod: ,,, | Performed by: SURGERY

## 2022-10-04 PROCEDURE — G0180 PR HOME HEALTH MD CERTIFICATION: ICD-10-PCS | Mod: ,,, | Performed by: SURGERY

## 2022-10-04 NOTE — PROGRESS NOTES
C3 nurse attempted to contact Mariaelena Sheffield and son for a TCC post hospital discharge follow up call. No answer. Left voicemail with callback information. The patient does not have a scheduled HOSFU appointment per AVS and patient has a Non-[facility] PCP. Patient has orders for [facility] and [facility].

## 2022-10-05 NOTE — PROGRESS NOTES
C3 nurse spoke with Mariaelena Sheffield  for a TCC post hospital discharge follow up call. The patient reports does not have a scheduled HOSFU appointment. C3 nurse was unable to schedule HOSFU appointment for [facility]. Patient advised to contact their PCP to schedule a HOSPFU within 5-7 days. Pt has a POST-OP visit scheduled with Dr Edison Brooks on 10/13/22 @ 7200. No Home NP service to patient's address.

## 2022-10-05 NOTE — PROGRESS NOTES
2nd attempt made to reach patient for TCC call. Left voicemail please call [phone], leave first name, last name, and  for Phi. I will return the call.

## 2022-10-10 ENCOUNTER — TELEPHONE (OUTPATIENT)
Dept: GYNECOLOGIC ONCOLOGY | Facility: CLINIC | Age: 82
End: 2022-10-10
Payer: MEDICARE

## 2022-10-10 NOTE — TELEPHONE ENCOUNTER
----- Message from Og Flores MD sent at 10/10/2022  8:49 AM CDT -----  Hi, can we please schedule an AV to discuss results?  Thank you.

## 2022-10-10 NOTE — TELEPHONE ENCOUNTER
Spoke with our patient about her insurance, schedule audio appointment she voiced understanding of the date, time and location. All questions answered Provider Scheduling Coord.  Gynecologic Oncology MA/PAR /Preceptor Tanmay Piper      Please call discuss results [phone]

## 2022-10-11 ENCOUNTER — OFFICE VISIT (OUTPATIENT)
Dept: GYNECOLOGIC ONCOLOGY | Facility: CLINIC | Age: 82
End: 2022-10-11
Payer: MEDICARE

## 2022-10-11 DIAGNOSIS — C56.3 MALIGNANT NEOPLASM OF BOTH OVARIES: Primary | ICD-10-CM

## 2022-10-11 DIAGNOSIS — Z85.3 PERSONAL HISTORY OF BREAST CANCER: ICD-10-CM

## 2022-10-11 DIAGNOSIS — D72.823 LEUKEMOID REACTION: ICD-10-CM

## 2022-10-11 PROCEDURE — 1111F PR DISCHARGE MEDS RECONCILED W/ CURRENT OUTPATIENT MED LIST: ICD-10-PCS | Mod: CPTII,95,, | Performed by: OBSTETRICS & GYNECOLOGY

## 2022-10-11 PROCEDURE — 1159F MED LIST DOCD IN RCRD: CPT | Mod: CPTII,95,, | Performed by: OBSTETRICS & GYNECOLOGY

## 2022-10-11 PROCEDURE — 1111F DSCHRG MED/CURRENT MED MERGE: CPT | Mod: CPTII,95,, | Performed by: OBSTETRICS & GYNECOLOGY

## 2022-10-11 PROCEDURE — 99442 PR PHYSICIAN TELEPHONE EVALUATION 11-20 MIN: CPT | Mod: 95,,, | Performed by: OBSTETRICS & GYNECOLOGY

## 2022-10-11 PROCEDURE — 1159F PR MEDICATION LIST DOCUMENTED IN MEDICAL RECORD: ICD-10-PCS | Mod: CPTII,95,, | Performed by: OBSTETRICS & GYNECOLOGY

## 2022-10-11 PROCEDURE — 1160F PR REVIEW ALL MEDS BY PRESCRIBER/CLIN PHARMACIST DOCUMENTED: ICD-10-PCS | Mod: CPTII,95,, | Performed by: OBSTETRICS & GYNECOLOGY

## 2022-10-11 PROCEDURE — 99442 PR PHYSICIAN TELEPHONE EVALUATION 11-20 MIN: ICD-10-PCS | Mod: 95,,, | Performed by: OBSTETRICS & GYNECOLOGY

## 2022-10-11 PROCEDURE — 1160F RVW MEDS BY RX/DR IN RCRD: CPT | Mod: CPTII,95,, | Performed by: OBSTETRICS & GYNECOLOGY

## 2022-10-11 NOTE — PROGRESS NOTES
Established Patient - Audio Only Telehealth Visit     The patient location is: home  The chief complaint leading to consultation is: discuss pathology  Visit type: Virtual visit with audio only (telephone)  Total time spent with patient: 15       The reason for the audio only service rather than synchronous audio and video virtual visit was related to technical difficulties or patient preference/necessity.     Each patient to whom I provide medical services by telemedicine is:  (1) informed of the relationship between the physician and patient and the respective role of any other health care provider with respect to management of the patient; and (2) notified that they may decline to receive medical services by telemedicine and may withdraw from such care at any time. Patient verbally consented to receive this service via voice-only telephone call.      This service was not originating from a related E/M service provided within the previous 7 days nor will  to an E/M service or procedure within the next 24 hours or my soonest available appointment.  Prevailing standard of care was able to be met in this audio-only visit.      REFERRING PROVIDER  Edison Brooks MD    REASON FOR CONSULT  Mariaelena Sheffield  is a 82 y.o.  woman who presents for evaluation of gBRCA? HRD? 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    HISTORY OF PRESENT ILLNESS    Please refer below for the patient's tumor history.  The interval history is as follows: +PO. -N/V. +Flatus. +BM. -VB, VD, changes in stool or urine. -Abdominal or pelvic pain. -Unintentional weight loss.      Oncology History   Malignant neoplasm of both ovaries   9/25/2022 Imaging Significant Findings    CT Reneal ABD Pelvis w/o: Pelvic mass.  No ascites, omental caking, or lymphadenopathy.     9/26/2022 Tumor Markers    CA-125 = 154     9/28/2022 Imaging Significant Findings    CXR: NATALY     9/29/2022 Surgery    Ex-lap, JAMES, pelvic mass resection,  "ventral hernia repair (Dr. Brooks)    Final pathology c/w stage IIIC grade 2 endometrioid ovarian cancer (due to dense adhesions to the small bowel and colon above the pelvic brim)          REVIEW OF SYSTEMS  All systems reviewed and negative except as noted in HPI.    OBJECTIVE   There were no vitals filed for this visit.   There is no height or weight on file to calculate BMI.      ECOG status: 2    LABORATORY DATA  Lab data reviewed.    RADIOLOGICAL DATA  Radiology data reviewed.    ASSESSMENT / PLAN     1. Malignant neoplasm of both ovaries    2. Leukemoid reaction    3. Personal history of breast cancer       F/U CT Chest w/   F/U gBRCA (will request [facility])  F/U [facility]    We reviewed operative findings.  There was no identifiable ovary but there was an identifiable tube.  We also reviewed the pathology findings.  There was no identifiable ovary, but it is unlikely to be anything else.  The mass occupied much of the lower abdomen and there were dense adhesions to just about every surface above the pelvic brim.  This is difficult to stage, but I think this is most consistent with a stage IIIA2-C grade 2 endometrioid ovarian cancer.  We discussed performing a CT Chest w/ to rule out extraperitoneal disease.     We discussed (including the son) the standard of care in these situations which is adjuvant chemotherapy with carboplatin/paclitaxel.  We also discussed adjuvant hormonal therapy, which is a very reasonable options.  We briefly discussed the logistics of treatment.  After an extensive discussion the patient "wishes to do everything possible after a big surgery" and is interested in adjuvant chemotherapy.  I think that is reasonable, but this cancer was around for years, and appears to be very indolent growing.  I think the benefit of adjuvant chemotherapy is diminished due to that, but I agree that is still the standard of care.  She wishes to meet with [facility] to discuss further.    PATIENT EDUCATION  Ready to " learn, no apparent learning barriers were identified; learning preferences include listening. Explained diagnosis and treatment plan; patient expressed understanding of the content.    ADMINISTRATIVE BILLING  Greater than 50% of was spent in counseling.       Og Flores

## 2022-10-11 NOTE — OP NOTE
[facility]  Surgery Department  Operative Note       Date of Procedure: 9/28/2022     Procedure: Procedure(s) (LRB):  LAPAROTOMY, EXPLORATORY (N/A)  EXCISION, MASS, PELVIS (N/A)  REPAIR, HERNIA, INCISIONAL (N/A)  SIGMOIDOSCOPY, FLEXIBLE     Surgeon(s) and Role:     * Edison Brooks MD - Primary     * Og Flores MD - Assisting     * Kunal Jasso MD - Resident - Assisting     * Sebastian Barrow MD - Co-Surgeon        Pre-Operative Diagnosis: Pelvic mass [R19.00]  SBO  Post-Operative Diagnosis:   Same      Anesthesia: General    Operative Findings:   No evidence of distant intraperitoneal metastases   Large pelvic mass, ? Ovarian in origin, with dense retroperitoneal adhesions and varices    Procedures:  Laparotomy with excision of intraperitoneal and retroperitoneal pelvic mass, >15cm -- 22 mod  Bilateral ureterolysis  Primary repair rectum  Flexible sigmoidoscopy  Repair, primary, of incarcerated ventral incisional hernia    Estimated Blood Loss (EBL):  1000 mL           Indications:  Mariaelena Sheffield presents for the above procedures.  Risks and benefits were reviewed including bleeding, infection, damage to local structures, cardiovascular and pulmonary complications, need for additional procedures, death, and imponderables.  She understands and gave informed consent to proceed.    Details:  The patient was transported to the operating room and satisfactory anesthesia established.  Preoperative antibiotics were administered.  The patient was placed in the supine position and extremities were padded and protected throughout.  A fleming catheter was placed. An appropriate timeout was performed.    A lower midline incision, generous, is made and we carefully dissect down to the hernia sac and expose its edges. The peritoneum is entered and we spend some time taking down adhesions to this chronic incisional hernia and exposing the fascial edges. There is bowel and colon in the hernia which is a  bit injected and angry but non-dilated and well-perfused. This is reduced into the abdomen. There is an enormous pelvic mass which is covered in these varices and relatively fixed. The ureters are identified above the mass and both of them essentially form the lateral walls of the pelvic mass and a long (15cm) ureterolysis is performed down to the trigone. Methylene blue is given IV and the urine is observed for color change and there is no evidence of ureteral injury. We now begin freeing up this mass posteriorly and laterally. The iliac vessels are identified and the pelvic brim exposed and the mass is progressively mobilized. The sigmoid colon is mobilized and the mass is densely adherent to the serosa of the rectosigmoid junction which is sharply . The mass appears to be attached to the left ovary, although everything is so effaced its difficult to tell. The bladder is mobilized down to help us identify it and the mass is then dissected free of the base of the bladder and extirpated.     I examined the pelvis. The ureters are free along their course. There is no bladder injury. The bowel is healthy. There is a longitudinal deep serosal tear in the mobilized rectosigmoid. This is imbricated with a running V loc suture. We then perform a flexible sigmoidoscopy and leak test. The rectum and distal sigmoid mucosa is healthy, there is no evidence of injury. A 19F drain is left in the pelvis.     We turn our attention to there midline hernia. Because this mass had occupied so much domain she has a great deal of laxity and we actually resect back a little abdominal wall to get to fresh edges. The hernia sac bilaterally is excised and skin flaps are raised. The abdominal wall hernia is then closed primarily with running PDS suture in a deep bite, shallow advance fashion with no tension.. The skin is irrigated and a 15blake drain left in the subcutaneous space. The skin is closed with staples and dressed  appropriately.    All needle, lap, and sponge counts were reported as correct. I communicated the intraoperative findings with the family following the procedure.     Implants: * No implants in log *    Specimens:   Specimen (24h ago, onward)      None                    Condition: Stable    Disposition: ICU - intubated and hemodynamically stable.    Attestation: I was present and scrubbed for the entire procedure.    Assistance: Due to having no qualified resident during critical portion of the case, Kristina Barrow and Mark acted as an assistant

## 2022-10-13 ENCOUNTER — OFFICE VISIT (OUTPATIENT)
Dept: SURGERY | Facility: CLINIC | Age: 82
End: 2022-10-13
Payer: MEDICARE

## 2022-10-13 ENCOUNTER — HOSPITAL ENCOUNTER (OUTPATIENT)
Dept: RADIOLOGY | Facility: HOSPITAL | Age: 82
Discharge: HOME | End: 2022-10-13
Attending: NURSE PRACTITIONER
Payer: MEDICARE

## 2022-10-13 VITALS
HEIGHT: 62 IN | OXYGEN SATURATION: 100 % | BODY MASS INDEX: 29.44 KG/M2 | SYSTOLIC BLOOD PRESSURE: 138 MMHG | DIASTOLIC BLOOD PRESSURE: 71 MMHG | HEART RATE: 77 BPM

## 2022-10-13 DIAGNOSIS — Z85.3 PERSONAL HISTORY OF BREAST CANCER: ICD-10-CM

## 2022-10-13 DIAGNOSIS — C56.3 MALIGNANT NEOPLASM OF BOTH OVARIES: ICD-10-CM

## 2022-10-13 DIAGNOSIS — C56.3 MALIGNANT NEOPLASM OF BOTH OVARIES: Primary | ICD-10-CM

## 2022-10-13 DIAGNOSIS — D72.823 LEUKEMOID REACTION: ICD-10-CM

## 2022-10-13 PROBLEM — K43.6 VENTRAL HERNIA WITH BOWEL OBSTRUCTION: Status: RESOLVED | Noted: 2018-10-25 | Resolved: 2022-10-13

## 2022-10-13 PROBLEM — M10.071 ACUTE IDIOPATHIC GOUT INVOLVING TOE OF RIGHT FOOT: Status: RESOLVED | Noted: 2017-11-13 | Resolved: 2022-10-13

## 2022-10-13 PROBLEM — R19.00 PELVIC MASS: Status: RESOLVED | Noted: 2022-09-29 | Resolved: 2022-10-13

## 2022-10-13 PROBLEM — K43.6 VENTRAL HERNIA WITH OBSTRUCTION AND WITHOUT GANGRENE: Status: RESOLVED | Noted: 2018-10-11 | Resolved: 2022-10-13

## 2022-10-13 PROBLEM — K43.0 INCISIONAL HERNIA WITH BOWEL OBSTRUCTION: Status: RESOLVED | Noted: 2018-10-19 | Resolved: 2022-10-13

## 2022-10-13 PROBLEM — E87.6 HYPOKALEMIA: Status: RESOLVED | Noted: 2018-10-25 | Resolved: 2022-10-13

## 2022-10-13 PROBLEM — A08.4 VIRAL GASTROENTERITIS: Status: RESOLVED | Noted: 2018-10-02 | Resolved: 2022-10-13

## 2022-10-13 PROBLEM — D72.829 LEUKOCYTOSIS: Status: RESOLVED | Noted: 2022-09-29 | Resolved: 2022-10-13

## 2022-10-13 PROBLEM — K56.609 SBO (SMALL BOWEL OBSTRUCTION): Status: RESOLVED | Noted: 2022-09-26 | Resolved: 2022-10-13

## 2022-10-13 PROCEDURE — 99024 PR POST-OP FOLLOW-UP VISIT: ICD-10-PCS | Mod: S$GLB,,, | Performed by: NURSE PRACTITIONER

## 2022-10-13 PROCEDURE — 1160F PR REVIEW ALL MEDS BY PRESCRIBER/CLIN PHARMACIST DOCUMENTED: ICD-10-PCS | Mod: CPTII,S$GLB,, | Performed by: NURSE PRACTITIONER

## 2022-10-13 PROCEDURE — 99024 POSTOP FOLLOW-UP VISIT: CPT | Mod: S$GLB,,, | Performed by: NURSE PRACTITIONER

## 2022-10-13 PROCEDURE — 99999 PR PBB SHADOW E&M-EST. PATIENT-LVL III: ICD-10-PCS | Mod: PBBFAC,,, | Performed by: NURSE PRACTITIONER

## 2022-10-13 PROCEDURE — 1126F PR PAIN SEVERITY QUANTIFIED, NO PAIN PRESENT: ICD-10-PCS | Mod: CPTII,S$GLB,, | Performed by: NURSE PRACTITIONER

## 2022-10-13 PROCEDURE — 99999 PR PBB SHADOW E&M-EST. PATIENT-LVL III: CPT | Mod: PBBFAC,,, | Performed by: NURSE PRACTITIONER

## 2022-10-13 PROCEDURE — 71250 CT THORAX DX C-: CPT | Mod: TC

## 2022-10-13 PROCEDURE — 3078F DIAST BP <80 MM HG: CPT | Mod: CPTII,S$GLB,, | Performed by: NURSE PRACTITIONER

## 2022-10-13 PROCEDURE — 3075F SYST BP GE 130 - 139MM HG: CPT | Mod: CPTII,S$GLB,, | Performed by: NURSE PRACTITIONER

## 2022-10-13 PROCEDURE — 1160F RVW MEDS BY RX/DR IN RCRD: CPT | Mod: CPTII,S$GLB,, | Performed by: NURSE PRACTITIONER

## 2022-10-13 PROCEDURE — 71250 CT CHEST WITHOUT CONTRAST: ICD-10-PCS | Mod: 26,,, | Performed by: RADIOLOGY

## 2022-10-13 PROCEDURE — 1159F MED LIST DOCD IN RCRD: CPT | Mod: CPTII,S$GLB,, | Performed by: NURSE PRACTITIONER

## 2022-10-13 PROCEDURE — 71250 CT THORAX DX C-: CPT | Mod: 26,,, | Performed by: RADIOLOGY

## 2022-10-13 PROCEDURE — 1159F PR MEDICATION LIST DOCUMENTED IN MEDICAL RECORD: ICD-10-PCS | Mod: CPTII,S$GLB,, | Performed by: NURSE PRACTITIONER

## 2022-10-13 PROCEDURE — 3078F PR MOST RECENT DIASTOLIC BLOOD PRESSURE < 80 MM HG: ICD-10-PCS | Mod: CPTII,S$GLB,, | Performed by: NURSE PRACTITIONER

## 2022-10-13 PROCEDURE — 1126F AMNT PAIN NOTED NONE PRSNT: CPT | Mod: CPTII,S$GLB,, | Performed by: NURSE PRACTITIONER

## 2022-10-13 PROCEDURE — 3075F PR MOST RECENT SYSTOLIC BLOOD PRESS GE 130-139MM HG: ICD-10-PCS | Mod: CPTII,S$GLB,, | Performed by: NURSE PRACTITIONER

## 2022-10-13 NOTE — PROGRESS NOTES
"Post-Op Follow-up Visit:   10/13/2022  Patient ID: Mariaelena Sheffield is a 82 y.o. female, born 1940    Chief Complaint   Patient presents with    Post-op Evaluation     9/28/2022: s/p RP mass excision, ventral incisional hernia repair, primary repair rectum per Dr. JOANNE Brooks, Dr. Barrow and Dr. Flores    Interval History: This 83 y/o female presents to clinic in a w/c with her son. She underwent RP mass excision last month. She was discharged to home on 10/3/22. Today she feels weak, fatigued. C/o incisional pain, that is relieved with Tylenol. AKASH drain remains in place; she empties BID with <50cc total output daily. She reports a poor appetite, but is tolerating small portions of oral diet without N/V/D. Oral diet recall includes: BK cereal, pancake, sausage biscuit, eggs; ALPHONSE and DIN mashed potato with chicken, fish, shrimp with green beans, spaghetti, pork roast. She drinks Coke, limited water. Not drinking any protein supplements. Sleeping well at night. Remains afebrile.     Physical Exam:  /71   Pulse 77   Ht 5' 2" (1.575 m)   LMP  (LMP Unknown)   SpO2 100%   BMI 29.44 kg/m²     General:  Non-toxic, ambulatory  Abd:  Soft, non-tender  Incision:  midline abd incision ramona SHEA, AKASH drain sutured in place.     Pathology:  Resection of pelvic mass Specimen integrity: Not applicable -no intact normal structures are identified Tumor site: Tubo-ovarian, laterality can not be determined; no intact normal structures identified Tumor size: 22.2 cm Histologic type: Endometrioid carcinoma Histologic grade: G1 Ovarian surface involvement: Cannot be determined- no intact normal structures are identified Fallopian tube surface involvement: Cannot be determined - no intact normal structures are identified Implants: Not sampled Other tissue/organ involvement: Not identified Largest extrapelvic peritoneal focus: Not applicable Peritoneal/ascitic fluid involvement: Not submitted/unknown Chemotherapy response " score: No known pre-surgical therapy Regional lymph node status: Not applicable (no regional lymph nodes submitted or found) Distant metastasis: Not applicable Ancillary studies: Immunohistochemical stains with appropriately reactive controls and show the below immunohistochemical pattern: Positive: AE1/AE3, EMA, CK7, ER, WV, p53 wild type pattern, p16 increased but not block type and CD10 shows patchy positivity Negative: P63, CK20, GATA3, inhibin, calretinin, PAX8, WT1, TTF1 and CEA Estrogen receptor: Weak to moderate positivity in 95% of tumor Progesterone receptor: Strong to moderate positivity in 95% of tumor      ICD-10-CM ICD-9-CM    1. Malignant neoplasm of both ovaries  C56.3 183.0       Plan   Reviewed pathology with pt and her son, which is already aware of since previously discussed with Dr. Flores. CT chest today for staging.   Remove AKASH drain  Remove midline abd staples  Increase oral diet, recommended add protein supplement, coupons provided today.  Increase water intake for adequate hydration  Increase physical activity as tolerated, no heavy lifting until 6 weeks post op    RTC prn  F/u with Dr. Flores and medical oncology for further tx    Questions were asked and answered to patient and son's satisfaction.      Pt seen in conjunction with Dr. Edison Brooks today.         Chloé Santana NP  Upper GI / Hepatobiliary Surgical Oncology  [facility] [place], [place]  Office: [phone]  Fax: [phone]       ADDENDUM: CT chest:  Impression:     1.  In this patient with reported ovarian cancer, there is no specific CT evidence of intrathoracic metastatic disease.     2.  6 mm opacity in the apical segment right upper lobe, which appears related to fibronodular scarring.     3.  Few additional nonspecific micro nodules in the right upper lobe.  Attention on continued surveillance imaging recommended.

## 2022-10-13 NOTE — Clinical Note
Dr. Flores placed referral for medical oncology on 10/11/22. Can you f/u on this or reach out to the appropriate navigator? Thanks, Chloé

## 2022-10-18 ENCOUNTER — DOCUMENTATION ONLY (OUTPATIENT)
Dept: HEMATOLOGY/ONCOLOGY | Facility: CLINIC | Age: 82
End: 2022-10-18
Payer: MEDICARE

## 2022-10-18 NOTE — PROGRESS NOTES
Called pt about referral from Dr. Flores to medical oncology. Explained my role as NN and proceeded to offer pt appt with oncologist next week. Pt put sonBj on the phone with me to schedule since he'll be the one bringing her to the appt. Unable to offer appt with Dr. Palomares (< 7days) b/c she is out of the office next week, so together we scheduled her for 10/26 @ 220pm with Dr. Andres at the [place] location. Son and pt both voiced understanding and have my call back number to reach me. All questions answered.   Oncology Navigation   Intake  Date of Diagnosis: 09/28/22  Cancer Type: Gynecologic  Internal / External Referral: Internal  Date of Referral: 10/13/22  Initial Nurse Navigator Contact: 10/18/22  Referral to Initial Contact Timeline (days): 5  Date Worked: 10/18/22  Appointment Date: 10/25/22     Treatment  Current Status: Staging work-up    Surgical Oncologist: Dr. Flores    Medical Oncologist: Dr. Andres  Consult Date: 10/26/22                   Support Systems: Children     Acuity  Comorbidities in Medical History: 2  Navigation Acuity: 2     Follow Up  Follow up in 8 days (on 10/26/2022) for Oncology consult.

## 2022-10-18 NOTE — ADDENDUM NOTE
Addendum  created 10/18/22 1246 by Vladimir Brown MD    Clinical Note Signed, Diagnosis association updated, Intraprocedure Blocks edited, SmartForm saved

## 2022-10-19 ENCOUNTER — TELEPHONE (OUTPATIENT)
Dept: HEMATOLOGY/ONCOLOGY | Facility: CLINIC | Age: 82
End: 2022-10-19
Payer: MEDICARE

## 2022-10-19 ENCOUNTER — EXTERNAL HOME HEALTH (OUTPATIENT)
Dept: HOME HEALTH SERVICES | Facility: HOSPITAL | Age: 82
End: 2022-10-19
Payer: MEDICARE

## 2022-10-19 NOTE — TELEPHONE ENCOUNTER
Returned patient son phone call. Verified appointment date and time. He verbalized understanding and no no other issues at this time.

## 2022-10-19 NOTE — TELEPHONE ENCOUNTER
----- Message from Sara Erickson sent at 10/19/2022  2:23 PM CDT -----  Please call Bj santana @ [phone] regarding appt on 10/26, need to know what date appt for change, so he can tell his job

## 2022-10-21 ENCOUNTER — DOCUMENTATION ONLY (OUTPATIENT)
Dept: HEMATOLOGY/ONCOLOGY | Facility: CLINIC | Age: 82
End: 2022-10-21
Payer: MEDICARE

## 2022-10-21 NOTE — PROGRESS NOTES
Called and spoke with pt about changing appt from 10/26 per Dr. Flores request for appt with Dr. Palomares. Told her Dr. Palomares is out next week, able to schedule 11/3 @ 1120am. Asked her to take my phone number, states she will have her son, Bj call me about appt. Bj did return my call, we reviewed new appt provider/date/time/location and he agrees and plans to have pt at appt. No further questions at this time and he does have my call back number.   Oncology Navigation   Intake  Date of Diagnosis: 09/28/22  Cancer Type: Gynecologic  Internal / External Referral: Internal  Date of Referral: 10/13/22  Initial Nurse Navigator Contact: 10/18/22  Referral to Initial Contact Timeline (days): 5  Date Worked: 10/21/22  Appointment Date: 10/25/22     Treatment  Current Status: Staging work-up    Surgical Oncologist: Dr. Flores    Medical Oncologist: Dr. Andres  Consult Date: 10/26/22                   Support Systems: Children     Acuity  Comorbidities in Medical History: 2  Navigation Acuity: 2     Follow Up  Follow up in 13 days (on 11/3/2022) for oncology consult.

## 2022-10-26 NOTE — PHYSICIAN QUERY
PT Name: Mariaelena Sheffield  MR #: [id number]     DOCUMENTATION CLARIFICATION     CDS/: Delmy Nguyễn RN, CDS               Contact information: [email or web address]  This form is a permanent document in the medical record.     Query Date: October 26, 2022    By submitting this query, we are merely seeking further clarification of documentation to reflect the severity of illness of your patient. Please utilize your independent clinical judgment when addressing the question(s) below.    The medical record reflects the following:     Indicators   Supporting Clinical Findings Location in Medical Record   x Bowel obstruction, intestinal obstruction, LBO or SBO documented 81 yo woman with pelvic mass, significant ventral hernia, and partial small bowel obstruction who presents from OSH for n/v/diarrhea.  H&P 9/25    x Radiology findings CT scan was completed at [place] that showed a pelvic mass, a partial small bowel obstruction in the left upper hemiabdomen and inferior anterior abdominal wall, and herniation of colonic bowel loops in the anterior midline abdominal wall.     Partial small bowel obstruction in the left upper hemiabdomen and inferior anterior abdominal wall.  Strangulation of the bowel loops in the subcutaneous fat of the anterior abdominal wall hernia not excluded.  Correlate clinically   H&P 9/25           CT renal stone ABD pelvis 9/25    x Treatment/Medication NPO  NGT to LIWS   H&P 9/25    x Procedure/Surgery Procedure: Procedure(s) (LRB):  LAPAROTOMY, EXPLORATORY (N/A)  EXCISION, MASS, PELVIS (N/A)  REPAIR, HERNIA, INCISIONAL (N/A)     The peritoneum is entered and we spend some time taking down adhesions to this chronic incisional hernia and exposing the fascial edges. There is bowel and colon in the hernia which is a bit injected and angry but non-dilated and well-perfused. This is reduced into the abdomen. There is an enormous pelvic mass which is covered in these varices and  relatively fixed. Op Note 9/28           Op Note 9/28     Other       Provider, please further specify the etiology of partial bowel obstruction diagnosis:  [ X  ] Partial or incomplete intestinal obstruction, due to hernia   [ X  ] Partial or incomplete intestinal obstruction, due to neoplasm   [   ] Partial or incomplete intestinal obstruction, due to other (please specify): ____________   [   ] Partial or incomplete intestinal obstruction, unknown or unspecified etiology   [   ] Other intestinal condition or clarification  (please specify): _____________________   [   ] Clinically Undetermined           Please document in your progress notes daily for the duration of treatment until resolved, and include in your discharge summary.

## 2022-10-26 NOTE — PHYSICIAN QUERY
PT Name: Mariaelena Sheffield  MR #: [id number]    DOCUMENTATION CLARIFICATION     CDS/: Delmy Nguyễn RN, CDS               Contact information: [email or web address]      This form is a permanent document in the medical record.     Query Date: October 26, 2022    Dear Provider,  By submitting this query, we are merely seeking further clarification of documentation. Please utilize your independent clinical judgment when addressing the question(s) below.    The medical record contains the following:  Supporting Clinical Findings Location in Medical Record    There is an enormous pelvic mass which is covered in these varices and relatively fixed... We now begin freeing up this mass posteriorly and laterally. The iliac vessels are identified and the pelvic brim exposed and the mass is progressively mobilized. The sigmoid colon is mobilized and the mass is densely adherent to the serosa of the rectosigmoid junction which is sharply . The mass appears to be attached to the left ovary, although everything is so effaced its difficult to tell... I examined the pelvis. The ureters are free along their course. There is no bladder injury. The bowel is healthy. There is a longitudinal deep serosal tear in the mobilized rectosigmoid. This is imbricated with a running V loc suture. We then perform a flexible sigmoidoscopy and leak test. The rectum and distal sigmoid mucosa is healthy, there is no evidence of injury.  Op Note 9/28        Please clarify if _deep serosal tear_ (as it relates to __ex lap, excision of mass__) is:      [  ] Complication of the procedure   [ X ] Present, but not a complication of the procedure   [  ] Incidental finding, not clinically significant   [  ] Other (please specify): __________________   [  ] Clinically Undetermined       Please document in your progress notes daily for the duration of treatment until resolved and include in your discharge summary.

## 2022-10-26 NOTE — PHYSICIAN QUERY
PT Name: Mariaelena Sheffield  MR #: [id number]    DOCUMENTATION CLARIFICATION     CDS/: Delmy Nguyễn RN, CDS               Contact information: [email or web address]  This form is a permanent document in the medical record.     Query Date: October 26, 2022    By submitting this query, we are merely seeking further clarification of documentation.  Please utilize your independent clinical judgment when addressing the question(s) below.    The medical record contains the following:  Pathology Findings Location in Medical Record   Final Pathologic Diagnosis   1. Hernia, hernioplasty:   - Benign fibroadipose tissue and reactive mesothelial tissue with patchy   acute and chronic inflammation   -  Negative  for carcinoma   2. Pelvic mass, resection:   - Endometrial adenocarcinoma, endometrioid type (22.2 cm)        Path Report    Collected 9/28        Please clarify the pathology findings of _Endometrial adenocarcinoma__:  [ X ] Pathology findings noted above are ruled in/confirmed as diagnoses   [  ] Pathology findings noted above are not confirmed as diagnoses   [  ] Other diagnosis (please specify): ___________   [  ] Clinically Undetermined       Please document in your progress notes daily for the duration of treatment until resolved and include in your discharge summary.    Form No. [id number]

## 2022-11-03 ENCOUNTER — OFFICE VISIT (OUTPATIENT)
Dept: HEMATOLOGY/ONCOLOGY | Facility: CLINIC | Age: 82
End: 2022-11-03
Payer: MEDICARE

## 2022-11-03 VITALS
SYSTOLIC BLOOD PRESSURE: 144 MMHG | OXYGEN SATURATION: 99 % | HEART RATE: 86 BPM | BODY MASS INDEX: 25.78 KG/M2 | TEMPERATURE: 98 F | WEIGHT: 145.5 LBS | HEIGHT: 63 IN | DIASTOLIC BLOOD PRESSURE: 79 MMHG

## 2022-11-03 DIAGNOSIS — C56.9 ENDOMETRIOID ADENOCARCINOMA OF OVARY, UNSPECIFIED LATERALITY: ICD-10-CM

## 2022-11-03 DIAGNOSIS — D72.823 LEUKEMOID REACTION: ICD-10-CM

## 2022-11-03 DIAGNOSIS — C56.3 MALIGNANT NEOPLASM OF BOTH OVARIES: Primary | ICD-10-CM

## 2022-11-03 DIAGNOSIS — Z85.3 PERSONAL HISTORY OF BREAST CANCER: ICD-10-CM

## 2022-11-03 DIAGNOSIS — D50.0 IRON DEFICIENCY ANEMIA DUE TO CHRONIC BLOOD LOSS: ICD-10-CM

## 2022-11-03 PROCEDURE — 1159F MED LIST DOCD IN RCRD: CPT | Mod: CPTII,S$GLB,, | Performed by: INTERNAL MEDICINE

## 2022-11-03 PROCEDURE — 3077F PR MOST RECENT SYSTOLIC BLOOD PRESSURE >= 140 MM HG: ICD-10-PCS | Mod: CPTII,S$GLB,, | Performed by: INTERNAL MEDICINE

## 2022-11-03 PROCEDURE — 3078F DIAST BP <80 MM HG: CPT | Mod: CPTII,S$GLB,, | Performed by: INTERNAL MEDICINE

## 2022-11-03 PROCEDURE — 1159F PR MEDICATION LIST DOCUMENTED IN MEDICAL RECORD: ICD-10-PCS | Mod: CPTII,S$GLB,, | Performed by: INTERNAL MEDICINE

## 2022-11-03 PROCEDURE — 99215 PR OFFICE/OUTPT VISIT, EST, LEVL V, 40-54 MIN: ICD-10-PCS | Mod: S$GLB,,, | Performed by: INTERNAL MEDICINE

## 2022-11-03 PROCEDURE — 3288F FALL RISK ASSESSMENT DOCD: CPT | Mod: CPTII,S$GLB,, | Performed by: INTERNAL MEDICINE

## 2022-11-03 PROCEDURE — 90694 VACC AIIV4 NO PRSRV 0.5ML IM: CPT | Mod: S$GLB,,, | Performed by: INTERNAL MEDICINE

## 2022-11-03 PROCEDURE — 1101F PR PT FALLS ASSESS DOC 0-1 FALLS W/OUT INJ PAST YR: ICD-10-PCS | Mod: CPTII,S$GLB,, | Performed by: INTERNAL MEDICINE

## 2022-11-03 PROCEDURE — 99999 PR PBB SHADOW E&M-EST. PATIENT-LVL IV: ICD-10-PCS | Mod: PBBFAC,,, | Performed by: INTERNAL MEDICINE

## 2022-11-03 PROCEDURE — 99999 PR PBB SHADOW E&M-EST. PATIENT-LVL IV: CPT | Mod: PBBFAC,,, | Performed by: INTERNAL MEDICINE

## 2022-11-03 PROCEDURE — 99215 OFFICE O/P EST HI 40 MIN: CPT | Mod: S$GLB,,, | Performed by: INTERNAL MEDICINE

## 2022-11-03 PROCEDURE — 3078F PR MOST RECENT DIASTOLIC BLOOD PRESSURE < 80 MM HG: ICD-10-PCS | Mod: CPTII,S$GLB,, | Performed by: INTERNAL MEDICINE

## 2022-11-03 PROCEDURE — G0008 FLU VACCINE - QUADRIVALENT - ADJUVANTED: ICD-10-PCS | Mod: S$GLB,,, | Performed by: INTERNAL MEDICINE

## 2022-11-03 PROCEDURE — G0008 ADMIN INFLUENZA VIRUS VAC: HCPCS | Mod: S$GLB,,, | Performed by: INTERNAL MEDICINE

## 2022-11-03 PROCEDURE — 1126F PR PAIN SEVERITY QUANTIFIED, NO PAIN PRESENT: ICD-10-PCS | Mod: CPTII,S$GLB,, | Performed by: INTERNAL MEDICINE

## 2022-11-03 PROCEDURE — 1101F PT FALLS ASSESS-DOCD LE1/YR: CPT | Mod: CPTII,S$GLB,, | Performed by: INTERNAL MEDICINE

## 2022-11-03 PROCEDURE — 3288F PR FALLS RISK ASSESSMENT DOCUMENTED: ICD-10-PCS | Mod: CPTII,S$GLB,, | Performed by: INTERNAL MEDICINE

## 2022-11-03 PROCEDURE — 3077F SYST BP >= 140 MM HG: CPT | Mod: CPTII,S$GLB,, | Performed by: INTERNAL MEDICINE

## 2022-11-03 PROCEDURE — 1126F AMNT PAIN NOTED NONE PRSNT: CPT | Mod: CPTII,S$GLB,, | Performed by: INTERNAL MEDICINE

## 2022-11-03 PROCEDURE — 90694 FLU VACCINE - QUADRIVALENT - ADJUVANTED: ICD-10-PCS | Mod: S$GLB,,, | Performed by: INTERNAL MEDICINE

## 2022-11-03 RX ORDER — DEXAMETHASONE 4 MG/1
8 TABLET ORAL DAILY
Qty: 24 TABLET | Refills: 5 | Status: SHIPPED | OUTPATIENT
Start: 2022-11-17

## 2022-11-03 RX ORDER — ONDANSETRON 8 MG/1
8 TABLET, ORALLY DISINTEGRATING ORAL EVERY 8 HOURS PRN
Qty: 60 TABLET | Refills: 5 | Status: SHIPPED | OUTPATIENT
Start: 2022-11-16 | End: 2022-11-03

## 2022-11-03 RX ORDER — LISINOPRIL 20 MG/1
1 TABLET ORAL 2 TIMES DAILY
COMMUNITY
Start: 2022-06-30

## 2022-11-03 RX ORDER — ONDANSETRON 8 MG/1
8 TABLET, ORALLY DISINTEGRATING ORAL EVERY 8 HOURS PRN
Qty: 24 TABLET | Refills: 2 | Status: SHIPPED | OUTPATIENT
Start: 2022-11-16

## 2022-11-03 NOTE — PATIENT INSTRUCTIONS
NGS if not already ordered  My Risk germline testing today  Flu shot today  Iv iron feraheme x 2 doses 1 week apart as soon as approved  Chemo teaching  Consent completed today, please file  RV for C1D1 cbc, cmp and ca125 prior  CC: NN  ------------  Recommended covid booster

## 2022-11-03 NOTE — PLAN OF CARE
START ON PATHWAY REGIMEN - Ovarian    [id number]        Paclitaxel       Carboplatin     **Always confirm dose/schedule in your pharmacy ordering system**    Patient Characteristics:  Postoperative without Neoadjuvant Therapy (Pathologic Staging), Newly Diagnosed,   Adjuvant Therapy, Stage IIB and Stage IIIA/B/C Optimal Cytoreduction  BRCA Mutation Status: Awaiting Test Results  Therapeutic Status: Postoperative without Neoadjuvant Therapy (Pathologic   Staging)  AJCC 8 Stage Grouping: IIIC  AJCC M Category: cM1b  AJCC T Category: pTX  AJCC N Category: pNX  Intent of Therapy:  Non-Curative / Palliative Intent, Discussed with Patient

## 2022-11-03 NOTE — PROGRESS NOTES
Subjective:      DATE OF VISIT: 11/3/22     ?  Patient ID:?Mariaelena Sheffield is a 82 y.o. female.?? MR#: [id number]   ?   REFERRING PROVIDER: Og Flores MD  [address]  [place] [address]     ? Primary Care Providers:  Lui Blue MD, MD (General)     CHIEF COMPLAINT: ?Establish care with Medical Oncology for new gynecologic malignancy???   ?   ONCOLOGIC DIAGNOSIS:  Endometrioid adenocarcinoma, tubo-ovarian, Grade I, Stage III, MMR stable  ?   CURRENT TREATMENT:  Plan for carboplatin paclitaxel    PAST TREATMENT:  Biopsy only  ?   ONCOLOGIC HISTORY:   ?   Oncology History   Malignant neoplasm of both ovaries   9/25/2022 Imaging Significant Findings    CT Reneal ABD Pelvis w/o: Pelvic mass.  No ascites, omental caking, or lymphadenopathy.     9/26/2022 Tumor Markers    CA-125 = 154     9/28/2022 Imaging Significant Findings    CXR: NATALY     9/29/2022 Surgery    Ex-lap, JAMES, pelvic mass resection, ventral hernia repair (Dr. Brooks)    Final pathology c/w stage IIIC grade 2 endometrioid ovarian cancer (due to dense adhesions to the small bowel and colon above the pelvic brim)     Endometrioid adenocarcinoma of ovary   11/3/2022 Initial Diagnosis    Endometrioid adenocarcinoma of ovary     11/17/2022 -  Chemotherapy    Treatment Summary   Plan Name: OP GYN PACLITAXEL CARBOPLATIN (AUC 6) Q3W  Treatment Goal: Control  Status: Active  Start Date: 11/17/2022 (Planned)  End Date: 10/19/2023 (Planned)  Provider: Alisha Palomares MD  Chemotherapy: CARBOplatin (PARAPLATIN) in sodium chloride 0.9% 250 mL chemo infusion, , Intravenous, Clinic/HOD 1 time, 0 of 17 cycles  PACLitaxeL (TAXOL) 175 mg/m2 = 300 mg in sodium chloride 0.9% 500 mL chemo infusion, 175 mg/m2, Intravenous, Clinic/HOD 1 time, 0 of 17 cycles          Cancer Staging   No matching staging information was found for the patient.       HPI    Ms. Sheffield is a very pleasant and healthy 82-year-old woman.  She presented after 1 day of  nausea vomiting with CT showing large pelvic mass suspicious for malignancy, partial small-bowel obstruction and can not exclude strangulation of anterior wall hernia.  She was taken to surgery with Gynecologic Oncology assistance and pathology showing grade 1 endometrioid adenocarcinoma, see pathology report below.    Today she notes feeling well has recovered well from her surgery and denies any notable symptoms or pain residual.  She noted prior mild left abdominal swelling resolved.  She denies any bleeding.  She notes possible history of TAHBSO in the past.  History of 2018 lysis of adhesion for stricture per patient report.  No known family history of breast ovarian or gynecologic malignancy.    Review of Systems    ?   A comprehensive 14-point review of systems was reviewed with patient and was negative other than as specified above.   ?   PAST MEDICAL HISTORY:   Past Medical History:   Diagnosis Date    Cancer     breast cancer R     Diabetes mellitus     GERD (gastroesophageal reflux disease)     Gout     Hypercholesteremia     Hypertension     ?     PAST SURGICAL HISTORY:   Past Surgical History:   Procedure Laterality Date    ABDOMINAL SURGERY      BREAST BIOPSY      BREAST SURGERY Right     Breast biopsy    CATARACT EXTRACTION, BILATERAL      EXCISION OF PELVIC MASS N/A 9/28/2022    Procedure: EXCISION, MASS, PELVIS;  Surgeon: Edison Brooks MD;  Location: [address];  Service: General;  Laterality: N/A;    FLEXIBLE SIGMOIDOSCOPY  9/28/2022    Procedure: SIGMOIDOSCOPY, FLEXIBLE;  Surgeon: Edison Brooks MD;  Location: [address];  Service: General;;    HERNIA REPAIR      LYSIS OF ADHESIONS N/A 10/25/2018    Procedure: LYSIS, ADHESIONS;  Surgeon: Kevin Caldwell MD;  Location[facility];  Service: General;  Laterality: N/A;    REPAIR OF INCARCERATED INCISIONAL HERNIA WITHOUT HISTORY OF PRIOR REPAIR N/A 10/25/2018    Procedure: REPAIR, HERNIA, INCISIONAL, INCARCERATED, WITHOUT  HISTORY OF PRIOR REPAIR;  Surgeon: Kevin Caldwell MD;  Location: [facility];  Service: General;  Laterality: N/A;      ?   ALLERGIES:   Allergies as of 11/03/2022    (No Known Allergies)      ?   MEDICATIONS:?   Outpatient Medications Marked as Taking for the 11/3/22 encounter (Office Visit) with Alisha Palomares MD   Medication Sig Dispense Refill    allopurinoL (ZYLOPRIM) 300 MG tablet Take 300 mg by mouth once daily.      amLODIPine (NORVASC) 5 MG tablet Take 5 mg by mouth once daily.      atorvastatin (LIPITOR) 20 MG tablet Take 20 mg by mouth every evening.       colchicine 0.6 mg tablet Take 0.6 mg by mouth once daily.      dicyclomine (BENTYL) 10 MG capsule Take 10 mg by mouth 3 (three) times daily as needed.      lisinopriL (PRINIVIL,ZESTRIL) 20 MG tablet Take 1 tablet by mouth 2 (two) times daily.      omeprazole (PRILOSEC) 40 MG capsule Take 40 mg by mouth once daily.       polyethylene glycol (GLYCOLAX) 17 gram PwPk Take 17 g by mouth daily 28 packet 0     Current Facility-Administered Medications for the 11/3/22 encounter (Office Visit) with Alisha Palomares MD   Medication Dose Route Frequency Provider Last Rate Last Admin    lidocaine (PF) 10 mg/ml (1%) injection 10 mg  1 mL Intradermal Once Kevin Caldwell MD          ?   SOCIAL HISTORY:?   Social History     Tobacco Use    Smoking status: Never    Smokeless tobacco: Never   Substance Use Topics    Alcohol use: Yes     Comment: socially  No alcohol prior to sx      ?      ?   FAMILY HISTORY:   family history is not on file.   ?        Objective:      Physical Exam      ?   Vitals:    11/03/22 1032   BP: (!) 144/79   Pulse: 86   Temp: 97.5 °F (36.4 °C)      ?   ECOG:?0   General appearance: Generally well appearing, in no acute distress.   Head, eyes, ears, nose, and throat: moist mucous membranes.   Respiratory:  Normal work of breathing  Abdomen: nontender, nondistended.   Extremities: Warm, without edema.   Neurologic: Alert and oriented.  Grossly normal strength, coordination, and gait.   Skin: No rashes, ecchymoses or petechial lesion.   Psychiatric:  Normal mood and affect.    ?   Laboratory:  ?   Lab Results   Component Value Date    WBC 4.38 10/03/2022    HGB 7.6 (L) 10/03/2022    HCT 23.9 (L) 10/03/2022    MCV 92 10/03/2022     10/03/2022       CMP  Sodium   Date Value Ref Range Status   10/03/2022 138 136 - 145 mmol/L Final     Potassium   Date Value Ref Range Status   10/03/2022 4.0 3.5 - 5.1 mmol/L Final     Chloride   Date Value Ref Range Status   10/03/2022 106 95 - 110 mmol/L Final     CO2   Date Value Ref Range Status   10/03/2022 23 23 - 29 mmol/L Final     Glucose   Date Value Ref Range Status   10/03/2022 98 70 - 110 mg/dL Final     BUN   Date Value Ref Range Status   10/03/2022 6 (L) 8 - 23 mg/dL Final     Creatinine   Date Value Ref Range Status   10/03/2022 0.7 0.5 - 1.4 mg/dL Final     Calcium   Date Value Ref Range Status   10/03/2022 8.5 (L) 8.7 - 10.5 mg/dL Final     Total Protein   Date Value Ref Range Status   10/03/2022 5.2 (L) 6.0 - 8.4 g/dL Final     Albumin   Date Value Ref Range Status   10/03/2022 2.7 (L) 3.5 - 5.2 g/dL Final     Total Bilirubin   Date Value Ref Range Status   10/03/2022 0.9 0.1 - 1.0 mg/dL Final     Comment:     For infants and newborns, interpretation of results should be based  on gestational age, weight and in agreement with clinical  observations.    Premature Infant recommended reference ranges:  Up to 24 hours.............<8.0 mg/dL  Up to 48 hours............<12.0 mg/dL  3-5 days..................<15.0 mg/dL  6-29 days.................<15.0 mg/dL       Alkaline Phosphatase   Date Value Ref Range Status   10/03/2022 149 (H) 55 - 135 U/L Final     AST   Date Value Ref Range Status   10/03/2022 28 10 - 40 U/L Final     ALT   Date Value Ref Range Status   10/03/2022 13 10 - 44 U/L Final     Anion Gap   Date Value Ref Range Status   10/03/2022 9 8 - 16 mmol/L Final     eGFR if     Date Value Ref Range Status   10/29/2018 >60 >60 mL/min/1.73 m^2 Final     eGFR if non    Date Value Ref Range Status   10/29/2018 >60 >60 mL/min/1.73 m^2 Final     Comment:     Calculation used to obtain the estimated glomerular filtration  rate (eGFR) is the CKD-EPI equation.          No visits with results within 1 Day(s) from this visit.   Latest known visit with results is:   No results displayed because visit has over 200 results.         ?   Tumor markers   ?  154 on 9/2022  ?   Imaging:  ?    Results for orders placed or performed during the hospital encounter of 10/13/22 (from the past 2160 hour(s))   CT Chest Without Contrast    Impression    1.  In this patient with reported ovarian cancer, there is no specific CT evidence of intrathoracic metastatic disease.    2.  6 mm opacity in the apical segment right upper lobe, which appears related to fibronodular scarring.    3.  Few additional nonspecific micro nodules in the right upper lobe.  Attention on continued surveillance imaging recommended.    4.  Other findings as above.    Electronically signed by resident: Tim Macias  Date:    10/13/2022  Time:    11:49    Electronically signed by: Rosalind Chavez  Date:    10/13/2022  Time:    14:12   Results for orders placed or performed during the hospital encounter of 09/25/22 (from the past 2160 hour(s))   CT Renal Stone Study ABD Pelvis WO    Impression    Large pelvic mass.  Suspect malignancy.  Correlate clinically    Partial small bowel obstruction in the left upper hemiabdomen and inferior anterior abdominal wall.  Strangulation of the bowel loops in the subcutaneous fat of the anterior abdominal wall hernia not excluded.  Correlate clinically.    Herniation of colonic bowel loops in the anterior midline abdominal wall.    All CT scans   are performed using dose optimization techniques including the following: automated exposure control; adjustment of the mA and/or kV; use of  iterative reconstruction technique.  Dose modulation was employed for ALARA by means of: Automated exposure control; adjustment of the mA and/or kV according to patient size (this includes techniques or standardized protocols for targeted exams where dose is matched to indication/reason for exam; i.e. extremities or head); and/or use of iterative reconstructive technique.      Electronically signed by: Carlos Guan  Date:    09/25/2022  Time:    13:52     No results found for this or any previous visit (from the past 2160 hour(s)).  No results found for this or any previous visit (from the past 2160 hour(s)).    CT ABDOMEN PELVIS WITHOUT CONTRAST     CLINICAL HISTORY:  abdominal pain, nausea and vomiting;     TECHNIQUE:  Routine CT abdomen and pelvis performed without IV contrast.  Coronal and sagittal reformatted images obtained.     COMPARISON:  Abdominal CT 04/17/2018     FINDINGS:  No acute disease identified in the lung bases.  Stable small a moderate size hiatal hernia.  No acute osseous abnormality or suspicious bone lesion identified.     There are 2 large ventral abdominal wall hernias.  There is a periumbilical hernia containing transverse colon and a dilated segment of mid to distal small bowel.  The lower ventral abdominal wall hernia contains multiple segments of dilated and narrowed distal small bowel with surrounding fluid in the hernia sac suggesting strangulation/incarceration.  There is an associated distal small-bowel obstruction with a transition point likely at the lower strangulated appearing hernia with dilation and air-fluid levels of the more proximal small bowel.  There are colonic diverticuli without evidence of acute diverticulitis.  No evidence of colonic obstruction.  There is no evidence of appendicitis. No free air or abscess identified.     The kidneys and ureters are unremarkable without evidence of urolithiasis or hydronephrosis.  The bladder is unremarkable.     The unenhanced  liver, pancreas, gallbladder, bile ducts, spleen and adrenals are unremarkable. The aorta is normal in caliber.  No pathologically enlarged lymph nodes identified.  Stable enlarged uterus with multiple fibroids.     IMPRESSION:      Bowel containing periumbilical and lower ventral abdominal wall hernias.  Strangulation/incarceration of the lower ventral abdominal wall hernia with an associated distal small-bowel obstruction and surrounding free fluid in the hernia sac.  No free air or abscess identified.     All CT scans at this facility are performed  using dose modulation techniques as appropriate to performed exam including the following:  automated exposure control; adjustment of mA and/or kV according to the patients size (this includes techniques or standardized protocols for targeted exams where dose is matched to indication/reason for exam: i.e. extremities or head);  iterative reconstruction technique.        Electronically signed by: Kade Lozano MD  Date:                                            10/11/2018    CT CHEST WITHOUT CONTRAST     CLINICAL HISTORY:  staging CT scan, path + endometrial CA; Malignant neoplasm of bilateral ovaries     TECHNIQUE:  Low dose axial images, sagittal and coronal reformations were obtained from the thoracic inlet to the lung bases. Contrast was not administered.     COMPARISON:  Chest radiograph 09/20/2022, CT renal stone study 09/25/2022, CT abdomen pelvis 04/17/2018     FINDINGS:  Base of Neck: No significant abnormality.     Thoracic soft tissues: The right breast is smaller, consistent with the reported history of treatment for breast cancer.  There are surgical clips in the right axilla.     Aorta: Mild calcific disease peer     Heart: Mild cardiac enlargement.  Mild left-sided calcification of coronary arteries.  Low attenuating blood pool when compared to the myocardium.  No pericardial effusion.     Pulmonary vasculature: Unremarkable.     Taryn/Mediastinum: No  pathologic asha enlargement.     Airways: Patent.     Lungs/Pleura: Minimal apical fibro nodular scarring.  Right middle lobe bandlike opacity, atelectasis or scar.  3 mm ground-glass nodule in the right upper lobe (series 4, image 96).  6 mm opacity in the apical segment of the right upper lobe (series 4, image 50), extending from the pleura and may reflect extension of the apical fibro nodular scarring.  4 mm solid pulmonary nodule in the medial segment of the right middle lobe (series 4, image 242).  Additional scattered bilateral solid pulmonary micro nodules.  Triangular opacity along the right major fissure favored to represent lymphatic tissue.  No consolidation or pleural effusion.     Esophagus: Normal.     Upper Abdomen: Large hiatal hernia.  1.6 cm calcification noted in the inferior right hepatic lobe, similar to prior.     Bones: Osseous degenerative changes without evidence of acute fracture.  No osseous destructive lesions.     Impression:     1.  In this patient with reported ovarian cancer, there is no specific CT evidence of intrathoracic metastatic disease.     2.  6 mm opacity in the apical segment right upper lobe, which appears related to fibronodular scarring.     3.  Few additional nonspecific micro nodules in the right upper lobe.  Attention on continued surveillance imaging recommended.     4.  Other findings as above.     Electronically signed by resident: Tim Macias  Date:                                            10/13/2022  Time:                                           11:49  Pathology:    9/28/22  Final Pathologic Diagnosis 1. Hernia, hernioplasty:   - Benign fibroadipose tissue and reactive mesothelial tissue with patchy   acute and chronic inflammation   -  Negative  for carcinoma   2. Pelvic mass, resection:   - Endometrial adenocarcinoma, endometrioid type (22.2 cm)   - Fallopian tube with serosal adhesions   - See synoptic report for details and complete pathologic  staging   CASE SUMMARY: (OVARY or FALLOPIAN TUBE or PRIMARY PERITONEUM)   Seizure:   Resection of pelvic mass   Specimen integrity:   Not applicable -no intact normal structures are   identified   Tumor site:   Tubo-ovarian, laterality can not be determined; no intact   normal structures identified   Tumor size:   22.2 cm   Histologic type:   Endometrioid carcinoma   Histologic grade:   G1   Ovarian surface involvement:  Cannot be determined- no intact normal   structures are identified   Fallopian tube surface involvement:  Cannot be determined - no intact normal   structures are identified   Implants:   Not sampled   Other tissue/organ involvement:   Not identified   Largest extrapelvic peritoneal focus:   Not applicable   Peritoneal/ascitic fluid involvement:  Not submitted/unknown   Chemotherapy response score:   No known pre-surgical therapy   Regional lymph node status:   Not applicable (no regional lymph nodes   submitted or found)   Distant metastasis:   Not applicable   Ancillary studies:   Immunohistochemical stains with appropriately reactive   controls and show the below immunohistochemical pattern:   Positive: AE1/AE3, EMA, CK7, ER, MO, p53 wild type pattern, p16 increased but   not block type and CD10 shows patchy positivity   Negative:  P63, CK20, GATA3, inhibin, calretinin, PAX8, WT1, TTF1 and CEA   Estrogen receptor:  Weak to moderate positivity in 95% of tumor   Progesterone receptor:  Strong to moderate positivity in 95% of tumor   MMR immunostains have been ordered and the results will be reported as an   addendum.   Optimal block for additional molecular testinJ   PATHOLOGIC STAGE CLASSIFICATION (pTNM, AJCC 8th Edition): pT2 (subcategory   can not be determined)). pNx, pMx  VC      Comment: Interp By Marilou Chacon M.D., Signed on 10/10/2022 at 08:04   Supplemental Diagnosis Immunohistochemistry (IHC) testing for mismatch repair (MMR) proteins:   MLH1-intact nuclear expression    MSH2-intact nuclear expression   MSH6-intact nuclear expression   PMS2-intact nuclear expression   Background nonneoplastic tissue/internal control with intact nuclear   expression.   IHC interpretation:  No loss of nuclear expression of MMR proteins:  Low   probability of microsatellite instability.   There are exceptions to the above IHC interpretations.  These results should   not be considered in isolation, and clinical correlation with genetic   counseling is recommended to assess the need for germ line testing.           ?   Assessment/Plan:   Malignant neoplasm of both ovaries  -     Ambulatory referral/consult to Hematology / Oncology    Leukemoid reaction  -     Ambulatory referral/consult to Hematology / Oncology    Personal history of breast cancer  -     Ambulatory referral/consult to Hematology / Oncology    Iron deficiency anemia due to chronic blood loss    Endometrioid adenocarcinoma of ovary, unspecified laterality  -     dexAMETHasone (DECADRON) 4 MG Tab; Take 2 tablets (8 mg total) by mouth once daily. Take as directed on days 2, 3, and 4 of your chemotherapy cycle.  Dispense: 24 tablet; Refill: 5  -     Discontinue: ondansetron (ZOFRAN-ODT) 8 MG TbDL; Take 1 tablet (8 mg total) by mouth every 8 (eight) hours as needed (nausea/vomiting).  Dispense: 60 tablet; Refill: 5  -     ondansetron (ZOFRAN-ODT) 8 MG TbDL; Take 1 tablet (8 mg total) by mouth every 8 (eight) hours as needed (nausea/vomiting).  Dispense: 24 tablet; Refill: 2    Other orders  -     Influenza - Quadrivalent (Adjuvanted)  -     iron sucrose injection 200 mg  -     sodium chloride 0.9% 250 mL flush bag  -     sodium chloride 0.9% flush 10 mL  -     heparin, porcine (PF) 100 unit/mL injection flush 500 Units  -     alteplase injection 2 mg  -     iron sucrose injection 200 mg  -     sodium chloride 0.9% 250 mL flush bag  -     sodium chloride 0.9% flush 10 mL  -     heparin, porcine (PF) 100 unit/mL injection flush 500 Units  -      alteplase injection 2 mg  -     iron sucrose injection 200 mg  -     sodium chloride 0.9% 250 mL flush bag  -     sodium chloride 0.9% flush 10 mL  -     heparin, porcine (PF) 100 unit/mL injection flush 500 Units  -     alteplase injection 2 mg  -     iron sucrose injection 200 mg  -     sodium chloride 0.9% 250 mL flush bag  -     sodium chloride 0.9% flush 10 mL  -     heparin, porcine (PF) 100 unit/mL injection flush 500 Units  -     alteplase injection 2 mg  -     iron sucrose injection 200 mg  -     sodium chloride 0.9% 250 mL flush bag  -     sodium chloride 0.9% flush 10 mL  -     heparin, porcine (PF) 100 unit/mL injection flush 500 Units  -     alteplase injection 2 mg     1. Malignant neoplasm of both ovaries    2. Leukemoid reaction    3. Personal history of breast cancer    4. Iron deficiency anemia due to chronic blood loss    5. Endometrioid adenocarcinoma of ovary, unspecified laterality            Plan:     Problem List Items Addressed This Visit          Oncology    Anemia    Overview     Last Assessment & Plan:   Formatting of this note may be different from the original.  Anemia: routine CBC.  It has been present for several years.  Associated signs & symptoms: none.  Patient has not had colonscopy.  Lab Results   Component Value Date    HGB 10.9 (L) 08/28/2017     Lab Results   Component Value Date    FERRITIN 108.0 08/07/2014     Lab Results   Component Value Date    UQWOMHIE36 1380 (H) 08/07/2014     Lab Results   Component Value Date    FOLATE 11.5 08/07/2014     No results found for: TSH         Personal history of breast cancer    Overview     Overview:   Gets annual mammograms, had been treated with chemo and XRT, says no surgery.  Saw Dr Lantigua (Onc) and Dr Waters (XRT).  No SOB, no abnormal mammograms.  DECLINES BREAST EXAM, GYN APPT.  Is to do mammogram again this summer.         Leukemoid reaction    Malignant neoplasm of both ovaries - Primary    Endometrioid adenocarcinoma of ovary      Endometroid carcinoma, Grade 1, tubo-ovarian: Stage III, ER weak to moderate in 95% tumor, MMR intact, NGS pending.  Imaging notable for pelvic mass however no definitive evidence of metastatic disease in chest.  I reviewed imaging and pathology with patient and discuss low-grade endometrioid carcinoma and natural history of such and options for treatment.  She is very enthusiastic about being most aggressive and proceeding with chemotherapy carboplatin paclitaxel.  I reviewed with her potential toxicities of chemotherapy and she is interested in proceeding with chemotherapy teaching.  Consent completed today.  No initial port needed.  Discussed risk of infection and recommendation for vaccination prior to treatment initiation ideally.  - Baseline  elevation  Addendum:  [facility]retchen germline genetic testing negative for clinically significant mutation    Iron deficiency anemia:  Previously no notable anemia.  No overt blood loss.  Labs with low iron saturation possible iron deficiency anemia may be multifactorial with anemia of chronic illness/inflammation.  Prior to initiation of chemotherapy would recommend iron repletion to optimize hemoglobin prior to treatment start.  Ordered IV Venofer.    Lab Results   Component Value Date    IRON 18 (L) 09/29/2022    TRANSFERRIN 106 (L) 09/29/2022    TIBC 157 (L) 09/29/2022    FESATURATED 11 (L) 09/29/2022      Lab Results   Component Value Date    FERRITIN 728 (H) 09/29/2022       Follow-Up:   Patient Instructions   NGS if not already ordered  My Risk germline testing today  Flu shot today  Iv iron feraheme x 2 doses 1 week apart as soon as approved  Chemo teaching  Consent completed today, please file  RV for C1D1 cbc, cmp and ca125 prior  CC: NN  ------------  Recommended covid booster

## 2022-11-04 PROBLEM — D50.0 IRON DEFICIENCY ANEMIA DUE TO CHRONIC BLOOD LOSS: Status: ACTIVE | Noted: 2022-11-04

## 2022-11-04 RX ORDER — HEPARIN 100 UNIT/ML
500 SYRINGE INTRAVENOUS
Status: CANCELLED | OUTPATIENT
Start: 2022-12-01

## 2022-11-04 RX ORDER — HEPARIN 100 UNIT/ML
500 SYRINGE INTRAVENOUS
Status: CANCELLED | OUTPATIENT
Start: 2022-12-15

## 2022-11-04 RX ORDER — SODIUM CHLORIDE 0.9 % (FLUSH) 0.9 %
10 SYRINGE (ML) INJECTION
Status: CANCELLED | OUTPATIENT
Start: 2022-12-15

## 2022-11-04 RX ORDER — SODIUM CHLORIDE 0.9 % (FLUSH) 0.9 %
10 SYRINGE (ML) INJECTION
Status: CANCELLED | OUTPATIENT
Start: 2022-11-04

## 2022-11-04 RX ORDER — SODIUM CHLORIDE 0.9 % (FLUSH) 0.9 %
10 SYRINGE (ML) INJECTION
Status: CANCELLED | OUTPATIENT
Start: 2022-11-23

## 2022-11-04 RX ORDER — HEPARIN 100 UNIT/ML
500 SYRINGE INTRAVENOUS
Status: CANCELLED | OUTPATIENT
Start: 2022-12-08

## 2022-11-04 RX ORDER — HEPARIN 100 UNIT/ML
500 SYRINGE INTRAVENOUS
Status: CANCELLED | OUTPATIENT
Start: 2022-11-04

## 2022-11-04 RX ORDER — SODIUM CHLORIDE 0.9 % (FLUSH) 0.9 %
10 SYRINGE (ML) INJECTION
Status: CANCELLED | OUTPATIENT
Start: 2022-12-01

## 2022-11-04 RX ORDER — HEPARIN 100 UNIT/ML
500 SYRINGE INTRAVENOUS
Status: CANCELLED | OUTPATIENT
Start: 2022-11-23

## 2022-11-04 RX ORDER — SODIUM CHLORIDE 0.9 % (FLUSH) 0.9 %
10 SYRINGE (ML) INJECTION
Status: CANCELLED | OUTPATIENT
Start: 2022-12-08

## 2022-11-10 ENCOUNTER — DOCUMENTATION ONLY (OUTPATIENT)
Dept: HEMATOLOGY/ONCOLOGY | Facility: CLINIC | Age: 82
End: 2022-11-10
Payer: MEDICARE

## 2022-11-10 NOTE — PROGRESS NOTES
Called and spoke with pt and son, Bj over the phone. Explained my role as NN and together we discussed the need for chemo education prior to scheduling chemo. Explained that nurse educator will assess veins to see if port needed but will try to complete tx without, since she will be coming every 3 wks. Stressed the importance of drinking plenty of water starting a couple days before tx day to help dilate the veins, flush the kidneys and bladder. Together we scheduled chemo education  @ 1pm and we scheduled tx start day as , with labs @ 830, provider @ 930 and chemo at 10am all at the [place]. Told them Dr. Palomares is only at the , only have to see her for scan results and per pt request, can see TANNER or other MD all other times, since they live in [place]. Explained the estimated amount of time pt will be in infusion each time. Opportunity to ask questions given, they voiced understanding all information. Bj states he's writing dates and times as we talk and they have my call back number. Notified SW and FC of new pt start.   Oncology Navigation   Intake  Date of Diagnosis: 22  Cancer Type: Gynecologic  Internal / External Referral: Internal  Date of Referral: 10/13/22  Initial Nurse Navigator Contact: 10/18/22  Referral to Initial Contact Timeline (days): 5  Date Worked: 11/10/22  Appointment Date: 10/25/22  Start of Treatment: 22     Treatment  Current Status: Active    Surgical Oncologist: Dr. Flores    Medical Oncologist: Dr. Andres  Consult Date: 10/26/22  Chemotherapy: Planned  Chemotherapy Regimen: Carboplatin/Taxol                   Support Systems: Children     Acuity  Systemic Treatment - predicted or initiated: Chemotherapy Regimen with Multiple drugs (+1)  ECO  Comorbidities in Medical History: 2  Support: 1  Transportation: 0  Verbalizes the need for more education: 1  Navigation Acuity: 4     Follow Up  No follow-ups on file.

## 2022-11-17 ENCOUNTER — CLINICAL SUPPORT (OUTPATIENT)
Dept: HEMATOLOGY/ONCOLOGY | Facility: CLINIC | Age: 82
End: 2022-11-17
Payer: MEDICARE

## 2022-11-17 DIAGNOSIS — C56.3 MALIGNANT NEOPLASM OF BOTH OVARIES: Primary | ICD-10-CM

## 2022-11-17 NOTE — PROGRESS NOTES
Met with patient in person____) to discuss upcoming systemic therapy initiation. Discussed patient diagnosis including staging information. Also discussed that therapy regimen prescribed involves the following drugs: _Taxol and Carboplatin_, and timeline of therapy administration. Went over what to expect on first day of treatment, including what to bring with you, visitor policy, and infusion suite guidelines. Also discussed supportive and shared services available to patient, including social work, financial counseling, oncology nutrition, and oncology psychology.      Covered with patient potential side effects and symptom management. Reviewed supportive medications that will be given before, during, and after treatment and provided written instructions for all.   Also stressed that other side effects are possible outside of those listed. Spent additional time on signs of infection, infection prevention, and proper nutrition/hydration.    Education provided to patient via [facility] specific drug information and chemotherapy education binder___). Also provided contact information for clinic and information related to [facility] communication. Discussed communication process for after-hours needs and some common scenarios in which patient should call provider for guidance vs. immediately report to the emergency room.     Finally, patient was given my contact information. Encouraged patient to call with any questions, concerns, or needs. Patient verbalized understanding of all above information.

## 2022-11-18 LAB
COMMENT: NORMAL
FINAL PATHOLOGIC DIAGNOSIS: NORMAL
GROSS: NORMAL
Lab: NORMAL
SUPPLEMENTAL DIAGNOSIS: NORMAL

## 2022-11-21 ENCOUNTER — TELEPHONE (OUTPATIENT)
Dept: HEMATOLOGY/ONCOLOGY | Facility: CLINIC | Age: 82
End: 2022-11-21
Payer: MEDICARE

## 2022-11-21 ENCOUNTER — LAB VISIT (OUTPATIENT)
Dept: LAB | Facility: HOSPITAL | Age: 82
End: 2022-11-21
Attending: INTERNAL MEDICINE
Payer: MEDICARE

## 2022-11-21 DIAGNOSIS — C56.3 MALIGNANT NEOPLASM OF BOTH OVARIES: ICD-10-CM

## 2022-11-21 LAB
ALBUMIN SERPL BCP-MCNC: 3.9 G/DL (ref 3.5–5.2)
ALP SERPL-CCNC: 118 U/L (ref 55–135)
ALT SERPL W/O P-5'-P-CCNC: 7 U/L (ref 10–44)
ANION GAP SERPL CALC-SCNC: 9 MMOL/L (ref 8–16)
AST SERPL-CCNC: 12 U/L (ref 10–40)
BASOPHILS # BLD AUTO: 0.02 K/UL (ref 0–0.2)
BASOPHILS NFR BLD: 0.5 % (ref 0–1.9)
BILIRUB SERPL-MCNC: 0.4 MG/DL (ref 0.1–1)
BUN SERPL-MCNC: 14 MG/DL (ref 8–23)
CALCIUM SERPL-MCNC: 9.8 MG/DL (ref 8.7–10.5)
CHLORIDE SERPL-SCNC: 107 MMOL/L (ref 95–110)
CO2 SERPL-SCNC: 25 MMOL/L (ref 23–29)
CREAT SERPL-MCNC: 0.8 MG/DL (ref 0.5–1.4)
DIFFERENTIAL METHOD: ABNORMAL
EOSINOPHIL # BLD AUTO: 0.1 K/UL (ref 0–0.5)
EOSINOPHIL NFR BLD: 1.9 % (ref 0–8)
ERYTHROCYTE [DISTWIDTH] IN BLOOD BY AUTOMATED COUNT: 13.4 % (ref 11.5–14.5)
EST. GFR  (NO RACE VARIABLE): >60 ML/MIN/1.73 M^2
GLUCOSE SERPL-MCNC: 96 MG/DL (ref 70–110)
HCT VFR BLD AUTO: 33.5 % (ref 37–48.5)
HGB BLD-MCNC: 10.4 G/DL (ref 12–16)
IMM GRANULOCYTES # BLD AUTO: 0 K/UL (ref 0–0.04)
IMM GRANULOCYTES NFR BLD AUTO: 0 % (ref 0–0.5)
LYMPHOCYTES # BLD AUTO: 1 K/UL (ref 1–4.8)
LYMPHOCYTES NFR BLD: 25.5 % (ref 18–48)
MCH RBC QN AUTO: 27.7 PG (ref 27–31)
MCHC RBC AUTO-ENTMCNC: 31 G/DL (ref 32–36)
MCV RBC AUTO: 89 FL (ref 82–98)
MONOCYTES # BLD AUTO: 0.3 K/UL (ref 0.3–1)
MONOCYTES NFR BLD: 9 % (ref 4–15)
NEUTROPHILS # BLD AUTO: 2.4 K/UL (ref 1.8–7.7)
NEUTROPHILS NFR BLD: 63.1 % (ref 38–73)
NRBC BLD-RTO: 0 /100 WBC
PLATELET # BLD AUTO: 169 K/UL (ref 150–450)
PMV BLD AUTO: 10.8 FL (ref 9.2–12.9)
POTASSIUM SERPL-SCNC: 4.5 MMOL/L (ref 3.5–5.1)
PROT SERPL-MCNC: 7.6 G/DL (ref 6–8.4)
RBC # BLD AUTO: 3.75 M/UL (ref 4–5.4)
SODIUM SERPL-SCNC: 141 MMOL/L (ref 136–145)
WBC # BLD AUTO: 3.77 K/UL (ref 3.9–12.7)

## 2022-11-21 PROCEDURE — 85025 COMPLETE CBC W/AUTO DIFF WBC: CPT | Mod: PO | Performed by: INTERNAL MEDICINE

## 2022-11-21 PROCEDURE — 36415 COLL VENOUS BLD VENIPUNCTURE: CPT | Mod: PO | Performed by: INTERNAL MEDICINE

## 2022-11-21 PROCEDURE — 80053 COMPREHEN METABOLIC PANEL: CPT | Mod: PO | Performed by: INTERNAL MEDICINE

## 2022-11-21 PROCEDURE — 86304 IMMUNOASSAY TUMOR CA 125: CPT | Performed by: INTERNAL MEDICINE

## 2022-11-21 NOTE — TELEPHONE ENCOUNTER
Called pt to remind her of lab appt @ [facility] today for chemo tomorrow (11/22). Pt states she is getting ready to go to the lab now. Told her I will see her tomorrow but to call if she needs anything before appt tomorrow. She voiced understanding all information.

## 2022-11-22 ENCOUNTER — DOCUMENTATION ONLY (OUTPATIENT)
Dept: HEMATOLOGY/ONCOLOGY | Facility: CLINIC | Age: 82
End: 2022-11-22

## 2022-11-22 ENCOUNTER — OFFICE VISIT (OUTPATIENT)
Dept: HEMATOLOGY/ONCOLOGY | Facility: CLINIC | Age: 82
End: 2022-11-22
Payer: MEDICARE

## 2022-11-22 ENCOUNTER — INFUSION (OUTPATIENT)
Dept: INFUSION THERAPY | Facility: HOSPITAL | Age: 82
End: 2022-11-22
Attending: INTERNAL MEDICINE
Payer: MEDICARE

## 2022-11-22 VITALS
SYSTOLIC BLOOD PRESSURE: 139 MMHG | HEART RATE: 83 BPM | DIASTOLIC BLOOD PRESSURE: 82 MMHG | RESPIRATION RATE: 16 BRPM | OXYGEN SATURATION: 97 % | TEMPERATURE: 98 F

## 2022-11-22 VITALS
BODY MASS INDEX: 27.14 KG/M2 | DIASTOLIC BLOOD PRESSURE: 86 MMHG | HEART RATE: 78 BPM | SYSTOLIC BLOOD PRESSURE: 137 MMHG | TEMPERATURE: 98 F | HEIGHT: 62 IN | OXYGEN SATURATION: 98 % | WEIGHT: 147.5 LBS

## 2022-11-22 DIAGNOSIS — D50.0 IRON DEFICIENCY ANEMIA DUE TO CHRONIC BLOOD LOSS: Primary | ICD-10-CM

## 2022-11-22 DIAGNOSIS — C56.9 ENDOMETRIOID ADENOCARCINOMA OF OVARY, UNSPECIFIED LATERALITY: ICD-10-CM

## 2022-11-22 DIAGNOSIS — D50.0 IRON DEFICIENCY ANEMIA DUE TO CHRONIC BLOOD LOSS: ICD-10-CM

## 2022-11-22 PROCEDURE — 63600175 PHARM REV CODE 636 W HCPCS

## 2022-11-22 PROCEDURE — 96375 TX/PRO/DX INJ NEW DRUG ADDON: CPT

## 2022-11-22 PROCEDURE — 96417 CHEMO IV INFUS EACH ADDL SEQ: CPT

## 2022-11-22 PROCEDURE — 96415 CHEMO IV INFUSION ADDL HR: CPT

## 2022-11-22 PROCEDURE — 3079F PR MOST RECENT DIASTOLIC BLOOD PRESSURE 80-89 MM HG: ICD-10-PCS | Mod: CPTII,S$GLB,,

## 2022-11-22 PROCEDURE — 3075F SYST BP GE 130 - 139MM HG: CPT | Mod: CPTII,S$GLB,,

## 2022-11-22 PROCEDURE — 63600175 PHARM REV CODE 636 W HCPCS: Performed by: INTERNAL MEDICINE

## 2022-11-22 PROCEDURE — 99999 PR PBB SHADOW E&M-EST. PATIENT-LVL IV: CPT | Mod: PBBFAC,,,

## 2022-11-22 PROCEDURE — 3079F DIAST BP 80-89 MM HG: CPT | Mod: CPTII,S$GLB,,

## 2022-11-22 PROCEDURE — 1126F AMNT PAIN NOTED NONE PRSNT: CPT | Mod: CPTII,S$GLB,,

## 2022-11-22 PROCEDURE — 1101F PR PT FALLS ASSESS DOC 0-1 FALLS W/OUT INJ PAST YR: ICD-10-PCS | Mod: CPTII,S$GLB,,

## 2022-11-22 PROCEDURE — 96413 CHEMO IV INFUSION 1 HR: CPT

## 2022-11-22 PROCEDURE — 3288F PR FALLS RISK ASSESSMENT DOCUMENTED: ICD-10-PCS | Mod: CPTII,S$GLB,,

## 2022-11-22 PROCEDURE — 99215 OFFICE O/P EST HI 40 MIN: CPT | Mod: S$GLB,,,

## 2022-11-22 PROCEDURE — 96367 TX/PROPH/DG ADDL SEQ IV INF: CPT

## 2022-11-22 PROCEDURE — 3288F FALL RISK ASSESSMENT DOCD: CPT | Mod: CPTII,S$GLB,,

## 2022-11-22 PROCEDURE — 25000003 PHARM REV CODE 250: Performed by: INTERNAL MEDICINE

## 2022-11-22 PROCEDURE — 99999 PR PBB SHADOW E&M-EST. PATIENT-LVL IV: ICD-10-PCS | Mod: PBBFAC,,,

## 2022-11-22 PROCEDURE — 1101F PT FALLS ASSESS-DOCD LE1/YR: CPT | Mod: CPTII,S$GLB,,

## 2022-11-22 PROCEDURE — 1126F PR PAIN SEVERITY QUANTIFIED, NO PAIN PRESENT: ICD-10-PCS | Mod: CPTII,S$GLB,,

## 2022-11-22 PROCEDURE — 99215 PR OFFICE/OUTPT VISIT, EST, LEVL V, 40-54 MIN: ICD-10-PCS | Mod: S$GLB,,,

## 2022-11-22 PROCEDURE — 3075F PR MOST RECENT SYSTOLIC BLOOD PRESS GE 130-139MM HG: ICD-10-PCS | Mod: CPTII,S$GLB,,

## 2022-11-22 RX ORDER — SODIUM CHLORIDE 0.9 % (FLUSH) 0.9 %
10 SYRINGE (ML) INJECTION
Status: CANCELLED | OUTPATIENT
Start: 2022-11-22

## 2022-11-22 RX ORDER — EPINEPHRINE 0.3 MG/.3ML
0.3 INJECTION SUBCUTANEOUS ONCE AS NEEDED
Status: CANCELLED | OUTPATIENT
Start: 2022-11-22

## 2022-11-22 RX ORDER — METHYLPREDNISOLONE SOD SUCC 125 MG
80 VIAL (EA) INJECTION ONCE
Status: COMPLETED | OUTPATIENT
Start: 2022-11-22 | End: 2022-11-22

## 2022-11-22 RX ORDER — METHYLPREDNISOLONE SOD SUCC 125 MG
VIAL (EA) INJECTION
Status: DISCONTINUED
Start: 2022-11-22 | End: 2022-11-22 | Stop reason: HOSPADM

## 2022-11-22 RX ORDER — FAMOTIDINE 10 MG/ML
20 INJECTION INTRAVENOUS
Status: COMPLETED | OUTPATIENT
Start: 2022-11-22 | End: 2022-11-22

## 2022-11-22 RX ORDER — DIPHENHYDRAMINE HYDROCHLORIDE 50 MG/ML
50 INJECTION INTRAMUSCULAR; INTRAVENOUS ONCE AS NEEDED
Status: CANCELLED | OUTPATIENT
Start: 2022-11-22

## 2022-11-22 RX ORDER — METHYLPREDNISOLONE SOD SUCC 125 MG
80 VIAL (EA) INJECTION ONCE
Status: CANCELLED
Start: 2022-11-22

## 2022-11-22 RX ORDER — FAMOTIDINE 10 MG/ML
20 INJECTION INTRAVENOUS
Status: CANCELLED | OUTPATIENT
Start: 2022-11-22

## 2022-11-22 RX ORDER — HEPARIN 100 UNIT/ML
500 SYRINGE INTRAVENOUS
Status: CANCELLED | OUTPATIENT
Start: 2022-11-22

## 2022-11-22 RX ADMIN — CARBOPLATIN 415 MG: 10 INJECTION, SOLUTION INTRAVENOUS at 04:11

## 2022-11-22 RX ADMIN — METHYLPREDNISOLONE SODIUM SUCCINATE 80 MG: 125 INJECTION, POWDER, FOR SOLUTION INTRAMUSCULAR; INTRAVENOUS at 12:11

## 2022-11-22 RX ADMIN — APREPITANT 130 MG: 130 INJECTION, EMULSION INTRAVENOUS at 10:11

## 2022-11-22 RX ADMIN — DEXAMETHASONE SODIUM PHOSPHATE 0.25 MG: 4 INJECTION, SOLUTION INTRA-ARTICULAR; INTRALESIONAL; INTRAMUSCULAR; INTRAVENOUS; SOFT TISSUE at 11:11

## 2022-11-22 RX ADMIN — FAMOTIDINE 20 MG: 10 INJECTION INTRAVENOUS at 10:11

## 2022-11-22 RX ADMIN — SODIUM CHLORIDE 500 ML: 0.9 INJECTION, SOLUTION INTRAVENOUS at 10:11

## 2022-11-22 RX ADMIN — PACLITAXEL 228 MG: 6 INJECTION, SOLUTION, CONCENTRATE INTRAVENOUS at 11:11

## 2022-11-22 RX ADMIN — DIPHENHYDRAMINE HYDROCHLORIDE 50 MG: 50 INJECTION, SOLUTION INTRAMUSCULAR; INTRAVENOUS at 10:11

## 2022-11-22 RX ADMIN — IRON SUCROSE 200 MG: 20 INJECTION, SOLUTION INTRAVENOUS at 10:11

## 2022-11-22 NOTE — ASSESSMENT & PLAN NOTE
Grade 1, tubo-ovarian: Stage III, ER weak to moderate in 95% tumor, MMR intact, NGS pending.  Imaging notable for pelvic mass however no definitive evidence of metastatic disease in chest.    CT CHEST 10/13/22 found:  --no specific CT evidence of intrathoracic metastatic disease.  --6 mm opacity in the apical segment right upper lobe, which appears related to fibronodular scarring.  --Few additional nonspecific micro nodules in the right upper lobe.  Attention on continued surveillance imaging recommended.     Labs reviewed, no concerning cytopenias  -ANC 2.4; Plts 169; LFTs 21/7; tbili 0.4  -Case discussed with oncologist  -Ok to proceed with C1D1 Carbo/Taxol today    Follow-up in 3 weeks with CBC CMP prior and MD for C2D1 carbo/Taxol

## 2022-11-22 NOTE — PROGRESS NOTES
AMY met with patient on today in infusion. LASHANDAR explained her role. Pt reports her transported her to treatment on today. LASHANDAR provided patient with new patient packet. LASHANDAR explained it contents. Pt was interested in transportation to and from treatment. AMY explained Lyft program. AMY provided patient with $25 gas card to provide to her son. LASHANDAR will remain available

## 2022-11-22 NOTE — PLAN OF CARE
Discussed plan of care with pt. Addressed any and ongoing concerns. Pt denies    Problem: Adult Inpatient Plan of Care  Goal: Plan of Care Review  Outcome: Ongoing, Progressing  Flowsheets (Taken 11/22/2022 1438)  Plan of Care Reviewed With: patient  Goal: Patient-Specific Goal (Individualized)  Outcome: Ongoing, Progressing  Goal: Absence of Hospital-Acquired Illness or Injury  Outcome: Ongoing, Progressing  Intervention: Identify and Manage Fall Risk  Flowsheets (Taken 11/22/2022 1438)  Safety Promotion/Fall Prevention:   in recliner, wheels locked   room near unit station  Intervention: Prevent Infection  Flowsheets (Taken 11/22/2022 1438)  Infection Prevention:   hand hygiene promoted   equipment surfaces disinfected  Goal: Optimal Comfort and Wellbeing  Outcome: Ongoing, Progressing  Intervention: Monitor Pain and Promote Comfort  Flowsheets (Taken 11/22/2022 1438)  Pain Management Interventions:   warm blanket provided   quiet environment facilitated  Intervention: Provide Person-Centered Care  Flowsheets (Taken 11/22/2022 1438)  Trust Relationship/Rapport:   reassurance provided   care explained   choices provided   thoughts/feelings acknowledged   emotional support provided   empathic listening provided   questions answered   questions encouraged

## 2022-11-22 NOTE — ASSESSMENT & PLAN NOTE
Lab Results   Component Value Date    HGB 10.4 (L) 11/21/2022     Lab Results   Component Value Date    IRON 18 (L) 09/29/2022    TRANSFERRIN 106 (L) 09/29/2022    TIBC 157 (L) 09/29/2022    FESATURATED 11 (L) 09/29/2022      Multifactorial with anemia of chronic illness/inflammation  Per primary oncologist plan for iron repletion with IV Venofer x5 doses  Dose 1 of 5 today  Please schedule accordingly

## 2022-11-22 NOTE — PROGRESS NOTES
Subjective:     Patient ID:?Mariaelena Sheffield is a 82 y.o. female.?? MR#: [id number]   ?   PRIMARY ONCOLOGIST:   ?   CHIEF COMPLAINT: lab review/assessment for chemotherapy?????   ?   ONCOLOGIC DIAGNOSIS: Endometrioid adenocarcinoma, tubo-ovarian, Grade I, Stage III, MMR stable  ?   ?   CURRENT TREATMENT: OP GYN PACLITAXEL CARBOPLATIN (AUC 6) Q3W C1D1 11/22/22      HPI  Ms. Sheffield is a very pleasant and healthy 82-year-old woman.  She presented after 1 day of nausea vomiting with CT showing large pelvic mass suspicious for malignancy, partial small-bowel obstruction and can not exclude strangulation of anterior wall hernia.  She was taken to surgery with Gynecologic Oncology assistance and pathology showing grade 1 endometrioid adenocarcinoma.  Previously seen by Dr. Palomares, she noted feeling well and recovering well from her surgery.    She denies any bleeding.  She notes possible history of TAHBSO in the past.  History of 2018 lysis of adhesion for stricture per patient report.  No known family history of breast ovarian or gynecologic malignancy.    Interval History: Pt states she is feelling well and voices no complaints today--she does note some anxiety related to first chemo today.    Oncology History   Malignant neoplasm of both ovaries   9/25/2022 Imaging Significant Findings    CT Reneal ABD Pelvis w/o: Pelvic mass.  No ascites, omental caking, or lymphadenopathy.     9/26/2022 Tumor Markers    CA-125 = 154     9/28/2022 Imaging Significant Findings    CXR: NATALY     9/29/2022 Surgery    Ex-lap, JAMES, pelvic mass resection, ventral hernia repair (Dr. Brooks)    Final pathology c/w stage IIIC grade 2 endometrioid ovarian cancer (due to dense adhesions to the small bowel and colon above the pelvic brim)      Genetic Testing    Patient has genetic testing done for [employer].                                              Results revealed patient has the following mutation(s): in process     Endometrioid  adenocarcinoma of ovary   11/3/2022 Initial Diagnosis    Endometrioid adenocarcinoma of ovary     11/22/2022 -  Chemotherapy    Treatment Summary   Plan Name: OP GYN PACLITAXEL CARBOPLATIN (AUC 6) Q3W  Treatment Goal: Control  Status: Active  Start Date: 11/22/2022  End Date: 10/24/2023 (Planned)  Provider: Alisha Palomares MD  Chemotherapy: CARBOplatin (PARAPLATIN) 415 mg in sodium chloride 0.9% 250 mL chemo infusion, 415 mg (100 % of original dose 417 mg), Intravenous, Clinic/HOD 1 time, 1 of 17 cycles  Dose modification:   (original dose 417 mg, Cycle 1)  PACLitaxeL (TAXOL) 135 mg/m2 = 228 mg in sodium chloride 0.9% 500 mL chemo infusion, 135 mg/m2 = 228 mg (100 % of original dose 135 mg/m2), Intravenous, Clinic/HOD 1 time, 1 of 17 cycles  Dose modification: 135 mg/m2 (original dose 135 mg/m2, Cycle 1, Reason: MD Discretion, Comment: reduced dose d/t age>71y/o)               Social History     Socioeconomic History    Marital status:    Tobacco Use    Smoking status: Never    Smokeless tobacco: Never   Substance and Sexual Activity    Alcohol use: Yes     Comment: socially  No alcohol prior to sx    Drug use: No   Social History Narrative    Lives with son. Surrogate decision makers: Son, Bj Sheffield [phone] and Son, Blair Sheffield [phone].      History reviewed. No pertinent family history.   Past Surgical History:   Procedure Laterality Date    ABDOMINAL SURGERY      BREAST BIOPSY      BREAST SURGERY Right     Breast biopsy    CATARACT EXTRACTION, BILATERAL      EXCISION OF PELVIC MASS N/A 9/28/2022    Procedure: EXCISION, MASS, PELVIS;  Surgeon: Edison Brooks MD;  Location: [facility] OR [address] [place] [address];  Service: General;  Laterality: N/A;    FLEXIBLE SIGMOIDOSCOPY  9/28/2022    Procedure: SIGMOIDOSCOPY, FLEXIBLE;  Surgeon: Edison Brooks MD;  Location: [facility] OR [address] [place] [address];  Service: General;;    HERNIA REPAIR      LYSIS OF ADHESIONS N/A 10/25/2018    Procedure: LYSIS, ADHESIONS;  Surgeon:  Kevin Caldwell MD;  Location: [facility] OR;  Service: General;  Laterality: N/A;    REPAIR OF INCARCERATED INCISIONAL HERNIA WITHOUT HISTORY OF PRIOR REPAIR N/A 10/25/2018    Procedure: REPAIR, HERNIA, INCISIONAL, INCARCERATED, WITHOUT HISTORY OF PRIOR REPAIR;  Surgeon: Kevin Caldwell MD;  Location: [facility] OR;  Service: General;  Laterality: N/A;        Review of Systems   Constitutional:  Negative for activity change, appetite change, chills, fatigue and fever.   HENT:  Negative for congestion, facial swelling, nosebleeds, rhinorrhea, sore throat and trouble swallowing.    Eyes:  Negative for photophobia, pain and visual disturbance.   Respiratory:  Negative for cough, shortness of breath and wheezing.    Cardiovascular:  Negative for chest pain, palpitations and leg swelling.   Gastrointestinal:  Negative for abdominal distention, abdominal pain, anal bleeding, blood in stool, constipation, diarrhea, nausea, rectal pain and vomiting.   Genitourinary:  Negative for difficulty urinating, dysuria, hematuria and vaginal bleeding.   Musculoskeletal:  Negative for arthralgias.   Skin:  Negative for color change, pallor, rash and wound.   Neurological:  Negative for dizziness, light-headedness, numbness and headaches.   Hematological:  Negative for adenopathy. Does not bruise/bleed easily.   Psychiatric/Behavioral:  The patient is nervous/anxious.      ?   A comprehensive 14-point review of systems was reviewed with patient and was negative other than as specified above.   ?     Objective:      Physical Exam  Vitals reviewed.   Constitutional:       Appearance: Normal appearance. She is not ill-appearing.   HENT:      Head: Normocephalic and atraumatic.      Right Ear: External ear normal.      Left Ear: External ear normal.      Nose: Nose normal.      Mouth/Throat:      Mouth: Mucous membranes are moist.      Pharynx: Oropharynx is clear.   Eyes:      Conjunctiva/sclera: Conjunctivae normal.   Cardiovascular:      Rate and Rhythm:  Normal rate and regular rhythm.      Heart sounds: Normal heart sounds.   Pulmonary:      Effort: Pulmonary effort is normal.      Breath sounds: Normal breath sounds.   Abdominal:      General: Abdomen is flat.   Genitourinary:     Comments: deferred  Musculoskeletal:         General: Normal range of motion.      Cervical back: Normal range of motion.      Right lower leg: No edema.      Left lower leg: No edema.   Skin:     General: Skin is warm and dry.      Capillary Refill: Capillary refill takes less than 2 seconds.   Neurological:      Mental Status: She is alert and oriented to person, place, and time.      Motor: No weakness.   Psychiatric:         Mood and Affect: Mood normal.         Behavior: Behavior normal.         Thought Content: Thought content normal.         Judgment: Judgment normal.         ?   Vitals:    11/22/22 0901   BP: 137/86   Pulse: 78   Temp: 98.2 °F (36.8 °C)      ?       ?   Laboratory:  ?   No visits with results within 1 Day(s) from this visit.   Latest known visit with results is:   Lab Visit on 11/21/2022   Component Date Value Ref Range Status    WBC 11/21/2022 3.77 (L)  3.90 - 12.70 K/uL Final    RBC 11/21/2022 3.75 (L)  4.00 - 5.40 M/uL Final    Hemoglobin 11/21/2022 10.4 (L)  12.0 - 16.0 g/dL Final    Hematocrit 11/21/2022 33.5 (L)  37.0 - 48.5 % Final    MCV 11/21/2022 89  82 - 98 fL Final    MCH 11/21/2022 27.7  27.0 - 31.0 pg Final    MCHC 11/21/2022 31.0 (L)  32.0 - 36.0 g/dL Final    RDW 11/21/2022 13.4  11.5 - 14.5 % Final    Platelets 11/21/2022 169  150 - 450 K/uL Final    MPV 11/21/2022 10.8  9.2 - 12.9 fL Final    Immature Granulocytes 11/21/2022 0.0  0.0 - 0.5 % Final    Gran # (ANC) 11/21/2022 2.4  1.8 - 7.7 K/uL Final    Immature Grans (Abs) 11/21/2022 0.00  0.00 - 0.04 K/uL Final    Lymph # 11/21/2022 1.0  1.0 - 4.8 K/uL Final    Mono # 11/21/2022 0.3  0.3 - 1.0 K/uL Final    Eos # 11/21/2022 0.1  0.0 - 0.5 K/uL Final    Baso # 11/21/2022 0.02  0.00 - 0.20 K/uL  Final    nRBC 11/21/2022 0  0 /100 WBC Final    Gran % 11/21/2022 63.1  38.0 - 73.0 % Final    Lymph % 11/21/2022 25.5  18.0 - 48.0 % Final    Mono % 11/21/2022 9.0  4.0 - 15.0 % Final    Eosinophil % 11/21/2022 1.9  0.0 - 8.0 % Final    Basophil % 11/21/2022 0.5  0.0 - 1.9 % Final    Differential Method 11/21/2022 Automated   Final    Sodium 11/21/2022 141  136 - 145 mmol/L Final    Potassium 11/21/2022 4.5  3.5 - 5.1 mmol/L Final    Chloride 11/21/2022 107  95 - 110 mmol/L Final    CO2 11/21/2022 25  23 - 29 mmol/L Final    Glucose 11/21/2022 96  70 - 110 mg/dL Final    BUN 11/21/2022 14  8 - 23 mg/dL Final    Creatinine 11/21/2022 0.8  0.5 - 1.4 mg/dL Final    Calcium 11/21/2022 9.8  8.7 - 10.5 mg/dL Final    Total Protein 11/21/2022 7.6  6.0 - 8.4 g/dL Final    Albumin 11/21/2022 3.9  3.5 - 5.2 g/dL Final    Total Bilirubin 11/21/2022 0.4  0.1 - 1.0 mg/dL Final    Alkaline Phosphatase 11/21/2022 118  55 - 135 U/L Final    AST 11/21/2022 12  10 - 40 U/L Final    ALT 11/21/2022 7 (L)  10 - 44 U/L Final    Anion Gap 11/21/2022 9  8 - 16 mmol/L Final    eGFR 11/21/2022 >60.0  >60 mL/min/1.73 m^2 Final      ?   Imaging:    No results found for this or any previous visit (from the past 2160 hour(s)).     Results for orders placed or performed during the hospital encounter of 10/13/22 (from the past 2160 hour(s))   CT Chest Without Contrast    Impression    1.  In this patient with reported ovarian cancer, there is no specific CT evidence of intrathoracic metastatic disease.    2.  6 mm opacity in the apical segment right upper lobe, which appears related to fibronodular scarring.    3.  Few additional nonspecific micro nodules in the right upper lobe.  Attention on continued surveillance imaging recommended.    4.  Other findings as above.    Electronically signed by resident: Tim Macias  Date:    10/13/2022  Time:    11:49    Electronically signed by: Rosalind Chavez  Date:    10/13/2022  Time:    14:12   Results  for orders placed or performed during the hospital encounter of 09/25/22 (from the past 2160 hour(s))   CT Renal Stone Study ABD Pelvis WO    Impression    Large pelvic mass.  Suspect malignancy.  Correlate clinically    Partial small bowel obstruction in the left upper hemiabdomen and inferior anterior abdominal wall.  Strangulation of the bowel loops in the subcutaneous fat of the anterior abdominal wall hernia not excluded.  Correlate clinically.    Herniation of colonic bowel loops in the anterior midline abdominal wall.    All CT scans   are performed using dose optimization techniques including the following: automated exposure control; adjustment of the mA and/or kV; use of iterative reconstruction technique.  Dose modulation was employed for ALARA by means of: Automated exposure control; adjustment of the mA and/or kV according to patient size (this includes techniques or standardized protocols for targeted exams where dose is matched to indication/reason for exam; i.e. extremities or head); and/or use of iterative reconstructive technique.      Electronically signed by: Carlos Guan  Date:    09/25/2022  Time:    13:52        ?   Assessment/Plan:     Problem List Items Addressed This Visit          Oncology    Endometrioid adenocarcinoma of ovary     Grade 1, tubo-ovarian: Stage III, ER weak to moderate in 95% tumor, MMR intact, NGS pending.  Imaging notable for pelvic mass however no definitive evidence of metastatic disease in chest.    CT CHEST 10/13/22 found:  --no specific CT evidence of intrathoracic metastatic disease.  --6 mm opacity in the apical segment right upper lobe, which appears related to fibronodular scarring.  --Few additional nonspecific micro nodules in the right upper lobe.  Attention on continued surveillance imaging recommended.     Labs reviewed, no concerning cytopenias  -ANC 2.4; Plts 169; LFTs 21/7; tbili 0.4  -Case discussed with oncologist  -Ok to proceed with C1D1 Carbo/Taxol  today    Follow-up in 3 weeks with CBC CMP prior and MD for C2D1 carbo/Taxol           Relevant Orders    CBC Auto Differential    Comprehensive Metabolic Panel    CANCER ANTIGEN 125    Iron deficiency anemia due to chronic blood loss     Lab Results   Component Value Date    HGB 10.4 (L) 11/21/2022     Lab Results   Component Value Date    IRON 18 (L) 09/29/2022    TRANSFERRIN 106 (L) 09/29/2022    TIBC 157 (L) 09/29/2022    FESATURATED 11 (L) 09/29/2022      Multifactorial with anemia of chronic illness/inflammation  Per primary oncologist plan for iron repletion with IV Venofer x5 doses  Dose 1 of 5 today  Please schedule accordingly               Med Onc Chart Routing      Follow up with physician 3 weeks. for C2D1 carbo/taxol   Follow up with TANNER    Infusion scheduling note Venofer 1/5 today   Injection scheduling note Please schedule future venofers accordingly   Labs CMP, CBC and other   Lab interval:     Imaging    Pharmacy appointment    Other referrals           TORY PfeifferP-C  Hematology/Oncology

## 2022-11-22 NOTE — NURSING
1215 pt c/o of hip pain 10/10. Taxol infusion stopped and flush bag opened. DIEGO Harp NP notified.    1218 DIEGO Harp NP ordered 80 mg Solumedrol and hold infusion until pain subsides. Infusion can be restarted at current rate when pt is back to baseline    1226 solumedrol given    1245 pt states pain has subsided. Infusion restarted at 84 ml/hr and will ramp as tolerated

## 2022-11-23 LAB — CANCER AG125 SERPL-ACNC: 8 U/ML (ref 0–30)

## 2022-11-25 ENCOUNTER — TELEPHONE (OUTPATIENT)
Dept: HEMATOLOGY/ONCOLOGY | Facility: CLINIC | Age: 82
End: 2022-11-25
Payer: MEDICARE

## 2022-11-25 NOTE — TELEPHONE ENCOUNTER
Spoke with patient who states she forgot to take her decadron and needed some instruction on what to do. I transferred patient to infusion RN to discuss.           ----- Message from Davi Nur sent at 11/25/2022 10:29 AM CST -----  Contact: pt  forgot to take her 2 tablets on Wed and wants to know if it was ok to take on Saturday and can be reached at [email or web address]

## 2022-11-30 ENCOUNTER — TELEPHONE (OUTPATIENT)
Dept: HEMATOLOGY/ONCOLOGY | Facility: CLINIC | Age: 82
End: 2022-11-30
Payer: MEDICARE

## 2022-11-30 ENCOUNTER — INFUSION (OUTPATIENT)
Dept: INFUSION THERAPY | Facility: HOSPITAL | Age: 82
End: 2022-11-30
Attending: INTERNAL MEDICINE
Payer: MEDICARE

## 2022-11-30 VITALS
HEART RATE: 82 BPM | SYSTOLIC BLOOD PRESSURE: 125 MMHG | TEMPERATURE: 98 F | DIASTOLIC BLOOD PRESSURE: 71 MMHG | OXYGEN SATURATION: 97 % | RESPIRATION RATE: 16 BRPM

## 2022-11-30 DIAGNOSIS — D50.0 IRON DEFICIENCY ANEMIA DUE TO CHRONIC BLOOD LOSS: Primary | ICD-10-CM

## 2022-11-30 PROCEDURE — 96374 THER/PROPH/DIAG INJ IV PUSH: CPT

## 2022-11-30 PROCEDURE — 25000003 PHARM REV CODE 250: Performed by: INTERNAL MEDICINE

## 2022-11-30 PROCEDURE — A4216 STERILE WATER/SALINE, 10 ML: HCPCS | Performed by: INTERNAL MEDICINE

## 2022-11-30 PROCEDURE — 63600175 PHARM REV CODE 636 W HCPCS: Performed by: INTERNAL MEDICINE

## 2022-11-30 RX ORDER — SODIUM CHLORIDE 0.9 % (FLUSH) 0.9 %
10 SYRINGE (ML) INJECTION
Status: DISCONTINUED | OUTPATIENT
Start: 2022-11-30 | End: 2022-11-30 | Stop reason: HOSPADM

## 2022-11-30 RX ADMIN — Medication 10 ML: at 12:11

## 2022-11-30 RX ADMIN — IRON SUCROSE 200 MG: 20 INJECTION, SOLUTION INTRAVENOUS at 12:11

## 2022-11-30 NOTE — TELEPHONE ENCOUNTER
----- Message from Alisha Palomares MD sent at 11/30/2022  4:16 PM CST -----  Please call patient to let her know that her germline genetic testing did not show any inherited mutations that may be associated with ovarian breast or other cancers in particular.  Good news.  I had given report to staff to be mailed to her home.

## 2022-11-30 NOTE — NURSING
Infusion # 2 of 5 - Venofer 200 mg  Last dose- 11/22/22      Recent labs? 11/21/22  Last Hem/Onc provider visit- Seen by Pha,NP on 11/22/22     Premeds-none     Venofer 200 mg administered IV per orders; see MAR and vitals for more details. Tolerated well without adverse events, discharged and ambulatory out of clinic.

## 2022-11-30 NOTE — TELEPHONE ENCOUNTER
Spoke with patient and informed of results. Also informed that copy will be mailed to her home address. Patient verbalized all understanding.

## 2022-11-30 NOTE — PLAN OF CARE
Plan of care reviewed with patient. Discussed if there are any new or ongoing concerns. Denies.   Problem: Adult Inpatient Plan of Care  Goal: Plan of Care Review  Outcome: Ongoing, Progressing  Flowsheets (Taken 11/30/2022 1422)  Plan of Care Reviewed With: patient  Goal: Patient-Specific Goal (Individualized)  Outcome: Ongoing, Progressing  Goal: Absence of Hospital-Acquired Illness or Injury  Outcome: Ongoing, Progressing  Intervention: Identify and Manage Fall Risk  Flowsheets (Taken 11/30/2022 1422)  Safety Promotion/Fall Prevention: in recliner, wheels locked  Goal: Optimal Comfort and Wellbeing  Outcome: Ongoing, Progressing  Intervention: Provide Person-Centered Care  Flowsheets (Taken 11/30/2022 1422)  Trust Relationship/Rapport:   care explained   reassurance provided   choices provided   thoughts/feelings acknowledged   empathic listening provided   emotional support provided   questions answered   questions encouraged

## 2022-12-06 ENCOUNTER — INFUSION (OUTPATIENT)
Dept: INFUSION THERAPY | Facility: HOSPITAL | Age: 82
End: 2022-12-06
Attending: INTERNAL MEDICINE
Payer: MEDICARE

## 2022-12-06 VITALS
TEMPERATURE: 98 F | DIASTOLIC BLOOD PRESSURE: 71 MMHG | HEART RATE: 88 BPM | OXYGEN SATURATION: 98 % | RESPIRATION RATE: 16 BRPM | SYSTOLIC BLOOD PRESSURE: 120 MMHG

## 2022-12-06 DIAGNOSIS — D50.0 IRON DEFICIENCY ANEMIA DUE TO CHRONIC BLOOD LOSS: Primary | ICD-10-CM

## 2022-12-06 PROCEDURE — 63600175 PHARM REV CODE 636 W HCPCS: Performed by: INTERNAL MEDICINE

## 2022-12-06 PROCEDURE — 96374 THER/PROPH/DIAG INJ IV PUSH: CPT

## 2022-12-06 PROCEDURE — 25000003 PHARM REV CODE 250: Performed by: INTERNAL MEDICINE

## 2022-12-06 RX ORDER — SODIUM CHLORIDE 0.9 % (FLUSH) 0.9 %
10 SYRINGE (ML) INJECTION
Status: DISCONTINUED | OUTPATIENT
Start: 2022-12-06 | End: 2022-12-06 | Stop reason: HOSPADM

## 2022-12-06 RX ADMIN — IRON SUCROSE 200 MG: 20 INJECTION, SOLUTION INTRAVENOUS at 09:12

## 2022-12-06 RX ADMIN — SODIUM CHLORIDE: 9 INJECTION, SOLUTION INTRAVENOUS at 09:12

## 2022-12-06 NOTE — PLAN OF CARE
Problem: Adult Inpatient Plan of Care  Goal: Plan of Care Review  Outcome: Ongoing, Progressing  Flowsheets (Taken 12/6/2022 0958)  Plan of Care Reviewed With: patient  Goal: Patient-Specific Goal (Individualized)  Outcome: Ongoing, Progressing  Flowsheets (Taken 12/6/2022 0958)  Anxieties, Fears or Concerns: denies  Individualized Care Needs: feet elevated, warm blanket and pillow provided  Patient-Specific Goals (Include Timeframe): tolerate infusion today  Goal: Optimal Comfort and Wellbeing  Outcome: Ongoing, Progressing  Intervention: Provide Person-Centered Care  Flowsheets (Taken 12/6/2022 0958)  Trust Relationship/Rapport:   care explained   questions encouraged   choices provided   reassurance provided   emotional support provided   thoughts/feelings acknowledged   empathic listening provided   questions answered     Problem: Anemia  Goal: Anemia Symptom Improvement  Outcome: Ongoing, Progressing  Intervention: Monitor and Manage Anemia  Flowsheets (Taken 12/6/2022 0958)  Safety Promotion/Fall Prevention:   assistive device/personal item within reach   instructed to call staff for mobility   medications reviewed   in recliner, wheels locked  Fatigue Management: frequent rest breaks encouraged

## 2022-12-12 ENCOUNTER — LAB VISIT (OUTPATIENT)
Dept: LAB | Facility: HOSPITAL | Age: 82
End: 2022-12-12
Attending: INTERNAL MEDICINE
Payer: MEDICARE

## 2022-12-12 DIAGNOSIS — C56.9 ENDOMETRIOID ADENOCARCINOMA OF OVARY, UNSPECIFIED LATERALITY: ICD-10-CM

## 2022-12-12 LAB
ALBUMIN SERPL BCP-MCNC: 3.9 G/DL (ref 3.5–5.2)
ALP SERPL-CCNC: 136 U/L (ref 55–135)
ALT SERPL W/O P-5'-P-CCNC: 12 U/L (ref 10–44)
ANION GAP SERPL CALC-SCNC: 10 MMOL/L (ref 8–16)
AST SERPL-CCNC: 16 U/L (ref 10–40)
BASOPHILS # BLD AUTO: 0.03 K/UL (ref 0–0.2)
BASOPHILS NFR BLD: 0.9 % (ref 0–1.9)
BILIRUB SERPL-MCNC: 0.4 MG/DL (ref 0.1–1)
BUN SERPL-MCNC: 10 MG/DL (ref 8–23)
CALCIUM SERPL-MCNC: 10 MG/DL (ref 8.7–10.5)
CHLORIDE SERPL-SCNC: 107 MMOL/L (ref 95–110)
CO2 SERPL-SCNC: 25 MMOL/L (ref 23–29)
CREAT SERPL-MCNC: 0.8 MG/DL (ref 0.5–1.4)
DIFFERENTIAL METHOD: ABNORMAL
EOSINOPHIL # BLD AUTO: 0 K/UL (ref 0–0.5)
EOSINOPHIL NFR BLD: 0.9 % (ref 0–8)
ERYTHROCYTE [DISTWIDTH] IN BLOOD BY AUTOMATED COUNT: 14.4 % (ref 11.5–14.5)
EST. GFR  (NO RACE VARIABLE): >60 ML/MIN/1.73 M^2
GLUCOSE SERPL-MCNC: 116 MG/DL (ref 70–110)
HCT VFR BLD AUTO: 31.6 % (ref 37–48.5)
HGB BLD-MCNC: 10.1 G/DL (ref 12–16)
IMM GRANULOCYTES # BLD AUTO: 0 K/UL (ref 0–0.04)
IMM GRANULOCYTES NFR BLD AUTO: 0 % (ref 0–0.5)
LYMPHOCYTES # BLD AUTO: 0.8 K/UL (ref 1–4.8)
LYMPHOCYTES NFR BLD: 24.1 % (ref 18–48)
MCH RBC QN AUTO: 28.4 PG (ref 27–31)
MCHC RBC AUTO-ENTMCNC: 32 G/DL (ref 32–36)
MCV RBC AUTO: 89 FL (ref 82–98)
MONOCYTES # BLD AUTO: 0.3 K/UL (ref 0.3–1)
MONOCYTES NFR BLD: 8.7 % (ref 4–15)
NEUTROPHILS # BLD AUTO: 2.1 K/UL (ref 1.8–7.7)
NEUTROPHILS NFR BLD: 65.4 % (ref 38–73)
NRBC BLD-RTO: 0 /100 WBC
PLATELET # BLD AUTO: 183 K/UL (ref 150–450)
PMV BLD AUTO: 9.9 FL (ref 9.2–12.9)
POTASSIUM SERPL-SCNC: 4.4 MMOL/L (ref 3.5–5.1)
PROT SERPL-MCNC: 7.6 G/DL (ref 6–8.4)
RBC # BLD AUTO: 3.56 M/UL (ref 4–5.4)
SODIUM SERPL-SCNC: 142 MMOL/L (ref 136–145)
WBC # BLD AUTO: 3.23 K/UL (ref 3.9–12.7)

## 2022-12-12 PROCEDURE — 36415 COLL VENOUS BLD VENIPUNCTURE: CPT | Mod: PO

## 2022-12-12 PROCEDURE — 86304 IMMUNOASSAY TUMOR CA 125: CPT

## 2022-12-12 PROCEDURE — 80053 COMPREHEN METABOLIC PANEL: CPT | Mod: PO

## 2022-12-12 PROCEDURE — 85025 COMPLETE CBC W/AUTO DIFF WBC: CPT | Mod: PO

## 2022-12-13 ENCOUNTER — INFUSION (OUTPATIENT)
Dept: INFUSION THERAPY | Facility: HOSPITAL | Age: 82
End: 2022-12-13
Attending: INTERNAL MEDICINE
Payer: MEDICARE

## 2022-12-13 ENCOUNTER — OFFICE VISIT (OUTPATIENT)
Dept: HEMATOLOGY/ONCOLOGY | Facility: CLINIC | Age: 82
End: 2022-12-13
Payer: MEDICARE

## 2022-12-13 VITALS
SYSTOLIC BLOOD PRESSURE: 137 MMHG | DIASTOLIC BLOOD PRESSURE: 80 MMHG | WEIGHT: 145.31 LBS | RESPIRATION RATE: 20 BRPM | HEIGHT: 62 IN | HEART RATE: 73 BPM | BODY MASS INDEX: 26.74 KG/M2 | TEMPERATURE: 98 F | OXYGEN SATURATION: 100 %

## 2022-12-13 VITALS
SYSTOLIC BLOOD PRESSURE: 130 MMHG | HEART RATE: 81 BPM | RESPIRATION RATE: 18 BRPM | TEMPERATURE: 98 F | OXYGEN SATURATION: 98 % | DIASTOLIC BLOOD PRESSURE: 70 MMHG

## 2022-12-13 DIAGNOSIS — C56.9 ENDOMETRIOID ADENOCARCINOMA OF OVARY, UNSPECIFIED LATERALITY: Primary | ICD-10-CM

## 2022-12-13 DIAGNOSIS — D50.0 IRON DEFICIENCY ANEMIA DUE TO CHRONIC BLOOD LOSS: Primary | ICD-10-CM

## 2022-12-13 DIAGNOSIS — C56.9 ENDOMETRIOID ADENOCARCINOMA OF OVARY, UNSPECIFIED LATERALITY: ICD-10-CM

## 2022-12-13 LAB — CANCER AG125 SERPL-ACNC: 9 U/ML (ref 0–30)

## 2022-12-13 PROCEDURE — 1126F PR PAIN SEVERITY QUANTIFIED, NO PAIN PRESENT: ICD-10-PCS | Mod: CPTII,S$GLB,, | Performed by: INTERNAL MEDICINE

## 2022-12-13 PROCEDURE — 1101F PR PT FALLS ASSESS DOC 0-1 FALLS W/OUT INJ PAST YR: ICD-10-PCS | Mod: CPTII,S$GLB,, | Performed by: INTERNAL MEDICINE

## 2022-12-13 PROCEDURE — 3075F PR MOST RECENT SYSTOLIC BLOOD PRESS GE 130-139MM HG: ICD-10-PCS | Mod: CPTII,S$GLB,, | Performed by: INTERNAL MEDICINE

## 2022-12-13 PROCEDURE — 3288F PR FALLS RISK ASSESSMENT DOCUMENTED: ICD-10-PCS | Mod: CPTII,S$GLB,, | Performed by: INTERNAL MEDICINE

## 2022-12-13 PROCEDURE — 96413 CHEMO IV INFUSION 1 HR: CPT

## 2022-12-13 PROCEDURE — 96417 CHEMO IV INFUS EACH ADDL SEQ: CPT

## 2022-12-13 PROCEDURE — 1101F PT FALLS ASSESS-DOCD LE1/YR: CPT | Mod: CPTII,S$GLB,, | Performed by: INTERNAL MEDICINE

## 2022-12-13 PROCEDURE — 96367 TX/PROPH/DG ADDL SEQ IV INF: CPT

## 2022-12-13 PROCEDURE — 3079F DIAST BP 80-89 MM HG: CPT | Mod: CPTII,S$GLB,, | Performed by: INTERNAL MEDICINE

## 2022-12-13 PROCEDURE — 3079F PR MOST RECENT DIASTOLIC BLOOD PRESSURE 80-89 MM HG: ICD-10-PCS | Mod: CPTII,S$GLB,, | Performed by: INTERNAL MEDICINE

## 2022-12-13 PROCEDURE — 99214 OFFICE O/P EST MOD 30 MIN: CPT | Mod: S$GLB,,, | Performed by: INTERNAL MEDICINE

## 2022-12-13 PROCEDURE — 3075F SYST BP GE 130 - 139MM HG: CPT | Mod: CPTII,S$GLB,, | Performed by: INTERNAL MEDICINE

## 2022-12-13 PROCEDURE — 1159F MED LIST DOCD IN RCRD: CPT | Mod: CPTII,S$GLB,, | Performed by: INTERNAL MEDICINE

## 2022-12-13 PROCEDURE — 99999 PR PBB SHADOW E&M-EST. PATIENT-LVL IV: ICD-10-PCS | Mod: PBBFAC,,, | Performed by: INTERNAL MEDICINE

## 2022-12-13 PROCEDURE — 25000003 PHARM REV CODE 250: Performed by: INTERNAL MEDICINE

## 2022-12-13 PROCEDURE — 63600175 PHARM REV CODE 636 W HCPCS: Performed by: INTERNAL MEDICINE

## 2022-12-13 PROCEDURE — 96415 CHEMO IV INFUSION ADDL HR: CPT

## 2022-12-13 PROCEDURE — 1159F PR MEDICATION LIST DOCUMENTED IN MEDICAL RECORD: ICD-10-PCS | Mod: CPTII,S$GLB,, | Performed by: INTERNAL MEDICINE

## 2022-12-13 PROCEDURE — 1160F PR REVIEW ALL MEDS BY PRESCRIBER/CLIN PHARMACIST DOCUMENTED: ICD-10-PCS | Mod: CPTII,S$GLB,, | Performed by: INTERNAL MEDICINE

## 2022-12-13 PROCEDURE — 96375 TX/PRO/DX INJ NEW DRUG ADDON: CPT

## 2022-12-13 PROCEDURE — 1126F AMNT PAIN NOTED NONE PRSNT: CPT | Mod: CPTII,S$GLB,, | Performed by: INTERNAL MEDICINE

## 2022-12-13 PROCEDURE — 99214 PR OFFICE/OUTPT VISIT, EST, LEVL IV, 30-39 MIN: ICD-10-PCS | Mod: S$GLB,,, | Performed by: INTERNAL MEDICINE

## 2022-12-13 PROCEDURE — 3288F FALL RISK ASSESSMENT DOCD: CPT | Mod: CPTII,S$GLB,, | Performed by: INTERNAL MEDICINE

## 2022-12-13 PROCEDURE — 99999 PR PBB SHADOW E&M-EST. PATIENT-LVL IV: CPT | Mod: PBBFAC,,, | Performed by: INTERNAL MEDICINE

## 2022-12-13 PROCEDURE — 1160F RVW MEDS BY RX/DR IN RCRD: CPT | Mod: CPTII,S$GLB,, | Performed by: INTERNAL MEDICINE

## 2022-12-13 RX ORDER — DIPHENHYDRAMINE HYDROCHLORIDE 50 MG/ML
50 INJECTION INTRAMUSCULAR; INTRAVENOUS ONCE AS NEEDED
Status: CANCELLED | OUTPATIENT
Start: 2022-12-13

## 2022-12-13 RX ORDER — FAMOTIDINE 10 MG/ML
20 INJECTION INTRAVENOUS
Status: COMPLETED | OUTPATIENT
Start: 2022-12-13 | End: 2022-12-13

## 2022-12-13 RX ORDER — SODIUM CHLORIDE 0.9 % (FLUSH) 0.9 %
10 SYRINGE (ML) INJECTION
Status: CANCELLED | OUTPATIENT
Start: 2022-12-13

## 2022-12-13 RX ORDER — FAMOTIDINE 10 MG/ML
20 INJECTION INTRAVENOUS
Status: CANCELLED | OUTPATIENT
Start: 2022-12-13

## 2022-12-13 RX ORDER — HEPARIN 100 UNIT/ML
500 SYRINGE INTRAVENOUS
Status: CANCELLED | OUTPATIENT
Start: 2022-12-13

## 2022-12-13 RX ORDER — EPINEPHRINE 0.3 MG/.3ML
0.3 INJECTION SUBCUTANEOUS ONCE AS NEEDED
Status: CANCELLED | OUTPATIENT
Start: 2022-12-13

## 2022-12-13 RX ADMIN — CARBOPLATIN 415 MG: 10 INJECTION, SOLUTION INTRAVENOUS at 02:12

## 2022-12-13 RX ADMIN — APREPITANT 130 MG: 130 INJECTION, EMULSION INTRAVENOUS at 10:12

## 2022-12-13 RX ADMIN — SODIUM CHLORIDE 500 ML: 0.9 INJECTION, SOLUTION INTRAVENOUS at 03:12

## 2022-12-13 RX ADMIN — IRON SUCROSE 200 MG: 20 INJECTION, SOLUTION INTRAVENOUS at 09:12

## 2022-12-13 RX ADMIN — PACLITAXEL 228 MG: 6 INJECTION, SOLUTION INTRAVENOUS at 11:12

## 2022-12-13 RX ADMIN — FAMOTIDINE 20 MG: 10 INJECTION INTRAVENOUS at 10:12

## 2022-12-13 RX ADMIN — DIPHENHYDRAMINE HYDROCHLORIDE 50 MG: 50 INJECTION, SOLUTION INTRAMUSCULAR; INTRAVENOUS at 10:12

## 2022-12-13 RX ADMIN — DEXAMETHASONE SODIUM PHOSPHATE 0.25 MG: 4 INJECTION, SOLUTION INTRA-ARTICULAR; INTRALESIONAL; INTRAMUSCULAR; INTRAVENOUS; SOFT TISSUE at 10:12

## 2022-12-13 NOTE — PROGRESS NOTES
Subjective:      Patient ID: Mariaelena Sheffield is a 82 y.o. female.    Chief Complaint: No chief complaint on file.      HPI: This is an 82 year old woman with stage III ovarian (endometrioid) cancer. She had surgical resection in September 2022. She is on chemotherapy with Carboplatin/ Taxol - q 3 weeks cycle. She is scheduled for cycle 2 today. She also received IV iron for iron deficiency anemia. She is followed by Dr. Palomares.    Review of Systems   Constitutional:  Negative for chills, fatigue and fever.   HENT:  Negative for sore throat.    Respiratory:  Negative for cough and shortness of breath.    Cardiovascular:  Negative for chest pain and palpitations.   Gastrointestinal:  Negative for blood in stool.   Genitourinary:  Negative for hematuria.   Neurological:  Negative for dizziness and headaches.   Psychiatric/Behavioral:  Negative for agitation and confusion.      Objective:     Physical Exam  Constitutional:       Appearance: Normal appearance.   HENT:      Head: Normocephalic and atraumatic.      Mouth/Throat:      Pharynx: No oropharyngeal exudate or posterior oropharyngeal erythema.   Eyes:      Pupils: Pupils are equal, round, and reactive to light.   Pulmonary:      Breath sounds: No wheezing, rhonchi or rales.   Abdominal:      Tenderness: There is no guarding or rebound.   Musculoskeletal:      Cervical back: Neck supple.      Right lower leg: No edema.      Left lower leg: No edema.   Skin:     Findings: No erythema.   Neurological:      General: No focal deficit present.      Mental Status: She is alert and oriented to person, place, and time.   Psychiatric:         Mood and Affect: Mood normal.         Behavior: Behavior normal.         Thought Content: Thought content normal.         Judgment: Judgment normal.       Assessment:     Problem List Items Addressed This Visit    None       Ovarian Cancer (endometrioid) - stage III     Plan:     Patient is scheduled for C2D1 Carboplatin/Taxol  - Q 3 weeks cycle.   Labs today show ANC 2.1. HB 10.1, platelet 183. CA - 125 is 9.  2 . RTC; 3 weeks - C3 - see Dr. Palomares - CBC/CMP/CA -125.

## 2022-12-13 NOTE — PLAN OF CARE
Discussed plan of care with pt. Addressed any and ongoing concerns. Pt denies    Problem: Adult Inpatient Plan of Care  Goal: Plan of Care Review  Outcome: Ongoing, Progressing  Flowsheets (Taken 12/13/2022 1417)  Plan of Care Reviewed With: patient  Goal: Patient-Specific Goal (Individualized)  Outcome: Ongoing, Progressing  Goal: Absence of Hospital-Acquired Illness or Injury  Outcome: Ongoing, Progressing  Goal: Optimal Comfort and Wellbeing  Outcome: Ongoing, Progressing  Intervention: Monitor Pain and Promote Comfort  Flowsheets (Taken 12/13/2022 1417)  Pain Management Interventions:   quiet environment facilitated   warm blanket provided  Intervention: Provide Person-Centered Care  Flowsheets (Taken 12/13/2022 1417)  Trust Relationship/Rapport:   questions answered   empathic listening provided

## 2022-12-20 ENCOUNTER — INFUSION (OUTPATIENT)
Dept: INFUSION THERAPY | Facility: HOSPITAL | Age: 82
End: 2022-12-20
Attending: INTERNAL MEDICINE
Payer: MEDICARE

## 2022-12-20 VITALS
DIASTOLIC BLOOD PRESSURE: 67 MMHG | SYSTOLIC BLOOD PRESSURE: 121 MMHG | HEART RATE: 84 BPM | OXYGEN SATURATION: 98 % | TEMPERATURE: 98 F

## 2022-12-20 DIAGNOSIS — D50.0 IRON DEFICIENCY ANEMIA DUE TO CHRONIC BLOOD LOSS: Primary | ICD-10-CM

## 2022-12-20 PROCEDURE — 25000003 PHARM REV CODE 250: Performed by: INTERNAL MEDICINE

## 2022-12-20 PROCEDURE — 63600175 PHARM REV CODE 636 W HCPCS: Performed by: INTERNAL MEDICINE

## 2022-12-20 PROCEDURE — 96374 THER/PROPH/DIAG INJ IV PUSH: CPT

## 2022-12-20 RX ADMIN — SODIUM CHLORIDE: 0.9 INJECTION, SOLUTION INTRAVENOUS at 10:12

## 2022-12-20 RX ADMIN — IRON SUCROSE 200 MG: 20 INJECTION, SOLUTION INTRAVENOUS at 10:12

## 2022-12-20 NOTE — PLAN OF CARE
Problem: Adult Inpatient Plan of Care  Goal: Plan of Care Review  Outcome: Ongoing, Progressing  Flowsheets (Taken 12/20/2022 1009)  Plan of Care Reviewed With: patient  Goal: Patient-Specific Goal (Individualized)  Outcome: Ongoing, Progressing  Flowsheets (Taken 12/20/2022 1009)  Anxieties, Fears or Concerns: none  Individualized Care Needs: eet up, blanket  Patient-Specific Goals (Include Timeframe): tolerate iron

## 2022-12-20 NOTE — DISCHARGE INSTRUCTIONS
THANKS FOR ALLOWING ME TO CARE FOR YOU TODAY!!! ~Virginia          THANKS FOR CHOOSING [facility]!!!      [facility]  [address] [facility]  [id number] [place] [address] Drive  [phone] phone     [phone] fax  Hours of Operation: Monday- Friday 8:00am- 5:00pm  After hours phone  [phone]  Hematology / Oncology Physicians on call      SHERINE Burrell Dr., Dr., NP Sydney Prescott, LEXX Harp, JAZIEL Ramos    Please call with any concerns regarding your appointment today.

## 2022-12-20 NOTE — PLAN OF CARE
Problem: Adult Inpatient Plan of Care  Goal: Plan of Care Review  12/20/2022 1030 by Holli Desouza RN  Outcome: Ongoing, Progressing  Flowsheets (Taken 12/20/2022 1030)  Plan of Care Reviewed With: patient  12/20/2022 1009 by Holli Desouza RN  Outcome: Ongoing, Progressing  Flowsheets (Taken 12/20/2022 1009)  Plan of Care Reviewed With: patient  Goal: Patient-Specific Goal (Individualized)  12/20/2022 1030 by Holli Desouza RN  Outcome: Ongoing, Progressing  Flowsheets (Taken 12/20/2022 1030)  Anxieties, Fears or Concerns: none  Individualized Care Needs: feet up, blanket  Patient-Specific Goals (Include Timeframe): tolerate treatment  12/20/2022 1009 by Holli Desouza RN  Outcome: Ongoing, Progressing  Flowsheets (Taken 12/20/2022 1009)  Anxieties, Fears or Concerns: none  Individualized Care Needs: eet up, blanket  Patient-Specific Goals (Include Timeframe): tolerate iron     Problem: Fall Injury Risk  Goal: Absence of Fall and Fall-Related Injury  Outcome: Ongoing, Progressing  Intervention: Identify and Manage Contributors  Flowsheets (Taken 12/20/2022 1030)  Self-Care Promotion: BADL personal routines maintained  Medication Review/Management: medications reviewed  Intervention: Promote Injury-Free Environment  Flowsheets (Taken 12/20/2022 1030)  Safety Promotion/Fall Prevention:   in recliner, wheels locked   nonskid shoes/socks when out of bed   room near unit station

## 2023-01-03 ENCOUNTER — INFUSION (OUTPATIENT)
Dept: INFUSION THERAPY | Facility: HOSPITAL | Age: 83
End: 2023-01-03
Attending: INTERNAL MEDICINE
Payer: MEDICARE

## 2023-01-03 ENCOUNTER — OFFICE VISIT (OUTPATIENT)
Dept: HEMATOLOGY/ONCOLOGY | Facility: CLINIC | Age: 83
End: 2023-01-03
Payer: MEDICARE

## 2023-01-03 VITALS
HEART RATE: 81 BPM | OXYGEN SATURATION: 98 % | HEIGHT: 62 IN | TEMPERATURE: 98 F | DIASTOLIC BLOOD PRESSURE: 82 MMHG | BODY MASS INDEX: 26.86 KG/M2 | SYSTOLIC BLOOD PRESSURE: 130 MMHG | WEIGHT: 145.94 LBS

## 2023-01-03 VITALS
HEART RATE: 83 BPM | DIASTOLIC BLOOD PRESSURE: 76 MMHG | OXYGEN SATURATION: 96 % | TEMPERATURE: 97 F | RESPIRATION RATE: 16 BRPM | SYSTOLIC BLOOD PRESSURE: 133 MMHG

## 2023-01-03 DIAGNOSIS — E78.5 HYPERLIPIDEMIA ASSOCIATED WITH TYPE 2 DIABETES MELLITUS: ICD-10-CM

## 2023-01-03 DIAGNOSIS — E78.2 MODERATE MIXED HYPERLIPIDEMIA NOT REQUIRING STATIN THERAPY: ICD-10-CM

## 2023-01-03 DIAGNOSIS — C56.9 ENDOMETRIOID ADENOCARCINOMA OF OVARY, UNSPECIFIED LATERALITY: Primary | ICD-10-CM

## 2023-01-03 DIAGNOSIS — T45.1X5A IMMUNODEFICIENCY DUE TO CHEMOTHERAPY: ICD-10-CM

## 2023-01-03 DIAGNOSIS — D47.1 CHRONIC MYELOPROLIFERATIVE DISEASE: ICD-10-CM

## 2023-01-03 DIAGNOSIS — C56.3 MALIGNANT NEOPLASM OF BOTH OVARIES: ICD-10-CM

## 2023-01-03 DIAGNOSIS — E11.9 TYPE 2 DIABETES MELLITUS WITHOUT COMPLICATION, WITHOUT LONG-TERM CURRENT USE OF INSULIN: Chronic | ICD-10-CM

## 2023-01-03 DIAGNOSIS — I10 ESSENTIAL HYPERTENSION: Chronic | ICD-10-CM

## 2023-01-03 DIAGNOSIS — D84.821 IMMUNODEFICIENCY DUE TO CHEMOTHERAPY: ICD-10-CM

## 2023-01-03 DIAGNOSIS — Z79.899 IMMUNODEFICIENCY DUE TO CHEMOTHERAPY: ICD-10-CM

## 2023-01-03 DIAGNOSIS — E11.69 HYPERLIPIDEMIA ASSOCIATED WITH TYPE 2 DIABETES MELLITUS: ICD-10-CM

## 2023-01-03 PROBLEM — Z79.69 IMMUNODEFICIENCY DUE TO CHEMOTHERAPY: Status: ACTIVE | Noted: 2023-01-03

## 2023-01-03 PROCEDURE — 96415 CHEMO IV INFUSION ADDL HR: CPT

## 2023-01-03 PROCEDURE — 1101F PR PT FALLS ASSESS DOC 0-1 FALLS W/OUT INJ PAST YR: ICD-10-PCS | Mod: CPTII,S$GLB,, | Performed by: INTERNAL MEDICINE

## 2023-01-03 PROCEDURE — 3075F SYST BP GE 130 - 139MM HG: CPT | Mod: CPTII,S$GLB,, | Performed by: INTERNAL MEDICINE

## 2023-01-03 PROCEDURE — 25000003 PHARM REV CODE 250: Performed by: INTERNAL MEDICINE

## 2023-01-03 PROCEDURE — 63600175 PHARM REV CODE 636 W HCPCS: Mod: JG | Performed by: INTERNAL MEDICINE

## 2023-01-03 PROCEDURE — 3288F FALL RISK ASSESSMENT DOCD: CPT | Mod: CPTII,S$GLB,, | Performed by: INTERNAL MEDICINE

## 2023-01-03 PROCEDURE — 1126F PR PAIN SEVERITY QUANTIFIED, NO PAIN PRESENT: ICD-10-PCS | Mod: CPTII,S$GLB,, | Performed by: INTERNAL MEDICINE

## 2023-01-03 PROCEDURE — 96375 TX/PRO/DX INJ NEW DRUG ADDON: CPT

## 2023-01-03 PROCEDURE — 3079F PR MOST RECENT DIASTOLIC BLOOD PRESSURE 80-89 MM HG: ICD-10-PCS | Mod: CPTII,S$GLB,, | Performed by: INTERNAL MEDICINE

## 2023-01-03 PROCEDURE — 99999 PR PBB SHADOW E&M-EST. PATIENT-LVL IV: CPT | Mod: PBBFAC,,, | Performed by: INTERNAL MEDICINE

## 2023-01-03 PROCEDURE — 3075F PR MOST RECENT SYSTOLIC BLOOD PRESS GE 130-139MM HG: ICD-10-PCS | Mod: CPTII,S$GLB,, | Performed by: INTERNAL MEDICINE

## 2023-01-03 PROCEDURE — 1101F PT FALLS ASSESS-DOCD LE1/YR: CPT | Mod: CPTII,S$GLB,, | Performed by: INTERNAL MEDICINE

## 2023-01-03 PROCEDURE — 3079F DIAST BP 80-89 MM HG: CPT | Mod: CPTII,S$GLB,, | Performed by: INTERNAL MEDICINE

## 2023-01-03 PROCEDURE — 99215 OFFICE O/P EST HI 40 MIN: CPT | Mod: S$GLB,,, | Performed by: INTERNAL MEDICINE

## 2023-01-03 PROCEDURE — 96367 TX/PROPH/DG ADDL SEQ IV INF: CPT

## 2023-01-03 PROCEDURE — 99999 PR PBB SHADOW E&M-EST. PATIENT-LVL IV: ICD-10-PCS | Mod: PBBFAC,,, | Performed by: INTERNAL MEDICINE

## 2023-01-03 PROCEDURE — 96417 CHEMO IV INFUS EACH ADDL SEQ: CPT

## 2023-01-03 PROCEDURE — 99215 PR OFFICE/OUTPT VISIT, EST, LEVL V, 40-54 MIN: ICD-10-PCS | Mod: S$GLB,,, | Performed by: INTERNAL MEDICINE

## 2023-01-03 PROCEDURE — 96413 CHEMO IV INFUSION 1 HR: CPT

## 2023-01-03 PROCEDURE — 1126F AMNT PAIN NOTED NONE PRSNT: CPT | Mod: CPTII,S$GLB,, | Performed by: INTERNAL MEDICINE

## 2023-01-03 PROCEDURE — 3288F PR FALLS RISK ASSESSMENT DOCUMENTED: ICD-10-PCS | Mod: CPTII,S$GLB,, | Performed by: INTERNAL MEDICINE

## 2023-01-03 RX ORDER — DIPHENHYDRAMINE HYDROCHLORIDE 50 MG/ML
50 INJECTION INTRAMUSCULAR; INTRAVENOUS ONCE AS NEEDED
Status: CANCELLED | OUTPATIENT
Start: 2023-01-03

## 2023-01-03 RX ORDER — EPINEPHRINE 0.3 MG/.3ML
0.3 INJECTION SUBCUTANEOUS ONCE AS NEEDED
Status: CANCELLED | OUTPATIENT
Start: 2023-01-03

## 2023-01-03 RX ORDER — HEPARIN 100 UNIT/ML
500 SYRINGE INTRAVENOUS
Status: CANCELLED | OUTPATIENT
Start: 2023-01-03

## 2023-01-03 RX ORDER — FAMOTIDINE 10 MG/ML
20 INJECTION INTRAVENOUS
Status: COMPLETED | OUTPATIENT
Start: 2023-01-03 | End: 2023-01-03

## 2023-01-03 RX ORDER — FAMOTIDINE 10 MG/ML
20 INJECTION INTRAVENOUS
Status: CANCELLED | OUTPATIENT
Start: 2023-01-03

## 2023-01-03 RX ORDER — SODIUM CHLORIDE 0.9 % (FLUSH) 0.9 %
10 SYRINGE (ML) INJECTION
Status: CANCELLED | OUTPATIENT
Start: 2023-01-03

## 2023-01-03 RX ADMIN — APREPITANT 130 MG: 130 INJECTION, EMULSION INTRAVENOUS at 10:01

## 2023-01-03 RX ADMIN — CARBOPLATIN 420 MG: 10 INJECTION, SOLUTION INTRAVENOUS at 01:01

## 2023-01-03 RX ADMIN — PACLITAXEL 228 MG: 6 INJECTION, SOLUTION INTRAVENOUS at 10:01

## 2023-01-03 RX ADMIN — DIPHENHYDRAMINE HYDROCHLORIDE 50 MG: 50 INJECTION, SOLUTION INTRAMUSCULAR; INTRAVENOUS at 10:01

## 2023-01-03 RX ADMIN — SODIUM CHLORIDE 500 ML: 0.9 INJECTION, SOLUTION INTRAVENOUS at 02:01

## 2023-01-03 RX ADMIN — DEXAMETHASONE SODIUM PHOSPHATE 0.25 MG: 4 INJECTION, SOLUTION INTRA-ARTICULAR; INTRALESIONAL; INTRAMUSCULAR; INTRAVENOUS; SOFT TISSUE at 09:01

## 2023-01-03 RX ADMIN — FAMOTIDINE 20 MG: 10 INJECTION INTRAVENOUS at 10:01

## 2023-01-03 NOTE — PLAN OF CARE
Plan of care reviewed with patient. Discussed if there are any new or ongoing concerns. Denies.    Problem: Adult Inpatient Plan of Care  Goal: Plan of Care Review  Outcome: Ongoing, Progressing  Goal: Patient-Specific Goal (Individualized)  Outcome: Ongoing, Progressing  Goal: Absence of Hospital-Acquired Illness or Injury  Outcome: Ongoing, Progressing  Intervention: Identify and Manage Fall Risk  Flowsheets (Taken 1/3/2023 1123)  Safety Promotion/Fall Prevention: in recliner, wheels locked  Goal: Optimal Comfort and Wellbeing  Outcome: Ongoing, Progressing  Intervention: Provide Person-Centered Care  Flowsheets (Taken 1/3/2023 1123)  Trust Relationship/Rapport:   care explained   reassurance provided   choices provided   thoughts/feelings acknowledged   emotional support provided   empathic listening provided   questions answered   questions encouraged

## 2023-01-03 NOTE — PROGRESS NOTES
Subjective:       Patient ID: Mariaelena Sheffield is a 82 y.o. female.    Chief Complaint: Results, Chemotherapy, and Cancer    HPI:  82-year-old female stage III ovarian carcinoma.  Patient presents for cycle 3 Taxol and carboplatin.  Patient is tolerating therapy well denies nausea vomiting fevers chills night sweats ECOG status 1    Past Medical History:   Diagnosis Date    Cancer     breast cancer R     Diabetes mellitus     GERD (gastroesophageal reflux disease)     Gout     Hypercholesteremia     Hypertension      History reviewed. No pertinent family history.  Social History     Socioeconomic History    Marital status:    Tobacco Use    Smoking status: Never    Smokeless tobacco: Never   Substance and Sexual Activity    Alcohol use: Yes     Comment: socially  No alcohol prior to sx    Drug use: No   Social History Narrative    Lives with son. Surrogate decision makers: Son, Bj Sheffield [phone] and Son, Blair Sheffield [phone].     Past Surgical History:   Procedure Laterality Date    ABDOMINAL SURGERY      BREAST BIOPSY      BREAST SURGERY Right     Breast biopsy    CATARACT EXTRACTION, BILATERAL      EXCISION OF PELVIC MASS N/A 9/28/2022    Procedure: EXCISION, MASS, PELVIS;  Surgeon: Edison Brooks MD;  Location: [address];  Service: General;  Laterality: N/A;    FLEXIBLE SIGMOIDOSCOPY  9/28/2022    Procedure: SIGMOIDOSCOPY, FLEXIBLE;  Surgeon: Edison Brooks MD;  Location: [facility] OR [address] [place] [address];  Service: General;;    HERNIA REPAIR      LYSIS OF ADHESIONS N/A 10/25/2018    Procedure: LYSIS, ADHESIONS;  Surgeon: Kevin Caldwell MD;  Location: [facility] OR;  Service: General;  Laterality: N/A;    REPAIR OF INCARCERATED INCISIONAL HERNIA WITHOUT HISTORY OF PRIOR REPAIR N/A 10/25/2018    Procedure: REPAIR, HERNIA, INCISIONAL, INCARCERATED, WITHOUT HISTORY OF PRIOR REPAIR;  Surgeon: Kevin Caldwell MD;  Location: [facility] OR;  Service: General;  Laterality: N/A;       Labs:  Lab  Results   Component Value Date    WBC 2.77 (L) 01/03/2023    HGB 9.8 (L) 01/03/2023    HCT 30.8 (L) 01/03/2023    MCV 89 01/03/2023     01/03/2023     BMP  Lab Results   Component Value Date     12/12/2022    K 4.4 12/12/2022     12/12/2022    CO2 25 12/12/2022    BUN 10 12/12/2022    CREATININE 0.8 12/12/2022    CALCIUM 10.0 12/12/2022    ANIONGAP 10 12/12/2022    ESTGFRAFRICA >60 10/29/2018    EGFRNONAA >60 10/29/2018     Lab Results   Component Value Date    ALT 12 12/12/2022    AST 16 12/12/2022    ALKPHOS 136 (H) 12/12/2022    BILITOT 0.4 12/12/2022       Lab Results   Component Value Date    IRON 18 (L) 09/29/2022    TIBC 157 (L) 09/29/2022    FERRITIN 728 (H) 09/29/2022     No results found for: [id number]  No results found for: FOLATE  No results found for: TSH      Review of Systems   Constitutional:  Negative for activity change, appetite change, chills, diaphoresis, fatigue, fever and unexpected weight change.   HENT:  Negative for congestion, dental problem, drooling, ear discharge, ear pain, facial swelling, hearing loss, mouth sores, nosebleeds, postnasal drip, rhinorrhea, sinus pressure, sneezing, sore throat, tinnitus, trouble swallowing and voice change.    Eyes:  Negative for photophobia, pain, discharge, redness, itching and visual disturbance.   Respiratory:  Negative for cough, choking, chest tightness, shortness of breath, wheezing and stridor.    Cardiovascular:  Negative for chest pain, palpitations and leg swelling.   Gastrointestinal:  Negative for abdominal distention, abdominal pain, anal bleeding, blood in stool, constipation, diarrhea, nausea, rectal pain and vomiting.   Endocrine: Negative for cold intolerance, heat intolerance, polydipsia, polyphagia and polyuria.   Genitourinary:  Negative for decreased urine volume, difficulty urinating, dyspareunia, dysuria, enuresis, flank pain, frequency, genital sores, hematuria, menstrual problem, pelvic pain, urgency,  vaginal bleeding, vaginal discharge and vaginal pain.   Musculoskeletal:  Negative for arthralgias, back pain, gait problem, joint swelling, myalgias, neck pain and neck stiffness.   Skin:  Negative for color change, pallor and rash.   Allergic/Immunologic: Negative for environmental allergies, food allergies and immunocompromised state.   Neurological:  Negative for dizziness, tremors, seizures, syncope, facial asymmetry, speech difficulty, weakness, light-headedness, numbness and headaches.   Hematological:  Negative for adenopathy. Does not bruise/bleed easily.   Psychiatric/Behavioral:  Negative for agitation, behavioral problems, confusion, decreased concentration, dysphoric mood, hallucinations, self-injury, sleep disturbance and suicidal ideas. The patient is not nervous/anxious and is not hyperactive.      Objective:      Physical Exam  Vitals reviewed.   Constitutional:       General: She is not in acute distress.     Appearance: She is well-developed. She is not diaphoretic.   HENT:      Head: Normocephalic and atraumatic.      Right Ear: External ear normal.      Left Ear: External ear normal.      Nose: Nose normal.      Right Sinus: No maxillary sinus tenderness or frontal sinus tenderness.      Left Sinus: No maxillary sinus tenderness or frontal sinus tenderness.      Mouth/Throat:      Pharynx: No oropharyngeal exudate.   Eyes:      General: Lids are normal. No scleral icterus.        Right eye: No discharge.         Left eye: No discharge.      Conjunctiva/sclera: Conjunctivae normal.      Right eye: Right conjunctiva is not injected. No hemorrhage.     Left eye: Left conjunctiva is not injected. No hemorrhage.     Pupils: Pupils are equal, round, and reactive to light.   Neck:      Thyroid: No thyromegaly.      Vascular: No JVD.      Trachea: No tracheal deviation.   Cardiovascular:      Rate and Rhythm: Normal rate.   Pulmonary:      Effort: Pulmonary effort is normal. No respiratory distress.       Breath sounds: No stridor.   Chest:      Chest wall: No tenderness.   Abdominal:      General: Bowel sounds are normal. There is no distension.      Palpations: Abdomen is soft. There is no hepatomegaly, splenomegaly or mass.      Tenderness: There is no abdominal tenderness. There is no rebound.   Musculoskeletal:         General: No tenderness. Normal range of motion.      Cervical back: Normal range of motion and neck supple.   Lymphadenopathy:      Cervical: No cervical adenopathy.      Upper Body:      Right upper body: No supraclavicular adenopathy.      Left upper body: No supraclavicular adenopathy.   Skin:     General: Skin is dry.      Findings: No erythema or rash.   Neurological:      Mental Status: She is alert and oriented to person, place, and time.      Cranial Nerves: No cranial nerve deficit.      Coordination: Coordination normal.   Psychiatric:         Behavior: Behavior normal.         Thought Content: Thought content normal.         Judgment: Judgment normal.           Assessment:      1. Endometrioid adenocarcinoma of ovary, unspecified laterality    2. Immunodeficiency due to chemotherapy    3. Malignant neoplasm of both ovaries    4. Essential hypertension    5. Moderate mixed hyperlipidemia not requiring statin therapy    6. Type 2 diabetes mellitus without complication, without long-term current use of insulin    7. Chronic myeloproliferative disease    8. Hyperlipidemia associated with type 2 diabetes mellitus           Plan:     Patient is tolerating therapy well proceed with cycle 3 day 1 orders written reviewed patient return in 3 weeks.  Continue with current treatment recommendations.  Complete 6 cycles of therapy re-stage.  At that time will consider maintenance PARP inhibitor will discuss with gyn Oncology discussed implications    Med Onc Chart Routing      Follow up with physician 6 weeks.   Follow up with [facility] 3 weeks.   Infusion scheduling note    Injection scheduling note  Continue with carboplatin and Taxol alternate APAP with MD visit.  Standing labs for CBC CMP and    Labs CBC and CMP   Lab interval:     Imaging    Pharmacy appointment    Other referrals           Kwabena Andres Jr, MD FACP

## 2023-01-23 ENCOUNTER — LAB VISIT (OUTPATIENT)
Dept: LAB | Facility: HOSPITAL | Age: 83
End: 2023-01-23
Attending: INTERNAL MEDICINE
Payer: MEDICARE

## 2023-01-23 DIAGNOSIS — C56.9 ENDOMETRIOID ADENOCARCINOMA OF OVARY, UNSPECIFIED LATERALITY: ICD-10-CM

## 2023-01-23 LAB
ALBUMIN SERPL BCP-MCNC: 3.8 G/DL (ref 3.5–5.2)
ALP SERPL-CCNC: 130 U/L (ref 55–135)
ALT SERPL W/O P-5'-P-CCNC: 10 U/L (ref 10–44)
ANION GAP SERPL CALC-SCNC: 11 MMOL/L (ref 8–16)
AST SERPL-CCNC: 14 U/L (ref 10–40)
BASOPHILS # BLD AUTO: 0.02 K/UL (ref 0–0.2)
BASOPHILS NFR BLD: 0.6 % (ref 0–1.9)
BILIRUB SERPL-MCNC: 0.3 MG/DL (ref 0.1–1)
BUN SERPL-MCNC: 12 MG/DL (ref 8–23)
CALCIUM SERPL-MCNC: 9.8 MG/DL (ref 8.7–10.5)
CHLORIDE SERPL-SCNC: 107 MMOL/L (ref 95–110)
CO2 SERPL-SCNC: 24 MMOL/L (ref 23–29)
CREAT SERPL-MCNC: 0.9 MG/DL (ref 0.5–1.4)
DIFFERENTIAL METHOD: ABNORMAL
EOSINOPHIL # BLD AUTO: 0 K/UL (ref 0–0.5)
EOSINOPHIL NFR BLD: 0.9 % (ref 0–8)
ERYTHROCYTE [DISTWIDTH] IN BLOOD BY AUTOMATED COUNT: 18.3 % (ref 11.5–14.5)
EST. GFR  (NO RACE VARIABLE): >60 ML/MIN/1.73 M^2
GLUCOSE SERPL-MCNC: 118 MG/DL (ref 70–110)
HCT VFR BLD AUTO: 30.6 % (ref 37–48.5)
HGB BLD-MCNC: 9.7 G/DL (ref 12–16)
IMM GRANULOCYTES # BLD AUTO: 0.01 K/UL (ref 0–0.04)
IMM GRANULOCYTES NFR BLD AUTO: 0.3 % (ref 0–0.5)
LYMPHOCYTES # BLD AUTO: 0.9 K/UL (ref 1–4.8)
LYMPHOCYTES NFR BLD: 27.6 % (ref 18–48)
MCH RBC QN AUTO: 28.3 PG (ref 27–31)
MCHC RBC AUTO-ENTMCNC: 31.7 G/DL (ref 32–36)
MCV RBC AUTO: 89 FL (ref 82–98)
MONOCYTES # BLD AUTO: 0.3 K/UL (ref 0.3–1)
MONOCYTES NFR BLD: 9.3 % (ref 4–15)
NEUTROPHILS # BLD AUTO: 2 K/UL (ref 1.8–7.7)
NEUTROPHILS NFR BLD: 61.3 % (ref 38–73)
NRBC BLD-RTO: 0 /100 WBC
PLATELET # BLD AUTO: 220 K/UL (ref 150–450)
PMV BLD AUTO: 9 FL (ref 9.2–12.9)
POTASSIUM SERPL-SCNC: 4.1 MMOL/L (ref 3.5–5.1)
PROT SERPL-MCNC: 7.3 G/DL (ref 6–8.4)
RBC # BLD AUTO: 3.43 M/UL (ref 4–5.4)
SODIUM SERPL-SCNC: 142 MMOL/L (ref 136–145)
WBC # BLD AUTO: 3.22 K/UL (ref 3.9–12.7)

## 2023-01-23 PROCEDURE — 80053 COMPREHEN METABOLIC PANEL: CPT | Mod: PO | Performed by: INTERNAL MEDICINE

## 2023-01-23 PROCEDURE — 85025 COMPLETE CBC W/AUTO DIFF WBC: CPT | Mod: PO | Performed by: INTERNAL MEDICINE

## 2023-01-23 PROCEDURE — 36415 COLL VENOUS BLD VENIPUNCTURE: CPT | Mod: PO | Performed by: INTERNAL MEDICINE

## 2023-01-23 PROCEDURE — 86304 IMMUNOASSAY TUMOR CA 125: CPT | Performed by: INTERNAL MEDICINE

## 2023-01-23 NOTE — PROGRESS NOTES
Subjective:       Patient ID: Mariaelena Sheffield is a 82 y.o. female.    Chief Complaint: Ovarian Cancer and Chemotherapy    Primary Oncologist/Hematologist: Dr. Andres    HPI: Ms. Sheffield is an 82 yea old female who is following up for her stage III ovarian carcinoma. She is here for cycle 4 day 1 of Taxol + carboplatin.   Cancer Hx:  She presented after 1 day of nausea vomiting with CT showing large pelvic mass suspicious for malignancy, partial small-bowel obstruction and can not exclude strangulation of anterior wall hernia.  She was taken to surgery with Gynecologic Oncology for ex lap, JAMES, pelvic mass resection 9/29/22 and pathology showing grade 1 endometrioid adenocarcinoma.    Today:  Patient states she is been doing well.  She states she is good appetite and has been staying hydrated.  She denies any N/V/D/C, fevers, pain, mouth ulcers, illnesses.  She states she is been a little bit fatigued on and off.    Social History     Socioeconomic History    Marital status:    Tobacco Use    Smoking status: Never    Smokeless tobacco: Never   Substance and Sexual Activity    Alcohol use: Yes     Comment: socially  No alcohol prior to sx    Drug use: No   Social History Narrative    Lives with son. Surrogate decision makers: Son, Bj Sheffield [phone] and Son, Blair Sheffield [phone].       Past Medical History:   Diagnosis Date    Cancer     breast cancer R     Diabetes mellitus     GERD (gastroesophageal reflux disease)     Gout     Hypercholesteremia     Hypertension        No family history on file.    Past Surgical History:   Procedure Laterality Date    ABDOMINAL SURGERY      BREAST BIOPSY      BREAST SURGERY Right     Breast biopsy    CATARACT EXTRACTION, BILATERAL      EXCISION OF PELVIC MASS N/A 9/28/2022    Procedure: EXCISION, MASS, PELVIS;  Surgeon: Edison Brooks MD;  Location: [facility] OR [address] [place] [address];  Service: General;  Laterality: N/A;    FLEXIBLE SIGMOIDOSCOPY  9/28/2022     Procedure: SIGMOIDOSCOPY, FLEXIBLE;  Surgeon: Edison Brooks MD;  Location: [address];  Service: General;;    HERNIA REPAIR      LYSIS OF ADHESIONS N/A 10/25/2018    Procedure: LYSIS, ADHESIONS;  Surgeon: Kevin Caldwell MD;  Location: [facility] OR;  Service: General;  Laterality: N/A;    REPAIR OF INCARCERATED INCISIONAL HERNIA WITHOUT HISTORY OF PRIOR REPAIR N/A 10/25/2018    Procedure: REPAIR, HERNIA, INCISIONAL, INCARCERATED, WITHOUT HISTORY OF PRIOR REPAIR;  Surgeon: Kevin Caldwell MD;  Location: [facility] OR;  Service: General;  Laterality: N/A;       Review of Systems   Constitutional:  Positive for fatigue. Negative for activity change, appetite change, chills, diaphoresis, fever and unexpected weight change.   HENT:  Negative for congestion, nosebleeds and rhinorrhea.    Respiratory:  Negative for cough and shortness of breath.    Cardiovascular:  Negative for chest pain and leg swelling.   Gastrointestinal:  Negative for abdominal pain, anal bleeding, blood in stool, constipation, diarrhea, nausea and vomiting.   Genitourinary:  Negative for hematuria.   Musculoskeletal:  Positive for gait problem.   Skin:  Negative for color change and pallor.   Neurological:  Negative for dizziness, weakness, light-headedness, numbness and headaches.       Medication List with Changes/Refills   Current Medications    ALLOPURINOL (ZYLOPRIM) 300 MG TABLET    Take 300 mg by mouth once daily.    AMLODIPINE (NORVASC) 5 MG TABLET    Take 5 mg by mouth once daily.    ATORVASTATIN (LIPITOR) 20 MG TABLET    Take 20 mg by mouth every evening.     BLOOD SUGAR DIAGNOSTIC STRP    TEST ONE TO TWO TIMES DAILY (NEED MD APPOINTMENT)    BLOOD-GLUCOSE METER MISC    Test BID    COLCHICINE 0.6 MG TABLET    Take 0.6 mg by mouth once daily.    DEXAMETHASONE (DECADRON) 4 MG TAB    Take 2 tablets (8 mg total) by mouth once daily. Take as directed on days 2, 3, and 4 of your chemotherapy cycle.    DICYCLOMINE (BENTYL) 10 MG CAPSULE    Take 10 mg by mouth  3 (three) times daily as needed.    FERROUS SULFATE 325 (65 FE) MG EC TABLET    Take 325 mg by mouth once daily.    LANCETS MISC    TEST BLOOD SUGAR  1  TO  2  TIMES  PER  DAY  ( NEED MD APPOINTMENT  )    LISINOPRIL (PRINIVIL,ZESTRIL) 20 MG TABLET    Take 1 tablet by mouth 2 (two) times daily.    METFORMIN (GLUCOPHAGE) 500 MG TABLET    Take 1 tablet by mouth daily with dinner or evening meal.    NOZASEPTIN (NOZIN) NASAL     1 each by Each Nare route 2 (two) times daily.    OMEPRAZOLE (PRILOSEC) 40 MG CAPSULE    Take 40 mg by mouth once daily.     ONDANSETRON (ZOFRAN-ODT) 8 MG TBDL    Take 1 tablet (8 mg total) by mouth every 8 (eight) hours as needed (nausea/vomiting).    OXYCODONE (ROXICODONE) 5 MG IMMEDIATE RELEASE TABLET    Take 1 tablet (5 mg total) by mouth every 6 (six) hours as needed.    POLYETHYLENE GLYCOL (GLYCOLAX) 17 GRAM PWPK    Take 17 g by mouth daily     Objective:     Vitals:    01/24/23 0734   BP: (!) 140/83   Pulse: 80   Temp: 97.5 °F (36.4 °C)       Physical Exam  Vitals reviewed.   Constitutional:       General: She is not in acute distress.     Appearance: She is not ill-appearing, toxic-appearing or diaphoretic.   HENT:      Head: Normocephalic and atraumatic.   Eyes:      Conjunctiva/sclera: Conjunctivae normal.   Cardiovascular:      Rate and Rhythm: Normal rate.   Pulmonary:      Effort: Pulmonary effort is normal.   Abdominal:      General: Bowel sounds are normal.   Musculoskeletal:      Right lower leg: No edema.      Left lower leg: No edema.   Skin:     General: Skin is warm.      Coloration: Skin is not jaundiced or pale.      Findings: No bruising, erythema, lesion or rash.   Neurological:      Mental Status: She is alert.      Gait: Gait abnormal (cane).   Psychiatric:         Mood and Affect: Mood normal.         Behavior: Behavior normal.         Thought Content: Thought content normal.          Labs/Results:  Lab Results   Component Value Date    WBC 3.22 (L) 01/23/2023     RBC 3.43 (L) 01/23/2023    HGB 9.7 (L) 01/23/2023    HCT 30.6 (L) 01/23/2023    MCV 89 01/23/2023    MCH 28.3 01/23/2023    MCHC 31.7 (L) 01/23/2023    RDW 18.3 (H) 01/23/2023     01/23/2023    MPV 9.0 (L) 01/23/2023    GRAN 2.0 01/23/2023    GRAN 61.3 01/23/2023    LYMPH 0.9 (L) 01/23/2023    LYMPH 27.6 01/23/2023    MONO 0.3 01/23/2023    MONO 9.3 01/23/2023    EOS 0.0 01/23/2023    BASO 0.02 01/23/2023    EOSINOPHIL 0.9 01/23/2023    BASOPHIL 0.6 01/23/2023     CMP  Sodium   Date Value Ref Range Status   01/23/2023 142 136 - 145 mmol/L Final     Potassium   Date Value Ref Range Status   01/23/2023 4.1 3.5 - 5.1 mmol/L Final     Chloride   Date Value Ref Range Status   01/23/2023 107 95 - 110 mmol/L Final     CO2   Date Value Ref Range Status   01/23/2023 24 23 - 29 mmol/L Final     Glucose   Date Value Ref Range Status   01/23/2023 118 (H) 70 - 110 mg/dL Final     BUN   Date Value Ref Range Status   01/23/2023 12 8 - 23 mg/dL Final     Creatinine   Date Value Ref Range Status   01/23/2023 0.9 0.5 - 1.4 mg/dL Final     Calcium   Date Value Ref Range Status   01/23/2023 9.8 8.7 - 10.5 mg/dL Final     Total Protein   Date Value Ref Range Status   01/23/2023 7.3 6.0 - 8.4 g/dL Final     Albumin   Date Value Ref Range Status   01/23/2023 3.8 3.5 - 5.2 g/dL Final     Total Bilirubin   Date Value Ref Range Status   01/23/2023 0.3 0.1 - 1.0 mg/dL Final     Comment:     For infants and newborns, interpretation of results should be based  on gestational age, weight and in agreement with clinical  observations.    Premature Infant recommended reference ranges:  Up to 24 hours.............<8.0 mg/dL  Up to 48 hours............<12.0 mg/dL  3-5 days..................<15.0 mg/dL  6-29 days.................<15.0 mg/dL       Alkaline Phosphatase   Date Value Ref Range Status   01/23/2023 130 55 - 135 U/L Final     AST   Date Value Ref Range Status   01/23/2023 14 10 - 40 U/L Final     ALT   Date Value Ref Range Status    01/23/2023 10 10 - 44 U/L Final     Anion Gap   Date Value Ref Range Status   01/23/2023 11 8 - 16 mmol/L Final     eGFR   Date Value Ref Range Status   01/23/2023 >60.0 >60 mL/min/1.73 m^2 Final      Latest Reference Range & Units 01/23/23 10:35    0 - 30 U/mL 8       Assessment:     Problem List Items Addressed This Visit          Immunology/Multi System    Immunodeficiency due to chemotherapy       Oncology    Personal history of breast cancer    Endometrioid adenocarcinoma of ovary - Primary    Iron deficiency anemia due to chronic blood loss     Plan:     Endometrioid adenocarcinoma of ovary, unspecified laterality  --ER weak to moderate in 95% tumor, MMR intact, NGS pending.  Imaging notable for pelvic mass however no definitive evidence of metastatic disease in chest.  --continue with cycle 4 day 1 of Taxol + carboplatin  --plan to complete 6 cycles then restage with imaging  --primary oncologist will consider PARP inhibitor at that time.   --continue to follow with gyn onc.   --ANC:2.0, plts:220, tbili:0.3 AST:14, ALT:10 alk phos: 130    Iron deficiency anemia due to chronic blood loss  --s/p IV iron venofer  --anemia thought to be multifactorial  --continue to monitor    Follow-Up: 3 weeks with cbc cmp ca 125 with primary oncologist-standing orders in    Gabriela Fulton PA-C  Hematology Oncology    Route Chart for Scheduling    Med Onc Chart Routing      Follow up with physician 3 weeks. Dr. Andres with cbc cmp ca 125 prior for cycle 5   Follow up with [facility]    Infusion scheduling note    Injection scheduling note    Labs CMP and CBC   Lab interval:  standing orders in   Imaging    Pharmacy appointment No pharmacy appointment needed      Other referrals No additional referrals needed         Treatment Plan Information   OP GYN PACLITAXEL CARBOPLATIN (AUC 6) Q3W

## 2023-01-24 ENCOUNTER — INFUSION (OUTPATIENT)
Dept: INFUSION THERAPY | Facility: HOSPITAL | Age: 83
End: 2023-01-24
Payer: MEDICARE

## 2023-01-24 ENCOUNTER — OFFICE VISIT (OUTPATIENT)
Dept: HEMATOLOGY/ONCOLOGY | Facility: CLINIC | Age: 83
End: 2023-01-24
Payer: MEDICARE

## 2023-01-24 VITALS
TEMPERATURE: 98 F | SYSTOLIC BLOOD PRESSURE: 140 MMHG | BODY MASS INDEX: 25.82 KG/M2 | WEIGHT: 145.75 LBS | DIASTOLIC BLOOD PRESSURE: 83 MMHG | HEART RATE: 80 BPM | OXYGEN SATURATION: 96 % | HEIGHT: 63 IN

## 2023-01-24 VITALS
OXYGEN SATURATION: 99 % | HEART RATE: 92 BPM | RESPIRATION RATE: 16 BRPM | BODY MASS INDEX: 25.81 KG/M2 | TEMPERATURE: 98 F | DIASTOLIC BLOOD PRESSURE: 69 MMHG | WEIGHT: 145.75 LBS | SYSTOLIC BLOOD PRESSURE: 128 MMHG

## 2023-01-24 DIAGNOSIS — D84.821 IMMUNODEFICIENCY DUE TO CHEMOTHERAPY: ICD-10-CM

## 2023-01-24 DIAGNOSIS — Z85.3 PERSONAL HISTORY OF BREAST CANCER: ICD-10-CM

## 2023-01-24 DIAGNOSIS — Z79.899 IMMUNODEFICIENCY DUE TO CHEMOTHERAPY: ICD-10-CM

## 2023-01-24 DIAGNOSIS — D50.0 IRON DEFICIENCY ANEMIA DUE TO CHRONIC BLOOD LOSS: ICD-10-CM

## 2023-01-24 DIAGNOSIS — T45.1X5A IMMUNODEFICIENCY DUE TO CHEMOTHERAPY: ICD-10-CM

## 2023-01-24 DIAGNOSIS — C56.9 ENDOMETRIOID ADENOCARCINOMA OF OVARY, UNSPECIFIED LATERALITY: Primary | ICD-10-CM

## 2023-01-24 LAB — CANCER AG125 SERPL-ACNC: 8 U/ML (ref 0–30)

## 2023-01-24 PROCEDURE — 99215 PR OFFICE/OUTPT VISIT, EST, LEVL V, 40-54 MIN: ICD-10-PCS | Mod: S$GLB,,,

## 2023-01-24 PROCEDURE — 96413 CHEMO IV INFUSION 1 HR: CPT

## 2023-01-24 PROCEDURE — 96375 TX/PRO/DX INJ NEW DRUG ADDON: CPT

## 2023-01-24 PROCEDURE — 99999 PR PBB SHADOW E&M-EST. PATIENT-LVL IV: CPT | Mod: PBBFAC,,,

## 2023-01-24 PROCEDURE — 3079F PR MOST RECENT DIASTOLIC BLOOD PRESSURE 80-89 MM HG: ICD-10-PCS | Mod: CPTII,S$GLB,,

## 2023-01-24 PROCEDURE — 96367 TX/PROPH/DG ADDL SEQ IV INF: CPT

## 2023-01-24 PROCEDURE — 1101F PR PT FALLS ASSESS DOC 0-1 FALLS W/OUT INJ PAST YR: ICD-10-PCS | Mod: CPTII,S$GLB,,

## 2023-01-24 PROCEDURE — 3288F PR FALLS RISK ASSESSMENT DOCUMENTED: ICD-10-PCS | Mod: CPTII,S$GLB,,

## 2023-01-24 PROCEDURE — 3077F PR MOST RECENT SYSTOLIC BLOOD PRESSURE >= 140 MM HG: ICD-10-PCS | Mod: CPTII,S$GLB,,

## 2023-01-24 PROCEDURE — 99215 OFFICE O/P EST HI 40 MIN: CPT | Mod: S$GLB,,,

## 2023-01-24 PROCEDURE — 99999 PR PBB SHADOW E&M-EST. PATIENT-LVL IV: ICD-10-PCS | Mod: PBBFAC,,,

## 2023-01-24 PROCEDURE — 96417 CHEMO IV INFUS EACH ADDL SEQ: CPT

## 2023-01-24 PROCEDURE — 3077F SYST BP >= 140 MM HG: CPT | Mod: CPTII,S$GLB,,

## 2023-01-24 PROCEDURE — 3288F FALL RISK ASSESSMENT DOCD: CPT | Mod: CPTII,S$GLB,,

## 2023-01-24 PROCEDURE — 1126F AMNT PAIN NOTED NONE PRSNT: CPT | Mod: CPTII,S$GLB,,

## 2023-01-24 PROCEDURE — 96415 CHEMO IV INFUSION ADDL HR: CPT

## 2023-01-24 PROCEDURE — 25000003 PHARM REV CODE 250

## 2023-01-24 PROCEDURE — 1126F PR PAIN SEVERITY QUANTIFIED, NO PAIN PRESENT: ICD-10-PCS | Mod: CPTII,S$GLB,,

## 2023-01-24 PROCEDURE — 63600175 PHARM REV CODE 636 W HCPCS

## 2023-01-24 PROCEDURE — 1101F PT FALLS ASSESS-DOCD LE1/YR: CPT | Mod: CPTII,S$GLB,,

## 2023-01-24 PROCEDURE — 3079F DIAST BP 80-89 MM HG: CPT | Mod: CPTII,S$GLB,,

## 2023-01-24 RX ORDER — DIPHENHYDRAMINE HYDROCHLORIDE 50 MG/ML
50 INJECTION INTRAMUSCULAR; INTRAVENOUS ONCE AS NEEDED
Status: CANCELLED | OUTPATIENT
Start: 2023-01-24

## 2023-01-24 RX ORDER — FAMOTIDINE 10 MG/ML
20 INJECTION INTRAVENOUS
Status: CANCELLED | OUTPATIENT
Start: 2023-01-24

## 2023-01-24 RX ORDER — FAMOTIDINE 10 MG/ML
20 INJECTION INTRAVENOUS
Status: COMPLETED | OUTPATIENT
Start: 2023-01-24 | End: 2023-01-24

## 2023-01-24 RX ORDER — HEPARIN 100 UNIT/ML
500 SYRINGE INTRAVENOUS
Status: CANCELLED | OUTPATIENT
Start: 2023-01-24

## 2023-01-24 RX ORDER — EPINEPHRINE 0.3 MG/.3ML
0.3 INJECTION SUBCUTANEOUS ONCE AS NEEDED
Status: CANCELLED | OUTPATIENT
Start: 2023-01-24

## 2023-01-24 RX ORDER — SODIUM CHLORIDE 0.9 % (FLUSH) 0.9 %
10 SYRINGE (ML) INJECTION
Status: CANCELLED | OUTPATIENT
Start: 2023-01-24

## 2023-01-24 RX ADMIN — FAMOTIDINE 20 MG: 10 INJECTION INTRAVENOUS at 09:01

## 2023-01-24 RX ADMIN — DEXAMETHASONE SODIUM PHOSPHATE 0.25 MG: 4 INJECTION, SOLUTION INTRA-ARTICULAR; INTRALESIONAL; INTRAMUSCULAR; INTRAVENOUS; SOFT TISSUE at 08:01

## 2023-01-24 RX ADMIN — CARBOPLATIN 385 MG: 10 INJECTION, SOLUTION INTRAVENOUS at 12:01

## 2023-01-24 RX ADMIN — PACLITAXEL 228 MG: 6 INJECTION, SOLUTION INTRAVENOUS at 09:01

## 2023-01-24 RX ADMIN — SODIUM CHLORIDE 500 ML: 0.9 INJECTION, SOLUTION INTRAVENOUS at 12:01

## 2023-01-24 RX ADMIN — DIPHENHYDRAMINE HYDROCHLORIDE 50 MG: 50 INJECTION, SOLUTION INTRAMUSCULAR; INTRAVENOUS at 08:01

## 2023-01-24 RX ADMIN — APREPITANT 130 MG: 130 INJECTION, EMULSION INTRAVENOUS at 08:01

## 2023-01-24 NOTE — PLAN OF CARE
Discussed plan of care with pt. Addressed any and ongoing concerns. Pt denies    Problem: Adult Inpatient Plan of Care  Goal: Plan of Care Review  Outcome: Ongoing, Progressing  Flowsheets (Taken 1/24/2023 0905)  Plan of Care Reviewed With: patient  Goal: Patient-Specific Goal (Individualized)  Outcome: Ongoing, Progressing  Goal: Absence of Hospital-Acquired Illness or Injury  Outcome: Ongoing, Progressing  Intervention: Identify and Manage Fall Risk  Flowsheets (Taken 1/24/2023 0905)  Safety Promotion/Fall Prevention:   in recliner, wheels locked   assistive device/personal item within reach  Intervention: Prevent Infection  Flowsheets (Taken 1/24/2023 0905)  Infection Prevention:   hand hygiene promoted   equipment surfaces disinfected  Goal: Optimal Comfort and Wellbeing  Outcome: Ongoing, Progressing  Intervention: Provide Person-Centered Care  Flowsheets (Taken 1/24/2023 0905)  Trust Relationship/Rapport:   care explained   reassurance provided   choices provided   thoughts/feelings acknowledged   emotional support provided   empathic listening provided   questions answered   questions encouraged

## 2023-02-13 ENCOUNTER — LAB VISIT (OUTPATIENT)
Dept: LAB | Facility: HOSPITAL | Age: 83
End: 2023-02-13
Attending: INTERNAL MEDICINE
Payer: MEDICARE

## 2023-02-13 DIAGNOSIS — C56.9 ENDOMETRIOID ADENOCARCINOMA OF OVARY, UNSPECIFIED LATERALITY: ICD-10-CM

## 2023-02-13 LAB
ALBUMIN SERPL BCP-MCNC: 3.8 G/DL (ref 3.5–5.2)
ALP SERPL-CCNC: 123 U/L (ref 55–135)
ALT SERPL W/O P-5'-P-CCNC: 10 U/L (ref 10–44)
ANION GAP SERPL CALC-SCNC: 8 MMOL/L (ref 8–16)
AST SERPL-CCNC: 15 U/L (ref 10–40)
BASOPHILS # BLD AUTO: 0.02 K/UL (ref 0–0.2)
BASOPHILS NFR BLD: 0.7 % (ref 0–1.9)
BILIRUB SERPL-MCNC: 0.3 MG/DL (ref 0.1–1)
BUN SERPL-MCNC: 11 MG/DL (ref 8–23)
CALCIUM SERPL-MCNC: 9.8 MG/DL (ref 8.7–10.5)
CANCER AG125 SERPL-ACNC: 9 U/ML (ref 0–30)
CHLORIDE SERPL-SCNC: 110 MMOL/L (ref 95–110)
CO2 SERPL-SCNC: 24 MMOL/L (ref 23–29)
CREAT SERPL-MCNC: 0.9 MG/DL (ref 0.5–1.4)
DIFFERENTIAL METHOD: ABNORMAL
EOSINOPHIL # BLD AUTO: 0 K/UL (ref 0–0.5)
EOSINOPHIL NFR BLD: 1.4 % (ref 0–8)
ERYTHROCYTE [DISTWIDTH] IN BLOOD BY AUTOMATED COUNT: 19.7 % (ref 11.5–14.5)
EST. GFR  (NO RACE VARIABLE): >60 ML/MIN/1.73 M^2
GLUCOSE SERPL-MCNC: 128 MG/DL (ref 70–110)
HCT VFR BLD AUTO: 29 % (ref 37–48.5)
HGB BLD-MCNC: 9.4 G/DL (ref 12–16)
IMM GRANULOCYTES # BLD AUTO: 0.01 K/UL (ref 0–0.04)
IMM GRANULOCYTES NFR BLD AUTO: 0.4 % (ref 0–0.5)
LYMPHOCYTES # BLD AUTO: 0.8 K/UL (ref 1–4.8)
LYMPHOCYTES NFR BLD: 26.3 % (ref 18–48)
MCH RBC QN AUTO: 29.3 PG (ref 27–31)
MCHC RBC AUTO-ENTMCNC: 32.4 G/DL (ref 32–36)
MCV RBC AUTO: 90 FL (ref 82–98)
MONOCYTES # BLD AUTO: 0.3 K/UL (ref 0.3–1)
MONOCYTES NFR BLD: 11.9 % (ref 4–15)
NEUTROPHILS # BLD AUTO: 1.7 K/UL (ref 1.8–7.7)
NEUTROPHILS NFR BLD: 59.3 % (ref 38–73)
NRBC BLD-RTO: 0 /100 WBC
PLATELET # BLD AUTO: 190 K/UL (ref 150–450)
PMV BLD AUTO: 9.6 FL (ref 9.2–12.9)
POTASSIUM SERPL-SCNC: 4.7 MMOL/L (ref 3.5–5.1)
PROT SERPL-MCNC: 7.1 G/DL (ref 6–8.4)
RBC # BLD AUTO: 3.21 M/UL (ref 4–5.4)
SODIUM SERPL-SCNC: 142 MMOL/L (ref 136–145)
WBC # BLD AUTO: 2.85 K/UL (ref 3.9–12.7)

## 2023-02-13 PROCEDURE — 85025 COMPLETE CBC W/AUTO DIFF WBC: CPT | Mod: PO | Performed by: INTERNAL MEDICINE

## 2023-02-13 PROCEDURE — 86304 IMMUNOASSAY TUMOR CA 125: CPT | Performed by: INTERNAL MEDICINE

## 2023-02-13 PROCEDURE — 80053 COMPREHEN METABOLIC PANEL: CPT | Mod: PO | Performed by: INTERNAL MEDICINE

## 2023-02-13 PROCEDURE — 36415 COLL VENOUS BLD VENIPUNCTURE: CPT | Mod: PO | Performed by: INTERNAL MEDICINE

## 2023-02-14 ENCOUNTER — OFFICE VISIT (OUTPATIENT)
Dept: HEMATOLOGY/ONCOLOGY | Facility: CLINIC | Age: 83
End: 2023-02-14
Payer: MEDICARE

## 2023-02-14 ENCOUNTER — INFUSION (OUTPATIENT)
Dept: INFUSION THERAPY | Facility: HOSPITAL | Age: 83
End: 2023-02-14
Attending: INTERNAL MEDICINE
Payer: MEDICARE

## 2023-02-14 VITALS
SYSTOLIC BLOOD PRESSURE: 147 MMHG | DIASTOLIC BLOOD PRESSURE: 77 MMHG | HEART RATE: 83 BPM | OXYGEN SATURATION: 98 % | TEMPERATURE: 97 F | WEIGHT: 146.63 LBS | HEIGHT: 64 IN | BODY MASS INDEX: 25.03 KG/M2

## 2023-02-14 VITALS
DIASTOLIC BLOOD PRESSURE: 80 MMHG | HEART RATE: 82 BPM | OXYGEN SATURATION: 99 % | SYSTOLIC BLOOD PRESSURE: 135 MMHG | TEMPERATURE: 98 F | RESPIRATION RATE: 18 BRPM

## 2023-02-14 DIAGNOSIS — T45.1X5A IMMUNODEFICIENCY DUE TO CHEMOTHERAPY: ICD-10-CM

## 2023-02-14 DIAGNOSIS — E11.69 HYPERLIPIDEMIA ASSOCIATED WITH TYPE 2 DIABETES MELLITUS: ICD-10-CM

## 2023-02-14 DIAGNOSIS — C56.9 ENDOMETRIOID ADENOCARCINOMA OF OVARY, UNSPECIFIED LATERALITY: Primary | ICD-10-CM

## 2023-02-14 DIAGNOSIS — D47.1 CHRONIC MYELOPROLIFERATIVE DISEASE: ICD-10-CM

## 2023-02-14 DIAGNOSIS — D84.821 IMMUNODEFICIENCY DUE TO CHEMOTHERAPY: ICD-10-CM

## 2023-02-14 DIAGNOSIS — E78.5 HYPERLIPIDEMIA ASSOCIATED WITH TYPE 2 DIABETES MELLITUS: ICD-10-CM

## 2023-02-14 DIAGNOSIS — Z79.899 IMMUNODEFICIENCY DUE TO CHEMOTHERAPY: ICD-10-CM

## 2023-02-14 DIAGNOSIS — I10 ESSENTIAL HYPERTENSION: Chronic | ICD-10-CM

## 2023-02-14 PROCEDURE — 99215 OFFICE O/P EST HI 40 MIN: CPT | Mod: 25,S$GLB,, | Performed by: INTERNAL MEDICINE

## 2023-02-14 PROCEDURE — 1159F PR MEDICATION LIST DOCUMENTED IN MEDICAL RECORD: ICD-10-PCS | Mod: CPTII,S$GLB,, | Performed by: INTERNAL MEDICINE

## 2023-02-14 PROCEDURE — 96367 TX/PROPH/DG ADDL SEQ IV INF: CPT

## 2023-02-14 PROCEDURE — 1160F RVW MEDS BY RX/DR IN RCRD: CPT | Mod: CPTII,S$GLB,, | Performed by: INTERNAL MEDICINE

## 2023-02-14 PROCEDURE — 3077F PR MOST RECENT SYSTOLIC BLOOD PRESSURE >= 140 MM HG: ICD-10-PCS | Mod: CPTII,S$GLB,, | Performed by: INTERNAL MEDICINE

## 2023-02-14 PROCEDURE — 96415 CHEMO IV INFUSION ADDL HR: CPT

## 2023-02-14 PROCEDURE — 96413 CHEMO IV INFUSION 1 HR: CPT

## 2023-02-14 PROCEDURE — 99999 PR PBB SHADOW E&M-EST. PATIENT-LVL IV: ICD-10-PCS | Mod: PBBFAC,,, | Performed by: INTERNAL MEDICINE

## 2023-02-14 PROCEDURE — 3078F DIAST BP <80 MM HG: CPT | Mod: CPTII,S$GLB,, | Performed by: INTERNAL MEDICINE

## 2023-02-14 PROCEDURE — 1160F PR REVIEW ALL MEDS BY PRESCRIBER/CLIN PHARMACIST DOCUMENTED: ICD-10-PCS | Mod: CPTII,S$GLB,, | Performed by: INTERNAL MEDICINE

## 2023-02-14 PROCEDURE — 1101F PR PT FALLS ASSESS DOC 0-1 FALLS W/OUT INJ PAST YR: ICD-10-PCS | Mod: CPTII,S$GLB,, | Performed by: INTERNAL MEDICINE

## 2023-02-14 PROCEDURE — 96417 CHEMO IV INFUS EACH ADDL SEQ: CPT

## 2023-02-14 PROCEDURE — 3288F PR FALLS RISK ASSESSMENT DOCUMENTED: ICD-10-PCS | Mod: CPTII,S$GLB,, | Performed by: INTERNAL MEDICINE

## 2023-02-14 PROCEDURE — 1126F AMNT PAIN NOTED NONE PRSNT: CPT | Mod: CPTII,S$GLB,, | Performed by: INTERNAL MEDICINE

## 2023-02-14 PROCEDURE — 3288F FALL RISK ASSESSMENT DOCD: CPT | Mod: CPTII,S$GLB,, | Performed by: INTERNAL MEDICINE

## 2023-02-14 PROCEDURE — 99215 PR OFFICE/OUTPT VISIT, EST, LEVL V, 40-54 MIN: ICD-10-PCS | Mod: 25,S$GLB,, | Performed by: INTERNAL MEDICINE

## 2023-02-14 PROCEDURE — 3078F PR MOST RECENT DIASTOLIC BLOOD PRESSURE < 80 MM HG: ICD-10-PCS | Mod: CPTII,S$GLB,, | Performed by: INTERNAL MEDICINE

## 2023-02-14 PROCEDURE — 63600175 PHARM REV CODE 636 W HCPCS: Performed by: INTERNAL MEDICINE

## 2023-02-14 PROCEDURE — 1159F MED LIST DOCD IN RCRD: CPT | Mod: CPTII,S$GLB,, | Performed by: INTERNAL MEDICINE

## 2023-02-14 PROCEDURE — 99999 PR PBB SHADOW E&M-EST. PATIENT-LVL IV: CPT | Mod: PBBFAC,,, | Performed by: INTERNAL MEDICINE

## 2023-02-14 PROCEDURE — 1101F PT FALLS ASSESS-DOCD LE1/YR: CPT | Mod: CPTII,S$GLB,, | Performed by: INTERNAL MEDICINE

## 2023-02-14 PROCEDURE — 96375 TX/PRO/DX INJ NEW DRUG ADDON: CPT

## 2023-02-14 PROCEDURE — 25000003 PHARM REV CODE 250: Performed by: INTERNAL MEDICINE

## 2023-02-14 PROCEDURE — 3077F SYST BP >= 140 MM HG: CPT | Mod: CPTII,S$GLB,, | Performed by: INTERNAL MEDICINE

## 2023-02-14 PROCEDURE — 1126F PR PAIN SEVERITY QUANTIFIED, NO PAIN PRESENT: ICD-10-PCS | Mod: CPTII,S$GLB,, | Performed by: INTERNAL MEDICINE

## 2023-02-14 RX ORDER — HEPARIN 100 UNIT/ML
500 SYRINGE INTRAVENOUS
Status: CANCELLED | OUTPATIENT
Start: 2023-02-14

## 2023-02-14 RX ORDER — EPINEPHRINE 0.3 MG/.3ML
0.3 INJECTION SUBCUTANEOUS ONCE AS NEEDED
Status: CANCELLED | OUTPATIENT
Start: 2023-02-14

## 2023-02-14 RX ORDER — FAMOTIDINE 10 MG/ML
20 INJECTION INTRAVENOUS
Status: COMPLETED | OUTPATIENT
Start: 2023-02-14 | End: 2023-02-14

## 2023-02-14 RX ORDER — DIPHENHYDRAMINE HYDROCHLORIDE 50 MG/ML
50 INJECTION INTRAMUSCULAR; INTRAVENOUS ONCE AS NEEDED
Status: CANCELLED | OUTPATIENT
Start: 2023-02-14

## 2023-02-14 RX ORDER — SODIUM CHLORIDE 0.9 % (FLUSH) 0.9 %
10 SYRINGE (ML) INJECTION
Status: CANCELLED | OUTPATIENT
Start: 2023-02-14

## 2023-02-14 RX ORDER — FAMOTIDINE 10 MG/ML
20 INJECTION INTRAVENOUS
Status: CANCELLED | OUTPATIENT
Start: 2023-02-14

## 2023-02-14 RX ADMIN — SODIUM CHLORIDE 500 ML: 0.9 INJECTION, SOLUTION INTRAVENOUS at 12:02

## 2023-02-14 RX ADMIN — APREPITANT 130 MG: 130 INJECTION, EMULSION INTRAVENOUS at 08:02

## 2023-02-14 RX ADMIN — CARBOPLATIN 385 MG: 10 INJECTION, SOLUTION INTRAVENOUS at 12:02

## 2023-02-14 RX ADMIN — DEXAMETHASONE SODIUM PHOSPHATE 0.25 MG: 4 INJECTION, SOLUTION INTRA-ARTICULAR; INTRALESIONAL; INTRAMUSCULAR; INTRAVENOUS; SOFT TISSUE at 08:02

## 2023-02-14 RX ADMIN — FAMOTIDINE 20 MG: 10 INJECTION INTRAVENOUS at 08:02

## 2023-02-14 RX ADMIN — DIPHENHYDRAMINE HYDROCHLORIDE 50 MG: 50 INJECTION, SOLUTION INTRAMUSCULAR; INTRAVENOUS at 09:02

## 2023-02-14 RX ADMIN — PACLITAXEL 234 MG: 6 INJECTION, SOLUTION INTRAVENOUS at 09:02

## 2023-02-14 NOTE — PROGRESS NOTES
Subjective:       Patient ID: Mariaelena Sheffield is a 82 y.o. female.    Chief Complaint: Results, Chemotherapy, and Cancer    HPI:  82-year-old female stage III endometrial cancer presents for cycle 3 day 1 of carboplatin Taxol patient tolerating therapy well denies nausea vomiting fevers chills night sweats ECOG status 1    Past Medical History:   Diagnosis Date    Cancer     breast cancer R     Diabetes mellitus     GERD (gastroesophageal reflux disease)     Gout     Hypercholesteremia     Hypertension      History reviewed. No pertinent family history.  Social History     Socioeconomic History    Marital status:    Tobacco Use    Smoking status: Never    Smokeless tobacco: Never   Substance and Sexual Activity    Alcohol use: Yes     Comment: socially  No alcohol prior to sx    Drug use: No   Social History Narrative    Lives with son. Surrogate decision makers: Son, Bj Sheffield [phone] and Son, Blair Sheffield [phone].     Past Surgical History:   Procedure Laterality Date    ABDOMINAL SURGERY      BREAST BIOPSY      BREAST SURGERY Right     Breast biopsy    CATARACT EXTRACTION, BILATERAL      EXCISION OF PELVIC MASS N/A 9/28/2022    Procedure: EXCISION, MASS, PELVIS;  Surgeon: Edison Brooks MD;  Location: [address];  Service: General;  Laterality: N/A;    FLEXIBLE SIGMOIDOSCOPY  9/28/2022    Procedure: SIGMOIDOSCOPY, FLEXIBLE;  Surgeon: Edison Brooks MD;  Location: [facility] OR [address] [place] [address];  Service: General;;    HERNIA REPAIR      LYSIS OF ADHESIONS N/A 10/25/2018    Procedure: LYSIS, ADHESIONS;  Surgeon: Kevin Caldwell MD;  Location: [facility] OR;  Service: General;  Laterality: N/A;    REPAIR OF INCARCERATED INCISIONAL HERNIA WITHOUT HISTORY OF PRIOR REPAIR N/A 10/25/2018    Procedure: REPAIR, HERNIA, INCISIONAL, INCARCERATED, WITHOUT HISTORY OF PRIOR REPAIR;  Surgeon: Kevin Caldwell MD;  Location: [facility] OR;  Service: General;  Laterality: N/A;       Labs:  Lab Results    Component Value Date    WBC 2.85 (L) 02/13/2023    HGB 9.4 (L) 02/13/2023    HCT 29.0 (L) 02/13/2023    MCV 90 02/13/2023     02/13/2023     BMP  Lab Results   Component Value Date     02/13/2023    K 4.7 02/13/2023     02/13/2023    CO2 24 02/13/2023    BUN 11 02/13/2023    CREATININE 0.9 02/13/2023    CALCIUM 9.8 02/13/2023    ANIONGAP 8 02/13/2023    ESTGFRAFRICA >60 10/29/2018    EGFRNONAA >60 10/29/2018     Lab Results   Component Value Date    ALT 10 02/13/2023    AST 15 02/13/2023    ALKPHOS 123 02/13/2023    BILITOT 0.3 02/13/2023       Lab Results   Component Value Date    IRON 18 (L) 09/29/2022    TIBC 157 (L) 09/29/2022    FERRITIN 728 (H) 09/29/2022     No results found for: [id number]  No results found for: FOLATE  No results found for: TSH      Review of Systems   Constitutional:  Negative for activity change, appetite change, chills, diaphoresis, fatigue, fever and unexpected weight change.   HENT:  Negative for congestion, dental problem, drooling, ear discharge, ear pain, facial swelling, hearing loss, mouth sores, nosebleeds, postnasal drip, rhinorrhea, sinus pressure, sneezing, sore throat, tinnitus, trouble swallowing and voice change.    Eyes:  Negative for photophobia, pain, discharge, redness, itching and visual disturbance.   Respiratory:  Negative for cough, choking, chest tightness, shortness of breath, wheezing and stridor.    Cardiovascular:  Negative for chest pain, palpitations and leg swelling.   Gastrointestinal:  Negative for abdominal distention, abdominal pain, anal bleeding, blood in stool, constipation, diarrhea, nausea, rectal pain and vomiting.   Endocrine: Negative for cold intolerance, heat intolerance, polydipsia, polyphagia and polyuria.   Genitourinary:  Negative for decreased urine volume, difficulty urinating, dyspareunia, dysuria, enuresis, flank pain, frequency, genital sores, hematuria, menstrual problem, pelvic pain, urgency, vaginal bleeding,  vaginal discharge and vaginal pain.   Musculoskeletal:  Negative for arthralgias, back pain, gait problem, joint swelling, myalgias, neck pain and neck stiffness.   Skin:  Negative for color change, pallor and rash.   Allergic/Immunologic: Negative for environmental allergies, food allergies and immunocompromised state.   Neurological:  Negative for dizziness, tremors, seizures, syncope, facial asymmetry, speech difficulty, weakness, light-headedness, numbness and headaches.   Hematological:  Negative for adenopathy. Does not bruise/bleed easily.   Psychiatric/Behavioral:  Negative for agitation, behavioral problems, confusion, decreased concentration, dysphoric mood, hallucinations, self-injury, sleep disturbance and suicidal ideas. The patient is not nervous/anxious and is not hyperactive.      Objective:      Physical Exam  Vitals reviewed.   Constitutional:       General: She is not in acute distress.     Appearance: She is well-developed. She is not diaphoretic.   HENT:      Head: Normocephalic and atraumatic.      Right Ear: External ear normal.      Left Ear: External ear normal.      Nose: Nose normal.      Right Sinus: No maxillary sinus tenderness or frontal sinus tenderness.      Left Sinus: No maxillary sinus tenderness or frontal sinus tenderness.      Mouth/Throat:      Pharynx: No oropharyngeal exudate.   Eyes:      General: Lids are normal. No scleral icterus.        Right eye: No discharge.         Left eye: No discharge.      Conjunctiva/sclera: Conjunctivae normal.      Right eye: Right conjunctiva is not injected. No hemorrhage.     Left eye: Left conjunctiva is not injected. No hemorrhage.     Pupils: Pupils are equal, round, and reactive to light.   Neck:      Thyroid: No thyromegaly.      Vascular: No JVD.      Trachea: No tracheal deviation.   Cardiovascular:      Rate and Rhythm: Normal rate.   Pulmonary:      Effort: Pulmonary effort is normal. No respiratory distress.      Breath sounds:  No stridor.   Chest:      Chest wall: No tenderness.   Abdominal:      General: Bowel sounds are normal. There is no distension.      Palpations: Abdomen is soft. There is no hepatomegaly, splenomegaly or mass.      Tenderness: There is no abdominal tenderness. There is no rebound.   Musculoskeletal:         General: No tenderness. Normal range of motion.      Cervical back: Normal range of motion and neck supple.   Lymphadenopathy:      Cervical: No cervical adenopathy.      Upper Body:      Right upper body: No supraclavicular adenopathy.      Left upper body: No supraclavicular adenopathy.   Skin:     General: Skin is dry.      Findings: No erythema or rash.   Neurological:      Mental Status: She is alert and oriented to person, place, and time.      Cranial Nerves: No cranial nerve deficit.      Coordination: Coordination normal.   Psychiatric:         Behavior: Behavior normal.         Thought Content: Thought content normal.         Judgment: Judgment normal.           Assessment:      1. Endometrioid adenocarcinoma of ovary, unspecified laterality    2. Chronic myeloproliferative disease    3. Immunodeficiency due to chemotherapy    4. Hyperlipidemia associated with type 2 diabetes mellitus    5. Essential hypertension           Plan:     Proceed with cycle 3 day 1 counts adequate.  Patient has D couple labs continue with follow-up will see back for cycle 6.  Discussed implications answered questions will most likely stretch to 4 weeks on last cycle orders for carboplatin Taxol signed in reviewed      Med Onc Chart Routing      Follow up with physician 4 weeks. Moved to 4 week interval for final cycle only   Follow up with [facility]    Infusion scheduling note    Injection scheduling note Final cycle 6 of carboplatin Taxol   Labs CBC and CMP   Lab interval:     Imaging    Pharmacy appointment    Other referrals         Kwabena Andres Jr, MD FACP

## 2023-02-14 NOTE — PLAN OF CARE
Discussed plan of care with pt. Addressed any and ongoing concerns. Pt denies    Problem: Adult Inpatient Plan of Care  Goal: Plan of Care Review  Outcome: Ongoing, Progressing  Flowsheets (Taken 2/14/2023 0920)  Plan of Care Reviewed With: patient  Goal: Patient-Specific Goal (Individualized)  Outcome: Ongoing, Progressing  Goal: Absence of Hospital-Acquired Illness or Injury  Outcome: Ongoing, Progressing  Intervention: Identify and Manage Fall Risk  Flowsheets (Taken 2/14/2023 0920)  Safety Promotion/Fall Prevention:   in recliner, wheels locked   room near unit station  Intervention: Prevent Infection  Flowsheets (Taken 2/14/2023 0920)  Infection Prevention:   hand hygiene promoted   equipment surfaces disinfected  Goal: Optimal Comfort and Wellbeing  Outcome: Ongoing, Progressing  Intervention: Monitor Pain and Promote Comfort  Flowsheets (Taken 2/14/2023 0920)  Pain Management Interventions:   quiet environment facilitated   warm blanket provided  Intervention: Provide Person-Centered Care  Flowsheets (Taken 2/14/2023 0920)  Trust Relationship/Rapport:   care explained   reassurance provided   thoughts/feelings acknowledged   choices provided   emotional support provided   empathic listening provided   questions answered   questions encouraged

## 2023-03-13 ENCOUNTER — LAB VISIT (OUTPATIENT)
Dept: LAB | Facility: HOSPITAL | Age: 83
End: 2023-03-13
Attending: INTERNAL MEDICINE
Payer: MEDICARE

## 2023-03-13 DIAGNOSIS — C56.9 ENDOMETRIOID ADENOCARCINOMA OF OVARY, UNSPECIFIED LATERALITY: ICD-10-CM

## 2023-03-13 LAB
ALBUMIN SERPL BCP-MCNC: 3.9 G/DL (ref 3.5–5.2)
ALP SERPL-CCNC: 110 U/L (ref 55–135)
ALT SERPL W/O P-5'-P-CCNC: 8 U/L (ref 10–44)
ANION GAP SERPL CALC-SCNC: 10 MMOL/L (ref 8–16)
AST SERPL-CCNC: 17 U/L (ref 10–40)
BASOPHILS # BLD AUTO: 0.01 K/UL (ref 0–0.2)
BASOPHILS NFR BLD: 0.3 % (ref 0–1.9)
BILIRUB SERPL-MCNC: 0.3 MG/DL (ref 0.1–1)
BUN SERPL-MCNC: 14 MG/DL (ref 8–23)
CALCIUM SERPL-MCNC: 9.8 MG/DL (ref 8.7–10.5)
CANCER AG125 SERPL-ACNC: 9 U/ML (ref 0–30)
CHLORIDE SERPL-SCNC: 109 MMOL/L (ref 95–110)
CO2 SERPL-SCNC: 21 MMOL/L (ref 23–29)
CREAT SERPL-MCNC: 0.9 MG/DL (ref 0.5–1.4)
DIFFERENTIAL METHOD: ABNORMAL
EOSINOPHIL # BLD AUTO: 0 K/UL (ref 0–0.5)
EOSINOPHIL NFR BLD: 1 % (ref 0–8)
ERYTHROCYTE [DISTWIDTH] IN BLOOD BY AUTOMATED COUNT: 17.4 % (ref 11.5–14.5)
EST. GFR  (NO RACE VARIABLE): >60 ML/MIN/1.73 M^2
GLUCOSE SERPL-MCNC: 113 MG/DL (ref 70–110)
HCT VFR BLD AUTO: 27.9 % (ref 37–48.5)
HGB BLD-MCNC: 9 G/DL (ref 12–16)
IMM GRANULOCYTES # BLD AUTO: 0.01 K/UL (ref 0–0.04)
IMM GRANULOCYTES NFR BLD AUTO: 0.3 % (ref 0–0.5)
LYMPHOCYTES # BLD AUTO: 0.9 K/UL (ref 1–4.8)
LYMPHOCYTES NFR BLD: 31.6 % (ref 18–48)
MCH RBC QN AUTO: 31.3 PG (ref 27–31)
MCHC RBC AUTO-ENTMCNC: 32.3 G/DL (ref 32–36)
MCV RBC AUTO: 97 FL (ref 82–98)
MONOCYTES # BLD AUTO: 0.3 K/UL (ref 0.3–1)
MONOCYTES NFR BLD: 10.8 % (ref 4–15)
NEUTROPHILS # BLD AUTO: 1.7 K/UL (ref 1.8–7.7)
NEUTROPHILS NFR BLD: 56 % (ref 38–73)
NRBC BLD-RTO: 0 /100 WBC
PLATELET # BLD AUTO: 168 K/UL (ref 150–450)
PMV BLD AUTO: 9.2 FL (ref 9.2–12.9)
POTASSIUM SERPL-SCNC: 4.3 MMOL/L (ref 3.5–5.1)
PROT SERPL-MCNC: 7.2 G/DL (ref 6–8.4)
RBC # BLD AUTO: 2.88 M/UL (ref 4–5.4)
SODIUM SERPL-SCNC: 140 MMOL/L (ref 136–145)
WBC # BLD AUTO: 2.97 K/UL (ref 3.9–12.7)

## 2023-03-13 PROCEDURE — 85025 COMPLETE CBC W/AUTO DIFF WBC: CPT | Mod: PO | Performed by: INTERNAL MEDICINE

## 2023-03-13 PROCEDURE — 36415 COLL VENOUS BLD VENIPUNCTURE: CPT | Mod: PO | Performed by: INTERNAL MEDICINE

## 2023-03-13 PROCEDURE — 86304 IMMUNOASSAY TUMOR CA 125: CPT | Performed by: INTERNAL MEDICINE

## 2023-03-13 PROCEDURE — 80053 COMPREHEN METABOLIC PANEL: CPT | Mod: PO | Performed by: INTERNAL MEDICINE

## 2023-03-14 ENCOUNTER — OFFICE VISIT (OUTPATIENT)
Dept: HEMATOLOGY/ONCOLOGY | Facility: CLINIC | Age: 83
End: 2023-03-14
Payer: MEDICARE

## 2023-03-14 ENCOUNTER — INFUSION (OUTPATIENT)
Dept: INFUSION THERAPY | Facility: HOSPITAL | Age: 83
End: 2023-03-14
Attending: INTERNAL MEDICINE
Payer: MEDICARE

## 2023-03-14 VITALS
HEART RATE: 98 BPM | OXYGEN SATURATION: 100 % | BODY MASS INDEX: 25.39 KG/M2 | WEIGHT: 143.31 LBS | TEMPERATURE: 97 F | DIASTOLIC BLOOD PRESSURE: 80 MMHG | HEIGHT: 63 IN | RESPIRATION RATE: 18 BRPM | SYSTOLIC BLOOD PRESSURE: 135 MMHG

## 2023-03-14 VITALS
TEMPERATURE: 99 F | SYSTOLIC BLOOD PRESSURE: 125 MMHG | HEART RATE: 79 BPM | DIASTOLIC BLOOD PRESSURE: 75 MMHG | WEIGHT: 143.31 LBS | OXYGEN SATURATION: 98 % | BODY MASS INDEX: 25.39 KG/M2 | HEIGHT: 63 IN

## 2023-03-14 DIAGNOSIS — C56.9 ENDOMETRIOID ADENOCARCINOMA OF OVARY, UNSPECIFIED LATERALITY: Primary | ICD-10-CM

## 2023-03-14 DIAGNOSIS — E78.5 HYPERLIPIDEMIA ASSOCIATED WITH TYPE 2 DIABETES MELLITUS: ICD-10-CM

## 2023-03-14 DIAGNOSIS — T45.1X5A IMMUNODEFICIENCY DUE TO CHEMOTHERAPY: ICD-10-CM

## 2023-03-14 DIAGNOSIS — D84.821 IMMUNODEFICIENCY DUE TO CHEMOTHERAPY: ICD-10-CM

## 2023-03-14 DIAGNOSIS — Z79.899 IMMUNODEFICIENCY DUE TO CHEMOTHERAPY: ICD-10-CM

## 2023-03-14 DIAGNOSIS — E11.69 HYPERLIPIDEMIA ASSOCIATED WITH TYPE 2 DIABETES MELLITUS: ICD-10-CM

## 2023-03-14 DIAGNOSIS — I10 ESSENTIAL HYPERTENSION: Chronic | ICD-10-CM

## 2023-03-14 DIAGNOSIS — D50.0 IRON DEFICIENCY ANEMIA DUE TO CHRONIC BLOOD LOSS: ICD-10-CM

## 2023-03-14 DIAGNOSIS — E11.9 TYPE 2 DIABETES MELLITUS WITHOUT COMPLICATION, WITHOUT LONG-TERM CURRENT USE OF INSULIN: Chronic | ICD-10-CM

## 2023-03-14 DIAGNOSIS — D47.1 CHRONIC MYELOPROLIFERATIVE DISEASE: ICD-10-CM

## 2023-03-14 PROBLEM — D72.823 LEUKEMOID REACTION: Status: RESOLVED | Noted: 2022-09-28 | Resolved: 2023-03-14

## 2023-03-14 PROCEDURE — 3074F SYST BP LT 130 MM HG: CPT | Mod: CPTII,S$GLB,, | Performed by: INTERNAL MEDICINE

## 2023-03-14 PROCEDURE — 99999 PR PBB SHADOW E&M-EST. PATIENT-LVL IV: ICD-10-PCS | Mod: PBBFAC,,, | Performed by: INTERNAL MEDICINE

## 2023-03-14 PROCEDURE — 1159F PR MEDICATION LIST DOCUMENTED IN MEDICAL RECORD: ICD-10-PCS | Mod: CPTII,S$GLB,, | Performed by: INTERNAL MEDICINE

## 2023-03-14 PROCEDURE — 3288F FALL RISK ASSESSMENT DOCD: CPT | Mod: CPTII,S$GLB,, | Performed by: INTERNAL MEDICINE

## 2023-03-14 PROCEDURE — 1126F AMNT PAIN NOTED NONE PRSNT: CPT | Mod: CPTII,S$GLB,, | Performed by: INTERNAL MEDICINE

## 2023-03-14 PROCEDURE — 96375 TX/PRO/DX INJ NEW DRUG ADDON: CPT

## 2023-03-14 PROCEDURE — 99215 PR OFFICE/OUTPT VISIT, EST, LEVL V, 40-54 MIN: ICD-10-PCS | Mod: 25,S$GLB,, | Performed by: INTERNAL MEDICINE

## 2023-03-14 PROCEDURE — 96367 TX/PROPH/DG ADDL SEQ IV INF: CPT

## 2023-03-14 PROCEDURE — 25000003 PHARM REV CODE 250: Performed by: INTERNAL MEDICINE

## 2023-03-14 PROCEDURE — 99999 PR PBB SHADOW E&M-EST. PATIENT-LVL IV: CPT | Mod: PBBFAC,,, | Performed by: INTERNAL MEDICINE

## 2023-03-14 PROCEDURE — 99215 OFFICE O/P EST HI 40 MIN: CPT | Mod: 25,S$GLB,, | Performed by: INTERNAL MEDICINE

## 2023-03-14 PROCEDURE — 1160F PR REVIEW ALL MEDS BY PRESCRIBER/CLIN PHARMACIST DOCUMENTED: ICD-10-PCS | Mod: CPTII,S$GLB,, | Performed by: INTERNAL MEDICINE

## 2023-03-14 PROCEDURE — 1101F PR PT FALLS ASSESS DOC 0-1 FALLS W/OUT INJ PAST YR: ICD-10-PCS | Mod: CPTII,S$GLB,, | Performed by: INTERNAL MEDICINE

## 2023-03-14 PROCEDURE — 96413 CHEMO IV INFUSION 1 HR: CPT

## 2023-03-14 PROCEDURE — 3078F PR MOST RECENT DIASTOLIC BLOOD PRESSURE < 80 MM HG: ICD-10-PCS | Mod: CPTII,S$GLB,, | Performed by: INTERNAL MEDICINE

## 2023-03-14 PROCEDURE — 1126F PR PAIN SEVERITY QUANTIFIED, NO PAIN PRESENT: ICD-10-PCS | Mod: CPTII,S$GLB,, | Performed by: INTERNAL MEDICINE

## 2023-03-14 PROCEDURE — 1101F PT FALLS ASSESS-DOCD LE1/YR: CPT | Mod: CPTII,S$GLB,, | Performed by: INTERNAL MEDICINE

## 2023-03-14 PROCEDURE — 1159F MED LIST DOCD IN RCRD: CPT | Mod: CPTII,S$GLB,, | Performed by: INTERNAL MEDICINE

## 2023-03-14 PROCEDURE — 96417 CHEMO IV INFUS EACH ADDL SEQ: CPT

## 2023-03-14 PROCEDURE — 3074F PR MOST RECENT SYSTOLIC BLOOD PRESSURE < 130 MM HG: ICD-10-PCS | Mod: CPTII,S$GLB,, | Performed by: INTERNAL MEDICINE

## 2023-03-14 PROCEDURE — 3078F DIAST BP <80 MM HG: CPT | Mod: CPTII,S$GLB,, | Performed by: INTERNAL MEDICINE

## 2023-03-14 PROCEDURE — 96415 CHEMO IV INFUSION ADDL HR: CPT

## 2023-03-14 PROCEDURE — 1160F RVW MEDS BY RX/DR IN RCRD: CPT | Mod: CPTII,S$GLB,, | Performed by: INTERNAL MEDICINE

## 2023-03-14 PROCEDURE — 3288F PR FALLS RISK ASSESSMENT DOCUMENTED: ICD-10-PCS | Mod: CPTII,S$GLB,, | Performed by: INTERNAL MEDICINE

## 2023-03-14 PROCEDURE — 63600175 PHARM REV CODE 636 W HCPCS: Performed by: INTERNAL MEDICINE

## 2023-03-14 RX ORDER — EPINEPHRINE 0.3 MG/.3ML
0.3 INJECTION SUBCUTANEOUS ONCE AS NEEDED
Status: CANCELLED | OUTPATIENT
Start: 2023-03-14

## 2023-03-14 RX ORDER — SODIUM CHLORIDE 0.9 % (FLUSH) 0.9 %
10 SYRINGE (ML) INJECTION
Status: CANCELLED | OUTPATIENT
Start: 2023-03-14

## 2023-03-14 RX ORDER — DIPHENHYDRAMINE HYDROCHLORIDE 50 MG/ML
50 INJECTION INTRAMUSCULAR; INTRAVENOUS ONCE AS NEEDED
Status: CANCELLED | OUTPATIENT
Start: 2023-03-14

## 2023-03-14 RX ORDER — FAMOTIDINE 10 MG/ML
20 INJECTION INTRAVENOUS
Status: COMPLETED | OUTPATIENT
Start: 2023-03-14 | End: 2023-03-14

## 2023-03-14 RX ORDER — HEPARIN 100 UNIT/ML
500 SYRINGE INTRAVENOUS
Status: CANCELLED | OUTPATIENT
Start: 2023-03-14

## 2023-03-14 RX ORDER — FAMOTIDINE 10 MG/ML
20 INJECTION INTRAVENOUS
Status: CANCELLED | OUTPATIENT
Start: 2023-03-14

## 2023-03-14 RX ADMIN — FAMOTIDINE 20 MG: 10 INJECTION INTRAVENOUS at 08:03

## 2023-03-14 RX ADMIN — CARBOPLATIN 380 MG: 10 INJECTION, SOLUTION INTRAVENOUS at 12:03

## 2023-03-14 RX ADMIN — DIPHENHYDRAMINE HYDROCHLORIDE 50 MG: 50 INJECTION INTRAMUSCULAR; INTRAVENOUS at 09:03

## 2023-03-14 RX ADMIN — DEXAMETHASONE SODIUM PHOSPHATE 0.25 MG: 4 INJECTION, SOLUTION INTRA-ARTICULAR; INTRALESIONAL; INTRAMUSCULAR; INTRAVENOUS; SOFT TISSUE at 08:03

## 2023-03-14 RX ADMIN — PACLITAXEL 228 MG: 6 INJECTION, SOLUTION INTRAVENOUS at 10:03

## 2023-03-14 RX ADMIN — APREPITANT 130 MG: 130 INJECTION, EMULSION INTRAVENOUS at 08:03

## 2023-03-14 RX ADMIN — SODIUM CHLORIDE 500 ML: 0.9 INJECTION, SOLUTION INTRAVENOUS at 01:03

## 2023-03-14 NOTE — PROGRESS NOTES
Subjective:       Patient ID: Mariaelena Sheffield is a 82 y.o. female.    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  ECOG status 1    Past Medical History:   Diagnosis Date    Cancer     breast cancer R     Diabetes mellitus     GERD (gastroesophageal reflux disease)     Gout     Hypercholesteremia     Hypertension      History reviewed. No pertinent family history.  Social History     Socioeconomic History    Marital status:    Tobacco Use    Smoking status: Never    Smokeless tobacco: Never   Substance and Sexual Activity    Alcohol use: Yes     Comment: socially  No alcohol prior to sx    Drug use: No   Social History Narrative    Lives with son. Surrogate decision makers: Son, Bj Sheffield [phone] and Son, Blair Sheffield [phone].     Past Surgical History:   Procedure Laterality Date    ABDOMINAL SURGERY      BREAST BIOPSY      BREAST SURGERY Right     Breast biopsy    CATARACT EXTRACTION, BILATERAL      EXCISION OF PELVIC MASS N/A 9/28/2022    Procedure: EXCISION, MASS, PELVIS;  Surgeon: Edison Brooks MD;  Location: [address];  Service: General;  Laterality: N/A;    FLEXIBLE SIGMOIDOSCOPY  9/28/2022    Procedure: SIGMOIDOSCOPY, FLEXIBLE;  Surgeon: Edison Brooks MD;  Location: [facility] OR [address] [place] [address];  Service: General;;    HERNIA REPAIR      LYSIS OF ADHESIONS N/A 10/25/2018    Procedure: LYSIS, ADHESIONS;  Surgeon: Kevin Caldwell MD;  Location: [facility] OR;  Service: General;  Laterality: N/A;    REPAIR OF INCARCERATED INCISIONAL HERNIA WITHOUT HISTORY OF PRIOR REPAIR N/A 10/25/2018    Procedure: REPAIR, HERNIA, INCISIONAL, INCARCERATED, WITHOUT HISTORY OF PRIOR REPAIR;  Surgeon: Kevin Caldwell MD;  Location: [facility] OR;  Service: General;  Laterality: N/A;       Labs:  Lab Results   Component Value Date    WBC 2.97 (L) 03/13/2023     HGB 9.0 (L) 03/13/2023    HCT 27.9 (L) 03/13/2023    MCV 97 03/13/2023     03/13/2023     BMP  Lab Results   Component Value Date     03/13/2023    K 4.3 03/13/2023     03/13/2023    CO2 21 (L) 03/13/2023    BUN 14 03/13/2023    CREATININE 0.9 03/13/2023    CALCIUM 9.8 03/13/2023    ANIONGAP 10 03/13/2023    ESTGFRAFRICA >60 10/29/2018    EGFRNONAA >60 10/29/2018     Lab Results   Component Value Date    ALT 8 (L) 03/13/2023    AST 17 03/13/2023    ALKPHOS 110 03/13/2023    BILITOT 0.3 03/13/2023       Lab Results   Component Value Date    IRON 18 (L) 09/29/2022    TIBC 157 (L) 09/29/2022    FERRITIN 728 (H) 09/29/2022     No results found for: [id number]  No results found for: FOLATE  No results found for: TSH      Review of Systems   Constitutional:  Negative for activity change, appetite change, chills, diaphoresis, fatigue, fever and unexpected weight change.   HENT:  Negative for congestion, dental problem, drooling, ear discharge, ear pain, facial swelling, hearing loss, mouth sores, nosebleeds, postnasal drip, rhinorrhea, sinus pressure, sneezing, sore throat, tinnitus, trouble swallowing and voice change.    Eyes:  Negative for photophobia, pain, discharge, redness, itching and visual disturbance.   Respiratory:  Negative for cough, choking, chest tightness, shortness of breath, wheezing and stridor.    Cardiovascular:  Negative for chest pain, palpitations and leg swelling.   Gastrointestinal:  Negative for abdominal distention, abdominal pain, anal bleeding, blood in stool, constipation, diarrhea, nausea, rectal pain and vomiting.   Endocrine: Negative for cold intolerance, heat intolerance, polydipsia, polyphagia and polyuria.   Genitourinary:  Negative for decreased urine volume, difficulty urinating, dyspareunia, dysuria, enuresis, flank pain, frequency, genital sores, hematuria, menstrual problem, pelvic pain, urgency, vaginal bleeding, vaginal discharge and vaginal pain.    Musculoskeletal:  Negative for arthralgias, back pain, gait problem, joint swelling, myalgias, neck pain and neck stiffness.   Skin:  Negative for color change, pallor and rash.   Allergic/Immunologic: Negative for environmental allergies, food allergies and immunocompromised state.   Neurological:  Negative for dizziness, tremors, seizures, syncope, facial asymmetry, speech difficulty, weakness, light-headedness, numbness and headaches.   Hematological:  Negative for adenopathy. Does not bruise/bleed easily.   Psychiatric/Behavioral:  Negative for agitation, behavioral problems, confusion, decreased concentration, dysphoric mood, hallucinations, self-injury, sleep disturbance and suicidal ideas. The patient is not nervous/anxious and is not hyperactive.      Objective:      Physical Exam  Vitals reviewed.   Constitutional:       General: She is not in acute distress.     Appearance: She is well-developed. She is not diaphoretic.   HENT:      Head: Normocephalic and atraumatic.      Right Ear: External ear normal.      Left Ear: External ear normal.      Nose: Nose normal.      Right Sinus: No maxillary sinus tenderness or frontal sinus tenderness.      Left Sinus: No maxillary sinus tenderness or frontal sinus tenderness.      Mouth/Throat:      Pharynx: No oropharyngeal exudate.   Eyes:      General: Lids are normal. No scleral icterus.        Right eye: No discharge.         Left eye: No discharge.      Conjunctiva/sclera: Conjunctivae normal.      Right eye: Right conjunctiva is not injected. No hemorrhage.     Left eye: Left conjunctiva is not injected. No hemorrhage.     Pupils: Pupils are equal, round, and reactive to light.   Neck:      Thyroid: No thyromegaly.      Vascular: No JVD.      Trachea: No tracheal deviation.   Cardiovascular:      Rate and Rhythm: Normal rate.   Pulmonary:      Effort: Pulmonary effort is normal. No respiratory distress.      Breath sounds: No stridor.   Chest:      Chest wall:  No tenderness.   Abdominal:      General: Bowel sounds are normal. There is no distension.      Palpations: Abdomen is soft. There is no hepatomegaly, splenomegaly or mass.      Tenderness: There is no abdominal tenderness. There is no rebound.   Musculoskeletal:         General: No tenderness. Normal range of motion.      Cervical back: Normal range of motion and neck supple.   Lymphadenopathy:      Cervical: No cervical adenopathy.      Upper Body:      Right upper body: No supraclavicular adenopathy.      Left upper body: No supraclavicular adenopathy.   Skin:     General: Skin is dry.      Findings: No erythema or rash.   Neurological:      Mental Status: She is alert and oriented to person, place, and time.      Cranial Nerves: No cranial nerve deficit.      Coordination: Coordination normal.   Psychiatric:         Behavior: Behavior normal.         Thought Content: Thought content normal.         Judgment: Judgment normal.           Assessment:      1. Endometrioid adenocarcinoma of ovary, unspecified laterality    2. Chronic myeloproliferative disease    3. Immunodeficiency due to chemotherapy    4. Essential hypertension    5. Hyperlipidemia associated with type 2 diabetes mellitus    6. Iron deficiency anemia due to chronic blood loss    7. Type 2 diabetes mellitus without complication, without long-term current use of insulin           Plan:     Patient tolerating therapy well.  Return in 1 month with CT chest abdomen pelvis video visit.  Patient request imaging to be done in [facility] location.  On [facility] proceed with treatment orders signed      Med Onc Chart Routing      Follow up with physician 1 month.   Follow up with [facility]    Infusion scheduling note    Injection scheduling note    Labs CBC and CMP   Scheduling:  Preferred lab:  Lab interval:     Imaging CT chest abdomen pelvis   Due in [facility] location at [facility]   Pharmacy appointment    Other referrals             Kwabena Andres Jr, MD FACP

## 2023-03-14 NOTE — PLAN OF CARE
Patient tolerated chemotherapy well today; no adverse reaction noted.  No significant new complaints voiced.   Has f/u appt(s) scheduled per MD request.  No questions or concerns voiced.  NAD noted upon discharge.

## 2023-03-14 NOTE — DISCHARGE INSTRUCTIONS
WAYS TO HELP PREVENT INFECTION        WASH YOUR HANDS OFTEN DURING THE DAY, ESPECIALLY BEFORE YOU EAT, AFTER USING THE BATHROOM, AND AFTER TOUCHING ANIMALS    STAY AWAY FROM PEOPLE WHO HAVE ILLNESSES YOU CAN CATCH; SUCH AS COLDS, FLU, CHICKEN POX    TRY TO AVOID CROWDS    STAY AWAY FROM CHILDREN WHO RECENTLY HAVE RECEIVED LIVE VIRUS VACCINES    MAINTAIN GOOD MOUTH CARE    DO NOT SQUEEZE OR SCRATCH PIMPLES    CLEAN CUTS & SCRAPES RIGHT AWAY AND DAILY UNTIL HEALED WITH WARM WATER, SOAP & AN ANTISEPTIC    AVOID CONTACT WITH LITTER BOXES, BIRD CAGES, & FISH TANKS    AVOID STANDING WATER, IE., BIRD BATHS, FLOWER POTS/VASES, OR HUMIDIFIERS    WEAR GLOVES WHEN GARDENING OR CLEANING UP AFTER OTHERS, ESPECIALLY BABIES & SMALL CHILDREN    DO NOT EAT RAW FISH, SEAFOOD, MEAT, OR EGGS     FALL PREVENTION   Falls often occur due to slipping, tripping or losing your balance. Here are ways to reduce your risk of falling again.   Was there anything that caused your fall that can be fixed, removed or replaced?   Make your home safe by keeping walkways clear of objects you may trip over.   Use non-slip pads under rugs.   Do not walk in poorly lit areas.   Do not stand on chairs or wobbly ladders.   Use caution when reaching overhead or looking upward. This position can cause a loss of balance.   Be sure your shoes fit properly, have non-slip bottoms and are in good condition.   Be cautious when going up and down stairs, curbs, and when walking on uneven sidewalks.   If your balance is poor, consider using a cane or walker.   If your fall was related to alcohol use, stop or limit alcohol intake.   If your fall was related to use of sleeping medicines, talk to your doctor about this. You may need to reduce your dosage at bedtime if you awaken during the night to go to the bathroom.   To reduce the need for nighttime bathroom trips:   Avoid drinking fluids for several hours before going to bed   Empty your bladder before going to bed    Men can keep a urinal at the bedside   © 6289-5723 [employer], [address] [facility], [place] [address]. All rights reserved. This information is not intended as a substitute for professional medical care. Always follow your healthcare professional's instructions.     Thank you for letting me take care of YOU!!    LYNNE Matias          [facility]  [address] [address]  [phone] phone     [phone] fax  Hours of Operation: Monday- Friday 8:00am- 5:00pm  After hours phone  [phone]  Hematology / Oncology Physicians on call:    Dr. Dmitry Amador        Nurse Practitioners:    Lisha Quinones, LEXX Grijalva, BENY Donahue, LEXX Kimble, LEXX Lyn, LEXX      Please don't hesitate to call if you have any concerns.

## 2023-04-13 ENCOUNTER — HOSPITAL ENCOUNTER (OUTPATIENT)
Dept: RADIOLOGY | Facility: HOSPITAL | Age: 83
Discharge: HOME | End: 2023-04-13
Attending: INTERNAL MEDICINE
Payer: MEDICARE

## 2023-04-13 DIAGNOSIS — C56.9 ENDOMETRIOID ADENOCARCINOMA OF OVARY, UNSPECIFIED LATERALITY: ICD-10-CM

## 2023-04-13 PROCEDURE — 71260 CT CHEST ABDOMEN PELVIS WITH CONTRAST (XPD): ICD-10-PCS | Mod: 26,,, | Performed by: STUDENT IN AN ORGANIZED HEALTH CARE EDUCATION/TRAINING PROGRAM

## 2023-04-13 PROCEDURE — 71260 CT THORAX DX C+: CPT | Mod: 26,,, | Performed by: STUDENT IN AN ORGANIZED HEALTH CARE EDUCATION/TRAINING PROGRAM

## 2023-04-13 PROCEDURE — 74177 CT CHEST ABDOMEN PELVIS WITH CONTRAST (XPD): ICD-10-PCS | Mod: 26,,, | Performed by: STUDENT IN AN ORGANIZED HEALTH CARE EDUCATION/TRAINING PROGRAM

## 2023-04-13 PROCEDURE — 25500020 PHARM REV CODE 255: Mod: PO | Performed by: INTERNAL MEDICINE

## 2023-04-13 PROCEDURE — 74177 CT ABD & PELVIS W/CONTRAST: CPT | Mod: TC,PO

## 2023-04-13 PROCEDURE — 74177 CT ABD & PELVIS W/CONTRAST: CPT | Mod: 26,,, | Performed by: STUDENT IN AN ORGANIZED HEALTH CARE EDUCATION/TRAINING PROGRAM

## 2023-04-13 PROCEDURE — A9698 NON-RAD CONTRAST MATERIALNOC: HCPCS | Mod: PO | Performed by: INTERNAL MEDICINE

## 2023-04-13 PROCEDURE — 71260 CT THORAX DX C+: CPT | Mod: TC,PO

## 2023-04-13 RX ADMIN — IOHEXOL 500 ML: 9 SOLUTION ORAL at 09:04

## 2023-04-13 RX ADMIN — IOHEXOL 75 ML: 350 INJECTION, SOLUTION INTRAVENOUS at 09:04

## 2023-04-14 ENCOUNTER — OFFICE VISIT (OUTPATIENT)
Dept: HEMATOLOGY/ONCOLOGY | Facility: CLINIC | Age: 83
End: 2023-04-14
Payer: MEDICARE

## 2023-04-14 VITALS
RESPIRATION RATE: 18 BRPM | HEIGHT: 63 IN | DIASTOLIC BLOOD PRESSURE: 71 MMHG | WEIGHT: 145.5 LBS | TEMPERATURE: 98 F | HEART RATE: 81 BPM | OXYGEN SATURATION: 97 % | SYSTOLIC BLOOD PRESSURE: 122 MMHG | BODY MASS INDEX: 25.78 KG/M2

## 2023-04-14 DIAGNOSIS — C56.9 ENDOMETRIOID ADENOCARCINOMA OF OVARY, UNSPECIFIED LATERALITY: Primary | ICD-10-CM

## 2023-04-14 DIAGNOSIS — D47.1 CHRONIC MYELOPROLIFERATIVE DISEASE: ICD-10-CM

## 2023-04-14 DIAGNOSIS — T45.1X5A IMMUNODEFICIENCY DUE TO CHEMOTHERAPY: ICD-10-CM

## 2023-04-14 DIAGNOSIS — E78.5 HYPERLIPIDEMIA ASSOCIATED WITH TYPE 2 DIABETES MELLITUS: ICD-10-CM

## 2023-04-14 DIAGNOSIS — Z79.899 IMMUNODEFICIENCY DUE TO CHEMOTHERAPY: ICD-10-CM

## 2023-04-14 DIAGNOSIS — E11.69 HYPERLIPIDEMIA ASSOCIATED WITH TYPE 2 DIABETES MELLITUS: ICD-10-CM

## 2023-04-14 DIAGNOSIS — D84.821 IMMUNODEFICIENCY DUE TO CHEMOTHERAPY: ICD-10-CM

## 2023-04-14 DIAGNOSIS — I10 ESSENTIAL HYPERTENSION: Chronic | ICD-10-CM

## 2023-04-14 PROCEDURE — 1126F AMNT PAIN NOTED NONE PRSNT: CPT | Mod: CPTII,S$GLB,, | Performed by: INTERNAL MEDICINE

## 2023-04-14 PROCEDURE — 3288F FALL RISK ASSESSMENT DOCD: CPT | Mod: CPTII,S$GLB,, | Performed by: INTERNAL MEDICINE

## 2023-04-14 PROCEDURE — 1101F PR PT FALLS ASSESS DOC 0-1 FALLS W/OUT INJ PAST YR: ICD-10-PCS | Mod: CPTII,S$GLB,, | Performed by: INTERNAL MEDICINE

## 2023-04-14 PROCEDURE — 99214 OFFICE O/P EST MOD 30 MIN: CPT | Mod: S$GLB,,, | Performed by: INTERNAL MEDICINE

## 2023-04-14 PROCEDURE — 3074F PR MOST RECENT SYSTOLIC BLOOD PRESSURE < 130 MM HG: ICD-10-PCS | Mod: CPTII,S$GLB,, | Performed by: INTERNAL MEDICINE

## 2023-04-14 PROCEDURE — 99214 PR OFFICE/OUTPT VISIT, EST, LEVL IV, 30-39 MIN: ICD-10-PCS | Mod: S$GLB,,, | Performed by: INTERNAL MEDICINE

## 2023-04-14 PROCEDURE — 1159F PR MEDICATION LIST DOCUMENTED IN MEDICAL RECORD: ICD-10-PCS | Mod: CPTII,S$GLB,, | Performed by: INTERNAL MEDICINE

## 2023-04-14 PROCEDURE — 99999 PR PBB SHADOW E&M-EST. PATIENT-LVL V: CPT | Mod: PBBFAC,,, | Performed by: INTERNAL MEDICINE

## 2023-04-14 PROCEDURE — 3288F PR FALLS RISK ASSESSMENT DOCUMENTED: ICD-10-PCS | Mod: CPTII,S$GLB,, | Performed by: INTERNAL MEDICINE

## 2023-04-14 PROCEDURE — 1159F MED LIST DOCD IN RCRD: CPT | Mod: CPTII,S$GLB,, | Performed by: INTERNAL MEDICINE

## 2023-04-14 PROCEDURE — 3078F DIAST BP <80 MM HG: CPT | Mod: CPTII,S$GLB,, | Performed by: INTERNAL MEDICINE

## 2023-04-14 PROCEDURE — 1101F PT FALLS ASSESS-DOCD LE1/YR: CPT | Mod: CPTII,S$GLB,, | Performed by: INTERNAL MEDICINE

## 2023-04-14 PROCEDURE — 1126F PR PAIN SEVERITY QUANTIFIED, NO PAIN PRESENT: ICD-10-PCS | Mod: CPTII,S$GLB,, | Performed by: INTERNAL MEDICINE

## 2023-04-14 PROCEDURE — 3078F PR MOST RECENT DIASTOLIC BLOOD PRESSURE < 80 MM HG: ICD-10-PCS | Mod: CPTII,S$GLB,, | Performed by: INTERNAL MEDICINE

## 2023-04-14 PROCEDURE — 3074F SYST BP LT 130 MM HG: CPT | Mod: CPTII,S$GLB,, | Performed by: INTERNAL MEDICINE

## 2023-04-14 PROCEDURE — 99999 PR PBB SHADOW E&M-EST. PATIENT-LVL V: ICD-10-PCS | Mod: PBBFAC,,, | Performed by: INTERNAL MEDICINE

## 2023-04-14 NOTE — PROGRESS NOTES
Subjective:       Patient ID: Mariaelena Sheffield is a 83 y.o. female.    Chief Complaint: Results, Cancer, and Chemotherapy    HPI:  83-year-old female status post 6 cycles of carboplatin Taxol stage III T3 N0 M0 endometrial carcinoma ER negative IL positive MMR negative.  Patient is referred after completion of 6 cycles of therapy.  ECOG status 1    Past Medical History:   Diagnosis Date    Cancer     breast cancer R     Diabetes mellitus     Endometrioid adenocarcinoma of ovary 11/3/2022    GERD (gastroesophageal reflux disease)     Gout     Hypercholesteremia     Hypertension      History reviewed. No pertinent family history.  Social History     Socioeconomic History    Marital status:    Tobacco Use    Smoking status: Never    Smokeless tobacco: Never   Substance and Sexual Activity    Alcohol use: Yes     Comment: socially  No alcohol prior to sx    Drug use: No   Social History Narrative    Lives with son. Surrogate decision makers: SonBj [phone] and SonBlair [phone].     Past Surgical History:   Procedure Laterality Date    ABDOMINAL SURGERY      BREAST BIOPSY      BREAST SURGERY Right     Breast biopsy    CATARACT EXTRACTION, BILATERAL      EXCISION OF PELVIC MASS N/A 9/28/2022    Procedure: EXCISION, MASS, PELVIS;  Surgeon: Edison Brooks MD;  Location: [address];  Service: General;  Laterality: N/A;    FLEXIBLE SIGMOIDOSCOPY  9/28/2022    Procedure: SIGMOIDOSCOPY, FLEXIBLE;  Surgeon: Edison Brooks MD;  Location: [facility] OR [address] [place] [address];  Service: General;;    HERNIA REPAIR      LYSIS OF ADHESIONS N/A 10/25/2018    Procedure: LYSIS, ADHESIONS;  Surgeon: Kevin Caldwell MD;  Location: [facility] OR;  Service: General;  Laterality: N/A;    REPAIR OF INCARCERATED INCISIONAL HERNIA WITHOUT HISTORY OF PRIOR REPAIR N/A 10/25/2018    Procedure: REPAIR, HERNIA, INCISIONAL, INCARCERATED, WITHOUT HISTORY OF PRIOR REPAIR;  Surgeon: Kevin Caldwell MD;  Location: [facility] OR;   Service: General;  Laterality: N/A;       Labs:  Lab Results   Component Value Date    WBC 3.12 (L) 04/13/2023    HGB 8.9 (L) 04/13/2023    HCT 28.2 (L) 04/13/2023     (H) 04/13/2023     04/13/2023     BMP  Lab Results   Component Value Date     04/13/2023    K 4.3 04/13/2023     04/13/2023    CO2 26 04/13/2023    BUN 15 04/13/2023    CREATININE 0.9 04/13/2023    CALCIUM 10.0 04/13/2023    ANIONGAP 8 04/13/2023    ESTGFRAFRICA >60 10/29/2018    EGFRNONAA >60 10/29/2018     Lab Results   Component Value Date    ALT 12 04/13/2023    AST 16 04/13/2023    ALKPHOS 115 04/13/2023    BILITOT 0.4 04/13/2023       Lab Results   Component Value Date    IRON 18 (L) 09/29/2022    TIBC 157 (L) 09/29/2022    FERRITIN 728 (H) 09/29/2022     No results found for: [id number]  No results found for: FOLATE  No results found for: TSH      Review of Systems   Constitutional:  Negative for activity change, appetite change, chills, diaphoresis, fatigue, fever and unexpected weight change.   HENT:  Negative for congestion, dental problem, drooling, ear discharge, ear pain, facial swelling, hearing loss, mouth sores, nosebleeds, postnasal drip, rhinorrhea, sinus pressure, sneezing, sore throat, tinnitus, trouble swallowing and voice change.    Eyes:  Negative for photophobia, pain, discharge, redness, itching and visual disturbance.   Respiratory:  Negative for cough, choking, chest tightness, shortness of breath, wheezing and stridor.    Cardiovascular:  Negative for chest pain, palpitations and leg swelling.   Gastrointestinal:  Negative for abdominal distention, abdominal pain, anal bleeding, blood in stool, constipation, diarrhea, nausea, rectal pain and vomiting.   Endocrine: Negative for cold intolerance, heat intolerance, polydipsia, polyphagia and polyuria.   Genitourinary:  Negative for decreased urine volume, difficulty urinating, dyspareunia, dysuria, enuresis, flank pain, frequency, genital sores,  hematuria, menstrual problem, pelvic pain, urgency, vaginal bleeding, vaginal discharge and vaginal pain.   Musculoskeletal:  Negative for arthralgias, back pain, gait problem, joint swelling, myalgias, neck pain and neck stiffness.   Skin:  Negative for color change, pallor and rash.   Allergic/Immunologic: Negative for environmental allergies, food allergies and immunocompromised state.   Neurological:  Negative for dizziness, tremors, seizures, syncope, facial asymmetry, speech difficulty, weakness, light-headedness, numbness and headaches.   Hematological:  Negative for adenopathy. Does not bruise/bleed easily.   Psychiatric/Behavioral:  Negative for agitation, behavioral problems, confusion, decreased concentration, dysphoric mood, hallucinations, self-injury, sleep disturbance and suicidal ideas. The patient is not nervous/anxious and is not hyperactive.      Objective:      Physical Exam  Vitals reviewed.   Constitutional:       General: She is not in acute distress.     Appearance: She is well-developed. She is not diaphoretic.   HENT:      Head: Normocephalic and atraumatic.      Right Ear: External ear normal.      Left Ear: External ear normal.      Nose: Nose normal.      Right Sinus: No maxillary sinus tenderness or frontal sinus tenderness.      Left Sinus: No maxillary sinus tenderness or frontal sinus tenderness.      Mouth/Throat:      Pharynx: No oropharyngeal exudate.   Eyes:      General: Lids are normal. No scleral icterus.        Right eye: No discharge.         Left eye: No discharge.      Conjunctiva/sclera: Conjunctivae normal.      Right eye: Right conjunctiva is not injected. No hemorrhage.     Left eye: Left conjunctiva is not injected. No hemorrhage.     Pupils: Pupils are equal, round, and reactive to light.   Neck:      Thyroid: No thyromegaly.      Vascular: No JVD.      Trachea: No tracheal deviation.   Cardiovascular:      Rate and Rhythm: Normal rate and regular rhythm.      Heart  sounds: Normal heart sounds.   Pulmonary:      Effort: Pulmonary effort is normal. No respiratory distress.      Breath sounds: Normal breath sounds. No stridor.   Chest:      Chest wall: No tenderness.   Abdominal:      General: Bowel sounds are normal. There is no distension.      Palpations: Abdomen is soft. There is no hepatomegaly, splenomegaly or mass.      Tenderness: There is no abdominal tenderness. There is no rebound.   Musculoskeletal:         General: No tenderness. Normal range of motion.      Cervical back: Normal range of motion and neck supple.   Lymphadenopathy:      Cervical: No cervical adenopathy.      Upper Body:      Right upper body: No supraclavicular adenopathy.      Left upper body: No supraclavicular adenopathy.   Skin:     General: Skin is dry.      Findings: No erythema or rash.   Neurological:      Mental Status: She is alert and oriented to person, place, and time.      Cranial Nerves: No cranial nerve deficit.      Coordination: Coordination normal.   Psychiatric:         Behavior: Behavior normal.         Thought Content: Thought content normal.         Judgment: Judgment normal.           Assessment:      1. Endometrioid adenocarcinoma of ovary, unspecified laterality    2. Immunodeficiency due to chemotherapy    3. Essential hypertension    4. Hyperlipidemia associated with type 2 diabetes mellitus    5. Chronic myeloproliferative disease           Med Onc Chart Routing      Follow up with physician 3 months. RTC 3 months labs and CTs   Follow up with [facility]    Infusion scheduling note    Injection scheduling note    Labs CBC, CMP and LDH   Scheduling: 3 days prior to infusion  Preferred lab:  Lab interval:     Imaging CT chest abdomen pelvis      Pharmacy appointment    Other referrals            Plan:     Patient has done remarkably well tolerating therapy without major complications.  At this time completion of 6 cycles carboplatin Taxol repeat CT chest abdomen pelvis demonstrates  nothing to suggest recurrent disease.  Will ask Radiation Oncology to review to make sure no evidence or 4 radiation.  Will also has gyn Oncology to review to see whether not patient would be placed on adjuvant hormonal therapy.  At this time per recommendations discussed implications and answered questions with her RTC 3-4 months with labs prior with repeat imaging studies        Kwabena Andres Jr, MD FACP

## 2023-04-18 ENCOUNTER — TELEPHONE (OUTPATIENT)
Dept: HEMATOLOGY/ONCOLOGY | Facility: CLINIC | Age: 83
End: 2023-04-18
Payer: MEDICARE

## 2023-04-18 ENCOUNTER — TELEPHONE (OUTPATIENT)
Dept: GYNECOLOGIC ONCOLOGY | Facility: CLINIC | Age: 83
End: 2023-04-18
Payer: MEDICARE

## 2023-04-18 NOTE — TELEPHONE ENCOUNTER
Swer was consulted by [facility] staff to call pt. regarding transportation for upcoming [facility] consult on 5/4/23. Together, swer and pt. discussed transportation assistance. Pt. reports she does not have a cell phone, however her son is listed as a second contact. Swer to setup pt. transport with yellow cab. Pt. provided following physical address:  [address] [place] [address]  Pt. requests pickup time for 11:30 am.   Swer to call pt. day before appt for reminder. Pt. agreeable. Swer offered emotional support and supportive listening. Pt. reports she feels much better knowing she has transportation assistance. Pt. admits she was surprised to receive a phone call from [facility], but she reports feeling better at the present moment after our discussion. Swer to meet with pt. on 5/4/23. Pt. was given sw phone number in the event any needs or concerns arise. Swer will remain available.    3:30 pm - Swer received confirmation from [place], trip is scheduled for 5/4/23 with 11:30 am pickup time, round trip - will call.

## 2023-04-18 NOTE — TELEPHONE ENCOUNTER
Left voicemail to schedule virtual  with Dr. Flores referred by Dr. Andres ----- Message from Og Flores MD sent at 4/18/2023  8:31 AM CDT -----  Thanks.  I am happy to review the clinical picture with her virtually but surveillance visits require a physical exam.  For that, she will have to come to [facility] and if that is too far then she will need to see someone at [facility]'s.    [facility], can you please schedule a VV?  Thank you.  ----- Message -----  From: Kwabena Andres MD  Sent: 4/14/2023   1:34 PM CDT  To: Og Flores MD, cc Cancer Navigation    Patient has completed 6 cycles of carboplatin Taxol stage III endometrial cancer.  Will have Radiation Oncology review but would like probable gyn virtual visit for recommendations in terms of follow-up and to see whether not patient should be placed on hormonal therapy.  In adjuvant setting

## 2023-05-01 ENCOUNTER — TELEPHONE (OUTPATIENT)
Dept: HEMATOLOGY/ONCOLOGY | Facility: CLINIC | Age: 83
End: 2023-05-01
Payer: MEDICARE

## 2023-05-01 NOTE — TELEPHONE ENCOUNTER
Swer received call from pt. today to confirm transportation for upcoming [facility]plenish appt on 5/4/23. Swer confirmed transport has been scheduled. Pt. inquired about swer reminder call for 5/3. Swer confirmed to call pt. for reminder on 5/3/23. Pt. had no other needs today. Swer will remain available.

## 2023-05-03 ENCOUNTER — TELEPHONE (OUTPATIENT)
Dept: HEMATOLOGY/ONCOLOGY | Facility: CLINIC | Age: 83
End: 2023-05-03
Payer: MEDICARE

## 2023-05-03 NOTE — TELEPHONE ENCOUNTER
10:59 am - Swer called pt. per pt. request for reminder for tm's appt time. Swer confirmed pt. transport scheduled for 11:30 am pickup per pt. request. Pt. had no other needs. Swer will remain available.

## 2023-05-03 NOTE — PROGRESS NOTES
PATIENT IDENTIFICATION:  Patient Name: Mariaelena Sheffield  MRN: [id number]  : 1940    DIAGNOSIS:  Cancer Staging   Endometrioid adenocarcinoma of ovary  Staging form: Ovary, Fallopian Tube, and Primary Peritoneal Carcinoma, AJCC 8th Edition  - Clinical stage from 1/3/2023: FIGO Stage III (cT3, cN0, cM0) - Signed by Kwabena Andres MD on 1/3/2023      HISTORY OF PRESENT ILLNESS:   The patient is an 83-year-old woman with an endometrioid adenocarcinoma the fallopian tube/ovary.  She is status post resection and adjuvant chemotherapy with carbo Taxol.  She is been referred to determine if there is a role for adjuvant radiation therapy.    The patient presented with abdominal pain.  CT of the abdomen revealed a large pelvic mass 20 cm. She was taken to the operating room for debulking/resection of the mass on 22.  Pathology revealed:  2. Pelvic mass, resection:   - Endometrial adenocarcinoma, endometrioid type (22.2 cm)   - Fallopian tube with serosal adhesions   - See synoptic report for details and complete pathologic staging   CASE SUMMARY: (OVARY or FALLOPIAN TUBE or PRIMARY PERITONEUM)   Seizure:   Resection of pelvic mass   Specimen integrity:   Not applicable -no intact normal structures are   identified   Tumor site:   Tubo-ovarian, laterality can not be determined; no intact   normal structures identified   Tumor size:   22.2 cm   Histologic type:   Endometrioid carcinoma   Histologic grade:   G1   Ovarian surface involvement:  Cannot be determined- no intact normal   structures are identified   Fallopian tube surface involvement:  Cannot be determined - no intact normal   structures are identified   Implants:   Not sampled   Other tissue/organ involvement:   Not identified   Largest extrapelvic peritoneal focus:   Not applicable   Peritoneal/ascitic fluid involvement:  Not submitted/unknown   Chemotherapy response score:   No known pre-surgical therapy   Regional lymph node status:   Not applicable  (no regional lymph nodes   submitted or found)   Distant metastasis:   Not applicable   Ancillary studies:   Immunohistochemical stains with appropriately reactive   controls and show the below immunohistochemical pattern:   Positive: AE1/AE3, EMA, CK7, ER, TN, p53 wild type pattern, p16 increased but   not block type and CD10 shows patchy positivity   Negative:  P63, CK20, GATA3, inhibin, calretinin, PAX8, WT1, TTF1 and CEA   Estrogen receptor:  Weak to moderate positivity in 95% of tumor   Progesterone receptor:  Strong to moderate positivity in 95% of tumor   MMR immunostains have been ordered and the results will be reported as an   addendum.   Optimal block for additional molecular testinJ   PATHOLOGIC STAGE CLASSIFICATION (pTNM, AJCC 8th Edition): pT2 (subcategory   can not be determined)). pNx, pMx     The patient received adjuvant chemotherapy with CarboTaxol x 6 cylcles under the care of Dr. Andres.      Oncology History   Malignant neoplasm of both ovaries (Resolved)   2022 Imaging Significant Findings    CT Reneal ABD Pelvis w/o: Pelvic mass.  No ascites, omental caking, or lymphadenopathy.     2022 Tumor Markers    CA-125 = 154     2022 Imaging Significant Findings    CXR: NATALY     2022 Surgery    Ex-lap, JAMES, pelvic mass resection, ventral hernia repair (Dr. Brooks)    Final pathology c/w stage IIIC grade 2 endometrioid ovarian cancer (due to dense adhesions to the small bowel and colon above the pelvic brim)      Genetic Testing    Patient has genetic testing done for [employer].                                              Results revealed patient has the following mutation(s): in process     Endometrioid adenocarcinoma of ovary   11/3/2022 Initial Diagnosis    Endometrioid adenocarcinoma of ovary       2022 -  Chemotherapy    Treatment Summary   Plan Name: OP GYN PACLITAXEL CARBOPLATIN (AUC 6) Q3W  Treatment Goal: Control  Status: Active  Start Date: 2022  End Date:  10/31/2023 (Planned)  Provider: Alisha Palomares MD  Chemotherapy: CARBOplatin (PARAPLATIN) 415 mg in sodium chloride 0.9% 291.5 mL chemo infusion, 415 mg (100 % of original dose 417 mg), Intravenous, Clinic/HOD 1 time, 6 of 17 cycles  Dose modification:   (original dose 417 mg, Cycle 1),   (original dose 417 mg, Cycle 3),   (original dose 417 mg, Cycle 3, Reason: Dose not tolerated)  Administration: 415 mg (11/22/2022), 415 mg (12/13/2022), 420 mg (1/3/2023), 385 mg (1/24/2023), 385 mg (2/14/2023), 380 mg (3/14/2023)  PACLitaxeL (TAXOL) 135 mg/m2 = 228 mg in sodium chloride 0.9% 500 mL chemo infusion, 135 mg/m2 = 228 mg (100 % of original dose 135 mg/m2), Intravenous, Clinic/HOD 1 time, 6 of 17 cycles  Dose modification: 135 mg/m2 (original dose 135 mg/m2, Cycle 1, Reason: MD Discretion, Comment: reduced dose d/t age>71y/o), 135 mg/m2 (original dose 135 mg/m2, Cycle 4)  Administration: 228 mg (11/22/2022), 228 mg (12/13/2022), 228 mg (1/3/2023), 228 mg (1/24/2023), 234 mg (2/14/2023), 228 mg (3/14/2023)        Genetic Testing    Patient has genetic testing done for MY [employer]IS[employer].                                              Results revealed patient has the following mutation(s):NO abnormality     1/3/2023 Cancer Staged    Staging form: Ovary, Fallopian Tube, and Primary Peritoneal Carcinoma, AJCC 8th Edition  - Clinical stage from 1/3/2023: FIGO Stage III (cT3, cN0, cM0)        Tumor Markers    Immunohistochemistry (IHC) testing for mismatch repair (MMR) proteins:   MLH1-intact nuclear expression   MSH2-intact nuclear expression   MSH6-intact nuclear expression   PMS2-intact nuclear expression   Background nonneoplastic tissue/internal control with intact nuclear   expression.   IHC interpretation:  No loss of nuclear expression of MMR proteins:  Low   probability of microsatellite instability.          REVIEW OF SYSTEMS:   Review of Systems   Constitutional:  Positive for malaise/fatigue.   Gastrointestinal:   Negative for constipation, diarrhea, nausea and vomiting.       PAST MEDICAL HISTORY:  Past Medical History:   Diagnosis Date    Cancer     breast cancer R     Diabetes mellitus     Endometrioid adenocarcinoma of ovary 11/3/2022    GERD (gastroesophageal reflux disease)     Gout     Hypercholesteremia     Hypertension        PAST SURGICAL HISTORY:  Past Surgical History:   Procedure Laterality Date    ABDOMINAL SURGERY      BREAST BIOPSY      BREAST SURGERY Right     Breast biopsy    CATARACT EXTRACTION, BILATERAL      EXCISION OF PELVIC MASS N/A 9/28/2022    Procedure: EXCISION, MASS, PELVIS;  Surgeon: Edison Brooks MD;  Location: [facility] OR [address] [place] [address];  Service: General;  Laterality: N/A;    FLEXIBLE SIGMOIDOSCOPY  9/28/2022    Procedure: SIGMOIDOSCOPY, FLEXIBLE;  Surgeon: Edison Brooks MD;  Location: [facility] OR [address] [place] [address];  Service: General;;    HERNIA REPAIR      LYSIS OF ADHESIONS N/A 10/25/2018    Procedure: LYSIS, ADHESIONS;  Surgeon: Kevin Caldwell MD;  Location: [facility];  Service: General;  Laterality: N/A;    REPAIR OF INCARCERATED INCISIONAL HERNIA WITHOUT HISTORY OF PRIOR REPAIR N/A 10/25/2018    Procedure: REPAIR, HERNIA, INCISIONAL, INCARCERATED, WITHOUT HISTORY OF PRIOR REPAIR;  Surgeon: Kevin Caldwell MD;  Location: [facility];  Service: General;  Laterality: N/A;       ALLERGIES:   Review of patient's allergies indicates:  No Known Allergies    MEDICATIONS:  Current Outpatient Medications   Medication Sig    allopurinoL (ZYLOPRIM) 300 MG tablet Take 300 mg by mouth once daily.    amLODIPine (NORVASC) 5 MG tablet Take 5 mg by mouth once daily.    atorvastatin (LIPITOR) 20 MG tablet Take 20 mg by mouth every evening.     blood sugar diagnostic Strp TEST ONE TO TWO TIMES DAILY (NEED MD APPOINTMENT)    blood-glucose meter Misc Test BID    colchicine 0.6 mg tablet Take 0.6 mg by mouth once daily.    dexAMETHasone (DECADRON) 4 MG Tab Take 2 tablets (8 mg total) by mouth once daily. Take as directed on days 2, 3, and 4  of your chemotherapy cycle.    dicyclomine (BENTYL) 10 MG capsule Take 10 mg by mouth 3 (three) times daily as needed.    ferrous sulfate 325 (65 FE) MG EC tablet Take 325 mg by mouth once daily.    lancets Misc TEST BLOOD SUGAR  1  TO  2  TIMES  PER  DAY  ( NEED MD APPOINTMENT  )    lisinopriL (PRINIVIL,ZESTRIL) 20 MG tablet Take 1 tablet by mouth 2 (two) times daily.    metFORMIN (GLUCOPHAGE) 500 MG tablet Take 1 tablet by mouth daily with dinner or evening meal.    nozaseptin (NOZIN) nasal  1 each by Each Nare route 2 (two) times daily.    omeprazole (PRILOSEC) 40 MG capsule Take 40 mg by mouth once daily.     ondansetron (ZOFRAN-ODT) 8 MG TbDL Take 1 tablet (8 mg total) by mouth every 8 (eight) hours as needed (nausea/vomiting).    oxyCODONE (ROXICODONE) 5 MG immediate release tablet Take 1 tablet (5 mg total) by mouth every 6 (six) hours as needed.    polyethylene glycol (GLYCOLAX) 17 gram PwPk Take 17 g by mouth daily     Current Facility-Administered Medications   Medication    lidocaine (PF) 10 mg/ml (1%) injection 10 mg       SOCIAL HISTORY:  Social History     Socioeconomic History    Marital status:    Tobacco Use    Smoking status: Never    Smokeless tobacco: Never   Substance and Sexual Activity    Alcohol use: Yes     Comment: socially  No alcohol prior to sx    Drug use: No   Social History Narrative    Lives with son. Surrogate decision makers: Son, Bj Sheffield [phone] and Son, Blair Sheffield [phone].       FAMILY HISTORY:  No family history on file.      PHYSICAL EXAMINATION:  ECO  Vitals:    23 1251   BP: (!) 143/70   Pulse: 79   Resp: 18   Temp: 97.6 °F (36.4 °C)     Body mass index is 25.69 kg/m².    Physical Exam  Constitutional:       Appearance: Normal appearance.   HENT:      Head: Normocephalic and atraumatic.      Nose: Nose normal.      Mouth/Throat:      Mouth: Mucous membranes are moist.   Cardiovascular:      Rate and Rhythm: Normal rate.    Pulmonary:      Effort: Pulmonary effort is normal.   Abdominal:      General: Abdomen is flat.      Palpations: Abdomen is soft.       Musculoskeletal:         General: Normal range of motion.      Cervical back: Normal range of motion.   Skin:     General: Skin is warm and dry.   Neurological:      General: No focal deficit present.      Mental Status: She is alert. Mental status is at baseline.         RADIOLOGY:  FINDINGS:  The airway is widely patent. There is no mediastinal or hilar lymphadenopathy. There is no central pulmonary embolus. The heart is not enlarged.  The 3 mm nodule in the right upper lobe is unchanged best visualized on series 6, image 102.  The 4 mm nodule in the right lung apex is unchanged best visualized on series 6, image 56.  The 4 mm nodule in the medial right middle lobe seen previously is not visualized on this exam.  There is no new pulmonary nodule or mass.  There is no pleural effusion.  There is no ground-glass or reticulonodular airspace opacity.     There is a calcified nodule in the posterior right hepatic lobe which is unchanged.  There is no solid hepatic mass.  The portal vein is patent.  The gallbladder, spleen, pancreas, and adrenal glands are normal.  The bilateral kidneys are normal.  There is no hydronephrosis or nephrolithiasis.     The stomach and small bowel are decompressed.  There is a moderately sized hiatal hernia.  There is no bowel obstruction.  The appendix is not definitively visualized.  The colon is normal.  The uterus is surgically absent.  The urinary bladder is normal.  There is no pelvic or retroperitoneal lymphadenopathy.  There is no peritoneal nodularity.  There is no ascites.  There is no evidence of omental carcinomatosis.     There is no lytic or blastic osseous lesion.     Impression:     1. No evidence of recurrent or metastatic disease in the chest abdomen or pelvis.  2. Unchanged punctate pulmonary nodules with no new nodule.  3. Moderately  sized hiatal hernia.  ASSESSMENT/PLAN:  Mariaelena was seen today for consult.    Diagnoses and all orders for this visit:    Endometrioid adenocarcinoma of ovary, unspecified laterality  -     Ambulatory referral/consult to Radiation Oncology      The patient is an 83 year old woman with an endometrioid carcinoma of the fallopian tube status post resection/debulking procedure.  She has completed 6 cycles of chemotherapy with CarboTaxol with good tolerance.  She has no evidence of residual disease on recent imaging with CT scan.  The patient is not recommended adjuvant pelvic radiation or brachytherapy as there would likely be limited benefit in this slow growing gyn tumor.  She is recommended observation with routine surveillance imaging/CT scans.    The risks and benefits of treatment have been discussed with the patient and she expressed full understanding. she understands the treatment plan and willing to proceed accordingly.    I spent approximately 60 minutes reviewing the available records and evaluating the patient, out of which over 50% of the time was spent face to face with the patient in counseling and coordinating this patient's care.

## 2023-05-04 ENCOUNTER — TELEPHONE (OUTPATIENT)
Dept: GYNECOLOGIC ONCOLOGY | Facility: CLINIC | Age: 83
End: 2023-05-04
Payer: MEDICARE

## 2023-05-04 ENCOUNTER — OFFICE VISIT (OUTPATIENT)
Dept: RADIATION ONCOLOGY | Facility: CLINIC | Age: 83
End: 2023-05-04
Payer: MEDICARE

## 2023-05-04 VITALS
RESPIRATION RATE: 18 BRPM | HEART RATE: 79 BPM | HEIGHT: 63 IN | TEMPERATURE: 98 F | BODY MASS INDEX: 25.69 KG/M2 | DIASTOLIC BLOOD PRESSURE: 70 MMHG | SYSTOLIC BLOOD PRESSURE: 143 MMHG | WEIGHT: 145 LBS

## 2023-05-04 DIAGNOSIS — C56.9 ENDOMETRIOID ADENOCARCINOMA OF OVARY, UNSPECIFIED LATERALITY: Primary | ICD-10-CM

## 2023-05-04 PROCEDURE — 1101F PT FALLS ASSESS-DOCD LE1/YR: CPT | Mod: CPTII,S$GLB,, | Performed by: RADIOLOGY

## 2023-05-04 PROCEDURE — 3288F FALL RISK ASSESSMENT DOCD: CPT | Mod: CPTII,S$GLB,, | Performed by: RADIOLOGY

## 2023-05-04 PROCEDURE — 3288F PR FALLS RISK ASSESSMENT DOCUMENTED: ICD-10-PCS | Mod: CPTII,S$GLB,, | Performed by: RADIOLOGY

## 2023-05-04 PROCEDURE — 1159F PR MEDICATION LIST DOCUMENTED IN MEDICAL RECORD: ICD-10-PCS | Mod: CPTII,S$GLB,, | Performed by: RADIOLOGY

## 2023-05-04 PROCEDURE — 99999 PR PBB SHADOW E&M-EST. PATIENT-LVL IV: CPT | Mod: PBBFAC,,, | Performed by: RADIOLOGY

## 2023-05-04 PROCEDURE — 3078F PR MOST RECENT DIASTOLIC BLOOD PRESSURE < 80 MM HG: ICD-10-PCS | Mod: CPTII,S$GLB,, | Performed by: RADIOLOGY

## 2023-05-04 PROCEDURE — 1126F PR PAIN SEVERITY QUANTIFIED, NO PAIN PRESENT: ICD-10-PCS | Mod: CPTII,S$GLB,, | Performed by: RADIOLOGY

## 2023-05-04 PROCEDURE — 1101F PR PT FALLS ASSESS DOC 0-1 FALLS W/OUT INJ PAST YR: ICD-10-PCS | Mod: CPTII,S$GLB,, | Performed by: RADIOLOGY

## 2023-05-04 PROCEDURE — 1159F MED LIST DOCD IN RCRD: CPT | Mod: CPTII,S$GLB,, | Performed by: RADIOLOGY

## 2023-05-04 PROCEDURE — 3078F DIAST BP <80 MM HG: CPT | Mod: CPTII,S$GLB,, | Performed by: RADIOLOGY

## 2023-05-04 PROCEDURE — 3077F PR MOST RECENT SYSTOLIC BLOOD PRESSURE >= 140 MM HG: ICD-10-PCS | Mod: CPTII,S$GLB,, | Performed by: RADIOLOGY

## 2023-05-04 PROCEDURE — 1126F AMNT PAIN NOTED NONE PRSNT: CPT | Mod: CPTII,S$GLB,, | Performed by: RADIOLOGY

## 2023-05-04 PROCEDURE — 99205 PR OFFICE/OUTPT VISIT, NEW, LEVL V, 60-74 MIN: ICD-10-PCS | Mod: S$GLB,,, | Performed by: RADIOLOGY

## 2023-05-04 PROCEDURE — 99999 PR PBB SHADOW E&M-EST. PATIENT-LVL IV: ICD-10-PCS | Mod: PBBFAC,,, | Performed by: RADIOLOGY

## 2023-05-04 PROCEDURE — 3077F SYST BP >= 140 MM HG: CPT | Mod: CPTII,S$GLB,, | Performed by: RADIOLOGY

## 2023-05-04 PROCEDURE — 99205 OFFICE O/P NEW HI 60 MIN: CPT | Mod: S$GLB,,, | Performed by: RADIOLOGY

## 2023-05-04 NOTE — TELEPHONE ENCOUNTER
Spoke with the patient son he will  have her give our office a call----- Message from Tanmay Piper MA sent at 4/18/2023 10:43 AM CDT -----  Left voicemail to schedule virtual  with Dr. Flores referred by Dr. Andres -

## 2023-05-05 ENCOUNTER — TELEPHONE (OUTPATIENT)
Dept: GYNECOLOGIC ONCOLOGY | Facility: CLINIC | Age: 83
End: 2023-05-05
Payer: MEDICARE

## 2023-05-15 ENCOUNTER — TELEPHONE (OUTPATIENT)
Dept: GYNECOLOGIC ONCOLOGY | Facility: CLINIC | Age: 83
End: 2023-05-15
Payer: MEDICARE

## 2023-05-15 NOTE — TELEPHONE ENCOUNTER
Returned call to patient about CT scan, patient stated that Dr. Andres had reviewed it and it looked good but the doctor wanted Dr. Flores to review it as well. Also she was supposed to find a doctor in [place] since we don't come out there anymore. Informed patient that our [place] patients were referred to GYN/ONC at [facility] (Dr Humberto Abel, Dr Alvaro Nguyễn, Dr Jennifer Helton and Dr Luiza Delgado) [address] [place] [address], phone [phone] and fax [phone]. Patient verbalized understanding

## 2023-05-15 NOTE — TELEPHONE ENCOUNTER
----- Message from Tanmay Piper MA sent at 5/15/2023 11:37 AM CDT -----  Contact: [phone]  Good morning, please give the patient a call back about a referral to [facility]

## 2023-05-16 NOTE — TELEPHONE ENCOUNTER
"----- Message from Chanel Boggs LPN sent at 10/15/2018  1:37 PM CDT -----      ----- Message -----  From: Maria Del Carmen Sexton RN  Sent: 10/15/2018   1:31 PM  To: Paulette Brown Staff    Just completed a post discharge follow up call with Mariaelena Sheffield.  She is a recent discharge from [facility] with "large ventral hernia".  She has an appt on Friday to see Dr. Caldwell and she is requesting a prescription for Zofran be sent to [facility] in [place] @[phone].  She is having occasional nausea.  Her original prescription for Zofran was given to her in the ER and she only has 1 pill left.  Is this something you can do for her?  Thanks so much.  Maria Del Carmen Sexton RN  Care Coordination Call Center  [facility]      " [facility] Pregnancy And Lactation Text: There is insufficient data to evaluate drug-associated risk for major birth defects, miscarriage, or other adverse maternal or fetal outcomes.  There is a pregnancy registry that monitors pregnancy outcomes in pregnant persons exposed to the medication during pregnancy.  It is unknown if this medication is excreted in breast milk.  Do not breastfeed during treatment and for about 4 weeks after the last dose.

## 2023-05-30 ENCOUNTER — TELEPHONE (OUTPATIENT)
Dept: HEMATOLOGY/ONCOLOGY | Facility: CLINIC | Age: 83
End: 2023-05-30
Payer: MEDICARE

## 2023-05-30 NOTE — TELEPHONE ENCOUNTER
Returned Tanya phone call. Verified that she only needed CT results, and confirmed fax. Informed her that I would sent results. She verbalized understanding, no other issues at this time.

## 2023-05-30 NOTE — TELEPHONE ENCOUNTER
----- Message from Lois Roland sent at 5/30/2023 12:04 PM CDT -----  Contact: Tanya/[facility]  Tanya with [facility] is calling to speak with someone regarding results. Reports receiving referral; however, not yet receiving CT scan results and request results are faxed to [phone]. Please give Tanya a call back at [phone] when possible.  Thank you,  [facility]

## 2023-08-02 ENCOUNTER — HOSPITAL ENCOUNTER (OUTPATIENT)
Dept: RADIOLOGY | Facility: HOSPITAL | Age: 83
Discharge: HOME | End: 2023-08-02
Attending: INTERNAL MEDICINE
Payer: MEDICARE

## 2023-08-02 DIAGNOSIS — E78.5 HYPERLIPIDEMIA ASSOCIATED WITH TYPE 2 DIABETES MELLITUS: ICD-10-CM

## 2023-08-02 DIAGNOSIS — I10 ESSENTIAL HYPERTENSION: ICD-10-CM

## 2023-08-02 DIAGNOSIS — T45.1X5A IMMUNODEFICIENCY DUE TO CHEMOTHERAPY: ICD-10-CM

## 2023-08-02 DIAGNOSIS — E11.69 HYPERLIPIDEMIA ASSOCIATED WITH TYPE 2 DIABETES MELLITUS: ICD-10-CM

## 2023-08-02 DIAGNOSIS — C56.9 ENDOMETRIOID ADENOCARCINOMA OF OVARY, UNSPECIFIED LATERALITY: ICD-10-CM

## 2023-08-02 DIAGNOSIS — Z79.899 IMMUNODEFICIENCY DUE TO CHEMOTHERAPY: ICD-10-CM

## 2023-08-02 DIAGNOSIS — D84.821 IMMUNODEFICIENCY DUE TO CHEMOTHERAPY: ICD-10-CM

## 2023-08-02 PROCEDURE — 71260 CT THORAX DX C+: CPT | Mod: 26,,, | Performed by: STUDENT IN AN ORGANIZED HEALTH CARE EDUCATION/TRAINING PROGRAM

## 2023-08-02 PROCEDURE — 74177 CT ABD & PELVIS W/CONTRAST: CPT | Mod: 26,,, | Performed by: STUDENT IN AN ORGANIZED HEALTH CARE EDUCATION/TRAINING PROGRAM

## 2023-08-02 PROCEDURE — 25500020 PHARM REV CODE 255: Mod: PO | Performed by: INTERNAL MEDICINE

## 2023-08-02 PROCEDURE — 71260 CT CHEST ABDOMEN PELVIS WITH CONTRAST (XPD): ICD-10-PCS | Mod: 26,,, | Performed by: STUDENT IN AN ORGANIZED HEALTH CARE EDUCATION/TRAINING PROGRAM

## 2023-08-02 PROCEDURE — A9698 NON-RAD CONTRAST MATERIALNOC: HCPCS | Mod: PO | Performed by: INTERNAL MEDICINE

## 2023-08-02 PROCEDURE — 74177 CT CHEST ABDOMEN PELVIS WITH CONTRAST (XPD): ICD-10-PCS | Mod: 26,,, | Performed by: STUDENT IN AN ORGANIZED HEALTH CARE EDUCATION/TRAINING PROGRAM

## 2023-08-02 PROCEDURE — 71260 CT THORAX DX C+: CPT | Mod: TC,PO

## 2023-08-02 RX ADMIN — IOHEXOL 500 ML: 12 SOLUTION ORAL at 09:08

## 2023-08-02 RX ADMIN — IOHEXOL 100 ML: 350 INJECTION, SOLUTION INTRAVENOUS at 09:08

## 2023-08-04 ENCOUNTER — OFFICE VISIT (OUTPATIENT)
Dept: HEMATOLOGY/ONCOLOGY | Facility: CLINIC | Age: 83
End: 2023-08-04
Payer: MEDICARE

## 2023-08-04 VITALS
HEIGHT: 63 IN | TEMPERATURE: 98 F | DIASTOLIC BLOOD PRESSURE: 71 MMHG | HEART RATE: 88 BPM | BODY MASS INDEX: 27.73 KG/M2 | SYSTOLIC BLOOD PRESSURE: 113 MMHG | WEIGHT: 156.5 LBS | OXYGEN SATURATION: 98 %

## 2023-08-04 DIAGNOSIS — C56.9 ENDOMETRIOID ADENOCARCINOMA OF OVARY, UNSPECIFIED LATERALITY: Primary | ICD-10-CM

## 2023-08-04 DIAGNOSIS — E78.5 HYPERLIPIDEMIA ASSOCIATED WITH TYPE 2 DIABETES MELLITUS: ICD-10-CM

## 2023-08-04 DIAGNOSIS — D47.1 CHRONIC MYELOPROLIFERATIVE DISEASE: ICD-10-CM

## 2023-08-04 DIAGNOSIS — D84.821 IMMUNODEFICIENCY DUE TO CHEMOTHERAPY: ICD-10-CM

## 2023-08-04 DIAGNOSIS — Z79.899 IMMUNODEFICIENCY DUE TO CHEMOTHERAPY: ICD-10-CM

## 2023-08-04 DIAGNOSIS — D50.0 IRON DEFICIENCY ANEMIA DUE TO CHRONIC BLOOD LOSS: ICD-10-CM

## 2023-08-04 DIAGNOSIS — T45.1X5A IMMUNODEFICIENCY DUE TO CHEMOTHERAPY: ICD-10-CM

## 2023-08-04 DIAGNOSIS — E11.69 HYPERLIPIDEMIA ASSOCIATED WITH TYPE 2 DIABETES MELLITUS: ICD-10-CM

## 2023-08-04 DIAGNOSIS — I10 ESSENTIAL HYPERTENSION: Chronic | ICD-10-CM

## 2023-08-04 PROCEDURE — 1101F PR PT FALLS ASSESS DOC 0-1 FALLS W/OUT INJ PAST YR: ICD-10-PCS | Mod: CPTII,S$GLB,, | Performed by: INTERNAL MEDICINE

## 2023-08-04 PROCEDURE — 3074F SYST BP LT 130 MM HG: CPT | Mod: CPTII,S$GLB,, | Performed by: INTERNAL MEDICINE

## 2023-08-04 PROCEDURE — 1126F AMNT PAIN NOTED NONE PRSNT: CPT | Mod: CPTII,S$GLB,, | Performed by: INTERNAL MEDICINE

## 2023-08-04 PROCEDURE — 99999 PR PBB SHADOW E&M-EST. PATIENT-LVL IV: CPT | Mod: PBBFAC,,, | Performed by: INTERNAL MEDICINE

## 2023-08-04 PROCEDURE — 99999 PR PBB SHADOW E&M-EST. PATIENT-LVL IV: ICD-10-PCS | Mod: PBBFAC,,, | Performed by: INTERNAL MEDICINE

## 2023-08-04 PROCEDURE — 1160F RVW MEDS BY RX/DR IN RCRD: CPT | Mod: CPTII,S$GLB,, | Performed by: INTERNAL MEDICINE

## 2023-08-04 PROCEDURE — 1101F PT FALLS ASSESS-DOCD LE1/YR: CPT | Mod: CPTII,S$GLB,, | Performed by: INTERNAL MEDICINE

## 2023-08-04 PROCEDURE — 3078F PR MOST RECENT DIASTOLIC BLOOD PRESSURE < 80 MM HG: ICD-10-PCS | Mod: CPTII,S$GLB,, | Performed by: INTERNAL MEDICINE

## 2023-08-04 PROCEDURE — 1159F PR MEDICATION LIST DOCUMENTED IN MEDICAL RECORD: ICD-10-PCS | Mod: CPTII,S$GLB,, | Performed by: INTERNAL MEDICINE

## 2023-08-04 PROCEDURE — 99214 PR OFFICE/OUTPT VISIT, EST, LEVL IV, 30-39 MIN: ICD-10-PCS | Mod: S$GLB,,, | Performed by: INTERNAL MEDICINE

## 2023-08-04 PROCEDURE — 1126F PR PAIN SEVERITY QUANTIFIED, NO PAIN PRESENT: ICD-10-PCS | Mod: CPTII,S$GLB,, | Performed by: INTERNAL MEDICINE

## 2023-08-04 PROCEDURE — 3078F DIAST BP <80 MM HG: CPT | Mod: CPTII,S$GLB,, | Performed by: INTERNAL MEDICINE

## 2023-08-04 PROCEDURE — 3074F PR MOST RECENT SYSTOLIC BLOOD PRESSURE < 130 MM HG: ICD-10-PCS | Mod: CPTII,S$GLB,, | Performed by: INTERNAL MEDICINE

## 2023-08-04 PROCEDURE — 1159F MED LIST DOCD IN RCRD: CPT | Mod: CPTII,S$GLB,, | Performed by: INTERNAL MEDICINE

## 2023-08-04 PROCEDURE — 1160F PR REVIEW ALL MEDS BY PRESCRIBER/CLIN PHARMACIST DOCUMENTED: ICD-10-PCS | Mod: CPTII,S$GLB,, | Performed by: INTERNAL MEDICINE

## 2023-08-04 PROCEDURE — 3288F PR FALLS RISK ASSESSMENT DOCUMENTED: ICD-10-PCS | Mod: CPTII,S$GLB,, | Performed by: INTERNAL MEDICINE

## 2023-08-04 PROCEDURE — 99214 OFFICE O/P EST MOD 30 MIN: CPT | Mod: S$GLB,,, | Performed by: INTERNAL MEDICINE

## 2023-08-04 PROCEDURE — 3288F FALL RISK ASSESSMENT DOCD: CPT | Mod: CPTII,S$GLB,, | Performed by: INTERNAL MEDICINE

## 2023-08-04 NOTE — PROGRESS NOTES
Subjective:       Patient ID: Mariaelena Sheffield is a 83 y.o. female.    Chief Complaint: Cancer    HPI:  83-year-old female stage III endometrial carcinoma status post adjuvant carboplatin Taxol patient returns for review of laboratory studies.  And recent CTs refer you ECOG status 1    Past Medical History:   Diagnosis Date    Cancer     breast cancer R     Diabetes mellitus     Endometrioid adenocarcinoma of ovary 11/3/2022    GERD (gastroesophageal reflux disease)     Gout     Hypercholesteremia     Hypertension      History reviewed. No pertinent family history.  Social History     Socioeconomic History    Marital status:    Tobacco Use    Smoking status: Never    Smokeless tobacco: Never   Substance and Sexual Activity    Alcohol use: Yes     Comment: socially  No alcohol prior to sx    Drug use: No   Social History Narrative    Lives with son. Surrogate decision makers: SonBj [phone] and SonBlair [phone].     Past Surgical History:   Procedure Laterality Date    ABDOMINAL SURGERY      BREAST BIOPSY      BREAST SURGERY Right     Breast biopsy    CATARACT EXTRACTION, BILATERAL      EXCISION OF PELVIC MASS N/A 9/28/2022    Procedure: EXCISION, MASS, PELVIS;  Surgeon: Edison Brooks MD;  Location: [address];  Service: General;  Laterality: N/A;    FLEXIBLE SIGMOIDOSCOPY  9/28/2022    Procedure: SIGMOIDOSCOPY, FLEXIBLE;  Surgeon: Edison Brooks MD;  Location: [facility] OR [address] [place] [address];  Service: General;;    HERNIA REPAIR      LYSIS OF ADHESIONS N/A 10/25/2018    Procedure: LYSIS, ADHESIONS;  Surgeon: Kevin Caldwell MD;  Location: [facility] OR;  Service: General;  Laterality: N/A;    REPAIR OF INCARCERATED INCISIONAL HERNIA WITHOUT HISTORY OF PRIOR REPAIR N/A 10/25/2018    Procedure: REPAIR, HERNIA, INCISIONAL, INCARCERATED, WITHOUT HISTORY OF PRIOR REPAIR;  Surgeon: Kevin Caldwell MD;  Location: [facility] OR;  Service: General;  Laterality: N/A;       Labs:  Lab Results  "  Component Value Date    WBC 3.99 08/02/2023    WBC 3.99 08/02/2023    HGB 9.6 (L) 08/02/2023    HGB 9.6 (L) 08/02/2023    HCT 30.2 (L) 08/02/2023    HCT 30.2 (L) 08/02/2023    MCV 99 (H) 08/02/2023    MCV 99 (H) 08/02/2023     08/02/2023     08/02/2023     BMP  Lab Results   Component Value Date     08/02/2023    K 4.6 08/02/2023     08/02/2023    CO2 25 08/02/2023    BUN 16 08/02/2023    CREATININE 0.9 08/02/2023    CALCIUM 10.3 08/02/2023    ANIONGAP 9 08/02/2023    ESTGFRAFRICA >60 10/29/2018    EGFRNONAA >60 10/29/2018     Lab Results   Component Value Date    ALT 18 08/02/2023    AST 21 08/02/2023    ALKPHOS 117 08/02/2023    BILITOT 0.4 08/02/2023       Lab Results   Component Value Date    IRON 18 (L) 09/29/2022    TIBC 157 (L) 09/29/2022    FERRITIN 728 (H) 09/29/2022     No results found for: "MWDQRQCD09"  No results found for: "FOLATE"  Lab Results   Component Value Date    TSH 1.620 05/31/2023         Review of Systems   Constitutional:  Negative for activity change, appetite change, chills, diaphoresis, fatigue, fever and unexpected weight change.   HENT:  Negative for congestion, dental problem, drooling, ear discharge, ear pain, facial swelling, hearing loss, mouth sores, nosebleeds, postnasal drip, rhinorrhea, sinus pressure, sneezing, sore throat, tinnitus, trouble swallowing and voice change.    Eyes:  Negative for photophobia, pain, discharge, redness, itching and visual disturbance.   Respiratory:  Negative for cough, choking, chest tightness, shortness of breath, wheezing and stridor.    Cardiovascular:  Negative for chest pain, palpitations and leg swelling.   Gastrointestinal:  Negative for abdominal distention, abdominal pain, anal bleeding, blood in stool, constipation, diarrhea, nausea, rectal pain and vomiting.   Endocrine: Negative for cold intolerance, heat intolerance, polydipsia, polyphagia and polyuria.   Genitourinary:  Negative for decreased urine volume, " difficulty urinating, dyspareunia, dysuria, enuresis, flank pain, frequency, genital sores, hematuria, menstrual problem, pelvic pain, urgency, vaginal bleeding, vaginal discharge and vaginal pain.   Musculoskeletal:  Negative for arthralgias, back pain, gait problem, joint swelling, myalgias, neck pain and neck stiffness.   Skin:  Negative for color change, pallor and rash.   Allergic/Immunologic: Negative for environmental allergies, food allergies and immunocompromised state.   Neurological:  Negative for dizziness, tremors, seizures, syncope, facial asymmetry, speech difficulty, weakness, light-headedness, numbness and headaches.   Hematological:  Negative for adenopathy. Does not bruise/bleed easily.   Psychiatric/Behavioral:  Negative for agitation, behavioral problems, confusion, decreased concentration, dysphoric mood, hallucinations, self-injury, sleep disturbance and suicidal ideas. The patient is nervous/anxious. The patient is not hyperactive.        Objective:      Physical Exam  Vitals reviewed.   Constitutional:       General: She is not in acute distress.     Appearance: She is well-developed. She is not diaphoretic.   HENT:      Head: Normocephalic and atraumatic.      Right Ear: External ear normal.      Left Ear: External ear normal.      Nose: Nose normal.      Right Sinus: No maxillary sinus tenderness or frontal sinus tenderness.      Left Sinus: No maxillary sinus tenderness or frontal sinus tenderness.      Mouth/Throat:      Pharynx: No oropharyngeal exudate.   Eyes:      General: Lids are normal. No scleral icterus.        Right eye: No discharge.         Left eye: No discharge.      Conjunctiva/sclera: Conjunctivae normal.      Right eye: Right conjunctiva is not injected. No hemorrhage.     Left eye: Left conjunctiva is not injected. No hemorrhage.     Pupils: Pupils are equal, round, and reactive to light.   Neck:      Thyroid: No thyromegaly.      Vascular: No JVD.      Trachea: No  tracheal deviation.   Cardiovascular:      Rate and Rhythm: Normal rate.   Pulmonary:      Effort: Pulmonary effort is normal. No respiratory distress.      Breath sounds: No stridor.   Chest:      Chest wall: No tenderness.   Abdominal:      General: Bowel sounds are normal. There is no distension.      Palpations: Abdomen is soft. There is no hepatomegaly, splenomegaly or mass.      Tenderness: There is no abdominal tenderness. There is no rebound.   Musculoskeletal:         General: No tenderness. Normal range of motion.      Cervical back: Normal range of motion and neck supple.   Lymphadenopathy:      Cervical: No cervical adenopathy.      Upper Body:      Right upper body: No supraclavicular adenopathy.      Left upper body: No supraclavicular adenopathy.   Skin:     General: Skin is dry.      Findings: No erythema or rash.   Neurological:      Mental Status: She is alert and oriented to person, place, and time.      Cranial Nerves: No cranial nerve deficit.      Coordination: Coordination normal.   Psychiatric:         Behavior: Behavior normal.         Thought Content: Thought content normal.         Judgment: Judgment normal.             Assessment:      1. Endometrioid adenocarcinoma of ovary, unspecified laterality    2. Chronic myeloproliferative disease    3. Immunodeficiency due to chemotherapy    4. Hyperlipidemia associated with type 2 diabetes mellitus    5. Essential hypertension    6. Iron deficiency anemia due to chronic blood loss           Med Onc Chart Routing      Follow up with physician 6 months. Return to clinic in 6 months with standing labs CBC CMP and  with iron status along with CT chest abdomen pelvis   Follow up with [facility]    Infusion scheduling note    Injection scheduling note    Labs    Imaging    Pharmacy appointment    Other referrals               Plan:     Results of laboratory studies slow we improving hemoglobin felt secondary to systemic therapy.  Will follow-up in 6  months with repeat CT chest abdomen pelvis no evidence of recurrence at present        Kwabena Andres Jr, MD FACP

## 2024-02-16 ENCOUNTER — HOSPITAL ENCOUNTER (OUTPATIENT)
Dept: RADIOLOGY | Facility: HOSPITAL | Age: 84
Discharge: HOME | End: 2024-02-16
Attending: INTERNAL MEDICINE
Payer: MEDICARE

## 2024-02-16 DIAGNOSIS — C56.9 ENDOMETRIOID ADENOCARCINOMA OF OVARY, UNSPECIFIED LATERALITY: ICD-10-CM

## 2024-02-16 LAB
CREAT SERPL-MCNC: 0.9 MG/DL (ref 0.5–1.4)
SAMPLE: NORMAL

## 2024-02-16 PROCEDURE — 74177 CT ABD & PELVIS W/CONTRAST: CPT | Mod: 26,,, | Performed by: RADIOLOGY

## 2024-02-16 PROCEDURE — 71260 CT THORAX DX C+: CPT | Mod: 26,,, | Performed by: RADIOLOGY

## 2024-02-16 PROCEDURE — 25500020 PHARM REV CODE 255: Performed by: INTERNAL MEDICINE

## 2024-02-16 PROCEDURE — A9698 NON-RAD CONTRAST MATERIALNOC: HCPCS | Performed by: INTERNAL MEDICINE

## 2024-02-16 PROCEDURE — 74177 CT ABD & PELVIS W/CONTRAST: CPT | Mod: TC

## 2024-02-16 RX ADMIN — IOHEXOL 100 ML: 350 INJECTION, SOLUTION INTRAVENOUS at 10:02

## 2024-02-16 RX ADMIN — IOHEXOL 1000 ML: 12 SOLUTION ORAL at 10:02

## 2024-02-22 ENCOUNTER — OFFICE VISIT (OUTPATIENT)
Dept: HEMATOLOGY/ONCOLOGY | Facility: CLINIC | Age: 84
End: 2024-02-22
Payer: MEDICARE

## 2024-02-22 VITALS
BODY MASS INDEX: 32.4 KG/M2 | TEMPERATURE: 98 F | SYSTOLIC BLOOD PRESSURE: 125 MMHG | DIASTOLIC BLOOD PRESSURE: 76 MMHG | HEIGHT: 63 IN | WEIGHT: 182.88 LBS | HEART RATE: 79 BPM

## 2024-02-22 DIAGNOSIS — C56.9 ENDOMETRIOID ADENOCARCINOMA OF OVARY, UNSPECIFIED LATERALITY: Primary | ICD-10-CM

## 2024-02-22 PROCEDURE — 1159F MED LIST DOCD IN RCRD: CPT | Mod: CPTII,S$GLB,,

## 2024-02-22 PROCEDURE — 1160F RVW MEDS BY RX/DR IN RCRD: CPT | Mod: CPTII,S$GLB,,

## 2024-02-22 PROCEDURE — 1126F AMNT PAIN NOTED NONE PRSNT: CPT | Mod: CPTII,S$GLB,,

## 2024-02-22 PROCEDURE — 1101F PT FALLS ASSESS-DOCD LE1/YR: CPT | Mod: CPTII,S$GLB,,

## 2024-02-22 PROCEDURE — 99999 PR PBB SHADOW E&M-EST. PATIENT-LVL IV: CPT | Mod: PBBFAC,,,

## 2024-02-22 PROCEDURE — 99215 OFFICE O/P EST HI 40 MIN: CPT | Mod: S$GLB,,,

## 2024-02-22 PROCEDURE — 3078F DIAST BP <80 MM HG: CPT | Mod: CPTII,S$GLB,,

## 2024-02-22 PROCEDURE — 3074F SYST BP LT 130 MM HG: CPT | Mod: CPTII,S$GLB,,

## 2024-02-22 PROCEDURE — 3288F FALL RISK ASSESSMENT DOCD: CPT | Mod: CPTII,S$GLB,,

## 2024-02-22 RX ORDER — VIT C/E/ZN/COPPR/LUTEIN/ZEAXAN 250MG-90MG
1 CAPSULE ORAL EVERY MORNING
COMMUNITY
Start: 2023-10-05

## 2024-02-22 RX ORDER — CALCIUM CARBONATE 500(1250)
1 TABLET ORAL EVERY MORNING
COMMUNITY
Start: 2023-10-05

## 2024-02-23 NOTE — PROGRESS NOTES
Subjective:     Patient ID:?Mariaelenasiri Sheffield is a 83 y.o. female.?? MR#: [id number]   ?   PRIMARY ONCOLOGIST: --> Dr. Andres  ?   CHIEF COMPLAINT: lab review/routine f/u/endometroid adenocarcinoma??   ?   ONCOLOGIC DIAGNOSIS: Cancer Staging   Endometrioid adenocarcinoma of ovary  Staging form: Ovary, Fallopian Tube, and Primary Peritoneal Carcinoma, AJCC 8th Edition  - Clinical stage from 1/3/2023: FIGO Stage III (cT3, cN0, cM0) - Signed by Kwabena Andres MD on 1/3/2023       CURRENT TREATMENT: Surveillance    HPI  Ms. Sheffield is a very pleasant 83-year-old woman with hx of endometrioid adenocarncinoma of ovary for which she presents today for follow-up.  Per review of the medical record, pt presented to ED after 1 day of nausea vomiting with CT showing large pelvic mass suspicious for malignancy, partial small-bowel obstruction and can not exclude strangulation of anterior wall hernia.  She was taken to surgery with Gynecologic Oncology assistance and pathology showing grade 1 endometrioid adenocarcinoma.  She is s/p adjuvant tx with carbo/ taxol completed 03/14/23.     Interval History: Pt states she is feelling well and voices no complaints today. Denies n/v/d/c, fever, chills, sob, cp, unintentional weight loss, abnormal bleeding.    Oncology History   Malignant neoplasm of both ovaries (Resolved)   9/25/2022 Imaging Significant Findings    CT Reneal ABD Pelvis w/o: Pelvic mass.  No ascites, omental caking, or lymphadenopathy.     9/26/2022 Tumor Markers    CA-125 = 154     9/28/2022 Imaging Significant Findings    CXR: NATALY     9/29/2022 Surgery    Ex-lap, JAMES, pelvic mass resection, ventral hernia repair (Dr. Brooks)    Final pathology c/w stage IIIC grade 2 endometrioid ovarian cancer (due to dense adhesions to the small bowel and colon above the pelvic brim)      Genetic Testing    Patient has genetic testing done for [employer].                                              Results revealed patient has  the following mutation(s): in process     Endometrioid adenocarcinoma of ovary   11/3/2022 Initial Diagnosis    Endometrioid adenocarcinoma of ovary     11/22/2022 - 3/14/2023 Chemotherapy    Treatment Summary   Plan Name: OP GYN PACLITAXEL CARBOPLATIN (AUC 6) Q3W  Treatment Goal: Control  Status: Inactive  Start Date: 11/22/2022  End Date: 3/14/2023  Provider: Alisha Palomares MD  Chemotherapy: CARBOplatin (PARAPLATIN) 415 mg in sodium chloride 0.9% 291.5 mL chemo infusion, 415 mg (100 % of original dose 417 mg), Intravenous, Clinic/HOD 1 time, 6 of 17 cycles  Dose modification:   (original dose 417 mg, Cycle 1),   (original dose 417 mg, Cycle 3),   (original dose 417 mg, Cycle 3, Reason: Dose not tolerated)  Administration: 415 mg (11/22/2022), 415 mg (12/13/2022), 420 mg (1/3/2023), 385 mg (1/24/2023), 385 mg (2/14/2023), 380 mg (3/14/2023)  PACLitaxeL (TAXOL) 135 mg/m2 = 228 mg in sodium chloride 0.9% 500 mL chemo infusion, 135 mg/m2 = 228 mg (100 % of original dose 135 mg/m2), Intravenous, Clinic/HOD 1 time, 6 of 17 cycles  Dose modification: 135 mg/m2 (original dose 135 mg/m2, Cycle 1, Reason: MD Discretion, Comment: reduced dose d/t age>71y/o), 135 mg/m2 (original dose 135 mg/m2, Cycle 4)  Administration: 228 mg (11/22/2022), 228 mg (12/13/2022), 228 mg (1/3/2023), 228 mg (1/24/2023), 234 mg (2/14/2023), 228 mg (3/14/2023)      Genetic Testing    Patient has genetic testing done for MY RISK.                                              Results revealed patient has the following mutation(s):NO abnormality     1/3/2023 Cancer Staged    Staging form: Ovary, Fallopian Tube, and Primary Peritoneal Carcinoma, AJCC 8th Edition  - Clinical stage from 1/3/2023: FIGO Stage III (cT3, cN0, cM0)      Tumor Markers    Immunohistochemistry (IHC) testing for mismatch repair (MMR) proteins:   MLH1-intact nuclear expression   MSH2-intact nuclear expression   MSH6-intact nuclear expression   PMS2-intact nuclear expression    Background nonneoplastic tissue/internal control with intact nuclear   expression.   IHC interpretation:  No loss of nuclear expression of MMR proteins:  Low   probability of microsatellite instability.              Social History     Socioeconomic History    Marital status:    Tobacco Use    Smoking status: Never    Smokeless tobacco: Never   Substance and Sexual Activity    Alcohol use: Yes     Comment: socially  No alcohol prior to sx    Drug use: No   Social History Narrative    Lives with son. Surrogate decision makers: Son, Bj Sheffield [phone] and Son, Blair Sheffield [phone].      History reviewed. No pertinent family history.   Past Surgical History:   Procedure Laterality Date    ABDOMINAL SURGERY      BREAST BIOPSY      BREAST SURGERY Right     Breast biopsy    CATARACT EXTRACTION, BILATERAL      EXCISION OF PELVIC MASS N/A 9/28/2022    Procedure: EXCISION, MASS, PELVIS;  Surgeon: Edison Brooks MD;  Location: [facility] OR [address] [place] [address];  Service: General;  Laterality: N/A;    FLEXIBLE SIGMOIDOSCOPY  9/28/2022    Procedure: SIGMOIDOSCOPY, FLEXIBLE;  Surgeon: Edison Brooks MD;  Location: [facility] OR [address] [place] [address];  Service: General;;    HERNIA REPAIR      LYSIS OF ADHESIONS N/A 10/25/2018    Procedure: LYSIS, ADHESIONS;  Surgeon: Kevin Caldwell MD;  Location: [facility] OR;  Service: General;  Laterality: N/A;    REPAIR OF INCARCERATED INCISIONAL HERNIA WITHOUT HISTORY OF PRIOR REPAIR N/A 10/25/2018    Procedure: REPAIR, HERNIA, INCISIONAL, INCARCERATED, WITHOUT HISTORY OF PRIOR REPAIR;  Surgeon: Kevin Caldwell MD;  Location: [facility] OR;  Service: General;  Laterality: N/A;        Review of Systems   Constitutional:  Negative for activity change, appetite change, chills, diaphoresis, fatigue, fever and unexpected weight change.   HENT:  Negative for congestion, facial swelling, nosebleeds, rhinorrhea, sore throat and trouble swallowing.    Eyes:  Negative for photophobia, pain and visual disturbance.    Respiratory:  Negative for cough, shortness of breath and wheezing.    Cardiovascular:  Negative for chest pain, palpitations and leg swelling.   Gastrointestinal:  Negative for abdominal distention, abdominal pain, anal bleeding, blood in stool, constipation, diarrhea, nausea, rectal pain and vomiting.   Genitourinary:  Negative for difficulty urinating, dysuria, hematuria and vaginal bleeding.   Musculoskeletal:  Positive for gait problem. Negative for arthralgias.   Skin:  Negative for color change, pallor, rash and wound.   Neurological:  Negative for dizziness, weakness, light-headedness, numbness and headaches.   Hematological:  Negative for adenopathy. Does not bruise/bleed easily.   Psychiatric/Behavioral:  The patient is not nervous/anxious.        ?   A comprehensive 14-point review of systems was reviewed with patient and was negative other than as specified above.   ?     Objective:      Physical Exam  Vitals reviewed.   Constitutional:       General: She is not in acute distress.     Appearance: Normal appearance. She is not ill-appearing, toxic-appearing or diaphoretic.   HENT:      Head: Normocephalic and atraumatic.      Right Ear: External ear normal.      Left Ear: External ear normal.      Nose: Nose normal.      Mouth/Throat:      Mouth: Mucous membranes are moist.      Pharynx: Oropharynx is clear.   Eyes:      Conjunctiva/sclera: Conjunctivae normal.   Cardiovascular:      Rate and Rhythm: Normal rate and regular rhythm.      Heart sounds: Normal heart sounds.   Pulmonary:      Effort: Pulmonary effort is normal.      Breath sounds: Normal breath sounds.   Abdominal:      General: Abdomen is flat. Bowel sounds are normal.   Genitourinary:     Comments: deferred  Musculoskeletal:         General: Normal range of motion.      Cervical back: Normal range of motion.      Right lower leg: No edema.      Left lower leg: No edema.   Skin:     General: Skin is warm and dry.      Capillary Refill:  Capillary refill takes less than 2 seconds.      Coloration: Skin is not jaundiced or pale.      Findings: No bruising, erythema, lesion or rash.   Neurological:      Mental Status: She is alert and oriented to person, place, and time.      Motor: No weakness.      Gait: Gait abnormal (cane).   Psychiatric:         Mood and Affect: Mood normal.         Behavior: Behavior normal.         Thought Content: Thought content normal.         Judgment: Judgment normal.           ?   Vitals:    02/22/24 1530   BP: 125/76   Pulse: 79   Temp: 97.7 °F (36.5 °C)      ?       ?   Laboratory:  ?   No visits with results within 1 Day(s) from this visit.   Latest known visit with results is:   Hospital Outpatient Visit on 02/16/2024   Component Date Value Ref Range Status    POC Creatinine 02/16/2024 0.9  0.5 - 1.4 mg/dL Final    Sample 02/16/2024 unknown   Final      ?   Imaging:    No results found for this or any previous visit (from the past 2160 hour(s)).     Results for orders placed or performed during the hospital encounter of 02/16/24 (from the past 2160 hour(s))   CT Chest Abdomen Pelvis With IV Contrast (XPD) Oral Contrast for GI Bypass    Narrative    EXAMINATION:  CT CHEST ABDOMEN PELVIS WITH IV CONTRAST (XPD)    CLINICAL HISTORY:  Malignant neoplasm of unspecified ovaryMetastatic disease evaluation;    TECHNIQUE:  Postcontrast CT scan of the chest, abdomen and pelvis.    COMPARISON:  CT scans 08/02/2023.    FINDINGS:  Tiny 3 mm nodule in the right upper lobe unchanged and of doubtful significance.  No significant pulmonary nodules identified.  No pleural fluid.  No enlarged lymph nodes.  There is a large hiatal hernia.  There are aortic and coronary artery calcifications.    The liver, gallbladder, spleen, pancreas, adrenals, and kidneys are unremarkable.  Patchy aortic atherosclerosis.  No enlarged lymph nodes or ascites.  No acute bowel abnormality or evidence of obstruction.  Postop change anterior abdominal wall.   No acute osseous abnormality is identified.      Impression    No CT evidence of metastatic disease is identified.  Findings appear similar to 08/02/2023.      Electronically signed by: Buddy Friedman  Date:    02/16/2024  Time:    12:46        ?   Assessment/Plan:     Problem List Items Addressed This Visit          Oncology    Endometrioid adenocarcinoma of ovary - Primary      Cancer Staging   Endometrioid adenocarcinoma of ovary  Staging form: Ovary, Fallopian Tube, and Primary Peritoneal Carcinoma, AJCC 8th Edition  - Clinical stage from 1/3/2023: FIGO Stage III (cT3, cN0, cM0) - Signed by Kwabena Andres MD on 1/3/2023  --Germline genetic testing did not show any inherited mutations that may be associated with ovarian breast or other cancers in particular.   --Completed chemo with carbo/taxol 03/14/23  --Most recent CT CAP 02/16/24: No CT evidence of metastatic disease is identified. Findings appear similar to 08/02/2023.      Labs reviewed, no concerning findings    Per NCCN guidelines:   · Visits every 2-4 mo for 2 y, then 3-6 mo for 3 y, then annually after 5 y   · Physical exam including pelvic exam as clinically indicated   · C/A/P CT, MRI, PET/CT, or PET (skull base to mid-thigh) as clinically indicated   · CBC and chemistry profile as indicated   · CA-125 or other tumor markers if initially elevated    Pt declines 2-4 month f/u and would like to continue with 6 month f/u; for this reason will repeat labs and imaging    F/u in six months with repeat labs and CT CAP prior         Relevant Orders    CBC Auto Differential    Comprehensive Metabolic Panel    CANCER ANTIGEN 125    CT Chest Abdomen Pelvis With IV Contrast (XPD) Routine Oral Contrast          Med Onc Chart Routing      Follow up with physician 6 months. with labs and ct cap prior   Follow up with [facility]    Infusion scheduling note    Injection scheduling note    Labs CBC, CMP and other   Scheduling:  Preferred lab:  Lab interval:     Imaging CT  chest abdomen pelvis      Pharmacy appointment    Other referrals                     TORY PfeifferP-C  Hematology/Oncology

## 2024-02-27 NOTE — ASSESSMENT & PLAN NOTE
Cancer Staging   Endometrioid adenocarcinoma of ovary  Staging form: Ovary, Fallopian Tube, and Primary Peritoneal Carcinoma, AJCC 8th Edition  - Clinical stage from 1/3/2023: FIGO Stage III (cT3, cN0, cM0) - Signed by Kwabena Andres MD on 1/3/2023  --Germline genetic testing did not show any inherited mutations that may be associated with ovarian breast or other cancers in particular.   --Completed chemo with carbo/taxol 03/14/23  --Most recent CT CAP 02/16/24: No CT evidence of metastatic disease is identified. Findings appear similar to 08/02/2023.      Labs reviewed, no concerning findings    Per NCCN guidelines:   · Visits every 2-4 mo for 2 y, then 3-6 mo for 3 y, then annually after 5 y   · Physical exam including pelvic exam as clinically indicated   · C/A/P CT, MRI, PET/CT, or PET (skull base to mid-thigh) as clinically indicated   · CBC and chemistry profile as indicated   · CA-125 or other tumor markers if initially elevated    Pt declines 2-4 month f/u and would like to continue with 6 month f/u; for this reason will repeat labs and imaging    F/u in six months with repeat labs and CT CAP prior

## 2024-03-13 ENCOUNTER — HOSPITAL ENCOUNTER (OUTPATIENT)
Dept: RADIOLOGY | Facility: HOSPITAL | Age: 84
Discharge: HOME | End: 2024-03-13
Payer: MEDICARE

## 2024-03-13 DIAGNOSIS — C56.9 ENDOMETRIOID ADENOCARCINOMA OF OVARY, UNSPECIFIED LATERALITY: ICD-10-CM

## 2024-03-13 PROCEDURE — 71260 CT THORAX DX C+: CPT | Mod: 26,,, | Performed by: RADIOLOGY

## 2024-03-13 PROCEDURE — A9698 NON-RAD CONTRAST MATERIALNOC: HCPCS | Mod: PO

## 2024-03-13 PROCEDURE — 74177 CT ABD & PELVIS W/CONTRAST: CPT | Mod: 26,,, | Performed by: RADIOLOGY

## 2024-03-13 PROCEDURE — 25500020 PHARM REV CODE 255: Mod: PO

## 2024-03-13 PROCEDURE — 74177 CT ABD & PELVIS W/CONTRAST: CPT | Mod: TC,PO

## 2024-03-13 RX ADMIN — IOHEXOL 500 ML: 12 SOLUTION ORAL at 09:03

## 2024-03-13 RX ADMIN — IOHEXOL 100 ML: 350 INJECTION, SOLUTION INTRAVENOUS at 09:03

## 2024-06-13 ENCOUNTER — HOSPITAL ENCOUNTER (OUTPATIENT)
Dept: RADIOLOGY | Facility: HOSPITAL | Age: 84
Discharge: HOME | End: 2024-06-13
Attending: NURSE PRACTITIONER
Payer: MEDICARE

## 2024-06-13 VITALS — WEIGHT: 182 LBS | HEIGHT: 63 IN | BODY MASS INDEX: 32.25 KG/M2

## 2024-06-13 DIAGNOSIS — C50.919 BREAST CANCER: Primary | ICD-10-CM

## 2024-06-13 DIAGNOSIS — Z12.31 SCREENING MAMMOGRAM FOR HIGH-RISK PATIENT: Primary | ICD-10-CM

## 2024-06-13 DIAGNOSIS — Z12.31 SCREENING MAMMOGRAM FOR HIGH-RISK PATIENT: ICD-10-CM

## 2024-06-13 PROCEDURE — 77063 BREAST TOMOSYNTHESIS BI: CPT | Mod: TC,PO

## 2024-06-13 PROCEDURE — 77063 BREAST TOMOSYNTHESIS BI: CPT | Mod: 26,,, | Performed by: RADIOLOGY

## 2024-06-13 PROCEDURE — 77067 SCR MAMMO BI INCL CAD: CPT | Mod: 26,,, | Performed by: RADIOLOGY

## 2024-08-29 ENCOUNTER — LAB VISIT (OUTPATIENT)
Dept: LAB | Facility: HOSPITAL | Age: 84
End: 2024-08-29
Attending: INTERNAL MEDICINE
Payer: MEDICARE

## 2024-08-29 ENCOUNTER — OFFICE VISIT (OUTPATIENT)
Dept: HEMATOLOGY/ONCOLOGY | Facility: CLINIC | Age: 84
End: 2024-08-29
Payer: MEDICARE

## 2024-08-29 VITALS
HEART RATE: 82 BPM | TEMPERATURE: 97 F | HEIGHT: 63 IN | BODY MASS INDEX: 33.71 KG/M2 | OXYGEN SATURATION: 96 % | WEIGHT: 190.25 LBS | DIASTOLIC BLOOD PRESSURE: 80 MMHG | RESPIRATION RATE: 20 BRPM | SYSTOLIC BLOOD PRESSURE: 138 MMHG

## 2024-08-29 DIAGNOSIS — D47.1 CHRONIC MYELOPROLIFERATIVE DISEASE: ICD-10-CM

## 2024-08-29 DIAGNOSIS — Z85.3 PERSONAL HISTORY OF BREAST CANCER: ICD-10-CM

## 2024-08-29 DIAGNOSIS — C56.9 ENDOMETRIOID ADENOCARCINOMA OF OVARY, UNSPECIFIED LATERALITY: ICD-10-CM

## 2024-08-29 DIAGNOSIS — D50.0 IRON DEFICIENCY ANEMIA DUE TO CHRONIC BLOOD LOSS: ICD-10-CM

## 2024-08-29 DIAGNOSIS — E78.5 HYPERLIPIDEMIA ASSOCIATED WITH TYPE 2 DIABETES MELLITUS: ICD-10-CM

## 2024-08-29 DIAGNOSIS — E11.69 HYPERLIPIDEMIA ASSOCIATED WITH TYPE 2 DIABETES MELLITUS: ICD-10-CM

## 2024-08-29 DIAGNOSIS — C56.9 ENDOMETRIOID ADENOCARCINOMA OF OVARY, UNSPECIFIED LATERALITY: Primary | ICD-10-CM

## 2024-08-29 DIAGNOSIS — D84.821 IMMUNODEFICIENCY DUE TO CHEMOTHERAPY: ICD-10-CM

## 2024-08-29 DIAGNOSIS — Z79.899 IMMUNODEFICIENCY DUE TO CHEMOTHERAPY: ICD-10-CM

## 2024-08-29 DIAGNOSIS — T45.1X5A IMMUNODEFICIENCY DUE TO CHEMOTHERAPY: ICD-10-CM

## 2024-08-29 LAB
BASOPHILS # BLD AUTO: 0.03 K/UL (ref 0–0.2)
BASOPHILS # BLD AUTO: 0.03 K/UL (ref 0–0.2)
BASOPHILS NFR BLD: 0.7 % (ref 0–1.9)
BASOPHILS NFR BLD: 0.7 % (ref 0–1.9)
CANCER AG125 SERPL-ACNC: 9 U/ML (ref 0–30)
DIFFERENTIAL METHOD BLD: ABNORMAL
DIFFERENTIAL METHOD BLD: ABNORMAL
EOSINOPHIL # BLD AUTO: 0.1 K/UL (ref 0–0.5)
EOSINOPHIL # BLD AUTO: 0.1 K/UL (ref 0–0.5)
EOSINOPHIL NFR BLD: 2.8 % (ref 0–8)
EOSINOPHIL NFR BLD: 2.8 % (ref 0–8)
ERYTHROCYTE [DISTWIDTH] IN BLOOD BY AUTOMATED COUNT: 13.8 % (ref 11.5–14.5)
ERYTHROCYTE [DISTWIDTH] IN BLOOD BY AUTOMATED COUNT: 13.8 % (ref 11.5–14.5)
FERRITIN SERPL-MCNC: 503 NG/ML (ref 20–300)
HCT VFR BLD AUTO: 33.3 % (ref 37–48.5)
HCT VFR BLD AUTO: 33.3 % (ref 37–48.5)
HGB BLD-MCNC: 10.6 G/DL (ref 12–16)
HGB BLD-MCNC: 10.6 G/DL (ref 12–16)
IMM GRANULOCYTES # BLD AUTO: 0.01 K/UL (ref 0–0.04)
IMM GRANULOCYTES # BLD AUTO: 0.01 K/UL (ref 0–0.04)
IMM GRANULOCYTES NFR BLD AUTO: 0.2 % (ref 0–0.5)
IMM GRANULOCYTES NFR BLD AUTO: 0.2 % (ref 0–0.5)
IRON SERPL-MCNC: 77 UG/DL (ref 30–160)
LDH SERPL L TO P-CCNC: 223 U/L (ref 110–260)
LYMPHOCYTES # BLD AUTO: 1.1 K/UL (ref 1–4.8)
LYMPHOCYTES # BLD AUTO: 1.1 K/UL (ref 1–4.8)
LYMPHOCYTES NFR BLD: 24.9 % (ref 18–48)
LYMPHOCYTES NFR BLD: 24.9 % (ref 18–48)
MCH RBC QN AUTO: 31.3 PG (ref 27–31)
MCH RBC QN AUTO: 31.3 PG (ref 27–31)
MCHC RBC AUTO-ENTMCNC: 31.8 G/DL (ref 32–36)
MCHC RBC AUTO-ENTMCNC: 31.8 G/DL (ref 32–36)
MCV RBC AUTO: 98 FL (ref 82–98)
MCV RBC AUTO: 98 FL (ref 82–98)
MONOCYTES # BLD AUTO: 0.6 K/UL (ref 0.3–1)
MONOCYTES # BLD AUTO: 0.6 K/UL (ref 0.3–1)
MONOCYTES NFR BLD: 13.3 % (ref 4–15)
MONOCYTES NFR BLD: 13.3 % (ref 4–15)
NEUTROPHILS # BLD AUTO: 2.5 K/UL (ref 1.8–7.7)
NEUTROPHILS # BLD AUTO: 2.5 K/UL (ref 1.8–7.7)
NEUTROPHILS NFR BLD: 58.1 % (ref 38–73)
NEUTROPHILS NFR BLD: 58.1 % (ref 38–73)
NRBC BLD-RTO: 0 /100 WBC
NRBC BLD-RTO: 0 /100 WBC
PLATELET # BLD AUTO: 203 K/UL (ref 150–450)
PLATELET # BLD AUTO: 203 K/UL (ref 150–450)
PMV BLD AUTO: 9.1 FL (ref 9.2–12.9)
PMV BLD AUTO: 9.1 FL (ref 9.2–12.9)
RBC # BLD AUTO: 3.39 M/UL (ref 4–5.4)
RBC # BLD AUTO: 3.39 M/UL (ref 4–5.4)
SATURATED IRON: 25 % (ref 20–50)
TOTAL IRON BINDING CAPACITY: 306 UG/DL (ref 250–450)
TRANSFERRIN SERPL-MCNC: 207 MG/DL (ref 200–375)
WBC # BLD AUTO: 4.22 K/UL (ref 3.9–12.7)
WBC # BLD AUTO: 4.22 K/UL (ref 3.9–12.7)

## 2024-08-29 PROCEDURE — 3079F DIAST BP 80-89 MM HG: CPT | Mod: CPTII,S$GLB,, | Performed by: INTERNAL MEDICINE

## 2024-08-29 PROCEDURE — 3075F SYST BP GE 130 - 139MM HG: CPT | Mod: CPTII,S$GLB,, | Performed by: INTERNAL MEDICINE

## 2024-08-29 PROCEDURE — 99214 OFFICE O/P EST MOD 30 MIN: CPT | Mod: S$GLB,,, | Performed by: INTERNAL MEDICINE

## 2024-08-29 PROCEDURE — 36415 COLL VENOUS BLD VENIPUNCTURE: CPT | Performed by: INTERNAL MEDICINE

## 2024-08-29 PROCEDURE — 1101F PT FALLS ASSESS-DOCD LE1/YR: CPT | Mod: CPTII,S$GLB,, | Performed by: INTERNAL MEDICINE

## 2024-08-29 PROCEDURE — 3288F FALL RISK ASSESSMENT DOCD: CPT | Mod: CPTII,S$GLB,, | Performed by: INTERNAL MEDICINE

## 2024-08-29 PROCEDURE — 83540 ASSAY OF IRON: CPT | Performed by: INTERNAL MEDICINE

## 2024-08-29 PROCEDURE — 1126F AMNT PAIN NOTED NONE PRSNT: CPT | Mod: CPTII,S$GLB,, | Performed by: INTERNAL MEDICINE

## 2024-08-29 PROCEDURE — 1159F MED LIST DOCD IN RCRD: CPT | Mod: CPTII,S$GLB,, | Performed by: INTERNAL MEDICINE

## 2024-08-29 PROCEDURE — 99999 PR PBB SHADOW E&M-EST. PATIENT-LVL V: CPT | Mod: PBBFAC,,, | Performed by: INTERNAL MEDICINE

## 2024-08-29 PROCEDURE — 86304 IMMUNOASSAY TUMOR CA 125: CPT | Performed by: INTERNAL MEDICINE

## 2024-08-29 PROCEDURE — 82728 ASSAY OF FERRITIN: CPT | Performed by: INTERNAL MEDICINE

## 2024-08-29 PROCEDURE — 85025 COMPLETE CBC W/AUTO DIFF WBC: CPT | Performed by: INTERNAL MEDICINE

## 2024-08-29 PROCEDURE — 83615 LACTATE (LD) (LDH) ENZYME: CPT | Performed by: INTERNAL MEDICINE

## 2024-08-29 PROCEDURE — 1160F RVW MEDS BY RX/DR IN RCRD: CPT | Mod: CPTII,S$GLB,, | Performed by: INTERNAL MEDICINE

## 2024-08-29 RX ORDER — DICLOFENAC SODIUM 10 MG/G
2 GEL TOPICAL 2 TIMES DAILY PRN
COMMUNITY
Start: 2024-06-28

## 2024-08-29 RX ORDER — LANOLIN ALCOHOL/MO/W.PET/CERES
1 CREAM (GRAM) TOPICAL EVERY MORNING
COMMUNITY
Start: 2024-06-28

## 2024-08-29 NOTE — PROGRESS NOTES
Subjective:       Patient ID: Mariaelena Sheffield is a 84 y.o. female.    Chief Complaint: Results and Cancer    HPI:  84-year-old female 22 months status post stage III endometrial cancer.  Status post adjuvant carboplatin Taxol patient is doing well feels good no nausea vomiting fevers chills night sweats ECOG status 1    Past Medical History:   Diagnosis Date    Breast cancer     1995 pt states chemo and radiation treatments    Cancer     breast cancer R     Diabetes mellitus     Endometrioid adenocarcinoma of ovary 11/03/2022    GERD (gastroesophageal reflux disease)     Gout     Hypercholesteremia     Hypertension      No family history on file.  Social History     Socioeconomic History    Marital status:    Tobacco Use    Smoking status: Never    Smokeless tobacco: Never   Substance and Sexual Activity    Alcohol use: Yes     Comment: socially  No alcohol prior to sx    Drug use: No   Social History Narrative    Lives with son. Surrogate decision makers: Son, Bj Sheffield [phone] and Son, Blair Sheffield [phone].     Social Determinants of Health     Financial Resource Strain: Medium Risk (5/29/2024)    Received from [facility]    Overall Financial Resource Strain (CARDIA)     Difficulty of Paying Living Expenses: Somewhat hard   Food Insecurity: No Food Insecurity (5/29/2024)    Received from [facility]    Hunger Vital Sign     Worried About Running Out of Food in the Last Year: Never true     Ran Out of Food in the Last Year: Never true   Transportation Needs: No Transportation Needs (5/29/2024)    Received from [facility]    PRAPARE - Transportation     Lack of Transportation (Medical): No     Lack of Transportation (Non-Medical): No   Physical Activity: Inactive (5/29/2024)    Received from [facility]    Exercise Vital Sign     Days of Exercise per Week: 0 days     Minutes of Exercise per Session: 0 min   Stress: No Stress Concern Present (5/29/2024)     Received from [facility]    [place] [employer] - Occupational Stress Questionnaire     Feeling of Stress : Not at all   Housing Stability: Low Risk  (5/29/2024)    Received from [facility]    Housing Stability Vital Sign     Unable to Pay for Housing in the Last Year: No     Number of Times Moved in the Last Year: 0     Homeless in the Last Year: No     Past Surgical History:   Procedure Laterality Date    ABDOMINAL SURGERY      BREAST BIOPSY      BREAST LUMPECTOMY      BREAST SURGERY Right     Breast biopsy    CATARACT EXTRACTION, BILATERAL      EXCISION OF PELVIC MASS N/A 09/28/2022    Procedure: EXCISION, MASS, PELVIS;  Surgeon: Edison Brooks MD;  Location: [address];  Service: General;  Laterality: N/A;    FLEXIBLE SIGMOIDOSCOPY  09/28/2022    Procedure: SIGMOIDOSCOPY, FLEXIBLE;  Surgeon: Edison Brooks MD;  Location: [facility] OR [address] [place] [address];  Service: General;;    HERNIA REPAIR      LYSIS OF ADHESIONS N/A 10/25/2018    Procedure: LYSIS, ADHESIONS;  Surgeon: Kevin Caldwell MD;  Location: [facility];  Service: General;  Laterality: N/A;    REPAIR OF INCARCERATED INCISIONAL HERNIA WITHOUT HISTORY OF PRIOR REPAIR N/A 10/25/2018    Procedure: REPAIR, HERNIA, INCISIONAL, INCARCERATED, WITHOUT HISTORY OF PRIOR REPAIR;  Surgeon: Kevin Caldwell MD;  Location: [facility] OR;  Service: General;  Laterality: N/A;       Labs:  Lab Results   Component Value Date    WBC 4.22 08/29/2024    WBC 4.22 08/29/2024    HGB 10.6 (L) 08/29/2024    HGB 10.6 (L) 08/29/2024    HCT 33.3 (L) 08/29/2024    HCT 33.3 (L) 08/29/2024    MCV 98 08/29/2024    MCV 98 08/29/2024     08/29/2024     08/29/2024     BMP  Lab Results   Component Value Date     02/16/2024    K 4.3 02/16/2024     02/16/2024    CO2 26 02/16/2024    BUN 17 02/16/2024    CREATININE 0.8 02/16/2024    CALCIUM 10.1 02/16/2024    ANIONGAP 10 02/16/2024    ESTGFRAFRICA >60 10/29/2018    EGFRNONAA >60 10/29/2018     Lab Results  "  Component Value Date    ALT 18 02/16/2024    AST 23 02/16/2024    ALKPHOS 137 (H) 02/16/2024    BILITOT 0.6 02/16/2024       Lab Results   Component Value Date    IRON 71 06/28/2024    TIBC 255 06/28/2024    FERRITIN 449 (H) 06/28/2024     No results found for: "EYGGPHJN63"  No results found for: "FOLATE"  Lab Results   Component Value Date    TSH 1.650 09/28/2023         Review of Systems   Constitutional:  Negative for activity change, appetite change, chills, diaphoresis, fatigue, fever and unexpected weight change.   HENT:  Negative for congestion, dental problem, drooling, ear discharge, ear pain, facial swelling, hearing loss, mouth sores, nosebleeds, postnasal drip, rhinorrhea, sinus pressure, sneezing, sore throat, tinnitus, trouble swallowing and voice change.    Eyes:  Negative for photophobia, pain, discharge, redness, itching and visual disturbance.   Respiratory:  Negative for cough, choking, chest tightness, shortness of breath, wheezing and stridor.    Cardiovascular:  Negative for chest pain, palpitations and leg swelling.   Gastrointestinal:  Negative for abdominal distention, abdominal pain, anal bleeding, blood in stool, constipation, diarrhea, nausea, rectal pain and vomiting.   Endocrine: Negative for cold intolerance, heat intolerance, polydipsia, polyphagia and polyuria.   Genitourinary:  Negative for decreased urine volume, difficulty urinating, dyspareunia, dysuria, enuresis, flank pain, frequency, genital sores, hematuria, menstrual problem, pelvic pain, urgency, vaginal bleeding, vaginal discharge and vaginal pain.   Musculoskeletal:  Negative for arthralgias, back pain, gait problem, joint swelling, myalgias, neck pain and neck stiffness.   Skin:  Negative for color change, pallor and rash.   Allergic/Immunologic: Negative for environmental allergies, food allergies and immunocompromised state.   Neurological:  Negative for dizziness, tremors, seizures, syncope, facial asymmetry, speech " difficulty, weakness, light-headedness, numbness and headaches.   Hematological:  Negative for adenopathy. Does not bruise/bleed easily.   Psychiatric/Behavioral:  Negative for agitation, behavioral problems, confusion, decreased concentration, dysphoric mood, hallucinations, self-injury, sleep disturbance and suicidal ideas. The patient is not nervous/anxious and is not hyperactive.        Objective:      Physical Exam  Vitals reviewed.   Constitutional:       General: She is not in acute distress.     Appearance: She is well-developed. She is not diaphoretic.   HENT:      Head: Normocephalic and atraumatic.      Right Ear: External ear normal.      Left Ear: External ear normal.      Nose: Nose normal.      Right Sinus: No maxillary sinus tenderness or frontal sinus tenderness.      Left Sinus: No maxillary sinus tenderness or frontal sinus tenderness.      Mouth/Throat:      Pharynx: No oropharyngeal exudate.   Eyes:      General: Lids are normal. No scleral icterus.        Right eye: No discharge.         Left eye: No discharge.      Conjunctiva/sclera: Conjunctivae normal.      Right eye: Right conjunctiva is not injected. No hemorrhage.     Left eye: Left conjunctiva is not injected. No hemorrhage.     Pupils: Pupils are equal, round, and reactive to light.   Neck:      Thyroid: No thyromegaly.      Vascular: No JVD.      Trachea: No tracheal deviation.   Cardiovascular:      Rate and Rhythm: Normal rate.   Pulmonary:      Effort: Pulmonary effort is normal. No respiratory distress.      Breath sounds: No stridor.   Chest:      Chest wall: No tenderness.   Abdominal:      General: Bowel sounds are normal. There is no distension.      Palpations: Abdomen is soft. There is no hepatomegaly, splenomegaly or mass.      Tenderness: There is no abdominal tenderness. There is no rebound.   Musculoskeletal:         General: No tenderness. Normal range of motion.      Cervical back: Normal range of motion and neck  supple.   Lymphadenopathy:      Cervical: No cervical adenopathy.      Upper Body:      Right upper body: No supraclavicular adenopathy.      Left upper body: No supraclavicular adenopathy.   Skin:     General: Skin is dry.      Findings: No erythema or rash.   Neurological:      Mental Status: She is alert and oriented to person, place, and time.      Cranial Nerves: No cranial nerve deficit.      Coordination: Coordination normal.   Psychiatric:         Behavior: Behavior normal.         Thought Content: Thought content normal.         Judgment: Judgment normal.             Assessment:      1. Endometrioid adenocarcinoma of ovary, unspecified laterality    2. Chronic myeloproliferative disease    3. Immunodeficiency due to chemotherapy    4. Hyperlipidemia associated with type 2 diabetes mellitus    5. Iron deficiency anemia due to chronic blood loss    6. Personal history of breast cancer           Med Onc Chart Routing      Follow up with physician . Return in 6 months with CBC CMP serum iron TIBC ferritin  LDH C-reactive protein and CT chest abdomen pelvis prior   Follow up with [facility]    Infusion scheduling note    Injection scheduling note    Labs    Imaging    Pharmacy appointment    Other referrals                   Plan:     No evidence to suggest recurrent disease.  Reassurance given stable findings continue follow-up through PCP general medical care mammogram        Kwabena Andres Jr, MD FACP

## 2025-02-24 ENCOUNTER — TELEPHONE (OUTPATIENT)
Dept: HEMATOLOGY/ONCOLOGY | Facility: CLINIC | Age: 85
End: 2025-02-24
Payer: MEDICARE

## 2025-02-24 NOTE — TELEPHONE ENCOUNTER
LVM for pt advising that 3/4 appt needed to be rescheduled. Offered 3/7 at 9 am. Provided direct number for questions or concerns.     Pt returned phone call. V/a of offered rescheduled appt.

## 2025-02-28 ENCOUNTER — HOSPITAL ENCOUNTER (OUTPATIENT)
Dept: RADIOLOGY | Facility: HOSPITAL | Age: 85
Discharge: HOME | End: 2025-02-28
Attending: INTERNAL MEDICINE
Payer: MEDICARE

## 2025-02-28 ENCOUNTER — RESULTS FOLLOW-UP (OUTPATIENT)
Dept: HEMATOLOGY/ONCOLOGY | Facility: CLINIC | Age: 85
End: 2025-02-28

## 2025-02-28 DIAGNOSIS — C56.9 ENDOMETRIOID ADENOCARCINOMA OF OVARY, UNSPECIFIED LATERALITY: ICD-10-CM

## 2025-02-28 DIAGNOSIS — T45.1X5A IMMUNODEFICIENCY DUE TO CHEMOTHERAPY: ICD-10-CM

## 2025-02-28 DIAGNOSIS — D84.821 IMMUNODEFICIENCY DUE TO CHEMOTHERAPY: ICD-10-CM

## 2025-02-28 DIAGNOSIS — D47.1 CHRONIC MYELOPROLIFERATIVE DISEASE: ICD-10-CM

## 2025-02-28 DIAGNOSIS — Z79.899 IMMUNODEFICIENCY DUE TO CHEMOTHERAPY: ICD-10-CM

## 2025-02-28 DIAGNOSIS — E78.5 HYPERLIPIDEMIA ASSOCIATED WITH TYPE 2 DIABETES MELLITUS: ICD-10-CM

## 2025-02-28 DIAGNOSIS — E11.69 HYPERLIPIDEMIA ASSOCIATED WITH TYPE 2 DIABETES MELLITUS: ICD-10-CM

## 2025-02-28 PROCEDURE — 74177 CT ABD & PELVIS W/CONTRAST: CPT | Mod: 26,,, | Performed by: STUDENT IN AN ORGANIZED HEALTH CARE EDUCATION/TRAINING PROGRAM

## 2025-02-28 PROCEDURE — 71260 CT THORAX DX C+: CPT | Mod: TC,PO

## 2025-02-28 PROCEDURE — 25500020 PHARM REV CODE 255: Mod: PO | Performed by: INTERNAL MEDICINE

## 2025-02-28 PROCEDURE — 71260 CT THORAX DX C+: CPT | Mod: 26,,, | Performed by: STUDENT IN AN ORGANIZED HEALTH CARE EDUCATION/TRAINING PROGRAM

## 2025-02-28 RX ADMIN — IOHEXOL 30 ML: 350 INJECTION, SOLUTION INTRAVENOUS at 11:02

## 2025-02-28 RX ADMIN — IOHEXOL 100 ML: 350 INJECTION, SOLUTION INTRAVENOUS at 11:02

## 2025-03-07 ENCOUNTER — TELEPHONE (OUTPATIENT)
Dept: SURGERY | Facility: CLINIC | Age: 85
End: 2025-03-07
Payer: MEDICARE

## 2025-03-07 ENCOUNTER — OFFICE VISIT (OUTPATIENT)
Dept: HEMATOLOGY/ONCOLOGY | Facility: CLINIC | Age: 85
End: 2025-03-07
Payer: MEDICARE

## 2025-03-07 VITALS
HEIGHT: 63 IN | BODY MASS INDEX: 34.53 KG/M2 | TEMPERATURE: 97 F | OXYGEN SATURATION: 97 % | HEART RATE: 81 BPM | DIASTOLIC BLOOD PRESSURE: 71 MMHG | WEIGHT: 194.88 LBS | SYSTOLIC BLOOD PRESSURE: 143 MMHG

## 2025-03-07 DIAGNOSIS — Z87.19 S/P HERNIA SURGERY: ICD-10-CM

## 2025-03-07 DIAGNOSIS — D47.1 CHRONIC MYELOPROLIFERATIVE DISEASE: ICD-10-CM

## 2025-03-07 DIAGNOSIS — Z85.3 PERSONAL HISTORY OF BREAST CANCER: ICD-10-CM

## 2025-03-07 DIAGNOSIS — Z98.890 S/P HERNIA SURGERY: ICD-10-CM

## 2025-03-07 DIAGNOSIS — D50.0 IRON DEFICIENCY ANEMIA DUE TO CHRONIC BLOOD LOSS: ICD-10-CM

## 2025-03-07 DIAGNOSIS — C56.9 ENDOMETRIOID ADENOCARCINOMA OF OVARY, UNSPECIFIED LATERALITY: Primary | ICD-10-CM

## 2025-03-07 PROBLEM — Z79.69 IMMUNODEFICIENCY DUE TO CHEMOTHERAPY: Status: RESOLVED | Noted: 2023-01-03 | Resolved: 2025-03-07

## 2025-03-07 PROBLEM — Z79.899 IMMUNODEFICIENCY DUE TO CHEMOTHERAPY: Status: RESOLVED | Noted: 2023-01-03 | Resolved: 2025-03-07

## 2025-03-07 PROBLEM — T45.1X5A IMMUNODEFICIENCY DUE TO CHEMOTHERAPY: Status: RESOLVED | Noted: 2023-01-03 | Resolved: 2025-03-07

## 2025-03-07 PROBLEM — D84.821 IMMUNODEFICIENCY DUE TO CHEMOTHERAPY: Status: RESOLVED | Noted: 2023-01-03 | Resolved: 2025-03-07

## 2025-03-07 PROCEDURE — 99999 PR PBB SHADOW E&M-EST. PATIENT-LVL V: CPT | Mod: PBBFAC,,, | Performed by: INTERNAL MEDICINE

## 2025-03-07 NOTE — PROGRESS NOTES
Subjective:       Patient ID: Mariaelena Sheffield is a 84 y.o. female.    Chief Complaint: Results and Cancer    HPI:  84-year-old female status post carboplatin Taxol for stage III endometrial carcinoma variant patient returns for clinical follow-up patient has CT chest abdomen pelvis done demonstrating no evidence of recurrent disease but large ventral hernia periods ventral hernia repair time primary surgical resection    Past Medical History:   Diagnosis Date    Anemia 08/07/2014    Last Assessment & Plan:   Formatting of this note may be different from the original.  Anemia: routine CBC.  It has been present for several years.  Associated signs & symptoms: none.  Patient has not had colonscopy.  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  Social History[1]  Past Surgical History:   Procedure Laterality Date    ABDOMINAL SURGERY      BREAST BIOPSY      BREAST LUMPECTOMY      BREAST SURGERY Right     Breast biopsy    CATARACT EXTRACTION, BILATERAL      EXCISION OF PELVIC MASS N/A 09/28/2022    Procedure: EXCISION, MASS, PELVIS;  Surgeon: Edison Brooks MD;  Location: [address];  Service: General;  Laterality: N/A;    FLEXIBLE SIGMOIDOSCOPY  09/28/2022    Procedure: SIGMOIDOSCOPY, FLEXIBLE;  Surgeon: Edison Brooks MD;  Location: [address];  Service: General;;    HERNIA REPAIR      LYSIS OF ADHESIONS N/A 10/25/2018    Procedure: LYSIS, ADHESIONS;  Surgeon: Kevin Caldwell MD;  Location: [facility];  Service: General;   "Laterality: N/A;    REPAIR OF INCARCERATED INCISIONAL HERNIA WITHOUT HISTORY OF PRIOR REPAIR N/A 10/25/2018    Procedure: REPAIR, HERNIA, INCISIONAL, INCARCERATED, WITHOUT HISTORY OF PRIOR REPAIR;  Surgeon: Kevin Caldwell MD;  Location: [facility];  Service: General;  Laterality: N/A;       Labs:  Lab Results   Component Value Date    WBC 3.80 (L) 02/28/2025    HGB 11.1 (L) 02/28/2025    HCT 34.7 (L) 02/28/2025    MCV 96 02/28/2025     02/28/2025     BMP  Lab Results   Component Value Date     02/28/2025    K 4.3 02/28/2025     02/28/2025    CO2 25 02/28/2025    BUN 24 (H) 02/28/2025    CREATININE 0.9 02/28/2025    CALCIUM 9.6 02/28/2025    ANIONGAP 9 02/28/2025    ESTGFRAFRICA >60 10/29/2018    EGFRNONAA >60 10/29/2018     Lab Results   Component Value Date    ALT 18 02/28/2025    AST 20 02/28/2025    ALKPHOS 127 02/28/2025    BILITOT 0.5 02/28/2025       Lab Results   Component Value Date    IRON 76 02/28/2025    TIBC 334 02/28/2025    FERRITIN 404 (H) 02/28/2025     No results found for: "GWPMZQPW28"  No results found for: "FOLATE"  Lab Results   Component Value Date    TSH 1.75 01/06/2025         Review of Systems   Constitutional:  Negative for activity change, appetite change, chills, diaphoresis, fatigue, fever and unexpected weight change.   HENT:  Negative for congestion, dental problem, drooling, ear discharge, ear pain, facial swelling, hearing loss, mouth sores, nosebleeds, postnasal drip, rhinorrhea, sinus pressure, sneezing, sore throat, tinnitus, trouble swallowing and voice change.    Eyes:  Negative for photophobia, pain, discharge, redness, itching and visual disturbance.   Respiratory:  Negative for cough, choking, chest tightness, shortness of breath, wheezing and stridor.    Cardiovascular:  Negative for chest pain, palpitations and leg swelling.   Gastrointestinal:  Negative for abdominal distention, abdominal pain, anal bleeding, blood in stool, constipation, diarrhea, nausea, " rectal pain and vomiting.   Endocrine: Negative for cold intolerance, heat intolerance, polydipsia, polyphagia and polyuria.   Genitourinary:  Negative for decreased urine volume, difficulty urinating, dyspareunia, dysuria, enuresis, flank pain, frequency, genital sores, hematuria, menstrual problem, pelvic pain, urgency, vaginal bleeding, vaginal discharge and vaginal pain.   Musculoskeletal:  Negative for arthralgias, back pain, gait problem, joint swelling, myalgias, neck pain and neck stiffness.   Skin:  Negative for color change, pallor and rash.   Allergic/Immunologic: Negative for environmental allergies, food allergies and immunocompromised state.   Neurological:  Negative for dizziness, tremors, seizures, syncope, facial asymmetry, speech difficulty, weakness, light-headedness, numbness and headaches.   Hematological:  Negative for adenopathy. Does not bruise/bleed easily.   Psychiatric/Behavioral:  Negative for agitation, behavioral problems, confusion, decreased concentration, dysphoric mood, hallucinations, self-injury, sleep disturbance and suicidal ideas. The patient is not nervous/anxious and is not hyperactive.        Objective:      Physical Exam  Vitals reviewed.   Constitutional:       General: She is not in acute distress.     Appearance: She is well-developed. She is not diaphoretic.   HENT:      Head: Normocephalic and atraumatic.      Right Ear: External ear normal.      Left Ear: External ear normal.      Nose: Nose normal.      Right Sinus: No maxillary sinus tenderness or frontal sinus tenderness.      Left Sinus: No maxillary sinus tenderness or frontal sinus tenderness.      Mouth/Throat:      Pharynx: No oropharyngeal exudate.   Eyes:      General: Lids are normal. No scleral icterus.        Right eye: No discharge.         Left eye: No discharge.      Conjunctiva/sclera: Conjunctivae normal.      Right eye: Right conjunctiva is not injected. No hemorrhage.     Left eye: Left conjunctiva  is not injected. No hemorrhage.     Pupils: Pupils are equal, round, and reactive to light.   Neck:      Thyroid: No thyromegaly.      Vascular: No JVD.      Trachea: No tracheal deviation.   Cardiovascular:      Rate and Rhythm: Normal rate.   Pulmonary:      Effort: Pulmonary effort is normal. No respiratory distress.      Breath sounds: Normal breath sounds. No stridor.   Chest:      Chest wall: No tenderness.   Abdominal:      General: Bowel sounds are normal. There is no distension.      Palpations: Abdomen is soft. There is no hepatomegaly, splenomegaly or mass.      Tenderness: There is no abdominal tenderness. There is no rebound.      Comments: No tenderness to palpation   Musculoskeletal:         General: No tenderness. Normal range of motion.      Cervical back: Normal range of motion and neck supple.   Lymphadenopathy:      Cervical: No cervical adenopathy.      Upper Body:      Right upper body: No supraclavicular adenopathy.      Left upper body: No supraclavicular adenopathy.   Skin:     General: Skin is dry.      Findings: No erythema or rash.   Neurological:      Mental Status: She is alert and oriented to person, place, and time.      Cranial Nerves: No cranial nerve deficit.      Coordination: Coordination normal.   Psychiatric:         Behavior: Behavior normal.         Thought Content: Thought content normal.         Judgment: Judgment normal.             Assessment:      1. Endometrioid adenocarcinoma of ovary, unspecified laterality    2. Chronic myeloproliferative disease    3. Iron deficiency anemia due to chronic blood loss    4. Personal history of breast cancer    5. S/P hernia surgery           Med Onc Chart Routing      Follow up with physician . Returns 6 months CBC CMP serum iron TIBC ferritin LDH C-reactive protein    Follow up with [facility]    Infusion scheduling note    Injection scheduling note    Labs    Imaging   CT chest abdomen pelvis 6 months follow-up; mammogram in summer  of 2025   Pharmacy appointment    Other referrals         Referral general surgery to see whether not any additional recommendations in terms of ventral hernia              Plan:     No evidence of recurrence still has a ventral hernia.  It relatively asymptomatic will ask General surgery to come in to see whether not any more surgical intervention is warranted I believe patient is very non reluctant to do so but will ask for their comments specifically.  Follow-up with me in 6 months with stage III endometrial carcinoma with laboratory studies and repeat imaging studies        Kwabena Andres Jr, MD FACP         [1]   Social History  Socioeconomic History    Marital status:    Tobacco Use    Smoking status: Never    Smokeless tobacco: Never   Substance and Sexual Activity    Alcohol use: Yes     Comment: socially  No alcohol prior to sx    Drug use: No   Social History Narrative    Lives with son. Surrogate decision makers: Son, Bj Sheffield [phone] and Son, Blair Sheffield [phone].     Social Drivers of Health     Financial Resource Strain: Patient Declined (1/6/2025)    Received [facility] [employer] [facility]    Overall Financial Resource Strain (CARDIA)     Difficulty of Paying Living Expenses: Patient declined   Food Insecurity: Patient Declined (1/6/2025)    Received from [facility] Its Subsidi[facility] and Affiliates    Hunger Vital Sign     Worried About Running Out of Food in the Last Year: Patient declined     Ran Out of Food in the Last Year: Patient declined   Transportation Needs: Patient Declined (1/6/2025)    Received from [facility] Its Subsidiaries and Affiliates    PRAPARE - Transportation     Lack of Transportation (Medical): Patient declined     Lack of Transportation (Non-Medical): Patient declined   Physical Activity: Insufficiently  Active (1/6/2025)    Received from [facility] and Its Subsidiaries and Affiliates    Exercise Vital Sign     Days of Exercise per Week: 2 days     Minutes of Exercise per Session: 10 min   Stress: No Stress Concern Present (1/6/2025)    Received from [facility] [employer] [facility] and Its Subsidiaries and Affiliates    [place] [employer] - Occupational Stress Questionnaire     Feeling of Stress : Not at all   Housing Stability: Patient Declined (1/6/2025)    Received from [facility] [facility]    Housing Stability Vital Sign     Unable to Pay for Housing in the Last Year: Patient declined     Number of Times Moved in the Last Year: 0     Homeless in the Last Year: Patient declined

## 2025-03-07 NOTE — TELEPHONE ENCOUNTER
Contacted patient about changing appointment over from a virtual appointment to an in-person appointment. I offered the patient a date, time and location and she accepted. The patient expressed understanding.   ----- Message from Yadira Rojas PA-C sent at 3/7/2025  1:53 PM CST -----  Contact: Mariaelena    ----- Message -----  From: Pearl Krishna  Sent: 3/7/2025  11:53 AM CST  To: [facility] Staff    .Patient is calling to speak with the nurse regarding  appt . Reports needing to come in due to the patient dont know how to work virtual  . Please give patient a call back at .[phone]

## 2025-04-10 ENCOUNTER — OFFICE VISIT (OUTPATIENT)
Dept: SURGERY | Facility: CLINIC | Age: 85
End: 2025-04-10
Payer: MEDICARE

## 2025-04-10 VITALS
DIASTOLIC BLOOD PRESSURE: 82 MMHG | HEART RATE: 88 BPM | WEIGHT: 195.56 LBS | BODY MASS INDEX: 34.65 KG/M2 | SYSTOLIC BLOOD PRESSURE: 133 MMHG | HEIGHT: 63 IN

## 2025-04-10 DIAGNOSIS — C56.9 ENDOMETRIOID ADENOCARCINOMA OF OVARY, UNSPECIFIED LATERALITY: ICD-10-CM

## 2025-04-10 DIAGNOSIS — D50.0 IRON DEFICIENCY ANEMIA DUE TO CHRONIC BLOOD LOSS: ICD-10-CM

## 2025-04-10 DIAGNOSIS — D47.1 CHRONIC MYELOPROLIFERATIVE DISEASE: ICD-10-CM

## 2025-04-10 PROCEDURE — 99999 PR PBB SHADOW E&M-EST. PATIENT-LVL V: CPT | Mod: PBBFAC,,, | Performed by: SURGERY

## 2025-04-10 NOTE — PROGRESS NOTES
Patient ID: Mariaelena Sheffield is a 85 y.o. female.    Chief Complaint:  Incisional hernia    HPI:  85-year-old female who had a large endometrial carcinoma removed through a lower midline incision.  At the time of her surgery a pre-existing incisional hernia was repaired by primary closure after excision of the endometrial carcinoma.  Over the last several years she has developed an incisional hernia.      She is sent by Oncology to discuss repair of the incisional hernia.            Review of Systems   Constitutional:  Negative for appetite change, chills, fatigue, fever and unexpected weight change.   HENT:  Negative for hearing loss and rhinorrhea.    Eyes:  Negative for visual disturbance.   Respiratory:  Negative for apnea, cough, shortness of breath and wheezing.    Cardiovascular:  Negative for chest pain and palpitations.   Gastrointestinal:  Negative for abdominal distention, abdominal pain, blood in stool, constipation, diarrhea, nausea and vomiting.        Bulging of the lower abdominal wall   Genitourinary:  Negative for dysuria, frequency and urgency.   Musculoskeletal:  Negative for arthralgias and neck pain.   Skin:  Negative for rash.   Neurological:  Negative for seizures, weakness, numbness and headaches.   Hematological:  Negative for adenopathy. Does not bruise/bleed easily.   Psychiatric/Behavioral:  Negative for hallucinations. The patient is not nervous/anxious.      Current Medications[1]    Review of patient's allergies indicates:  No Known Allergies    Past Medical History:   Diagnosis Date    Anemia 08/07/2014    Last Assessment & Plan:   Formatting of this note may be different from the original.  Anemia: routine CBC.  It has been present for several years.  Associated signs & symptoms: none.  Patient has not had colonscopy.  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       Past Surgical History:   Procedure Laterality Date    ABDOMINAL SURGERY      BREAST BIOPSY      BREAST LUMPECTOMY      BREAST SURGERY Right     Breast biopsy    CATARACT EXTRACTION, BILATERAL      EXCISION OF PELVIC MASS N/A 09/28/2022    Procedure: EXCISION, MASS, PELVIS;  Surgeon: Edison Brooks MD;  Location: [facility] OR [address] [place] [address];  Service: General;  Laterality: N/A;    FLEXIBLE SIGMOIDOSCOPY  09/28/2022    Procedure: SIGMOIDOSCOPY, FLEXIBLE;  Surgeon: Edison Brooks MD;  Location: [facility] OR [address] [place] [address];  Service: General;;    HERNIA REPAIR      LYSIS OF ADHESIONS N/A 10/25/2018    Procedure: LYSIS, ADHESIONS;  Surgeon: Kevin Caldwell MD;  Location: [facility] OR;  Service: General;  Laterality: N/A;    REPAIR OF INCARCERATED INCISIONAL HERNIA WITHOUT HISTORY OF PRIOR REPAIR N/A 10/25/2018    Procedure: REPAIR, HERNIA, INCISIONAL, INCARCERATED, WITHOUT HISTORY OF PRIOR REPAIR;  Surgeon: Kevin Caldwell MD;  Location: [facility] OR;  Service: General;  Laterality: N/A;       Social History[2]    There were no vitals filed for this visit.    Physical Exam  Vitals reviewed.   Constitutional:       Appearance: She is well-developed.      Comments: Overweight   HENT:      Head: Normocephalic.   Eyes:      Pupils: Pupils are equal, round, and reactive to light.   Neck:      Thyroid: No thyromegaly.      Vascular: No JVD.      Trachea: No tracheal deviation.   Cardiovascular:      Rate and Rhythm: Normal rate and regular rhythm.      Heart sounds: Normal heart sounds.   Pulmonary:      Breath sounds: Normal breath sounds. No wheezing.   Abdominal:      General: Abdomen is flat. Bowel sounds are normal.  There is no distension.      Palpations: Abdomen is soft. Abdomen is not rigid. There is no mass.      Tenderness: There is no abdominal tenderness. There is no guarding or rebound.      Hernia: A hernia (large reducible lower abdominal wall hernia reducible along the umbilicus but difficult to tell below the umbilicus due to the thickness of the abdominal wall) is present.      Comments: That has a long midline scar   Musculoskeletal:         General: Normal range of motion.   Lymphadenopathy:      Cervical: No cervical adenopathy.   Skin:     General: Skin is warm and dry.      Findings: No erythema or rash.   Neurological:      Mental Status: She is alert and oriented to person, place, and time.   Psychiatric:         Mood and Affect: Mood normal.         Behavior: Behavior normal.         Thought Content: Thought content normal.         Judgment: Judgment normal.     XAMINATION:  CT CHEST ABDOMEN PELVIS WITH IV CONTRAST (XPD)     CLINICAL HISTORY:  Metastatic disease evaluation;Malignant neoplasm of unspecified ovary     TECHNIQUE:  Images from the lung apices to the ischial tuberosities were acquired after administration of 100cc of Omnipaque 350 IV contrast material.  Sagittal and Coronal multiplanar reconstructions (MPR) were performed. Positive oral contrast (30ml of Omnipaque 350 oral solution) was used for a better assessment of the gastrointestinal tract.     COMPARISON:  CT chest abdomen pelvis from 03/13/2024 02/16/2024; 08/02/2023     FINDINGS:  CT Chest:     Lower neck and chest wall: Postoperative changes in the right axilla.     Lungs and pleura: Stable 3 mm nodule in the right lung apex series 6, image 95.  Bibasilar atelectasis.     Mediastinum and damaris: Stable large hiatal hernia.     Heart and pericardium: Heart size is normal. No pericardial effusion.     CT abdomen and pelvis:     Liver: Redemonstration of the calcified nodule adjacent to the right liver lobe possibly a lymph node?-unchanged  since 2023.  Findings suggestive of hepatic steatosis.     Gallbladder and biliary tree: No high-density gallstones. Normal gallbladder wall thickness.No intra- or extrahepatic biliary ductal dilation.     Spleen: Normal.     Pancreas: Normal.     Adrenals: Normal.     Kidneys and ureters: Normal.     Bowel: Cecal diverticulosis without evident diverticulitis.  Mild small bowel wall thickening in the loops adjacent to the ventral hernia.  Small amount of fluid and fat stranding in the fat around the hernia site.     Vessels: Unchanged atherosclerotic disease     Lymph nodes: Grossly unchanged.     Bladder: Normal.     Reproductive organs: No pelvic masses.     Abdominal wall: Bowel containing hernia in the ventral abdominal wall.  Postoperative changes along the anterior abdominal wall.     Bones: Multilevel degenerative changes of the spine.  Osseous demineralization.     Impression:     1. No definitive evidence to recurrent or metastatic disease.  2. Ventral hernia with internal bowel loops with adjacent fat stranding and fluid.  Recommend correlation for clinical symptoms of strangulation.        Electronically signed by:Tj Landa  Date:                                            02/28/2025  Time:                                           12:08  Assessment & Plan:    Lower abdominal wall hernia.      We have discussed observation and the signs and symptoms of incarceration versus repair.  I have discussed the rationale for the use of mesh in the repair.      I discuss that the surgery.  Performed by an open approach likely with in laid Phasix mesh.  I would choose the open approach due to the fact that in HER2 previous surgery significant amount of intra-abdominal adhesions were encountered    The risks of surgery were discussed call including infection, bleeding, wound infection, mesh infection, bowel injury, recurrence of the hernia.      The postoperative course was discussed.      The patient has decided  she does not desire to pursue surgery at this time.      The signs and symptoms of hernia incarceration was discussed with her.      Questions were answered.  Contact information was         [1]   Current Outpatient Medications   Medication Sig Dispense Refill    allopurinoL (ZYLOPRIM) 300 MG tablet Take 300 mg by mouth once daily.      amLODIPine (NORVASC) 5 MG tablet Take 5 mg by mouth once daily.      atorvastatin (LIPITOR) 20 MG tablet Take 20 mg by mouth every evening.       blood sugar diagnostic Strp TEST ONE TO TWO TIMES DAILY (NEED MD APPOINTMENT)      blood-glucose meter Misc Test BID      calcium carbonate (OS-HANNA) 500 mg calcium (1,250 mg) tablet Take 1 tablet by mouth every morning.      cholecalciferol, vitamin D3, (VITAMIN D3) 25 mcg (1,000 unit) capsule Take 1 capsule by mouth every morning.      colchicine 0.6 mg tablet Take 0.6 mg by mouth once daily.      cyanocobalamin (VITAMIN B-12) 1000 MCG tablet Take 1 tablet by mouth every morning.      dexAMETHasone (DECADRON) 4 MG Tab Take 2 tablets (8 mg total) by mouth once daily. Take as directed on days 2, 3, and 4 of your chemotherapy cycle. 24 tablet 5    diclofenac sodium (VOLTAREN) 1 % Gel Apply 2 g topically 2 (two) times daily as needed.      dicyclomine (BENTYL) 10 MG capsule Take 10 mg by mouth 3 (three) times daily as needed.      ferrous sulfate 325 (65 FE) MG EC tablet Take 325 mg by mouth once daily.      lancets Misc TEST BLOOD SUGAR  1  TO  2  TIMES  PER  DAY  ( NEED MD APPOINTMENT  )      lisinopriL (PRINIVIL,ZESTRIL) 20 MG tablet Take 1 tablet by mouth 2 (two) times daily.      metFORMIN (GLUCOPHAGE) 500 MG tablet Take 1 tablet by mouth daily with dinner or evening meal.      nozaseptin (NOZIN) nasal  1 each by Each Nare route 2 (two) times daily. 1 applicator 0    omeprazole (PRILOSEC) 40 MG capsule Take 40 mg by mouth once daily.       ondansetron (ZOFRAN-ODT) 8 MG TbDL Take 1 tablet (8 mg total) by mouth every 8 (eight)  hours as needed (nausea/vomiting). 24 tablet 2    oxyCODONE (ROXICODONE) 5 MG immediate release tablet Take 1 tablet (5 mg total) by mouth every 6 (six) hours as needed. 12 tablet 0    polyethylene glycol (GLYCOLAX) 17 gram PwPk Take 17 g by mouth daily 28 packet 0     Current Facility-Administered Medications   Medication Dose Route Frequency Provider Last Rate Last Admin    lidocaine (PF) 10 mg/ml (1%) injection 10 mg  1 mL Intradermal Once Kevin Caldwell MD       [2]   Social History  Socioeconomic History    Marital status:    Tobacco Use    Smoking status: Never    Smokeless tobacco: Never   Substance and Sexual Activity    Alcohol use: Yes     Comment: socially  No alcohol prior to sx    Drug use: No   Social History Narrative    Lives with son. Surrogate decision makers: Son, Bj Sheffield [phone] and Son, Blair Sheffield [phone].     Social Drivers of Health     Financial Resource Strain: Patient Declined (1/6/2025)    Received from [employer] [facility] and Its Subsidiaries and Affiliates    Overall Financial Resource Strain (CARDIA)     Difficulty of Paying Living Expenses: Patient declined   Food Insecurity: Patient Declined (1/6/2025)    Received from [employer] [facility] and Its Subsidiaries and Affiliates    Hunger Vital Sign     Worried About Running Out of Food in the Last Year: Patient declined     Ran Out of Food in the Last Year: Patient declined   Transportation Needs: Patient Declined (1/6/2025)    Received from [employer] [facility] and Its Subsidiaries and Affiliates    PRAPARE - Transportation     Lack of Transportation (Medical): Patient declined     Lack of Transportation (Non-Medical): Patient declined   Physical Activity: Insufficiently Active (1/6/2025)    Received from [employer] [facility] and Its Subsidiaries and Affiliates    Exercise Vital Sign      Days of Exercise per Week: 2 days     Minutes of Exercise per Session: 10 min   Stress: No Stress Concern Present (1/6/2025)    Received from [facility] and Its Subsidiaries and Affiliates    Citizen of [place] [employer] - Occupational Stress Questionnaire     Feeling of Stress : Not at all   Housing Stability: Patient Declined (1/6/2025)    Received from [facility] and Its Subsidiaries and Affiliates    Housing Stability Vital Sign     Unable to Pay for Housing in the Last Year: Patient declined     Number of Times Moved in the Last Year: 0     Homeless in the Last Year: Patient declined

## 2025-04-10 NOTE — PATIENT INSTRUCTIONS
With regard to monitoring your hernia it is okay to do this.      If the hernia bulges out and will not go back in, you have severe abdominal pain, redness or discomfort of your abdominal wall, or persistent nausea and vomiting please go to the emergency room.      If you change your mind about hernia surgery please let us know    Our office phone numbers are  [phone] and

## (undated) DEVICE — KIT IRR SUCTION HND PIECE

## (undated) DEVICE — SUT PROLENE 5-0 36 V-5 V-5

## (undated) DEVICE — SPONGE LAP 18X18 PREWASHED

## (undated) DEVICE — DRAPE ABDOMINAL TIBURON 14X11

## (undated) DEVICE — NDL 22GA X1 1/2 REG BEVEL

## (undated) DEVICE — STAPLER SKIN PROXIMATE WIDE

## (undated) DEVICE — GOWN SMART IMP BREATHABLE XXLG

## (undated) DEVICE — TRAY CATH FOL SIL URIMTR 16FR

## (undated) DEVICE — TUBING SUC UNIV W/CONN 12FT

## (undated) DEVICE — SUT SILK 3-0 STRANDS 30IN

## (undated) DEVICE — SUT PDS II 96 1 VIO

## (undated) DEVICE — APPLICATOR CHLORAPREP ORN 26ML

## (undated) DEVICE — CLIPPER BLADE MOD 4406 (CAREF)

## (undated) DEVICE — Device

## (undated) DEVICE — COVER OVERHEAD SURG LT BLUE

## (undated) DEVICE — DEVICE ENSEAL X1 LARGE JAW

## (undated) DEVICE — GAUZE SPONGE 4X4 12PLY

## (undated) DEVICE — CUTTER PROXIMATE 100MM GRN

## (undated) DEVICE — SEE MEDLINE ITEM 152622

## (undated) DEVICE — SUT CTD VICRYL VIL BR CR/SH

## (undated) DEVICE — SUT 3-0 12-18IN SILK

## (undated) DEVICE — DECANTER VIAL ASEPTIC TRANSFER

## (undated) DEVICE — SUT ETHILON 2-0 PSLX 30IN

## (undated) DEVICE — SUT PROLENE 0 MO6 30IN BLUE

## (undated) DEVICE — SUT SILK 3-0 SH 18IN BLACK

## (undated) DEVICE — SUT 1 36IN PDS II VIO MONO

## (undated) DEVICE — SUT SILK 2-0 SH 18IN BLACK

## (undated) DEVICE — GAUZE SPONGE PEANUT STRL

## (undated) DEVICE — SUT VICRYL BR 1 GEN 27 CT-1

## (undated) DEVICE — SUT 2/0 30IN SILK BLK BRAI

## (undated) DEVICE — SUT 0 54IN COATED VICRYL U

## (undated) DEVICE — GOWN SURGICAL X-LARGE

## (undated) DEVICE — TUBE PENROSE DRAIN 18INX5/8IN

## (undated) DEVICE — SUT 1 48IN PDS II VIO MONO

## (undated) DEVICE — SYR 30CC LUER LOCK

## (undated) DEVICE — SUT VICRYL PLUS 3-0 SH 18IN

## (undated) DEVICE — SUT PROLENE 4-0 RB-1 BL MO

## (undated) DEVICE — SUT SILK 2-0 STRANDS 30IN

## (undated) DEVICE — SEE MEDLINE ITEM 157117

## (undated) DEVICE — ELECTRODE REM PLYHSV RETURN 9

## (undated) DEVICE — DRESSING ADH ISLAND 3.6 X 14

## (undated) DEVICE — SEALER LIGASURE MARYLAND 23CM

## (undated) DEVICE — GLOVE 7.0 PROTEXIS PI BLUE

## (undated) DEVICE — HANDLE SCISSORS HS ACE 23CM

## (undated) DEVICE — SUT 4/0 27IN PDS II VIO MON

## (undated) DEVICE — TOWEL OR XRAY WHITE 17X26IN

## (undated) DEVICE — PAD K-THERMIA 24IN X 60IN

## (undated) DEVICE — KIT PREVENA PLUS

## (undated) DEVICE — SUT VICRYL 3-0 27 SH

## (undated) DEVICE — SEE MEDLINE ITEM 157027

## (undated) DEVICE — SEE MEDLINE ITEM 156902

## (undated) DEVICE — TIP YANKAUERS BULB NO VENT

## (undated) DEVICE — SUT 2-0 SILK 30IN BLK BRAID

## (undated) DEVICE — ELECTRODE BLD EXT 6.50 ST DISP

## (undated) DEVICE — SUT SILK 0 SUTUPAK SA86H

## (undated) DEVICE — SOL 9P NACL IRR PIC IL

## (undated) DEVICE — POSITIONER HEAD DONUT 9IN FOAM

## (undated) DEVICE — PAD ABD 8X10 STERILE

## (undated) DEVICE — SEE MEDLINE ITEM 146345

## (undated) DEVICE — SUT PROLENE 3-0 30SH

## (undated) DEVICE — SUT PDS II 0 CT-1 VIL MONO

## (undated) DEVICE — CONTAINER SPECIMEN STRL 4OZ

## (undated) DEVICE — CUTTER PROXIMATE BLUE 75MM

## (undated) DEVICE — SUT 2-0 12-18IN SILK

## (undated) DEVICE — KIT SAHARA DRAPE DRAW/LIFT

## (undated) DEVICE — GLOVE PROTEXIS HYDROGEL SZ7

## (undated) DEVICE — MANIFOLD 4 PORT

## (undated) DEVICE — WARMER DRAPE STERILE LF